# Patient Record
Sex: MALE | Race: WHITE | NOT HISPANIC OR LATINO
[De-identification: names, ages, dates, MRNs, and addresses within clinical notes are randomized per-mention and may not be internally consistent; named-entity substitution may affect disease eponyms.]

---

## 2017-01-30 ENCOUNTER — RX RENEWAL (OUTPATIENT)
Age: 82
End: 2017-01-30

## 2017-02-24 ENCOUNTER — APPOINTMENT (OUTPATIENT)
Dept: INTERNAL MEDICINE | Facility: CLINIC | Age: 82
End: 2017-02-24

## 2017-02-24 VITALS
OXYGEN SATURATION: 98 % | TEMPERATURE: 98.7 F | BODY MASS INDEX: 28.49 KG/M2 | HEIGHT: 68 IN | HEART RATE: 80 BPM | RESPIRATION RATE: 12 BRPM | WEIGHT: 188 LBS | DIASTOLIC BLOOD PRESSURE: 69 MMHG | SYSTOLIC BLOOD PRESSURE: 149 MMHG

## 2017-02-24 DIAGNOSIS — K40.90 UNILATERAL INGUINAL HERNIA, W/OUT OBSTRUCTION OR GANGRENE, NOT SPECIFIED AS RECURRENT: ICD-10-CM

## 2017-02-24 DIAGNOSIS — B35.4 TINEA CORPORIS: ICD-10-CM

## 2017-03-11 PROBLEM — K40.90 LEFT INGUINAL HERNIA: Status: RESOLVED | Noted: 2017-03-11 | Resolved: 2017-03-11

## 2017-03-11 LAB
25(OH)D3 SERPL-MCNC: 21.6 NG/ML
ALBUMIN SERPL ELPH-MCNC: 4.4 G/DL
ALP BLD-CCNC: 60 U/L
ALT SERPL-CCNC: 27 U/L
ANION GAP SERPL CALC-SCNC: 16 MMOL/L
AST SERPL-CCNC: 24 U/L
BACTERIA UR CULT: NORMAL
BILIRUB SERPL-MCNC: 0.2 MG/DL
BUN SERPL-MCNC: 17 MG/DL
CALCIUM SERPL-MCNC: 9.4 MG/DL
CHLORIDE SERPL-SCNC: 103 MMOL/L
CHOLEST SERPL-MCNC: 170 MG/DL
CHOLEST/HDLC SERPL: 4.5 RATIO
CO2 SERPL-SCNC: 20 MMOL/L
CREAT SERPL-MCNC: 1.03 MG/DL
CREAT SPEC-SCNC: 97 MG/DL
FOLATE SERPL-MCNC: >20 NG/ML
FRUCTOSAMINE SERPL-MCNC: 246 UMOL/L
GLUCOSE SERPL-MCNC: 97 MG/DL
HBA1C MFR BLD HPLC: 6 %
HDLC SERPL-MCNC: 38 MG/DL
IRON SERPL-MCNC: 76 UG/DL
LDLC SERPL CALC-MCNC: 80 MG/DL
MICROALBUMIN 24H UR DL<=1MG/L-MCNC: 1 MG/DL
MICROALBUMIN/CREAT 24H UR-RTO: 10 UG/MG
POTASSIUM SERPL-SCNC: 4.4 MMOL/L
PROT SERPL-MCNC: 7.7 G/DL
SODIUM SERPL-SCNC: 139 MMOL/L
T PALLIDUM AB SER QL IA: NEGATIVE
TRIGL SERPL-MCNC: 261 MG/DL
URATE SERPL-MCNC: 7.4 MG/DL
VIT B12 SERPL-MCNC: 433 PG/ML

## 2017-03-13 LAB
BASOPHILS # BLD AUTO: 0.01 K/UL
BASOPHILS NFR BLD AUTO: 0.2 %
EOSINOPHIL # BLD AUTO: 0.1 K/UL
EOSINOPHIL NFR BLD AUTO: 1.9 %
HCT VFR BLD CALC: 40 %
HGB BLD-MCNC: 13.1 G/DL
IMM GRANULOCYTES NFR BLD AUTO: 0.4 %
LYMPHOCYTES # BLD AUTO: 1.51 K/UL
LYMPHOCYTES NFR BLD AUTO: 28.1 %
MAN DIFF?: NORMAL
MCHC RBC-ENTMCNC: 32 PG
MCHC RBC-ENTMCNC: 32.8 GM/DL
MCV RBC AUTO: 97.8 FL
MONOCYTES # BLD AUTO: 0.74 K/UL
MONOCYTES NFR BLD AUTO: 13.8 %
NEUTROPHILS # BLD AUTO: 3 K/UL
NEUTROPHILS NFR BLD AUTO: 55.6 %
PLATELET # BLD AUTO: 244 K/UL
RBC # BLD: 4.09 M/UL
RBC # FLD: 13.7 %
WBC # FLD AUTO: 5.38 K/UL

## 2017-03-17 ENCOUNTER — APPOINTMENT (OUTPATIENT)
Dept: INTERNAL MEDICINE | Facility: CLINIC | Age: 82
End: 2017-03-17

## 2017-03-17 VITALS
DIASTOLIC BLOOD PRESSURE: 72 MMHG | TEMPERATURE: 98.3 F | WEIGHT: 187 LBS | RESPIRATION RATE: 12 BRPM | SYSTOLIC BLOOD PRESSURE: 143 MMHG | HEIGHT: 68 IN | OXYGEN SATURATION: 96 % | HEART RATE: 71 BPM | BODY MASS INDEX: 28.34 KG/M2

## 2017-03-17 DIAGNOSIS — L30.4 ERYTHEMA INTERTRIGO: ICD-10-CM

## 2017-04-13 PROBLEM — L30.4 INTERTRIGO: Status: ACTIVE | Noted: 2017-02-24

## 2017-07-31 ENCOUNTER — APPOINTMENT (OUTPATIENT)
Dept: INTERNAL MEDICINE | Facility: CLINIC | Age: 82
End: 2017-07-31
Payer: MEDICARE

## 2017-07-31 ENCOUNTER — LABORATORY RESULT (OUTPATIENT)
Age: 82
End: 2017-07-31

## 2017-07-31 VITALS
SYSTOLIC BLOOD PRESSURE: 135 MMHG | DIASTOLIC BLOOD PRESSURE: 72 MMHG | OXYGEN SATURATION: 95 % | WEIGHT: 183 LBS | BODY MASS INDEX: 27.74 KG/M2 | HEIGHT: 68 IN | TEMPERATURE: 98.5 F | HEART RATE: 69 BPM | RESPIRATION RATE: 12 BRPM

## 2017-07-31 PROCEDURE — 99214 OFFICE O/P EST MOD 30 MIN: CPT

## 2017-08-11 LAB
25(OH)D3 SERPL-MCNC: 40.3 NG/ML
ALBUMIN SERPL ELPH-MCNC: 4.3 G/DL
ALP BLD-CCNC: 50 U/L
ALT SERPL-CCNC: 22 U/L
ANION GAP SERPL CALC-SCNC: 17 MMOL/L
APPEARANCE: CLEAR
AST SERPL-CCNC: 21 U/L
BACTERIA UR CULT: NORMAL
BASOPHILS # BLD AUTO: 0.01 K/UL
BASOPHILS NFR BLD AUTO: 0.2 %
BILIRUB SERPL-MCNC: 0.4 MG/DL
BILIRUBIN URINE: NEGATIVE
BLOOD URINE: NEGATIVE
BUN SERPL-MCNC: 23 MG/DL
CALCIUM SERPL-MCNC: 10.4 MG/DL
CHLORIDE SERPL-SCNC: 103 MMOL/L
CHOLEST SERPL-MCNC: 137 MG/DL
CHOLEST/HDLC SERPL: 3.6 RATIO
CO2 SERPL-SCNC: 19 MMOL/L
COLOR: YELLOW
CREAT SERPL-MCNC: 1.3 MG/DL
CREAT SPEC-SCNC: 147 MG/DL
EOSINOPHIL # BLD AUTO: 0.05 K/UL
EOSINOPHIL NFR BLD AUTO: 1.1 %
FOLATE SERPL-MCNC: 19.1 NG/ML
FRUCTOSAMINE SERPL-MCNC: 247 UMOL/L
GLUCOSE QUALITATIVE U: NORMAL MG/DL
GLUCOSE SERPL-MCNC: 83 MG/DL
HBA1C MFR BLD HPLC: 5.9 %
HCT VFR BLD CALC: 39.5 %
HDLC SERPL-MCNC: 38 MG/DL
HGB BLD-MCNC: 13.1 G/DL
IMM GRANULOCYTES NFR BLD AUTO: 0.9 %
IRON SATN MFR SERPL: 38 %
IRON SERPL-MCNC: 94 UG/DL
KETONES URINE: NEGATIVE
LDLC SERPL CALC-MCNC: 59 MG/DL
LEUKOCYTE ESTERASE URINE: NEGATIVE
LYMPHOCYTES # BLD AUTO: 1.55 K/UL
LYMPHOCYTES NFR BLD AUTO: 35.1 %
MAN DIFF?: NORMAL
MCHC RBC-ENTMCNC: 32.1 PG
MCHC RBC-ENTMCNC: 33.2 GM/DL
MCV RBC AUTO: 96.8 FL
MICROALBUMIN 24H UR DL<=1MG/L-MCNC: 2.1 MG/DL
MICROALBUMIN/CREAT 24H UR-RTO: 14 MG/G
MONOCYTES # BLD AUTO: 0.83 K/UL
MONOCYTES NFR BLD AUTO: 18.8 %
NEUTROPHILS # BLD AUTO: 1.94 K/UL
NEUTROPHILS NFR BLD AUTO: 43.9 %
NITRITE URINE: NEGATIVE
PH URINE: 5.5
PLATELET # BLD AUTO: 229 K/UL
POTASSIUM SERPL-SCNC: 4.6 MMOL/L
PROT SERPL-MCNC: 8.1 G/DL
PROTEIN URINE: ABNORMAL MG/DL
RBC # BLD: 4.08 M/UL
RBC # FLD: 13.8 %
SODIUM SERPL-SCNC: 139 MMOL/L
SPECIFIC GRAVITY URINE: 1.02
TIBC SERPL-MCNC: 245 UG/DL
TRIGL SERPL-MCNC: 199 MG/DL
TSH SERPL-ACNC: 2.21 UIU/ML
UIBC SERPL-MCNC: 151 UG/DL
UROBILINOGEN URINE: NORMAL MG/DL
VIT B12 SERPL-MCNC: 434 PG/ML
WBC # FLD AUTO: 4.42 K/UL

## 2017-09-29 ENCOUNTER — APPOINTMENT (OUTPATIENT)
Dept: INTERNAL MEDICINE | Facility: CLINIC | Age: 82
End: 2017-09-29

## 2017-10-24 ENCOUNTER — APPOINTMENT (OUTPATIENT)
Dept: INTERNAL MEDICINE | Facility: CLINIC | Age: 82
End: 2017-10-24
Payer: MEDICARE

## 2017-10-24 VITALS — SYSTOLIC BLOOD PRESSURE: 140 MMHG | DIASTOLIC BLOOD PRESSURE: 60 MMHG

## 2017-10-24 VITALS
SYSTOLIC BLOOD PRESSURE: 151 MMHG | DIASTOLIC BLOOD PRESSURE: 74 MMHG | HEART RATE: 80 BPM | HEIGHT: 68 IN | TEMPERATURE: 98.4 F | RESPIRATION RATE: 12 BRPM | OXYGEN SATURATION: 95 % | WEIGHT: 184 LBS | BODY MASS INDEX: 27.89 KG/M2

## 2017-10-24 DIAGNOSIS — Z23 ENCOUNTER FOR IMMUNIZATION: ICD-10-CM

## 2017-10-24 DIAGNOSIS — L03.116 CELLULITIS OF LEFT LOWER LIMB: ICD-10-CM

## 2017-10-24 PROCEDURE — G0008: CPT

## 2017-10-24 PROCEDURE — 99214 OFFICE O/P EST MOD 30 MIN: CPT | Mod: 25

## 2017-10-24 PROCEDURE — 90686 IIV4 VACC NO PRSV 0.5 ML IM: CPT

## 2017-11-08 LAB
25(OH)D3 SERPL-MCNC: 41.1 NG/ML
ALBUMIN SERPL ELPH-MCNC: 4.3 G/DL
ALP BLD-CCNC: 47 U/L
ALT SERPL-CCNC: 20 U/L
ANION GAP SERPL CALC-SCNC: 14 MMOL/L
APPEARANCE: CLEAR
AST SERPL-CCNC: 24 U/L
BASOPHILS # BLD AUTO: 0.01 K/UL
BASOPHILS NFR BLD AUTO: 0.2 %
BILIRUB SERPL-MCNC: 0.3 MG/DL
BILIRUBIN URINE: NEGATIVE
BLOOD URINE: NEGATIVE
BUN SERPL-MCNC: 20 MG/DL
CALCIUM SERPL-MCNC: 9.3 MG/DL
CHLORIDE SERPL-SCNC: 104 MMOL/L
CHOLEST SERPL-MCNC: 143 MG/DL
CHOLEST/HDLC SERPL: 3.7 RATIO
CO2 SERPL-SCNC: 21 MMOL/L
COLOR: YELLOW
CREAT SERPL-MCNC: 1.06 MG/DL
CREAT SPEC-SCNC: 86 MG/DL
EOSINOPHIL # BLD AUTO: 0.08 K/UL
EOSINOPHIL NFR BLD AUTO: 1.4 %
GLUCOSE QUALITATIVE U: NEGATIVE MG/DL
GLUCOSE SERPL-MCNC: 81 MG/DL
HBA1C MFR BLD HPLC: 6 %
HCT VFR BLD CALC: 39.6 %
HDLC SERPL-MCNC: 39 MG/DL
HGB BLD-MCNC: 12.4 G/DL
IMM GRANULOCYTES NFR BLD AUTO: 0.3 %
KETONES URINE: NEGATIVE
LDLC SERPL CALC-MCNC: 70 MG/DL
LEUKOCYTE ESTERASE URINE: NEGATIVE
LYMPHOCYTES # BLD AUTO: 1.57 K/UL
LYMPHOCYTES NFR BLD AUTO: 27.4 %
MAN DIFF?: NORMAL
MCHC RBC-ENTMCNC: 31.3 GM/DL
MCHC RBC-ENTMCNC: 31.9 PG
MCV RBC AUTO: 101.8 FL
MICROALBUMIN 24H UR DL<=1MG/L-MCNC: 1.1 MG/DL
MICROALBUMIN/CREAT 24H UR-RTO: 13 MG/G
MONOCYTES # BLD AUTO: 0.87 K/UL
MONOCYTES NFR BLD AUTO: 15.2 %
NEUTROPHILS # BLD AUTO: 3.19 K/UL
NEUTROPHILS NFR BLD AUTO: 55.5 %
NITRITE URINE: NEGATIVE
PH URINE: 5
PLATELET # BLD AUTO: 229 K/UL
POTASSIUM SERPL-SCNC: 4.4 MMOL/L
PROT SERPL-MCNC: 8.3 G/DL
PROTEIN URINE: NEGATIVE MG/DL
RBC # BLD: 3.89 M/UL
RBC # FLD: 14.3 %
SODIUM SERPL-SCNC: 139 MMOL/L
SPECIFIC GRAVITY URINE: 1.02
TRIGL SERPL-MCNC: 168 MG/DL
UROBILINOGEN URINE: NEGATIVE MG/DL
WBC # FLD AUTO: 5.74 K/UL

## 2018-01-24 ENCOUNTER — RX RENEWAL (OUTPATIENT)
Age: 83
End: 2018-01-24

## 2018-02-13 ENCOUNTER — APPOINTMENT (OUTPATIENT)
Dept: INTERNAL MEDICINE | Facility: CLINIC | Age: 83
End: 2018-02-13
Payer: MEDICARE

## 2018-02-13 VITALS — SYSTOLIC BLOOD PRESSURE: 142 MMHG | DIASTOLIC BLOOD PRESSURE: 76 MMHG

## 2018-02-13 VITALS
TEMPERATURE: 98.4 F | SYSTOLIC BLOOD PRESSURE: 165 MMHG | BODY MASS INDEX: 28.79 KG/M2 | HEIGHT: 68 IN | WEIGHT: 190 LBS | HEART RATE: 74 BPM | OXYGEN SATURATION: 95 % | RESPIRATION RATE: 12 BRPM | DIASTOLIC BLOOD PRESSURE: 89 MMHG

## 2018-02-13 DIAGNOSIS — H53.9 UNSPECIFIED VISUAL DISTURBANCE: ICD-10-CM

## 2018-02-13 PROCEDURE — 99213 OFFICE O/P EST LOW 20 MIN: CPT | Mod: 25

## 2018-02-13 PROCEDURE — G0439: CPT

## 2018-02-25 LAB
25(OH)D3 SERPL-MCNC: 38.9 NG/ML
ALBUMIN SERPL ELPH-MCNC: 4.1 G/DL
ALP BLD-CCNC: 53 U/L
ALT SERPL-CCNC: 17 U/L
ANION GAP SERPL CALC-SCNC: 18 MMOL/L
AST SERPL-CCNC: 24 U/L
BILIRUB SERPL-MCNC: 0.4 MG/DL
BUN SERPL-MCNC: 18 MG/DL
CALCIUM SERPL-MCNC: 9.7 MG/DL
CHLORIDE SERPL-SCNC: 103 MMOL/L
CHOLEST SERPL-MCNC: 138 MG/DL
CHOLEST/HDLC SERPL: 3.5 RATIO
CO2 SERPL-SCNC: 20 MMOL/L
CREAT SERPL-MCNC: 1.19 MG/DL
FOLATE SERPL-MCNC: >20 NG/ML
GLUCOSE SERPL-MCNC: 83 MG/DL
HBA1C MFR BLD HPLC: 5.9 %
HDLC SERPL-MCNC: 40 MG/DL
IRON SATN MFR SERPL: 33 %
IRON SERPL-MCNC: 79 UG/DL
LDLC SERPL CALC-MCNC: 61 MG/DL
POTASSIUM SERPL-SCNC: 4.3 MMOL/L
PROT SERPL-MCNC: 8.3 G/DL
PSA SERPL-MCNC: 1.88 NG/ML
SODIUM SERPL-SCNC: 141 MMOL/L
TIBC SERPL-MCNC: 237 UG/DL
TRIGL SERPL-MCNC: 187 MG/DL
TSH SERPL-ACNC: 2.25 UIU/ML
UIBC SERPL-MCNC: 158 UG/DL
VIT B12 SERPL-MCNC: 532 PG/ML
VZV AB TITR SER: POSITIVE
VZV IGG SER IF-ACNC: 2336 INDEX

## 2018-03-20 LAB
BASOPHILS # BLD AUTO: 0.01 K/UL
BASOPHILS NFR BLD AUTO: 0.2 %
EOSINOPHIL # BLD AUTO: 0.05 K/UL
EOSINOPHIL NFR BLD AUTO: 0.9 %
HCT VFR BLD CALC: 39.7 %
HGB BLD-MCNC: 13.2 G/DL
IMM GRANULOCYTES NFR BLD AUTO: 0.8 %
LYMPHOCYTES # BLD AUTO: 1.94 K/UL
LYMPHOCYTES NFR BLD AUTO: 36.8 %
MAN DIFF?: NORMAL
MCHC RBC-ENTMCNC: 32.3 PG
MCHC RBC-ENTMCNC: 33.2 GM/DL
MCV RBC AUTO: 97.1 FL
MONOCYTES # BLD AUTO: 0.88 K/UL
MONOCYTES NFR BLD AUTO: 16.7 %
NEUTROPHILS # BLD AUTO: 2.35 K/UL
NEUTROPHILS NFR BLD AUTO: 44.6 %
PLATELET # BLD AUTO: 224 K/UL
RBC # BLD: 4.09 M/UL
RBC # FLD: 13.8 %
WBC # FLD AUTO: 5.27 K/UL

## 2018-04-19 ENCOUNTER — RX RENEWAL (OUTPATIENT)
Age: 83
End: 2018-04-19

## 2018-04-19 RX ORDER — CEPHALEXIN 500 MG/1
500 TABLET ORAL 3 TIMES DAILY
Qty: 30 | Refills: 0 | Status: COMPLETED | COMMUNITY
Start: 2017-10-24 | End: 2018-04-19

## 2018-04-20 ENCOUNTER — MEDICATION RENEWAL (OUTPATIENT)
Age: 83
End: 2018-04-20

## 2018-04-25 ENCOUNTER — RX RENEWAL (OUTPATIENT)
Age: 83
End: 2018-04-25

## 2018-05-03 ENCOUNTER — APPOINTMENT (OUTPATIENT)
Dept: INTERNAL MEDICINE | Facility: CLINIC | Age: 83
End: 2018-05-03
Payer: MEDICARE

## 2018-05-03 VITALS
HEIGHT: 68 IN | HEART RATE: 78 BPM | OXYGEN SATURATION: 96 % | TEMPERATURE: 98 F | SYSTOLIC BLOOD PRESSURE: 133 MMHG | RESPIRATION RATE: 12 BRPM | BODY MASS INDEX: 27.74 KG/M2 | DIASTOLIC BLOOD PRESSURE: 71 MMHG | WEIGHT: 183 LBS

## 2018-05-03 DIAGNOSIS — R21 RASH AND OTHER NONSPECIFIC SKIN ERUPTION: ICD-10-CM

## 2018-05-03 DIAGNOSIS — R04.0 EPISTAXIS: ICD-10-CM

## 2018-05-03 PROCEDURE — 99214 OFFICE O/P EST MOD 30 MIN: CPT

## 2018-07-01 DIAGNOSIS — M25.579 PAIN IN UNSPECIFIED ANKLE AND JOINTS OF UNSPECIFIED FOOT: ICD-10-CM

## 2018-08-14 PROBLEM — M25.579 ANKLE PAIN: Status: ACTIVE | Noted: 2018-08-14

## 2018-10-11 ENCOUNTER — APPOINTMENT (OUTPATIENT)
Dept: INTERNAL MEDICINE | Facility: CLINIC | Age: 83
End: 2018-10-11
Payer: MEDICARE

## 2018-10-11 VITALS
SYSTOLIC BLOOD PRESSURE: 145 MMHG | HEIGHT: 68 IN | DIASTOLIC BLOOD PRESSURE: 75 MMHG | RESPIRATION RATE: 12 BRPM | TEMPERATURE: 99.2 F | BODY MASS INDEX: 27.58 KG/M2 | WEIGHT: 182 LBS | OXYGEN SATURATION: 94 % | HEART RATE: 75 BPM

## 2018-10-11 VITALS — DIASTOLIC BLOOD PRESSURE: 66 MMHG | SYSTOLIC BLOOD PRESSURE: 110 MMHG

## 2018-10-11 DIAGNOSIS — E55.9 VITAMIN D DEFICIENCY, UNSPECIFIED: ICD-10-CM

## 2018-10-11 DIAGNOSIS — M10.9 GOUT, UNSPECIFIED: ICD-10-CM

## 2018-10-11 PROCEDURE — 90686 IIV4 VACC NO PRSV 0.5 ML IM: CPT

## 2018-10-11 PROCEDURE — G0008: CPT

## 2018-10-11 PROCEDURE — 99214 OFFICE O/P EST MOD 30 MIN: CPT | Mod: 25

## 2018-10-24 ENCOUNTER — APPOINTMENT (OUTPATIENT)
Dept: INTERNAL MEDICINE | Facility: CLINIC | Age: 83
End: 2018-10-24
Payer: MEDICARE

## 2018-10-24 VITALS
WEIGHT: 183 LBS | HEIGHT: 68 IN | DIASTOLIC BLOOD PRESSURE: 72 MMHG | SYSTOLIC BLOOD PRESSURE: 164 MMHG | RESPIRATION RATE: 12 BRPM | BODY MASS INDEX: 27.74 KG/M2 | OXYGEN SATURATION: 97 % | TEMPERATURE: 98.2 F | HEART RATE: 62 BPM

## 2018-10-24 VITALS — DIASTOLIC BLOOD PRESSURE: 76 MMHG | SYSTOLIC BLOOD PRESSURE: 140 MMHG

## 2018-10-24 DIAGNOSIS — L23.7 ALLERGIC CONTACT DERMATITIS DUE TO PLANTS, EXCEPT FOOD: ICD-10-CM

## 2018-10-24 PROCEDURE — 99214 OFFICE O/P EST MOD 30 MIN: CPT

## 2018-10-25 LAB
25(OH)D3 SERPL-MCNC: 27.3 NG/ML
ALBUMIN SERPL ELPH-MCNC: 4.4 G/DL
ALP BLD-CCNC: 47 U/L
ALT SERPL-CCNC: 20 U/L
ANION GAP SERPL CALC-SCNC: 13 MMOL/L
APPEARANCE: CLEAR
AST SERPL-CCNC: 21 U/L
BILIRUB SERPL-MCNC: 0.4 MG/DL
BILIRUBIN URINE: NEGATIVE
BLOOD URINE: NEGATIVE
BUN SERPL-MCNC: 18 MG/DL
CALCIUM SERPL-MCNC: 9.6 MG/DL
CHLORIDE SERPL-SCNC: 103 MMOL/L
CHOLEST SERPL-MCNC: 137 MG/DL
CHOLEST/HDLC SERPL: 3.6 RATIO
CO2 SERPL-SCNC: 23 MMOL/L
COLOR: YELLOW
CREAT SERPL-MCNC: 1.18 MG/DL
CRP SERPL-MCNC: 0.16 MG/DL
ERYTHROCYTE [SEDIMENTATION RATE] IN BLOOD BY WESTERGREN METHOD: 25 MM/HR
ESTIMATED AVERAGE GLUCOSE: 123 MG/DL
FOLATE SERPL-MCNC: >20 NG/ML
GLUCOSE QUALITATIVE U: NEGATIVE MG/DL
GLUCOSE SERPL-MCNC: 94 MG/DL
HBA1C MFR BLD HPLC: 5.9 %
HDLC SERPL-MCNC: 38 MG/DL
IRON SERPL-MCNC: 102 UG/DL
KETONES URINE: NEGATIVE
LDLC SERPL CALC-MCNC: 72 MG/DL
LEUKOCYTE ESTERASE URINE: NEGATIVE
NITRITE URINE: NEGATIVE
PH URINE: 5
POTASSIUM SERPL-SCNC: 4.2 MMOL/L
PROT SERPL-MCNC: 7.9 G/DL
PROTEIN URINE: NEGATIVE MG/DL
PSA SERPL-MCNC: 1.93 NG/ML
SODIUM SERPL-SCNC: 139 MMOL/L
SPECIFIC GRAVITY URINE: 1.01
TRIGL SERPL-MCNC: 135 MG/DL
TSH SERPL-ACNC: 3.18 UIU/ML
URATE SERPL-MCNC: 7.6 MG/DL
UROBILINOGEN URINE: NEGATIVE MG/DL
VIT B12 SERPL-MCNC: 491 PG/ML

## 2018-10-25 NOTE — PHYSICAL EXAM
[No Acute Distress] : no acute distress [Well Nourished] : well nourished [Well Developed] : well developed [Well-Appearing] : well-appearing [Normal Voice/Communication] : normal voice/communication [Normal Sclera/Conjunctiva] : normal sclera/conjunctiva [EOMI] : extraocular movements intact [Normal Outer Ear/Nose] : the outer ears and nose were normal in appearance [Normal Oropharynx] : the oropharynx was normal [Normal TMs] : both tympanic membranes were normal [No JVD] : no jugular venous distention [Supple] : supple [No Lymphadenopathy] : no lymphadenopathy [Thyroid Normal, No Nodules] : the thyroid was normal and there were no nodules present [No Respiratory Distress] : no respiratory distress  [Clear to Auscultation] : lungs were clear to auscultation bilaterally [No Accessory Muscle Use] : no accessory muscle use [Normal Rate] : normal rate  [Regular Rhythm] : with a regular rhythm [Normal S1, S2] : normal S1 and S2 [No Murmur] : no murmur heard [No Carotid Bruits] : no carotid bruits [No Edema] : there was no peripheral edema [Soft] : abdomen soft [Non Tender] : non-tender [Non-distended] : non-distended [No Masses] : no abdominal mass palpated [No HSM] : no HSM [Normal Bowel Sounds] : normal bowel sounds [Normal Supraclavicular Nodes] : no supraclavicular lymphadenopathy [Normal Posterior Cervical Nodes] : no posterior cervical lymphadenopathy [Normal Anterior Cervical Nodes] : no anterior cervical lymphadenopathy [No Joint Swelling] : no joint swelling [Grossly Normal Strength/Tone] : grossly normal strength/tone [No Rash] : no rash [No Skin Lesions] : no skin lesions [Normal Gait] : normal gait [Coordination Grossly Intact] : coordination grossly intact [No Focal Deficits] : no focal deficits [Speech Grossly Normal] : speech grossly normal [Memory Grossly Normal] : memory grossly normal [Normal Affect] : the affect was normal [Alert and Oriented x3] : oriented to person, place, and time [Normal Mood] : the mood was normal [Normal Insight/Judgement] : insight and judgment were intact [de-identified] : elderly male

## 2018-10-25 NOTE — HISTORY OF PRESENT ILLNESS
[FreeTextEntry1] : F/U [de-identified] : 83 yr old male w/atherosclerosis, IGT, HLD, GERD, and COPD presents today for CPE/annual wellness exam, presenting today with the following: \par \par COPD, GERD, IGT, HLD, Vit D Def = w/o sx. Has enough meds. Currently pt denies any N/V/D, weakness, malaise, F/C/NS, HA, LH, LOC, COV, palpitations, CP, SOB, RAMIREZ, hemoptysis, abdominal pain, edema, claudication, limb weakness or paresthesias, polydipsia, polyphagia, polyuria, weight loss, hyperhidrosis, tremors, agitation, urinary F/E/D, melena, or BRBPR.\par \par Was recently dx'd w/gout, with sharp foot pain requiring steroid injection and sent with oral steroids.  Since then no more sx.

## 2018-10-25 NOTE — REVIEW OF SYSTEMS
[Negative] : Neurological [Easy Bleeding] : no easy bleeding [Easy Bruising] : no easy bruising [Swollen Glands] : no swollen glands [FreeTextEntry9] : right foot pain s/p gouty attack.

## 2018-10-30 LAB
BASOPHILS # BLD AUTO: 0.01 K/UL
BASOPHILS NFR BLD AUTO: 0.2 %
EOSINOPHIL # BLD AUTO: 0.06 K/UL
EOSINOPHIL NFR BLD AUTO: 1.3 %
HCT VFR BLD CALC: 39.1 %
HGB BLD-MCNC: 12.6 G/DL
IMM GRANULOCYTES NFR BLD AUTO: 0.6 %
LYMPHOCYTES # BLD AUTO: 2.29 K/UL
LYMPHOCYTES NFR BLD AUTO: 48.7 %
MAN DIFF?: NORMAL
MCHC RBC-ENTMCNC: 31.5 PG
MCHC RBC-ENTMCNC: 32.2 GM/DL
MCV RBC AUTO: 97.8 FL
MONOCYTES # BLD AUTO: 0.66 K/UL
MONOCYTES NFR BLD AUTO: 14 %
NEUTROPHILS # BLD AUTO: 1.65 K/UL
NEUTROPHILS NFR BLD AUTO: 35.2 %
PLATELET # BLD AUTO: 234 K/UL
RBC # BLD: 4 M/UL
RBC # FLD: 14.3 %
WBC # FLD AUTO: 4.7 K/UL

## 2018-11-04 NOTE — PHYSICAL EXAM
[No Acute Distress] : no acute distress [Normal Sclera/Conjunctiva] : normal sclera/conjunctiva [Normal Outer Ear/Nose] : the outer ears and nose were normal in appearance [No JVD] : no jugular venous distention [No Respiratory Distress] : no respiratory distress  [Clear to Auscultation] : lungs were clear to auscultation bilaterally [No Accessory Muscle Use] : no accessory muscle use [Normal Rate] : normal rate  [Regular Rhythm] : with a regular rhythm [Normal S1, S2] : normal S1 and S2 [No Edema] : there was no peripheral edema [Soft] : abdomen soft [Non Tender] : non-tender [Normal Anterior Cervical Nodes] : no anterior cervical lymphadenopathy [No CVA Tenderness] : no CVA  tenderness [No Joint Swelling] : no joint swelling [Normal Gait] : normal gait [Normal Insight/Judgement] : insight and judgment were intact [de-identified] : + II/VI murmur [de-identified] : + erythematous itchy rash b/l forearms

## 2018-11-04 NOTE — HISTORY OF PRESENT ILLNESS
[FreeTextEntry1] : F/U [de-identified] : 83 yr old male w/atherosclerosis, IGT, HLD, GERD, and COPD presents for follow up visit to discuss lab results\par He c/o itchy rash  b/l forearm that he has had for about a week. Says he was working in his garden.\par Has been using neosporin. Says it's a little better the rash but still itchy

## 2018-11-04 NOTE — PLAN
[FreeTextEntry1] : Poison ivy\par ultravate cream\par \par Hyperlipidemia\par cont atorvastatin 10mg po daily, low fat diet\par \par Lab results reviwed with pt

## 2019-01-04 ENCOUNTER — APPOINTMENT (OUTPATIENT)
Dept: INTERNAL MEDICINE | Facility: CLINIC | Age: 84
End: 2019-01-04
Payer: MEDICARE

## 2019-01-04 VITALS
HEART RATE: 84 BPM | OXYGEN SATURATION: 96 % | WEIGHT: 186 LBS | BODY MASS INDEX: 28.19 KG/M2 | DIASTOLIC BLOOD PRESSURE: 72 MMHG | HEIGHT: 68 IN | SYSTOLIC BLOOD PRESSURE: 151 MMHG | RESPIRATION RATE: 12 BRPM | TEMPERATURE: 97.8 F

## 2019-01-04 PROCEDURE — 99214 OFFICE O/P EST MOD 30 MIN: CPT

## 2019-01-27 NOTE — PHYSICAL EXAM
[No Acute Distress] : no acute distress [Well Nourished] : well nourished [Well Developed] : well developed [Well-Appearing] : well-appearing [Normal Sclera/Conjunctiva] : normal sclera/conjunctiva [EOMI] : extraocular movements intact [Normal Outer Ear/Nose] : the outer ears and nose were normal in appearance [Normal Oropharynx] : the oropharynx was normal [Normal TMs] : both tympanic membranes were normal [No JVD] : no jugular venous distention [Supple] : supple [No Lymphadenopathy] : no lymphadenopathy [Thyroid Normal, No Nodules] : the thyroid was normal and there were no nodules present [No Respiratory Distress] : no respiratory distress  [No Accessory Muscle Use] : no accessory muscle use [Normal Rate] : normal rate  [Regular Rhythm] : with a regular rhythm [Normal S1, S2] : normal S1 and S2 [No Murmur] : no murmur heard [No Carotid Bruits] : no carotid bruits [Pedal Pulses Present] : the pedal pulses are present [No Edema] : there was no peripheral edema [Soft] : abdomen soft [Non Tender] : non-tender [Non-distended] : non-distended [No Masses] : no abdominal mass palpated [No HSM] : no HSM [Normal Bowel Sounds] : normal bowel sounds [Normal Posterior Cervical Nodes] : no posterior cervical lymphadenopathy [Normal Anterior Cervical Nodes] : no anterior cervical lymphadenopathy [No CVA Tenderness] : no CVA  tenderness [No Spinal Tenderness] : no spinal tenderness [No Joint Swelling] : no joint swelling [Grossly Normal Strength/Tone] : grossly normal strength/tone [No Rash] : no rash [Normal Gait] : normal gait [Coordination Grossly Intact] : coordination grossly intact [No Focal Deficits] : no focal deficits [Normal Affect] : the affect was normal [Normal Insight/Judgement] : insight and judgment were intact [de-identified] : + coarse B.S

## 2019-01-27 NOTE — HISTORY OF PRESENT ILLNESS
[de-identified] : 84 year old male presents with URI x 3 days: +  cough productive of yellow sputum  / runny nose.He denies associated fever, chills, symptoms getting  worse at night \par He takes Nasonex prn without much improvement.

## 2019-01-27 NOTE — REVIEW OF SYSTEMS
[Nasal Discharge] : nasal discharge [Sore Throat] : sore throat [Wheezing] : no wheezing [Cough] : cough [Negative] : Heme/Lymph

## 2019-02-05 ENCOUNTER — APPOINTMENT (OUTPATIENT)
Dept: INTERNAL MEDICINE | Facility: CLINIC | Age: 84
End: 2019-02-05
Payer: MEDICARE

## 2019-02-05 VITALS
SYSTOLIC BLOOD PRESSURE: 132 MMHG | HEIGHT: 68 IN | DIASTOLIC BLOOD PRESSURE: 70 MMHG | WEIGHT: 188 LBS | HEART RATE: 72 BPM | OXYGEN SATURATION: 100 % | TEMPERATURE: 98.4 F | BODY MASS INDEX: 28.49 KG/M2 | RESPIRATION RATE: 12 BRPM

## 2019-02-05 DIAGNOSIS — K21.9 GASTRO-ESOPHAGEAL REFLUX DISEASE W/OUT ESOPHAGITIS: ICD-10-CM

## 2019-02-05 PROCEDURE — 99214 OFFICE O/P EST MOD 30 MIN: CPT

## 2019-02-18 NOTE — HISTORY OF PRESENT ILLNESS
[de-identified] : 83 year old male with Hx of atherosclerosis, HLD, GERD, COPD presents for follow up visit\par He is doing well. Reports compliance with taking his meds.\par Offers no complaints

## 2019-02-18 NOTE — PHYSICAL EXAM
[No Acute Distress] : no acute distress [Normal Sclera/Conjunctiva] : normal sclera/conjunctiva [Normal Outer Ear/Nose] : the outer ears and nose were normal in appearance [No JVD] : no jugular venous distention [No Respiratory Distress] : no respiratory distress  [Clear to Auscultation] : lungs were clear to auscultation bilaterally [No Accessory Muscle Use] : no accessory muscle use [Normal Rate] : normal rate  [Regular Rhythm] : with a regular rhythm [Normal S1, S2] : normal S1 and S2 [No Edema] : there was no peripheral edema [Soft] : abdomen soft [Non Tender] : non-tender [Normal Anterior Cervical Nodes] : no anterior cervical lymphadenopathy [No CVA Tenderness] : no CVA  tenderness [No Joint Swelling] : no joint swelling [No Rash] : no rash [Normal Gait] : normal gait [Normal Insight/Judgement] : insight and judgment were intact [de-identified] : + II/VI murmur

## 2019-02-18 NOTE — PLAN
[FreeTextEntry1] : HLD, GERD, and COPD\par cont current meds\par does not want blood work today\par \par f/u in 3 months

## 2019-07-30 ENCOUNTER — RX RENEWAL (OUTPATIENT)
Age: 84
End: 2019-07-30

## 2019-09-11 ENCOUNTER — APPOINTMENT (OUTPATIENT)
Dept: INTERNAL MEDICINE | Facility: CLINIC | Age: 84
End: 2019-09-11
Payer: MEDICARE

## 2019-09-11 VITALS
HEART RATE: 69 BPM | SYSTOLIC BLOOD PRESSURE: 132 MMHG | WEIGHT: 183 LBS | DIASTOLIC BLOOD PRESSURE: 66 MMHG | HEIGHT: 68 IN | RESPIRATION RATE: 12 BRPM | BODY MASS INDEX: 27.74 KG/M2 | TEMPERATURE: 98.2 F | OXYGEN SATURATION: 97 %

## 2019-09-11 DIAGNOSIS — R73.02 IMPAIRED GLUCOSE TOLERANCE (ORAL): ICD-10-CM

## 2019-09-11 PROCEDURE — 99214 OFFICE O/P EST MOD 30 MIN: CPT

## 2019-09-15 LAB
ALBUMIN SERPL ELPH-MCNC: 4.4 G/DL
ALP BLD-CCNC: 49 U/L
ALT SERPL-CCNC: 18 U/L
ANION GAP SERPL CALC-SCNC: 13 MMOL/L
AST SERPL-CCNC: 19 U/L
BILIRUB SERPL-MCNC: 0.4 MG/DL
BUN SERPL-MCNC: 16 MG/DL
CALCIUM SERPL-MCNC: 9 MG/DL
CHLORIDE SERPL-SCNC: 105 MMOL/L
CO2 SERPL-SCNC: 21 MMOL/L
CREAT SERPL-MCNC: 1.05 MG/DL
GLUCOSE SERPL-MCNC: 89 MG/DL
POTASSIUM SERPL-SCNC: 4.3 MMOL/L
PROT SERPL-MCNC: 7.7 G/DL
SODIUM SERPL-SCNC: 139 MMOL/L

## 2019-09-15 NOTE — HISTORY OF PRESENT ILLNESS
[de-identified] : 84 year old male, accompanied by his wife, with Hx of atherosclerosis, HLD, GERD, COPD presents for follow up visit, blood work\par He is doing well. Says he does not take the Lipitor everyday. he takes it few times a week ( like 2-3) \par Offers no complaints

## 2019-09-15 NOTE — PHYSICAL EXAM
[No Acute Distress] : no acute distress [Normal Sclera/Conjunctiva] : normal sclera/conjunctiva [Normal Outer Ear/Nose] : the outer ears and nose were normal in appearance [No JVD] : no jugular venous distention [Clear to Auscultation] : lungs were clear to auscultation bilaterally [No Respiratory Distress] : no respiratory distress  [No Accessory Muscle Use] : no accessory muscle use [Regular Rhythm] : with a regular rhythm [Normal S1, S2] : normal S1 and S2 [Normal Rate] : normal rate  [No Edema] : there was no peripheral edema [Non Tender] : non-tender [Soft] : abdomen soft [Normal Anterior Cervical Nodes] : no anterior cervical lymphadenopathy [No CVA Tenderness] : no CVA  tenderness [No Rash] : no rash [No Joint Swelling] : no joint swelling [Normal Gait] : normal gait [Normal Insight/Judgement] : insight and judgment were intact [de-identified] : + II/VI murmur

## 2019-09-15 NOTE — PLAN
[FreeTextEntry1] : HLD, GERD, and COPD\par cont current meds\par blood work ordered\par \par follow up in one week for lab results\par

## 2019-09-18 LAB
CHOLEST SERPL-MCNC: 139 MG/DL
CHOLEST/HDLC SERPL: 3.9 RATIO
ESTIMATED AVERAGE GLUCOSE: 123 MG/DL
HBA1C MFR BLD HPLC: 5.9 %
HDLC SERPL-MCNC: 36 MG/DL
LDLC SERPL CALC-MCNC: 73 MG/DL
TRIGL SERPL-MCNC: 148 MG/DL

## 2019-10-16 ENCOUNTER — APPOINTMENT (OUTPATIENT)
Dept: INTERNAL MEDICINE | Facility: CLINIC | Age: 84
End: 2019-10-16
Payer: MEDICARE

## 2019-10-16 VITALS
TEMPERATURE: 98.2 F | OXYGEN SATURATION: 96 % | RESPIRATION RATE: 1 BRPM | HEIGHT: 68 IN | DIASTOLIC BLOOD PRESSURE: 74 MMHG | BODY MASS INDEX: 28.04 KG/M2 | SYSTOLIC BLOOD PRESSURE: 143 MMHG | HEART RATE: 65 BPM | WEIGHT: 185 LBS

## 2019-10-16 PROCEDURE — 90653 IIV ADJUVANT VACCINE IM: CPT

## 2019-10-16 PROCEDURE — 99214 OFFICE O/P EST MOD 30 MIN: CPT | Mod: 25

## 2019-10-16 PROCEDURE — G0008: CPT

## 2019-10-23 NOTE — HISTORY OF PRESENT ILLNESS
[de-identified] : 84 year old male, accompanied by his wife, with Hx of atherosclerosis, HLD, GERD, COPD presents for follow up visit to discuss lab results\par

## 2019-10-23 NOTE — ASSESSMENT
[FreeTextEntry1] : Lab results reviewed with pt\par HgA1c 5.9-unchanged from previous\par Lipid panel-normal-cont meds\par \par Hx of HLD, GERD, and COPD\par cont current meds\par \par flu vaccine administered

## 2019-10-23 NOTE — PHYSICAL EXAM
[No Acute Distress] : no acute distress [Normal Outer Ear/Nose] : the outer ears and nose were normal in appearance [Normal Sclera/Conjunctiva] : normal sclera/conjunctiva [No JVD] : no jugular venous distention [No Respiratory Distress] : no respiratory distress  [No Accessory Muscle Use] : no accessory muscle use [Clear to Auscultation] : lungs were clear to auscultation bilaterally [Normal Rate] : normal rate  [Normal S1, S2] : normal S1 and S2 [No Edema] : there was no peripheral edema [Regular Rhythm] : with a regular rhythm [Non Tender] : non-tender [Soft] : abdomen soft [No CVA Tenderness] : no CVA  tenderness [Normal Anterior Cervical Nodes] : no anterior cervical lymphadenopathy [No Joint Swelling] : no joint swelling [No Rash] : no rash [Normal Gait] : normal gait [Normal Insight/Judgement] : insight and judgment were intact [de-identified] : + II/VI murmur

## 2019-11-07 ENCOUNTER — RX RENEWAL (OUTPATIENT)
Age: 84
End: 2019-11-07

## 2019-11-14 ENCOUNTER — MEDICATION RENEWAL (OUTPATIENT)
Age: 84
End: 2019-11-14

## 2020-01-15 ENCOUNTER — APPOINTMENT (OUTPATIENT)
Dept: INTERNAL MEDICINE | Facility: CLINIC | Age: 85
End: 2020-01-15
Payer: MEDICARE

## 2020-01-15 VITALS
SYSTOLIC BLOOD PRESSURE: 145 MMHG | WEIGHT: 188 LBS | BODY MASS INDEX: 28.49 KG/M2 | HEART RATE: 79 BPM | OXYGEN SATURATION: 95 % | TEMPERATURE: 98.7 F | DIASTOLIC BLOOD PRESSURE: 72 MMHG | RESPIRATION RATE: 12 BRPM | HEIGHT: 68 IN

## 2020-01-15 DIAGNOSIS — J34.89 NASAL CONGESTION: ICD-10-CM

## 2020-01-15 DIAGNOSIS — R09.81 NASAL CONGESTION: ICD-10-CM

## 2020-01-15 DIAGNOSIS — J44.9 CHRONIC OBSTRUCTIVE PULMONARY DISEASE, UNSPECIFIED: ICD-10-CM

## 2020-01-15 PROCEDURE — 99214 OFFICE O/P EST MOD 30 MIN: CPT

## 2020-02-18 NOTE — HISTORY OF PRESENT ILLNESS
[de-identified] : 85 year old male, accompanied by his wife, with Hx of atherosclerosis, HLD, GERD, COPD presents for follow up visit \par He complaints of having runny nose for few weeks now\par \par

## 2020-02-18 NOTE — ASSESSMENT
[FreeTextEntry1] :  rhinorrhea\par triamcinolone nasal spray\par \par Hx of atherosclerosis, HLD, GERD, COPD\par cont current meds\par blood work ordered\par \par follow up in one week for lab results\par

## 2020-02-18 NOTE — PHYSICAL EXAM
[Normal Sclera/Conjunctiva] : normal sclera/conjunctiva [Normal Outer Ear/Nose] : the outer ears and nose were normal in appearance [No JVD] : no jugular venous distention [Normal] : no respiratory distress, lungs were clear to auscultation bilaterally and no accessory muscle use [Normal Rate] : normal rate  [Regular Rhythm] : with a regular rhythm [Normal S1, S2] : normal S1 and S2 [No Edema] : there was no peripheral edema [Soft] : abdomen soft [Non Tender] : non-tender [Non-distended] : non-distended [Normal Anterior Cervical Nodes] : no anterior cervical lymphadenopathy [No CVA Tenderness] : no CVA  tenderness [No Joint Swelling] : no joint swelling [No Rash] : no rash [Normal Gait] : normal gait [Alert and Oriented x3] : oriented to person, place, and time [Normal Insight/Judgement] : insight and judgment were intact [de-identified] : II/VI murmur

## 2020-03-17 ENCOUNTER — APPOINTMENT (OUTPATIENT)
Dept: INTERNAL MEDICINE | Facility: CLINIC | Age: 85
End: 2020-03-17
Payer: MEDICARE

## 2020-03-17 VITALS
TEMPERATURE: 98.1 F | WEIGHT: 188 LBS | RESPIRATION RATE: 12 BRPM | HEIGHT: 68 IN | SYSTOLIC BLOOD PRESSURE: 140 MMHG | DIASTOLIC BLOOD PRESSURE: 56 MMHG | BODY MASS INDEX: 28.49 KG/M2 | OXYGEN SATURATION: 99 % | HEART RATE: 75 BPM

## 2020-03-17 DIAGNOSIS — I70.90 UNSPECIFIED ATHEROSCLEROSIS: ICD-10-CM

## 2020-03-17 PROCEDURE — 99214 OFFICE O/P EST MOD 30 MIN: CPT

## 2020-03-17 NOTE — PHYSICAL EXAM
[Normal Sclera/Conjunctiva] : normal sclera/conjunctiva [Normal Outer Ear/Nose] : the outer ears and nose were normal in appearance [No JVD] : no jugular venous distention [Normal] : no respiratory distress, lungs were clear to auscultation bilaterally and no accessory muscle use [Normal Rate] : normal rate  [Regular Rhythm] : with a regular rhythm [Normal S1, S2] : normal S1 and S2 [No Edema] : there was no peripheral edema [Soft] : abdomen soft [Non Tender] : non-tender [Non-distended] : non-distended [Normal Anterior Cervical Nodes] : no anterior cervical lymphadenopathy [No CVA Tenderness] : no CVA  tenderness [No Joint Swelling] : no joint swelling [No Rash] : no rash [Normal Gait] : normal gait [Alert and Oriented x3] : oriented to person, place, and time [Normal Insight/Judgement] : insight and judgment were intact [de-identified] : II/VI murmur

## 2020-03-17 NOTE — HISTORY OF PRESENT ILLNESS
[de-identified] : 85 year old male, accompanied by his wife, with Hx of atherosclerosis, HLD, GERD, COPD presents for follow up visit and blood work\par He is doing well, offers no complaints\par Blood work was ordered on his last visit but pt did not get it done\par \par

## 2020-03-17 NOTE — ASSESSMENT
[FreeTextEntry1] : Hx of atherosclerosis, HLD, GERD, COPD\par cont current meds\par blood work ordered\par \par chronic rhinorrhea\par cont triamcinolone-renewed today\par \par follow up in one week for lab results\par

## 2020-04-02 PROBLEM — R09.81 NASAL CONGESTION WITH RHINORRHEA: Status: ACTIVE | Noted: 2017-03-17

## 2020-05-13 ENCOUNTER — APPOINTMENT (OUTPATIENT)
Dept: INTERNAL MEDICINE | Facility: CLINIC | Age: 85
End: 2020-05-13
Payer: MEDICARE

## 2020-05-13 VITALS
DIASTOLIC BLOOD PRESSURE: 73 MMHG | RESPIRATION RATE: 12 BRPM | OXYGEN SATURATION: 95 % | BODY MASS INDEX: 27.43 KG/M2 | HEIGHT: 68 IN | HEART RATE: 84 BPM | SYSTOLIC BLOOD PRESSURE: 155 MMHG | WEIGHT: 181 LBS | TEMPERATURE: 98.7 F

## 2020-05-13 PROCEDURE — 99214 OFFICE O/P EST MOD 30 MIN: CPT

## 2020-05-13 NOTE — PHYSICAL EXAM
[Normal Sclera/Conjunctiva] : normal sclera/conjunctiva [Normal Outer Ear/Nose] : the outer ears and nose were normal in appearance [No JVD] : no jugular venous distention [Normal] : no respiratory distress, lungs were clear to auscultation bilaterally and no accessory muscle use [Normal Rate] : normal rate  [Regular Rhythm] : with a regular rhythm [Normal S1, S2] : normal S1 and S2 [No Edema] : there was no peripheral edema [Soft] : abdomen soft [Non-distended] : non-distended [Non Tender] : non-tender [No CVA Tenderness] : no CVA  tenderness [Normal Anterior Cervical Nodes] : no anterior cervical lymphadenopathy [No Rash] : no rash [No Joint Swelling] : no joint swelling [Normal Gait] : normal gait [Alert and Oriented x3] : oriented to person, place, and time [Normal Insight/Judgement] : insight and judgment were intact [de-identified] : + PND [de-identified] : II/VI murmur

## 2020-05-13 NOTE — HISTORY OF PRESENT ILLNESS
[de-identified] : 85 year old male,with Hx of atherosclerosis, HLD, GERD, COPD presents for follow up visit\par Pt states his allergies have been acting up. he has nasal congestion and runny nose\par He has been using saline mist every day\par \par \par

## 2020-05-13 NOTE — ASSESSMENT
[FreeTextEntry1] : allergic rhinitis\par nasonex\par cont saline mist\par \par  Hx of atherosclerosis, HLD, GERD, COPD\par cont current meds\par pt does not want blood work today

## 2020-09-28 ENCOUNTER — APPOINTMENT (OUTPATIENT)
Dept: INTERNAL MEDICINE | Facility: CLINIC | Age: 85
End: 2020-09-28
Payer: MEDICARE

## 2020-09-28 PROCEDURE — G0008: CPT

## 2020-09-28 PROCEDURE — 90686 IIV4 VACC NO PRSV 0.5 ML IM: CPT

## 2021-01-06 ENCOUNTER — APPOINTMENT (OUTPATIENT)
Dept: INTERNAL MEDICINE | Facility: CLINIC | Age: 86
End: 2021-01-06
Payer: MEDICARE

## 2021-01-06 PROCEDURE — 99214 OFFICE O/P EST MOD 30 MIN: CPT | Mod: 95

## 2021-01-09 NOTE — ASSESSMENT
[FreeTextEntry1] : allergic rhinitis\par flonase nasal spray-Rx sent to pharmacy \par increase po fluids\par

## 2021-01-09 NOTE — HISTORY OF PRESENT ILLNESS
[Home] : at home, [unfilled] , at the time of the visit. [Medical Office: ([facility])___] : at the medical office located in  [Verbal consent obtained from patient] : the patient, [unfilled] [de-identified] : Mr. ILIA MONROE is a 86 year old male, seen in telemedicine  for follow up visit\par He complaints of nasal congestion, sneezing for about a month. Pt says he wakes up with these symptoms in the morning and last until about 10 o clock. Pt says he has this on and off throughout the year. He was prescribed nasacort in the spring which he says had helped\par Denies fever, chills, cough, SOB, chest pain, abdominal pain, N/V/D, leg swelling, headache, dizziness \par \par \par \par

## 2021-06-15 ENCOUNTER — APPOINTMENT (OUTPATIENT)
Dept: INTERNAL MEDICINE | Facility: CLINIC | Age: 86
End: 2021-06-15
Payer: MEDICARE

## 2021-06-15 VITALS
OXYGEN SATURATION: 96 % | TEMPERATURE: 97.7 F | BODY MASS INDEX: 27.07 KG/M2 | RESPIRATION RATE: 12 BRPM | HEART RATE: 72 BPM | WEIGHT: 178 LBS | DIASTOLIC BLOOD PRESSURE: 62 MMHG | SYSTOLIC BLOOD PRESSURE: 125 MMHG

## 2021-06-15 DIAGNOSIS — Z00.00 ENCOUNTER FOR GENERAL ADULT MEDICAL EXAMINATION W/OUT ABNORMAL FINDINGS: ICD-10-CM

## 2021-06-15 PROCEDURE — 99397 PER PM REEVAL EST PAT 65+ YR: CPT

## 2021-06-15 NOTE — PHYSICAL EXAM
[No Acute Distress] : no acute distress [Well Nourished] : well nourished [Well Developed] : well developed [Well-Appearing] : well-appearing [Normal Sclera/Conjunctiva] : normal sclera/conjunctiva [PERRL] : pupils equal round and reactive to light [EOMI] : extraocular movements intact [Normal Outer Ear/Nose] : the outer ears and nose were normal in appearance [Normal Oropharynx] : the oropharynx was normal [No JVD] : no jugular venous distention [No Lymphadenopathy] : no lymphadenopathy [Supple] : supple [Thyroid Normal, No Nodules] : the thyroid was normal and there were no nodules present [No Respiratory Distress] : no respiratory distress  [No Accessory Muscle Use] : no accessory muscle use [Clear to Auscultation] : lungs were clear to auscultation bilaterally [Normal Rate] : normal rate  [Regular Rhythm] : with a regular rhythm [Normal S1, S2] : normal S1 and S2 [No Murmur] : no murmur heard [No Carotid Bruits] : no carotid bruits [No Varicosities] : no varicosities [No Abdominal Bruit] : a ~M bruit was not heard ~T in the abdomen [Pedal Pulses Present] : the pedal pulses are present [No Edema] : there was no peripheral edema [No Palpable Aorta] : no palpable aorta [No Extremity Clubbing/Cyanosis] : no extremity clubbing/cyanosis [Soft] : abdomen soft [Non Tender] : non-tender [Non-distended] : non-distended [No Masses] : no abdominal mass palpated [No HSM] : no HSM [Normal Bowel Sounds] : normal bowel sounds [Normal Posterior Cervical Nodes] : no posterior cervical lymphadenopathy [Normal Anterior Cervical Nodes] : no anterior cervical lymphadenopathy [No CVA Tenderness] : no CVA  tenderness [No Spinal Tenderness] : no spinal tenderness [No Joint Swelling] : no joint swelling [Grossly Normal Strength/Tone] : grossly normal strength/tone [No Rash] : no rash [Coordination Grossly Intact] : coordination grossly intact [No Focal Deficits] : no focal deficits [Normal Gait] : normal gait [Speech Grossly Normal] : speech grossly normal [Normal Affect] : the affect was normal [Alert and Oriented x3] : oriented to person, place, and time [Normal Insight/Judgement] : insight and judgment were intact

## 2021-06-22 NOTE — HEALTH RISK ASSESSMENT
[No] : No [No falls in past year] : Patient reported no falls in the past year [Patient reported colonoscopy was normal] : Patient reported colonoscopy was normal [] :  [Feels Safe at Home] : Feels safe at home [Fully functional (bathing, dressing, toileting, transferring, walking, feeding)] : Fully functional (bathing, dressing, toileting, transferring, walking, feeding) [Fully functional (using the telephone, shopping, preparing meals, housekeeping, doing laundry, using] : Fully functional and needs no help or supervision to perform IADLs (using the telephone, shopping, preparing meals, housekeeping, doing laundry, using transportation, managing medications and managing finances) [0] : 2) Feeling down, depressed, or hopeless: Not at all (0) [] : No [Change in mental status noted] : No change in mental status noted [Reports changes in hearing] : Reports no changes in hearing [Reports changes in vision] : Reports no changes in vision [Reports changes in dental health] : Reports no changes in dental health [ColonoscopyDate] : many years ago [de-identified] : with wife

## 2021-06-22 NOTE — ASSESSMENT
[FreeTextEntry1] :  Physical\par colonoscopy not indicated at this age group\par blood work ordered\par \par Hx of atherosclerosis, \par cont ASA 81mg daily\par \par HLD\par cont Atorvastatin 10mg daily\par discussed importance of following a low cholesterol/low fat diet\par we'll check Lipid panel and LFT's today\par \par GERD\par cont protonix 40mg daily prn\par \par follow up in one week for lab results\par

## 2021-06-22 NOTE — HISTORY OF PRESENT ILLNESS
[de-identified] : \par Mr. ILIA MONROE is a 86 year old male, accompanied by his wife,with Hx of atherosclerosis, HLD, GERD, COPD ,  presents for annual physical\par He is doing well. \par Denies SOB, chest pain, abdominal pain, N/V/D, leg swelling, headache, dizziness \par Offers no complaints\par

## 2021-07-09 LAB
25(OH)D3 SERPL-MCNC: 29.3 NG/ML
ALBUMIN SERPL ELPH-MCNC: 4.7 G/DL
ALP BLD-CCNC: 46 U/L
ALT SERPL-CCNC: 21 U/L
ANION GAP SERPL CALC-SCNC: 15 MMOL/L
APPEARANCE: CLEAR
AST SERPL-CCNC: 20 U/L
BASOPHILS # BLD AUTO: 0.03 K/UL
BASOPHILS NFR BLD AUTO: 0.6 %
BILIRUB SERPL-MCNC: 0.4 MG/DL
BILIRUBIN URINE: NEGATIVE
BLOOD URINE: NEGATIVE
BUN SERPL-MCNC: 23 MG/DL
CALCIUM SERPL-MCNC: 9.9 MG/DL
CHLORIDE SERPL-SCNC: 100 MMOL/L
CHOLEST SERPL-MCNC: 188 MG/DL
CO2 SERPL-SCNC: 22 MMOL/L
COLOR: NORMAL
COVID-19 NUCLEOCAPSID  GAM ANTIBODY INTERPRETATION: NEGATIVE
COVID-19 SPIKE DOMAIN ANTIBODY INTERPRETATION: POSITIVE
CREAT SERPL-MCNC: 1.17 MG/DL
EOSINOPHIL # BLD AUTO: 0.02 K/UL
EOSINOPHIL NFR BLD AUTO: 0.4 %
ESTIMATED AVERAGE GLUCOSE: 126 MG/DL
GLUCOSE QUALITATIVE U: NEGATIVE
GLUCOSE SERPL-MCNC: 81 MG/DL
HBA1C MFR BLD HPLC: 6 %
HCT VFR BLD CALC: 39.1 %
HDLC SERPL-MCNC: 39 MG/DL
HGB BLD-MCNC: 12.5 G/DL
IMM GRANULOCYTES NFR BLD AUTO: 1.7 %
KETONES URINE: NEGATIVE
LDLC SERPL CALC-MCNC: 116 MG/DL
LEUKOCYTE ESTERASE URINE: NEGATIVE
LYMPHOCYTES # BLD AUTO: 2 K/UL
LYMPHOCYTES NFR BLD AUTO: 37.5 %
MAN DIFF?: NORMAL
MCHC RBC-ENTMCNC: 31.1 PG
MCHC RBC-ENTMCNC: 32 GM/DL
MCV RBC AUTO: 97.3 FL
MONOCYTES # BLD AUTO: 0.86 K/UL
MONOCYTES NFR BLD AUTO: 16.1 %
NEUTROPHILS # BLD AUTO: 2.34 K/UL
NEUTROPHILS NFR BLD AUTO: 43.7 %
NITRITE URINE: NEGATIVE
NONHDLC SERPL-MCNC: 149 MG/DL
PH URINE: 5
PLATELET # BLD AUTO: 207 K/UL
POTASSIUM SERPL-SCNC: 4.6 MMOL/L
PROT SERPL-MCNC: 8.6 G/DL
PROTEIN URINE: NEGATIVE
PSA SERPL-MCNC: 2.35 NG/ML
RBC # BLD: 4.02 M/UL
RBC # FLD: 13.7 %
SARS-COV-2 AB SERPL IA-ACNC: >250 U/ML
SARS-COV-2 AB SERPL QL IA: 0.08 INDEX
SODIUM SERPL-SCNC: 137 MMOL/L
SPECIFIC GRAVITY URINE: 1.01
TRIGL SERPL-MCNC: 168 MG/DL
TSH SERPL-ACNC: 2.75 UIU/ML
URATE SERPL-MCNC: 7.8 MG/DL
UROBILINOGEN URINE: NORMAL
VIT B12 SERPL-MCNC: 397 PG/ML
WBC # FLD AUTO: 5.34 K/UL

## 2021-07-16 ENCOUNTER — LABORATORY RESULT (OUTPATIENT)
Age: 86
End: 2021-07-16

## 2021-07-16 ENCOUNTER — APPOINTMENT (OUTPATIENT)
Dept: INTERNAL MEDICINE | Facility: CLINIC | Age: 86
End: 2021-07-16
Payer: MEDICARE

## 2021-07-16 VITALS
DIASTOLIC BLOOD PRESSURE: 66 MMHG | OXYGEN SATURATION: 96 % | BODY MASS INDEX: 26.76 KG/M2 | HEART RATE: 80 BPM | WEIGHT: 176 LBS | SYSTOLIC BLOOD PRESSURE: 121 MMHG | TEMPERATURE: 97.8 F | RESPIRATION RATE: 12 BRPM

## 2021-07-16 PROCEDURE — 99214 OFFICE O/P EST MOD 30 MIN: CPT

## 2021-07-16 NOTE — PLAN
[FreeTextEntry1] : Follow- Up\par \par 1. Fever\par urine dipstick > + blood/ leuk esterase \par increase po fluids\par cont Tylenol PRN\par Blood culture, urine culture/UA today\par start Keflex 100 mg BID \par \par 2. Constipation\par Colace 100 mg QD\par diet d/w pt\par if no improvement GI referral \par Pt instructed to go to ED if fever persist or worsening of symptoms \par \par

## 2021-07-16 NOTE — REVIEW OF SYSTEMS
[Fever] : fever [Night Sweats] : night sweats [Wheezing] : no wheezing [Dysuria] : no dysuria [Nocturia] : nocturia [Hematuria] : no hematuria [Frequency] : frequency [Negative] : Genitourinary

## 2021-07-16 NOTE — HISTORY OF PRESENT ILLNESS
[de-identified] : 86 year old male presents  c/o fever  and night sweets for 2 days. Max Temp 99.2 reports taking Tylenol with good relief.  \par Pt reports frequent urination at night, denies dysuria \par He is also constipated for few days, had small BM yesterday \par \par Pt reports has Moderna COVID vaccine\par Denies dizziness, blurred/double vision, N/V/D, cough, SOB, voiding problems, URI symptoms

## 2021-07-16 NOTE — PHYSICAL EXAM
[No Acute Distress] : no acute distress [Well Nourished] : well nourished [Well Developed] : well developed [Well-Appearing] : well-appearing [Normal Sclera/Conjunctiva] : normal sclera/conjunctiva [EOMI] : extraocular movements intact [Normal Outer Ear/Nose] : the outer ears and nose were normal in appearance [Normal Oropharynx] : the oropharynx was normal [Normal TMs] : both tympanic membranes were normal [No Lymphadenopathy] : no lymphadenopathy [Supple] : supple [No Respiratory Distress] : no respiratory distress  [No Accessory Muscle Use] : no accessory muscle use [Clear to Auscultation] : lungs were clear to auscultation bilaterally [Normal Rate] : normal rate  [Regular Rhythm] : with a regular rhythm [Normal S1, S2] : normal S1 and S2 [No Murmur] : no murmur heard [Pedal Pulses Present] : the pedal pulses are present [No Edema] : there was no peripheral edema [No Extremity Clubbing/Cyanosis] : no extremity clubbing/cyanosis [Soft] : abdomen soft [Non Tender] : non-tender [Non-distended] : non-distended [No Masses] : no abdominal mass palpated [Normal Bowel Sounds] : normal bowel sounds [Normal Posterior Cervical Nodes] : no posterior cervical lymphadenopathy [Normal Anterior Cervical Nodes] : no anterior cervical lymphadenopathy [No CVA Tenderness] : no CVA  tenderness [No Spinal Tenderness] : no spinal tenderness [No Joint Swelling] : no joint swelling [Grossly Normal Strength/Tone] : grossly normal strength/tone [No Rash] : no rash [Coordination Grossly Intact] : coordination grossly intact [No Focal Deficits] : no focal deficits [Normal Gait] : normal gait [Normal Affect] : the affect was normal [Normal Insight/Judgement] : insight and judgment were intact

## 2021-07-20 ENCOUNTER — APPOINTMENT (OUTPATIENT)
Dept: INTERNAL MEDICINE | Facility: CLINIC | Age: 86
End: 2021-07-20
Payer: MEDICARE

## 2021-07-20 VITALS
WEIGHT: 179 LBS | HEART RATE: 81 BPM | HEIGHT: 68 IN | TEMPERATURE: 98.2 F | OXYGEN SATURATION: 96 % | BODY MASS INDEX: 27.13 KG/M2 | RESPIRATION RATE: 12 BRPM | SYSTOLIC BLOOD PRESSURE: 136 MMHG | DIASTOLIC BLOOD PRESSURE: 71 MMHG

## 2021-07-20 DIAGNOSIS — R50.9 FEVER, UNSPECIFIED: ICD-10-CM

## 2021-07-20 PROCEDURE — 99214 OFFICE O/P EST MOD 30 MIN: CPT

## 2021-07-20 NOTE — PHYSICAL EXAM
[No Acute Distress] : no acute distress [Well Nourished] : well nourished [Well Developed] : well developed [Well-Appearing] : well-appearing [Normal Sclera/Conjunctiva] : normal sclera/conjunctiva [PERRL] : pupils equal round and reactive to light [EOMI] : extraocular movements intact [Normal Outer Ear/Nose] : the outer ears and nose were normal in appearance [Normal Oropharynx] : the oropharynx was normal [No JVD] : no jugular venous distention [No Lymphadenopathy] : no lymphadenopathy [Supple] : supple [No Respiratory Distress] : no respiratory distress  [No Accessory Muscle Use] : no accessory muscle use [Clear to Auscultation] : lungs were clear to auscultation bilaterally [Normal Rate] : normal rate  [Regular Rhythm] : with a regular rhythm [Normal S1, S2] : normal S1 and S2 [No Murmur] : no murmur heard [No Carotid Bruits] : no carotid bruits [No Extremity Clubbing/Cyanosis] : no extremity clubbing/cyanosis [Soft] : abdomen soft [Non Tender] : non-tender [Non-distended] : non-distended [Normal Bowel Sounds] : normal bowel sounds [Normal Posterior Cervical Nodes] : no posterior cervical lymphadenopathy [Normal Anterior Cervical Nodes] : no anterior cervical lymphadenopathy [No CVA Tenderness] : no CVA  tenderness [No Joint Swelling] : no joint swelling [No Rash] : no rash [Coordination Grossly Intact] : coordination grossly intact [No Focal Deficits] : no focal deficits [Normal Gait] : normal gait [Normal Affect] : the affect was normal [Speech Grossly Normal] : speech grossly normal [Alert and Oriented x3] : oriented to person, place, and time [Normal Insight/Judgement] : insight and judgment were intact

## 2021-07-21 LAB — SARS-COV-2 N GENE NPH QL NAA+PROBE: NOT DETECTED

## 2021-07-26 ENCOUNTER — APPOINTMENT (OUTPATIENT)
Dept: INTERNAL MEDICINE | Facility: CLINIC | Age: 86
End: 2021-07-26
Payer: MEDICARE

## 2021-07-26 VITALS
SYSTOLIC BLOOD PRESSURE: 130 MMHG | HEART RATE: 73 BPM | DIASTOLIC BLOOD PRESSURE: 63 MMHG | BODY MASS INDEX: 26.61 KG/M2 | TEMPERATURE: 97.3 F | RESPIRATION RATE: 12 BRPM | WEIGHT: 175 LBS | OXYGEN SATURATION: 97 %

## 2021-07-26 DIAGNOSIS — R79.89 OTHER SPECIFIED ABNORMAL FINDINGS OF BLOOD CHEMISTRY: ICD-10-CM

## 2021-07-26 DIAGNOSIS — N39.0 URINARY TRACT INFECTION, SITE NOT SPECIFIED: ICD-10-CM

## 2021-07-26 PROCEDURE — 99214 OFFICE O/P EST MOD 30 MIN: CPT

## 2021-07-26 NOTE — HISTORY OF PRESENT ILLNESS
[de-identified] : 85 year old male,with Hx of atherosclerosis, HLD, GERD, COPD presents for follow up visit, lab results\par He was seen last week for fever and dysuria. \par Pt states he is feeling better. Finished Cipro  Has no fever, no dysuria\par Also says he has been taking the Colace 100mg at night but it's not helping\par Also his allergies are bothering him. His nose is congested. has been using saline mist but says not helping much\par

## 2021-07-26 NOTE — PHYSICAL EXAM
[No Acute Distress] : no acute distress [Well Nourished] : well nourished [Well Developed] : well developed [Well-Appearing] : well-appearing [Normal Sclera/Conjunctiva] : normal sclera/conjunctiva [PERRL] : pupils equal round and reactive to light [EOMI] : extraocular movements intact [Normal Outer Ear/Nose] : the outer ears and nose were normal in appearance [Normal Oropharynx] : the oropharynx was normal [No JVD] : no jugular venous distention [No Lymphadenopathy] : no lymphadenopathy [Supple] : supple [No Respiratory Distress] : no respiratory distress  [No Accessory Muscle Use] : no accessory muscle use [Clear to Auscultation] : lungs were clear to auscultation bilaterally [Normal Rate] : normal rate  [Regular Rhythm] : with a regular rhythm [Normal S1, S2] : normal S1 and S2 [No Murmur] : no murmur heard [No Carotid Bruits] : no carotid bruits [No Extremity Clubbing/Cyanosis] : no extremity clubbing/cyanosis [Soft] : abdomen soft [Non Tender] : non-tender [Non-distended] : non-distended [Normal Bowel Sounds] : normal bowel sounds [Normal Posterior Cervical Nodes] : no posterior cervical lymphadenopathy [Normal Anterior Cervical Nodes] : no anterior cervical lymphadenopathy [No CVA Tenderness] : no CVA  tenderness [No Joint Swelling] : no joint swelling [No Rash] : no rash [Coordination Grossly Intact] : coordination grossly intact [No Focal Deficits] : no focal deficits [Normal Gait] : normal gait [Speech Grossly Normal] : speech grossly normal [Normal Affect] : the affect was normal [Alert and Oriented x3] : oriented to person, place, and time [Normal Insight/Judgement] : insight and judgment were intact

## 2021-07-26 NOTE — ASSESSMENT
[FreeTextEntry1] : \par s/p UTI\par finished Cipro 250mg\par we'll repeat UA and urine culture today\par \par Abnormal CBC\par pt does not want blood work today\par \par Allergies, PND\par nasacort AQ \par \par Constipation\par increase Colace 100mg to TID\par increase po fluids\par

## 2021-07-27 LAB
APPEARANCE: CLEAR
BILIRUBIN URINE: NEGATIVE
BLOOD URINE: NEGATIVE
COLOR: NORMAL
GLUCOSE QUALITATIVE U: NEGATIVE
KETONES URINE: NEGATIVE
LEUKOCYTE ESTERASE URINE: NEGATIVE
NITRITE URINE: NEGATIVE
PH URINE: 5
PROTEIN URINE: NEGATIVE
SPECIFIC GRAVITY URINE: 1.01
UROBILINOGEN URINE: NORMAL

## 2021-07-28 LAB
ALBUMIN SERPL ELPH-MCNC: 4.8 G/DL
ALP BLD-CCNC: 52 U/L
ALT SERPL-CCNC: 21 U/L
ANION GAP SERPL CALC-SCNC: 17 MMOL/L
APPEARANCE: CLEAR
AST SERPL-CCNC: 29 U/L
BACTERIA UR CULT: ABNORMAL
BACTERIA: NEGATIVE
BASOPHILS # BLD AUTO: 0.02 K/UL
BASOPHILS NFR BLD AUTO: 0.6 %
BILIRUB SERPL-MCNC: 0.2 MG/DL
BILIRUBIN URINE: NEGATIVE
BLOOD URINE: ABNORMAL
BUN SERPL-MCNC: 18 MG/DL
CALCIUM SERPL-MCNC: 10.2 MG/DL
CHLORIDE SERPL-SCNC: 97 MMOL/L
CO2 SERPL-SCNC: 21 MMOL/L
COLOR: YELLOW
CREAT SERPL-MCNC: 1.2 MG/DL
EOSINOPHIL # BLD AUTO: 0.01 K/UL
EOSINOPHIL NFR BLD AUTO: 0.3 %
GLUCOSE QUALITATIVE U: NEGATIVE
GLUCOSE SERPL-MCNC: 77 MG/DL
HCT VFR BLD CALC: 41.6 %
HGB BLD-MCNC: 13.2 G/DL
HYALINE CASTS: 2 /LPF
IMM GRANULOCYTES NFR BLD AUTO: 1.9 %
KETONES URINE: NEGATIVE
LEUKOCYTE ESTERASE URINE: ABNORMAL
LYMPHOCYTES # BLD AUTO: 0.94 K/UL
LYMPHOCYTES NFR BLD AUTO: 29.2 %
MAN DIFF?: NORMAL
MCHC RBC-ENTMCNC: 31.2 PG
MCHC RBC-ENTMCNC: 31.7 GM/DL
MCV RBC AUTO: 98.3 FL
MICROSCOPIC-UA: NORMAL
MONOCYTES # BLD AUTO: 0.84 K/UL
MONOCYTES NFR BLD AUTO: 26.1 %
NEUTROPHILS # BLD AUTO: 1.35 K/UL
NEUTROPHILS NFR BLD AUTO: 41.9 %
NITRITE URINE: NEGATIVE
PH URINE: 6
PLATELET # BLD AUTO: 163 K/UL
POTASSIUM SERPL-SCNC: 4.5 MMOL/L
PROT SERPL-MCNC: 8.8 G/DL
PROTEIN URINE: NEGATIVE
RBC # BLD: 4.23 M/UL
RBC # FLD: 14.4 %
RED BLOOD CELLS URINE: 24 /HPF
SODIUM SERPL-SCNC: 135 MMOL/L
SPECIFIC GRAVITY URINE: 1.01
SQUAMOUS EPITHELIAL CELLS: 3 /HPF
UROBILINOGEN URINE: NORMAL
WBC # FLD AUTO: 3.22 K/UL
WHITE BLOOD CELLS URINE: 42 /HPF

## 2021-07-29 LAB — BACTERIA UR CULT: NORMAL

## 2021-08-11 NOTE — ASSESSMENT
[FreeTextEntry1] : \par Lab results reviewed and discussed with patient\par \par UTI\par d/c Keflex\par start Cipro 250mg BID\par \par Fever, low WBC- ? viral syndrome\par referred for COVID PCR\par cont tylenol prn\par increase po fluids\par \par pt to f/u in one week or soone if symptoms worsen

## 2021-08-11 NOTE — HISTORY OF PRESENT ILLNESS
[de-identified] : 86 year old male presents  for follow up visit\par He has been having fever ( max Temp 100 F ) for over a week  Reports taking Tylenol with good relief.  \par He was seen Friday because of fever and frequency in urination. he was started on keflex had blood work, UA, urine culture done\par Pt reports compliance with taking the Keflex\par Says the urinary frequency is better  Still has been running temperature, takes tylenol and "sweats afterwards" \par Pt reports had Moderna COVID vaccine\par Denies dizziness, blurred/double vision, N/V/D, sore throat, cough, SOB

## 2021-08-16 ENCOUNTER — APPOINTMENT (OUTPATIENT)
Dept: INTERNAL MEDICINE | Facility: CLINIC | Age: 86
End: 2021-08-16
Payer: MEDICARE

## 2021-08-16 VITALS
RESPIRATION RATE: 12 BRPM | SYSTOLIC BLOOD PRESSURE: 130 MMHG | HEIGHT: 68 IN | TEMPERATURE: 97.7 F | WEIGHT: 177 LBS | DIASTOLIC BLOOD PRESSURE: 69 MMHG | HEART RATE: 69 BPM | BODY MASS INDEX: 26.83 KG/M2 | OXYGEN SATURATION: 96 %

## 2021-08-16 DIAGNOSIS — K59.00 CONSTIPATION, UNSPECIFIED: ICD-10-CM

## 2021-08-16 PROCEDURE — 99214 OFFICE O/P EST MOD 30 MIN: CPT

## 2021-08-16 NOTE — HISTORY OF PRESENT ILLNESS
[de-identified] : 86 year old male, accompannied by his wife, with Hx of atherosclerosis, HLD, GERD, COPD presents for follow up visit\par \par He feels well, offers no complaints reports urinary symptoms have subsided\par He reports he is moving his bowels more regularly since taking the Colace\par \par Denies CP, SOB, N/V or abdominal pain

## 2021-08-16 NOTE — PHYSICAL EXAM
[No Acute Distress] : no acute distress [Well Developed] : well developed [Well Nourished] : well nourished [Well-Appearing] : well-appearing [Normal Sclera/Conjunctiva] : normal sclera/conjunctiva [EOMI] : extraocular movements intact [Normal Outer Ear/Nose] : the outer ears and nose were normal in appearance [Normal Oropharynx] : the oropharynx was normal [No JVD] : no jugular venous distention [No Lymphadenopathy] : no lymphadenopathy [Supple] : supple [No Respiratory Distress] : no respiratory distress  [No Accessory Muscle Use] : no accessory muscle use [Clear to Auscultation] : lungs were clear to auscultation bilaterally [Normal Rate] : normal rate  [Regular Rhythm] : with a regular rhythm [Normal S1, S2] : normal S1 and S2 [No Murmur] : no murmur heard [No Carotid Bruits] : no carotid bruits [No Extremity Clubbing/Cyanosis] : no extremity clubbing/cyanosis [Soft] : abdomen soft [Non Tender] : non-tender [Non-distended] : non-distended [Normal Bowel Sounds] : normal bowel sounds [Normal Posterior Cervical Nodes] : no posterior cervical lymphadenopathy [Normal Anterior Cervical Nodes] : no anterior cervical lymphadenopathy [No CVA Tenderness] : no CVA  tenderness [No Joint Swelling] : no joint swelling [No Rash] : no rash [Coordination Grossly Intact] : coordination grossly intact [No Focal Deficits] : no focal deficits [Normal Gait] : normal gait [Speech Grossly Normal] : speech grossly normal [Normal Affect] : the affect was normal [Alert and Oriented x3] : oriented to person, place, and time [Normal Insight/Judgement] : insight and judgment were intact

## 2021-08-16 NOTE — ASSESSMENT
[FreeTextEntry1] : \par s/p UTI-treated with cipro-improved\par repeat UA and urine culture-neg\par \par Constipation\par cont Colace 100mg to TID, Miralax prn-renewed  today\par increase po fluids\par \par HLD\par reinforced low fat diet\par Renewed Atorvastatin 10 mg\par \par Hx of GERD\par cont protonix prn-renewed today\par \par

## 2021-09-10 NOTE — ASSESSMENT
[FreeTextEntry1] : 83-year-old male with atherosclerosis, GERD, HLD, IGT, COPD, rash, and allergic rhinitis who presents today for F/U visit, s/p gouty attack.  Exam normal except as stated.  Labs collected today.  CAD, GERD, COPD are all clinically stable.  RFing atorvastatin and pantoprazole, pt has enough of rest of meds.  Flu vaccine today.  Counseled patient and wife for greater than 50% of this visit, including diagnoses information, medication usage and side effects, therapeutic goals and options, and followup. All questions were answered.  Patient and wife expressed understanding of, and agreement with, assessment and plan.\par \par Basil Munroe MD  Jad, PGY2: Discussed w/ Dr. Jauregui, patients outpatient PMD. Requesting patient TBA for diuresis. Patient's daughter agrees. TBA to hospitalist

## 2021-10-20 ENCOUNTER — APPOINTMENT (OUTPATIENT)
Dept: INTERNAL MEDICINE | Facility: CLINIC | Age: 86
End: 2021-10-20
Payer: MEDICARE

## 2021-10-20 VITALS
DIASTOLIC BLOOD PRESSURE: 66 MMHG | TEMPERATURE: 96.9 F | RESPIRATION RATE: 12 BRPM | HEART RATE: 76 BPM | SYSTOLIC BLOOD PRESSURE: 124 MMHG | WEIGHT: 180 LBS | BODY MASS INDEX: 27.37 KG/M2 | OXYGEN SATURATION: 98 %

## 2021-10-20 DIAGNOSIS — L98.9 DISORDER OF THE SKIN AND SUBCUTANEOUS TISSUE, UNSPECIFIED: ICD-10-CM

## 2021-10-20 PROCEDURE — 99214 OFFICE O/P EST MOD 30 MIN: CPT | Mod: 25

## 2021-10-20 PROCEDURE — G0008: CPT

## 2021-10-20 PROCEDURE — 96372 THER/PROPH/DIAG INJ SC/IM: CPT

## 2021-10-20 PROCEDURE — 90662 IIV NO PRSV INCREASED AG IM: CPT

## 2021-10-20 NOTE — HISTORY OF PRESENT ILLNESS
[de-identified] : 86 year old male, accompannied by his wife, with Hx of atherosclerosis, HLD, GERD, COPD presents for follow up visit\par \par He feels well, offers no complaints \par \par Denies CP, SOB, N/V or abdominal pain

## 2021-10-20 NOTE — ASSESSMENT
[FreeTextEntry1] : \par skin lesions in face\par referred to dermatology\par \par High risk for diabetes\par discussed importance of following a low sugar/low carb diet\par we'll check glucose and HgA1c today\par \par Constipation\par cont Colace 100mg to TID, Miralax prn-renewed  today\par increase po fluids\par \par HLD\par cont  Atorvastatin 10 mg\par reinforced low cholesterol/low fat diet\par we'll check Lipid panel and LFT's today\par \par Hx of GERD\par cont protonix prn\par \par flu vaccine administered\par \par blood work ordered\par \par follow up in one week for lab results\par

## 2021-10-20 NOTE — PHYSICAL EXAM
[No Acute Distress] : no acute distress [Well Nourished] : well nourished [Well Developed] : well developed [Well-Appearing] : well-appearing [Normal Sclera/Conjunctiva] : normal sclera/conjunctiva [EOMI] : extraocular movements intact [Normal Outer Ear/Nose] : the outer ears and nose were normal in appearance [Normal Oropharynx] : the oropharynx was normal [No JVD] : no jugular venous distention [No Lymphadenopathy] : no lymphadenopathy [Supple] : supple [No Respiratory Distress] : no respiratory distress  [No Accessory Muscle Use] : no accessory muscle use [Clear to Auscultation] : lungs were clear to auscultation bilaterally [Normal Rate] : normal rate  [Regular Rhythm] : with a regular rhythm [Normal S1, S2] : normal S1 and S2 [No Murmur] : no murmur heard [No Carotid Bruits] : no carotid bruits [No Extremity Clubbing/Cyanosis] : no extremity clubbing/cyanosis [Soft] : abdomen soft [Non Tender] : non-tender [Non-distended] : non-distended [Normal Bowel Sounds] : normal bowel sounds [Normal Posterior Cervical Nodes] : no posterior cervical lymphadenopathy [Normal Anterior Cervical Nodes] : no anterior cervical lymphadenopathy [No CVA Tenderness] : no CVA  tenderness [No Joint Swelling] : no joint swelling [No Rash] : no rash [Coordination Grossly Intact] : coordination grossly intact [No Focal Deficits] : no focal deficits [Normal Gait] : normal gait [Speech Grossly Normal] : speech grossly normal [Normal Affect] : the affect was normal [Alert and Oriented x3] : oriented to person, place, and time [Normal Insight/Judgement] : insight and judgment were intact [de-identified] : multiple skin lesions in face

## 2022-05-10 ENCOUNTER — APPOINTMENT (OUTPATIENT)
Dept: INTERNAL MEDICINE | Facility: CLINIC | Age: 87
End: 2022-05-10
Payer: MEDICARE

## 2022-05-10 VITALS
RESPIRATION RATE: 12 BRPM | HEIGHT: 68 IN | TEMPERATURE: 97.3 F | OXYGEN SATURATION: 97 % | DIASTOLIC BLOOD PRESSURE: 68 MMHG | BODY MASS INDEX: 27.58 KG/M2 | HEART RATE: 72 BPM | WEIGHT: 182 LBS | SYSTOLIC BLOOD PRESSURE: 146 MMHG

## 2022-05-10 PROCEDURE — 99214 OFFICE O/P EST MOD 30 MIN: CPT

## 2022-05-15 NOTE — HISTORY OF PRESENT ILLNESS
[de-identified] : \par Mr. ILIA MONROE is a 87 year old male accompanied by his wife, with hx of atherosclerosis, HLD, GERD, COPD presents for follow up visit \par He c/o pain in the right shoulder for about a month and a half. Says he feels the pain when he lays down at night. He does not feel much pain during the day when he is moving around has tried Voltaren gel and takes Tylenol prn but says not helping much\par Denies SOB, chest pain, abdominal pain, N/V/D, leg swelling, headache, dizziness \par Offers no complaints\par \par \par

## 2022-05-15 NOTE — ASSESSMENT
[FreeTextEntry1] : \par rt shoulder pain\par does not want to go for PT or see Ortho at this time\par \par High risk for diabetes\par cont low sugar/low carb diet\par we'll check glucose and HgA1c today\par \par HLD\par on Atorvastatin 10 mg, but pt says he takes it 1-2 times a week\par reinforced low cholesterol/low fat diet\par we'll check Lipid panel and LFT's today\par \par Hx of GERD\par cont protonix prn\par \par blood work ordered\par \par follow up in one week for lab results\par

## 2022-07-11 ENCOUNTER — APPOINTMENT (OUTPATIENT)
Dept: INTERNAL MEDICINE | Facility: CLINIC | Age: 87
End: 2022-07-11

## 2022-07-11 VITALS
TEMPERATURE: 97.3 F | RESPIRATION RATE: 12 BRPM | WEIGHT: 177 LBS | DIASTOLIC BLOOD PRESSURE: 64 MMHG | HEIGHT: 68 IN | HEART RATE: 71 BPM | BODY MASS INDEX: 26.83 KG/M2 | SYSTOLIC BLOOD PRESSURE: 131 MMHG | OXYGEN SATURATION: 95 %

## 2022-07-11 DIAGNOSIS — S60.569A INSECT BITE (NONVENOMOUS) OF UNSPECIFIED HAND, INITIAL ENCOUNTER: ICD-10-CM
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
## 2022-07-11 PROCEDURE — 99214 OFFICE O/P EST MOD 30 MIN: CPT

## 2022-07-11 NOTE — ASSESSMENT
[FreeTextEntry1] : \par bug bite, left hand swelling\par diprolene cream\par Prednisone 5mg daily X 4 days\par \par High risk for diabetes\par cont low sugar/low carb diet\par \par HLD\par on Atorvastatin 10 mg, but pt says he takes it 1-2 times a week\par reinforced low cholesterol/low fat diet\par \par Hx of GERD\par cont protonix prn\par \par does not want blood work today\par \par Pt to f/u in one week or sooner if symptoms worsen\par \par

## 2022-07-11 NOTE — PHYSICAL EXAM
[No Acute Distress] : no acute distress [Well Nourished] : well nourished [Well Developed] : well developed [Well-Appearing] : well-appearing [Normal Sclera/Conjunctiva] : normal sclera/conjunctiva [EOMI] : extraocular movements intact [Normal Outer Ear/Nose] : the outer ears and nose were normal in appearance [Normal Oropharynx] : the oropharynx was normal [No JVD] : no jugular venous distention [No Lymphadenopathy] : no lymphadenopathy [Supple] : supple [No Respiratory Distress] : no respiratory distress  [No Accessory Muscle Use] : no accessory muscle use [Clear to Auscultation] : lungs were clear to auscultation bilaterally [Normal Rate] : normal rate  [Regular Rhythm] : with a regular rhythm [Normal S1, S2] : normal S1 and S2 [No Murmur] : no murmur heard [No Carotid Bruits] : no carotid bruits [No Extremity Clubbing/Cyanosis] : no extremity clubbing/cyanosis [Soft] : abdomen soft [Non Tender] : non-tender [Non-distended] : non-distended [Normal Bowel Sounds] : normal bowel sounds [Normal Posterior Cervical Nodes] : no posterior cervical lymphadenopathy [Normal Anterior Cervical Nodes] : no anterior cervical lymphadenopathy [No CVA Tenderness] : no CVA  tenderness [No Joint Swelling] : no joint swelling [No Rash] : no rash [Coordination Grossly Intact] : coordination grossly intact [No Focal Deficits] : no focal deficits [Normal Gait] : normal gait [Speech Grossly Normal] : speech grossly normal [Normal Affect] : the affect was normal [Alert and Oriented x3] : oriented to person, place, and time [Normal Insight/Judgement] : insight and judgment were intact [de-identified] : + swelling dorsum of left hand extending to left wrist and fingers

## 2022-07-11 NOTE — HISTORY OF PRESENT ILLNESS
[de-identified] : \par Mr. ILIA MONROE is a 87 year old male accompanied by his wife, with hx of atherosclerosis, HLD, GERD, COPD presents for follow up visit \par He c/o pain and swelling in the left hand. Says he was out in the garden 2 days ago and was bit by some bug\par Denies SOB, chest pain, abdominal pain, N/V/D, leg swelling, headache, dizziness \par \par \par \par

## 2022-10-04 ENCOUNTER — APPOINTMENT (OUTPATIENT)
Dept: INTERNAL MEDICINE | Facility: CLINIC | Age: 87
End: 2022-10-04

## 2022-10-04 ENCOUNTER — MED ADMIN CHARGE (OUTPATIENT)
Age: 87
End: 2022-10-04

## 2022-10-04 VITALS
WEIGHT: 180 LBS | BODY MASS INDEX: 27.28 KG/M2 | RESPIRATION RATE: 12 BRPM | HEIGHT: 68 IN | SYSTOLIC BLOOD PRESSURE: 150 MMHG | HEART RATE: 84 BPM | OXYGEN SATURATION: 95 % | DIASTOLIC BLOOD PRESSURE: 72 MMHG

## 2022-10-04 DIAGNOSIS — E78.5 HYPERLIPIDEMIA, UNSPECIFIED: ICD-10-CM

## 2022-10-04 DIAGNOSIS — Z91.89 OTHER SPECIFIED PERSONAL RISK FACTORS, NOT ELSEWHERE CLASSIFIED: ICD-10-CM

## 2022-10-04 DIAGNOSIS — Z00.00 ENCOUNTER FOR GENERAL ADULT MEDICAL EXAMINATION W/OUT ABNORMAL FINDINGS: ICD-10-CM

## 2022-10-04 PROCEDURE — 90471 IMMUNIZATION ADMIN: CPT

## 2022-10-04 PROCEDURE — 99397 PER PM REEVAL EST PAT 65+ YR: CPT

## 2022-10-04 PROCEDURE — 90662 IIV NO PRSV INCREASED AG IM: CPT

## 2022-10-04 RX ORDER — FLUTICASONE PROPIONATE 50 UG/1
50 SPRAY, METERED NASAL DAILY
Qty: 1 | Refills: 3 | Status: DISCONTINUED | COMMUNITY
Start: 2017-03-17 | End: 2022-10-04

## 2022-10-04 RX ORDER — CIPROFLOXACIN HYDROCHLORIDE 250 MG/1
250 TABLET, FILM COATED ORAL
Qty: 10 | Refills: 0 | Status: DISCONTINUED | COMMUNITY
Start: 2021-07-20 | End: 2022-10-04

## 2022-10-04 RX ORDER — TRIAMCINOLONE ACETONIDE 55 UG/1
55 SOLUTION/ DROPS OPHTHALMIC TWICE DAILY
Qty: 1 | Refills: 3 | Status: DISCONTINUED | COMMUNITY
Start: 2020-01-15 | End: 2022-10-04

## 2022-10-04 RX ORDER — AMOXICILLIN AND CLAVULANATE POTASSIUM 875; 125 MG/1; MG/1
875-125 TABLET, COATED ORAL
Qty: 14 | Refills: 0 | Status: DISCONTINUED | COMMUNITY
Start: 2019-01-04 | End: 2022-10-04

## 2022-10-04 RX ORDER — CEPHALEXIN 500 MG/1
500 CAPSULE ORAL TWICE DAILY
Qty: 10 | Refills: 0 | Status: DISCONTINUED | COMMUNITY
Start: 2021-07-16 | End: 2022-10-04

## 2022-10-04 RX ORDER — ANORECTAL OINTMENT 15.7; .44; 24; 20.6 G/100G; G/100G; G/100G; G/100G
0.44-20.6 OINTMENT TOPICAL
Qty: 45 | Refills: 1 | Status: DISCONTINUED | COMMUNITY
Start: 2017-02-24 | End: 2022-10-04

## 2022-10-04 NOTE — COUNSELING
[Fall prevention counseling provided] : Fall prevention counseling provided [Adequate lighting] : Adequate lighting [No throw rugs] : No throw rugs [Use proper foot wear] : Use proper foot wear [None] : None

## 2022-10-23 NOTE — HEALTH RISK ASSESSMENT
[Good] : ~his/her~  mood as  good [Never] : Never [No] : No [No falls in past year] : Patient reported no falls in the past year [0] : 2) Feeling down, depressed, or hopeless: Not at all (0) [None] : None [Retired] : retired [] :  [Feels Safe at Home] : Feels safe at home [Fully functional (bathing, dressing, toileting, transferring, walking, feeding)] : Fully functional (bathing, dressing, toileting, transferring, walking, feeding) [Fully functional (using the telephone, shopping, preparing meals, housekeeping, doing laundry, using] : Fully functional and needs no help or supervision to perform IADLs (using the telephone, shopping, preparing meals, housekeeping, doing laundry, using transportation, managing medications and managing finances) [Reports normal functional visual acuity (ie: able to read med bottle)] : Reports normal functional visual acuity [Smoke Detector] : smoke detector [Carbon Monoxide Detector] : carbon monoxide detector [Safety elements used in home] : safety elements used in home [Seat Belt] :  uses seat belt [Sunscreen] : uses sunscreen [Reports changes in hearing] : Reports no changes in hearing [Reports changes in vision] : Reports no changes in vision [Reports changes in dental health] : Reports no changes in dental health [Guns at Home] : no guns at home [Travel to Developing Areas] : does not  travel to developing areas [TB Exposure] : is not being exposed to tuberculosis [Caregiver Concerns] : does not have caregiver concerns [de-identified] : with wife

## 2022-10-23 NOTE — HISTORY OF PRESENT ILLNESS
[de-identified] : Mr. ILIA MONROE is a 87 year old male, accompanied by his wife,with Hx of atherosclerosis, HLD, GERD, COPD , presents for annual physical\par \par \par He is doing well. \par Denies SOB, chest pain, abdominal pain, N/V/D, leg swelling, headache, dizziness \par Offers no complaints\par  \par

## 2022-10-23 NOTE — ASSESSMENT
[FreeTextEntry1] : Physical \par \par Flu vaccine today \par \par HLD/HTN\par Low sodium, low cholesterol diet/ increased physical activity as tolerated\par cont Atorvastatin 10 mg daily \par \par High risk diabetes\par we'll check A1C today \par Low carb, low sugar diet/ increased physical activity\par \par Labs ordered \par pt to follow-up in 1 week for results

## 2022-10-23 NOTE — PHYSICAL EXAM
[No Acute Distress] : no acute distress [Well Nourished] : well nourished [Well Developed] : well developed [Well-Appearing] : well-appearing [Normal Sclera/Conjunctiva] : normal sclera/conjunctiva [EOMI] : extraocular movements intact [Normal Outer Ear/Nose] : the outer ears and nose were normal in appearance [Normal Oropharynx] : the oropharynx was normal [Normal TMs] : both tympanic membranes were normal [No JVD] : no jugular venous distention [No Lymphadenopathy] : no lymphadenopathy [Supple] : supple [No Respiratory Distress] : no respiratory distress  [No Accessory Muscle Use] : no accessory muscle use [Clear to Auscultation] : lungs were clear to auscultation bilaterally [Normal Rate] : normal rate  [Regular Rhythm] : with a regular rhythm [Normal S1, S2] : normal S1 and S2 [No Edema] : there was no peripheral edema [No Extremity Clubbing/Cyanosis] : no extremity clubbing/cyanosis [Soft] : abdomen soft [Non Tender] : non-tender [Non-distended] : non-distended [Normal Bowel Sounds] : normal bowel sounds [Normal Posterior Cervical Nodes] : no posterior cervical lymphadenopathy [Normal Anterior Cervical Nodes] : no anterior cervical lymphadenopathy [No CVA Tenderness] : no CVA  tenderness [No Joint Swelling] : no joint swelling [No Rash] : no rash [Coordination Grossly Intact] : coordination grossly intact [No Focal Deficits] : no focal deficits [Normal Gait] : normal gait [Speech Grossly Normal] : speech grossly normal [Normal Affect] : the affect was normal [Alert and Oriented x3] : oriented to person, place, and time [Normal Insight/Judgement] : insight and judgment were intact [de-identified] : + murmur

## 2022-11-02 LAB
25(OH)D3 SERPL-MCNC: 26.7 NG/ML
ALBUMIN SERPL ELPH-MCNC: 4.8 G/DL
ALP BLD-CCNC: 45 U/L
ALT SERPL-CCNC: 20 U/L
ANION GAP SERPL CALC-SCNC: 16 MMOL/L
APPEARANCE: CLEAR
AST SERPL-CCNC: 22 U/L
BACTERIA: NEGATIVE
BASOPHILS # BLD AUTO: 0.02 K/UL
BASOPHILS NFR BLD AUTO: 0.4 %
BILIRUB SERPL-MCNC: 0.3 MG/DL
BILIRUBIN URINE: NEGATIVE
BLOOD URINE: NEGATIVE
BUN SERPL-MCNC: 26 MG/DL
CALCIUM SERPL-MCNC: 9.9 MG/DL
CHLORIDE SERPL-SCNC: 101 MMOL/L
CHOLEST SERPL-MCNC: 175 MG/DL
CO2 SERPL-SCNC: 22 MMOL/L
COLOR: NORMAL
COVID-19 NUCLEOCAPSID  GAM ANTIBODY INTERPRETATION: NEGATIVE
COVID-19 SPIKE DOMAIN ANTIBODY INTERPRETATION: POSITIVE
CREAT SERPL-MCNC: 1.1 MG/DL
EGFR: 65 ML/MIN/1.73M2
EOSINOPHIL # BLD AUTO: 0.01 K/UL
EOSINOPHIL NFR BLD AUTO: 0.2 %
ESTIMATED AVERAGE GLUCOSE: 123 MG/DL
GLUCOSE QUALITATIVE U: NEGATIVE
GLUCOSE SERPL-MCNC: 85 MG/DL
HBA1C MFR BLD HPLC: 5.9 %
HCT VFR BLD CALC: 39.3 %
HDLC SERPL-MCNC: 40 MG/DL
HGB BLD-MCNC: 12.6 G/DL
HYALINE CASTS: 0 /LPF
IMM GRANULOCYTES NFR BLD AUTO: 1.6 %
KETONES URINE: NEGATIVE
LDLC SERPL CALC-MCNC: 89 MG/DL
LEUKOCYTE ESTERASE URINE: NEGATIVE
LYMPHOCYTES # BLD AUTO: 1.81 K/UL
LYMPHOCYTES NFR BLD AUTO: 36.6 %
MAN DIFF?: NORMAL
MCHC RBC-ENTMCNC: 30.9 PG
MCHC RBC-ENTMCNC: 32.1 GM/DL
MCV RBC AUTO: 96.3 FL
MICROSCOPIC-UA: NORMAL
MONOCYTES # BLD AUTO: 0.84 K/UL
MONOCYTES NFR BLD AUTO: 17 %
NEUTROPHILS # BLD AUTO: 2.18 K/UL
NEUTROPHILS NFR BLD AUTO: 44.2 %
NITRITE URINE: NEGATIVE
NONHDLC SERPL-MCNC: 135 MG/DL
PH URINE: 6.5
PLATELET # BLD AUTO: 229 K/UL
POTASSIUM SERPL-SCNC: 4.7 MMOL/L
PROT SERPL-MCNC: 9 G/DL
PROTEIN URINE: NEGATIVE
PSA SERPL-MCNC: 2.49 NG/ML
RBC # BLD: 4.08 M/UL
RBC # FLD: 13.7 %
RED BLOOD CELLS URINE: 2 /HPF
SARS-COV-2 AB SERPL IA-ACNC: 74.3 U/ML
SARS-COV-2 AB SERPL QL IA: 0.08 INDEX
SODIUM SERPL-SCNC: 138 MMOL/L
SPECIFIC GRAVITY URINE: 1.01
SQUAMOUS EPITHELIAL CELLS: 0 /HPF
TRIGL SERPL-MCNC: 230 MG/DL
TSH SERPL-ACNC: 3.72 UIU/ML
UROBILINOGEN URINE: NORMAL
VIT B12 SERPL-MCNC: 440 PG/ML
WBC # FLD AUTO: 4.94 K/UL
WHITE BLOOD CELLS URINE: 2 /HPF

## 2023-01-10 ENCOUNTER — APPOINTMENT (OUTPATIENT)
Dept: INTERNAL MEDICINE | Facility: CLINIC | Age: 88
End: 2023-01-10
Payer: MEDICARE

## 2023-01-10 VITALS
DIASTOLIC BLOOD PRESSURE: 67 MMHG | BODY MASS INDEX: 27.13 KG/M2 | HEART RATE: 68 BPM | RESPIRATION RATE: 12 BRPM | HEIGHT: 68 IN | WEIGHT: 179 LBS | OXYGEN SATURATION: 95 % | SYSTOLIC BLOOD PRESSURE: 147 MMHG | TEMPERATURE: 97.5 F

## 2023-01-10 DIAGNOSIS — R05.9 COUGH, UNSPECIFIED: ICD-10-CM

## 2023-01-10 PROCEDURE — 99214 OFFICE O/P EST MOD 30 MIN: CPT

## 2023-01-29 NOTE — PHYSICAL EXAM
[No Acute Distress] : no acute distress [Well Nourished] : well nourished [Normal Sclera/Conjunctiva] : normal sclera/conjunctiva [EOMI] : extraocular movements intact [Normal Outer Ear/Nose] : the outer ears and nose were normal in appearance [Normal Oropharynx] : the oropharynx was normal [No JVD] : no jugular venous distention [No Lymphadenopathy] : no lymphadenopathy [Supple] : supple [Thyroid Normal, No Nodules] : the thyroid was normal and there were no nodules present [No Respiratory Distress] : no respiratory distress  [No Accessory Muscle Use] : no accessory muscle use [Clear to Auscultation] : lungs were clear to auscultation bilaterally [Normal Rate] : normal rate  [Regular Rhythm] : with a regular rhythm [Normal S1, S2] : normal S1 and S2 [No Murmur] : no murmur heard [No Carotid Bruits] : no carotid bruits [No Varicosities] : no varicosities [Pedal Pulses Present] : the pedal pulses are present [No Edema] : there was no peripheral edema [No Extremity Clubbing/Cyanosis] : no extremity clubbing/cyanosis [Soft] : abdomen soft [Non Tender] : non-tender [Non-distended] : non-distended [No Masses] : no abdominal mass palpated [No HSM] : no HSM [Normal Bowel Sounds] : normal bowel sounds [Normal Posterior Cervical Nodes] : no posterior cervical lymphadenopathy [Normal Anterior Cervical Nodes] : no anterior cervical lymphadenopathy [No CVA Tenderness] : no CVA  tenderness [No Spinal Tenderness] : no spinal tenderness [No Joint Swelling] : no joint swelling [Grossly Normal Strength/Tone] : grossly normal strength/tone [No Rash] : no rash [Coordination Grossly Intact] : coordination grossly intact [No Focal Deficits] : no focal deficits [Normal Gait] : normal gait [Normal Affect] : the affect was normal [Normal Insight/Judgement] : insight and judgment were intact [Speech Grossly Normal] : speech grossly normal [Alert and Oriented x3] : oriented to person, place, and time [de-identified] : + nasal congestion, + mild erythematous pharynx [de-identified] : some coarse breath sounds b/l

## 2023-01-29 NOTE — REVIEW OF SYSTEMS
[Postnasal Drip] : postnasal drip [Nasal Discharge] : nasal discharge [Cough] : cough [Negative] : Heme/Lymph [FreeTextEntry4] : nasal congestion

## 2023-01-29 NOTE — ASSESSMENT
[FreeTextEntry1] : \par \par \par Cough/ PND \par increase Po fluid intake \par Nasonex Nasal spray- pt has at home\par Tessalon Perles 200 mg PRN \par augmentin 500mg BID with food \par \par HLD\par cont Atorvastatin 10 mg daily \par cont low cholesterol diet\par \par Pt to f/u in one week or sooner if symptoms worsen

## 2023-01-29 NOTE — HISTORY OF PRESENT ILLNESS
[de-identified] : Mr. ILIA MONROE is a 87 year old male, accompanied by his wife,with Hx of atherosclerosis, HLD, GERD, COPD , presents for acute visit \par \par \par \par Patient reports having cough with sputum production, post nasal drip, nasal congestion for the last week. Cough is worse at night. Denies fevers, chills, SOB, chest pain, abdominal pain, N/V/D, leg swelling, headache, dizziness \par \par  \par

## 2023-09-12 ENCOUNTER — APPOINTMENT (OUTPATIENT)
Dept: INTERNAL MEDICINE | Facility: CLINIC | Age: 88
End: 2023-09-12
Payer: MEDICARE

## 2023-09-12 VITALS
HEART RATE: 77 BPM | SYSTOLIC BLOOD PRESSURE: 156 MMHG | BODY MASS INDEX: 25.46 KG/M2 | WEIGHT: 168 LBS | HEIGHT: 68 IN | OXYGEN SATURATION: 94 % | RESPIRATION RATE: 12 BRPM | DIASTOLIC BLOOD PRESSURE: 70 MMHG | TEMPERATURE: 97.7 F

## 2023-09-12 DIAGNOSIS — D50.8 OTHER IRON DEFICIENCY ANEMIAS: ICD-10-CM

## 2023-09-12 PROCEDURE — 99214 OFFICE O/P EST MOD 30 MIN: CPT

## 2023-09-14 VITALS — SYSTOLIC BLOOD PRESSURE: 136 MMHG | DIASTOLIC BLOOD PRESSURE: 70 MMHG

## 2023-10-02 NOTE — PHYSICAL EXAM
Writer attempted to call patient at 157 pm. Left message for patient relaying this. Will try again today.  Lupe Owens LPN  Nephrology  [phone]     [No Acute Distress] : no acute distress [Well Nourished] : well nourished [Well Developed] : well developed [Well-Appearing] : well-appearing [Normal Sclera/Conjunctiva] : normal sclera/conjunctiva [EOMI] : extraocular movements intact [Normal Outer Ear/Nose] : the outer ears and nose were normal in appearance [Normal Oropharynx] : the oropharynx was normal [No JVD] : no jugular venous distention [No Lymphadenopathy] : no lymphadenopathy [Supple] : supple [No Respiratory Distress] : no respiratory distress  [No Accessory Muscle Use] : no accessory muscle use [Clear to Auscultation] : lungs were clear to auscultation bilaterally [Normal Rate] : normal rate  [Regular Rhythm] : with a regular rhythm [Normal S1, S2] : normal S1 and S2 [No Murmur] : no murmur heard [No Carotid Bruits] : no carotid bruits [No Extremity Clubbing/Cyanosis] : no extremity clubbing/cyanosis [Soft] : abdomen soft [Non Tender] : non-tender [Non-distended] : non-distended [Normal Bowel Sounds] : normal bowel sounds [Normal Posterior Cervical Nodes] : no posterior cervical lymphadenopathy [Normal Anterior Cervical Nodes] : no anterior cervical lymphadenopathy [No CVA Tenderness] : no CVA  tenderness [No Rash] : no rash [Coordination Grossly Intact] : coordination grossly intact [No Focal Deficits] : no focal deficits [Normal Gait] : normal gait [Speech Grossly Normal] : speech grossly normal [Normal Affect] : the affect was normal [Alert and Oriented x3] : oriented to person, place, and time [Normal Insight/Judgement] : insight and judgment were intact [de-identified] : decreased ROM in end ranges of external rotation and abduction secondary to pain [de-identified] : multiple skin lesions in face

## 2023-10-22 LAB
BASOPHILS # BLD AUTO: 0.02 K/UL
BASOPHILS NFR BLD AUTO: 0.4 %
EOSINOPHIL # BLD AUTO: 0.02 K/UL
EOSINOPHIL NFR BLD AUTO: 0.4 %
FERRITIN SERPL-MCNC: 171 NG/ML
HCT VFR BLD CALC: 38.7 %
HGB BLD-MCNC: 12.1 G/DL
IMM GRANULOCYTES NFR BLD AUTO: 1.9 %
IRON SATN MFR SERPL: 31 %
IRON SERPL-MCNC: 76 UG/DL
LYMPHOCYTES # BLD AUTO: 1.82 K/UL
LYMPHOCYTES NFR BLD AUTO: 38.7 %
MAN DIFF?: NORMAL
MCHC RBC-ENTMCNC: 31.3 GM/DL
MCHC RBC-ENTMCNC: 31.8 PG
MCV RBC AUTO: 101.8 FL
MONOCYTES # BLD AUTO: 0.78 K/UL
MONOCYTES NFR BLD AUTO: 16.6 %
NEUTROPHILS # BLD AUTO: 1.97 K/UL
NEUTROPHILS NFR BLD AUTO: 42 %
PLATELET # BLD AUTO: 218 K/UL
RBC # BLD: 3.8 M/UL
RBC # FLD: 14.3 %
TIBC SERPL-MCNC: 247 UG/DL
UIBC SERPL-MCNC: 171 UG/DL
VIT B12 SERPL-MCNC: 528 PG/ML
WBC # FLD AUTO: 4.7 K/UL

## 2023-11-08 ENCOUNTER — APPOINTMENT (OUTPATIENT)
Dept: INTERNAL MEDICINE | Facility: CLINIC | Age: 88
End: 2023-11-08
Payer: MEDICARE

## 2023-11-08 VITALS
SYSTOLIC BLOOD PRESSURE: 140 MMHG | HEART RATE: 68 BPM | OXYGEN SATURATION: 97 % | DIASTOLIC BLOOD PRESSURE: 82 MMHG | HEIGHT: 68 IN | RESPIRATION RATE: 12 BRPM

## 2023-11-08 DIAGNOSIS — J30.9 ALLERGIC RHINITIS, UNSPECIFIED: ICD-10-CM

## 2023-11-08 PROCEDURE — 99214 OFFICE O/P EST MOD 30 MIN: CPT

## 2023-12-21 ENCOUNTER — APPOINTMENT (OUTPATIENT)
Dept: UROLOGY | Facility: CLINIC | Age: 88
End: 2023-12-21
Payer: MEDICARE

## 2023-12-21 VITALS
HEIGHT: 68 IN | RESPIRATION RATE: 14 BRPM | SYSTOLIC BLOOD PRESSURE: 122 MMHG | HEART RATE: 67 BPM | DIASTOLIC BLOOD PRESSURE: 65 MMHG | TEMPERATURE: 97.6 F | WEIGHT: 168 LBS | BODY MASS INDEX: 25.46 KG/M2 | OXYGEN SATURATION: 98 %

## 2023-12-21 DIAGNOSIS — R31.0 GROSS HEMATURIA: ICD-10-CM

## 2023-12-21 PROCEDURE — 99204 OFFICE O/P NEW MOD 45 MIN: CPT

## 2023-12-21 NOTE — HISTORY OF PRESENT ILLNESS
[FreeTextEntry1] : 88 yo male with his nephew who provided clinically relevant details. He recently noticed gross hematuria 2 weeks ago. Denies any dysuria.  The hematuria has cleared up at this time.  He has a history of smoking previously but quit over 20 years ago.

## 2023-12-21 NOTE — ASSESSMENT
[FreeTextEntry1] : 88 yo male with gross hematuria. Will undergo full workup.  Plan  Urinalysis Urine culture Urine cytology CT urogram FU in 2 weeks for cystoscopy

## 2023-12-22 LAB
APPEARANCE: CLEAR
BACTERIA: ABNORMAL /HPF
BILIRUBIN URINE: NEGATIVE
BLOOD URINE: ABNORMAL
CAST: 0 /LPF
COLOR: YELLOW
EPITHELIAL CELLS: 2 /HPF
GLUCOSE QUALITATIVE U: NEGATIVE MG/DL
KETONES URINE: NEGATIVE MG/DL
LEUKOCYTE ESTERASE URINE: ABNORMAL
MICROSCOPIC-UA: NORMAL
NITRITE URINE: NEGATIVE
PH URINE: 5
PROTEIN URINE: NEGATIVE MG/DL
RED BLOOD CELLS URINE: 0 /HPF
REVIEW: NORMAL
SPECIFIC GRAVITY URINE: 1.01
UROBILINOGEN URINE: 0.2 MG/DL
WHITE BLOOD CELLS URINE: 10 /HPF

## 2023-12-29 ENCOUNTER — APPOINTMENT (OUTPATIENT)
Dept: CT IMAGING | Facility: CLINIC | Age: 88
End: 2023-12-29
Payer: MEDICARE

## 2023-12-29 PROCEDURE — 74178 CT ABD&PLV WO CNTR FLWD CNTR: CPT

## 2024-01-01 ENCOUNTER — INPATIENT (INPATIENT)
Facility: HOSPITAL | Age: 89
LOS: 19 days | DRG: 180 | End: 2024-07-10
Attending: INTERNAL MEDICINE | Admitting: HOSPITALIST
Payer: SELF-PAY

## 2024-01-01 ENCOUNTER — OUTPATIENT (OUTPATIENT)
Dept: OUTPATIENT SERVICES | Facility: HOSPITAL | Age: 88
LOS: 1 days | Discharge: HOME | End: 2024-01-01

## 2024-01-01 ENCOUNTER — OUTPATIENT (OUTPATIENT)
Dept: OUTPATIENT SERVICES | Facility: HOSPITAL | Age: 89
LOS: 1 days | Discharge: HOME | End: 2024-01-01
Payer: MEDICARE

## 2024-01-01 ENCOUNTER — OUTPATIENT (OUTPATIENT)
Dept: OUTPATIENT SERVICES | Facility: HOSPITAL | Age: 88
LOS: 1 days | End: 2024-01-01

## 2024-01-01 VITALS
TEMPERATURE: 98 F | WEIGHT: 149.91 LBS | HEART RATE: 97 BPM | HEIGHT: 67 IN | OXYGEN SATURATION: 92 % | RESPIRATION RATE: 24 BRPM | SYSTOLIC BLOOD PRESSURE: 175 MMHG | DIASTOLIC BLOOD PRESSURE: 82 MMHG

## 2024-01-01 VITALS
TEMPERATURE: 98 F | DIASTOLIC BLOOD PRESSURE: 71 MMHG | HEART RATE: 119 BPM | SYSTOLIC BLOOD PRESSURE: 123 MMHG | RESPIRATION RATE: 22 BRPM | OXYGEN SATURATION: 92 %

## 2024-01-01 DIAGNOSIS — I10 ESSENTIAL (PRIMARY) HYPERTENSION: ICD-10-CM

## 2024-01-01 DIAGNOSIS — Z98.890 OTHER SPECIFIED POSTPROCEDURAL STATES: Chronic | ICD-10-CM

## 2024-01-01 DIAGNOSIS — J90 PLEURAL EFFUSION, NOT ELSEWHERE CLASSIFIED: ICD-10-CM

## 2024-01-01 DIAGNOSIS — I48.0 PAROXYSMAL ATRIAL FIBRILLATION: ICD-10-CM

## 2024-01-01 DIAGNOSIS — C67.9 MALIGNANT NEOPLASM OF BLADDER, UNSPECIFIED: ICD-10-CM

## 2024-01-01 DIAGNOSIS — R64 CACHEXIA: ICD-10-CM

## 2024-01-01 DIAGNOSIS — R53.2 FUNCTIONAL QUADRIPLEGIA: ICD-10-CM

## 2024-01-01 DIAGNOSIS — R79.89 OTHER SPECIFIED ABNORMAL FINDINGS OF BLOOD CHEMISTRY: ICD-10-CM

## 2024-01-01 DIAGNOSIS — E87.1 HYPO-OSMOLALITY AND HYPONATREMIA: ICD-10-CM

## 2024-01-01 DIAGNOSIS — R06.02 SHORTNESS OF BREATH: ICD-10-CM

## 2024-01-01 DIAGNOSIS — Z51.5 ENCOUNTER FOR PALLIATIVE CARE: ICD-10-CM

## 2024-01-01 DIAGNOSIS — C67.8 MALIGNANT NEOPLASM OF OVERLAPPING SITES OF BLADDER: ICD-10-CM

## 2024-01-01 DIAGNOSIS — K52.9 NONINFECTIVE GASTROENTERITIS AND COLITIS, UNSPECIFIED: ICD-10-CM

## 2024-01-01 DIAGNOSIS — J96.01 ACUTE RESPIRATORY FAILURE WITH HYPOXIA: ICD-10-CM

## 2024-01-01 DIAGNOSIS — Z29.9 ENCOUNTER FOR PROPHYLACTIC MEASURES, UNSPECIFIED: ICD-10-CM

## 2024-01-01 DIAGNOSIS — R09.02 HYPOXEMIA: ICD-10-CM

## 2024-01-01 DIAGNOSIS — E83.52 HYPERCALCEMIA: ICD-10-CM

## 2024-01-01 DIAGNOSIS — E87.8 OTHER DISORDERS OF ELECTROLYTE AND FLUID BALANCE, NOT ELSEWHERE CLASSIFIED: ICD-10-CM

## 2024-01-01 DIAGNOSIS — Z71.89 OTHER SPECIFIED COUNSELING: ICD-10-CM

## 2024-01-01 DIAGNOSIS — N17.9 ACUTE KIDNEY FAILURE, UNSPECIFIED: ICD-10-CM

## 2024-01-01 DIAGNOSIS — D72.829 ELEVATED WHITE BLOOD CELL COUNT, UNSPECIFIED: ICD-10-CM

## 2024-01-01 LAB
ADD ON TEST-SPECIMEN IN LAB: SIGNIFICANT CHANGE UP
ADD ON TEST-SPECIMEN IN LAB: SIGNIFICANT CHANGE UP
ALBUMIN FLD-MCNC: 1.1 G/DL — SIGNIFICANT CHANGE UP
ALBUMIN FLD-MCNC: 1.5 G/DL — SIGNIFICANT CHANGE UP
ALBUMIN FLD-MCNC: 1.8 G/DL — SIGNIFICANT CHANGE UP
ALBUMIN FLD-MCNC: 2.2 G/DL — SIGNIFICANT CHANGE UP
ALBUMIN FLD-MCNC: 2.5 G/DL — SIGNIFICANT CHANGE UP
ALBUMIN SERPL ELPH-MCNC: 2.5 G/DL — LOW (ref 3.3–5)
ALBUMIN SERPL ELPH-MCNC: 2.6 G/DL — LOW (ref 3.3–5)
ALBUMIN SERPL ELPH-MCNC: 2.7 G/DL — LOW (ref 3.3–5)
ALBUMIN SERPL ELPH-MCNC: 3 G/DL — LOW (ref 3.3–5)
ALBUMIN SERPL ELPH-MCNC: 3.1 G/DL — LOW (ref 3.3–5)
ALP SERPL-CCNC: 82 U/L — SIGNIFICANT CHANGE UP (ref 40–120)
ALP SERPL-CCNC: 88 U/L — SIGNIFICANT CHANGE UP (ref 40–120)
ALP SERPL-CCNC: 89 U/L — SIGNIFICANT CHANGE UP (ref 40–120)
ALP SERPL-CCNC: 89 U/L — SIGNIFICANT CHANGE UP (ref 40–120)
ALP SERPL-CCNC: 94 U/L — SIGNIFICANT CHANGE UP (ref 40–120)
ALP SERPL-CCNC: 96 U/L — SIGNIFICANT CHANGE UP (ref 40–120)
ALP SERPL-CCNC: 97 U/L — SIGNIFICANT CHANGE UP (ref 40–120)
ALP SERPL-CCNC: 99 U/L — SIGNIFICANT CHANGE UP (ref 40–120)
ALT FLD-CCNC: 13 U/L — SIGNIFICANT CHANGE UP (ref 10–45)
ALT FLD-CCNC: 15 U/L — SIGNIFICANT CHANGE UP (ref 10–45)
ALT FLD-CCNC: 16 U/L — SIGNIFICANT CHANGE UP (ref 10–45)
ALT FLD-CCNC: 21 U/L — SIGNIFICANT CHANGE UP (ref 10–45)
ALT FLD-CCNC: 26 U/L — SIGNIFICANT CHANGE UP (ref 10–45)
ALT FLD-CCNC: 31 U/L — SIGNIFICANT CHANGE UP (ref 10–45)
ALT FLD-CCNC: 48 U/L — HIGH (ref 10–45)
ALT FLD-CCNC: 71 U/L — HIGH (ref 10–45)
ANION GAP SERPL CALC-SCNC: 11 MMOL/L — SIGNIFICANT CHANGE UP (ref 5–17)
ANION GAP SERPL CALC-SCNC: 11 MMOL/L — SIGNIFICANT CHANGE UP (ref 5–17)
ANION GAP SERPL CALC-SCNC: 12 MMOL/L — SIGNIFICANT CHANGE UP (ref 5–17)
ANION GAP SERPL CALC-SCNC: 13 MMOL/L — SIGNIFICANT CHANGE UP (ref 5–17)
ANION GAP SERPL CALC-SCNC: 14 MMOL/L — SIGNIFICANT CHANGE UP (ref 5–17)
ANION GAP SERPL CALC-SCNC: 15 MMOL/L — SIGNIFICANT CHANGE UP (ref 5–17)
ANION GAP SERPL CALC-SCNC: 16 MMOL/L — SIGNIFICANT CHANGE UP (ref 5–17)
ANION GAP SERPL CALC-SCNC: 17 MMOL/L — SIGNIFICANT CHANGE UP (ref 5–17)
ANION GAP SERPL CALC-SCNC: 18 MMOL/L — HIGH (ref 5–17)
ANION GAP SERPL CALC-SCNC: 19 MMOL/L — HIGH (ref 5–17)
ANISOCYTOSIS BLD QL: SIGNIFICANT CHANGE UP
ANISOCYTOSIS BLD QL: SLIGHT — SIGNIFICANT CHANGE UP
ANISOCYTOSIS BLD QL: SLIGHT — SIGNIFICANT CHANGE UP
APPEARANCE UR: ABNORMAL
APPEARANCE UR: CLEAR — SIGNIFICANT CHANGE UP
APTT BLD: 122.9 SEC — CRITICAL HIGH (ref 24.5–35.6)
APTT BLD: 27.3 SEC — SIGNIFICANT CHANGE UP (ref 24.5–35.6)
APTT BLD: 27.9 SEC — SIGNIFICANT CHANGE UP (ref 24.5–35.6)
APTT BLD: 29.1 SEC — SIGNIFICANT CHANGE UP (ref 24.5–35.6)
APTT BLD: 29.3 SEC — SIGNIFICANT CHANGE UP (ref 24.5–35.6)
APTT BLD: 30.9 SEC — SIGNIFICANT CHANGE UP (ref 24.5–35.6)
APTT BLD: 33.1 SEC — SIGNIFICANT CHANGE UP (ref 24.5–35.6)
APTT BLD: 35.3 SEC — SIGNIFICANT CHANGE UP (ref 24.5–35.6)
APTT BLD: 35.7 SEC — HIGH (ref 24.5–35.6)
APTT BLD: 36.5 SEC — HIGH (ref 24.5–35.6)
APTT BLD: 46.3 SEC — HIGH (ref 24.5–35.6)
APTT BLD: 53.8 SEC — HIGH (ref 24.5–35.6)
APTT BLD: 55.4 SEC — HIGH (ref 24.5–35.6)
APTT BLD: 56 SEC — HIGH (ref 24.5–35.6)
APTT BLD: 58.2 SEC — HIGH (ref 24.5–35.6)
APTT BLD: 64.4 SEC — HIGH (ref 24.5–35.6)
APTT BLD: 66 SEC — HIGH (ref 24.5–35.6)
APTT BLD: 66.7 SEC — HIGH (ref 24.5–35.6)
APTT BLD: 70.5 SEC — HIGH (ref 24.5–35.6)
APTT BLD: 70.9 SEC — HIGH (ref 24.5–35.6)
APTT BLD: 71 SEC — HIGH (ref 24.5–35.6)
APTT BLD: 71.3 SEC — HIGH (ref 24.5–35.6)
APTT BLD: 73 SEC — HIGH (ref 24.5–35.6)
APTT BLD: 74.3 SEC — HIGH (ref 24.5–35.6)
APTT BLD: 81.7 SEC — HIGH (ref 24.5–35.6)
APTT BLD: 81.8 SEC — HIGH (ref 24.5–35.6)
APTT BLD: 83.6 SEC — HIGH (ref 24.5–35.6)
APTT BLD: 91.1 SEC — HIGH (ref 24.5–35.6)
AST SERPL-CCNC: 11 U/L — SIGNIFICANT CHANGE UP (ref 10–40)
AST SERPL-CCNC: 13 U/L — SIGNIFICANT CHANGE UP (ref 10–40)
AST SERPL-CCNC: 14 U/L — SIGNIFICANT CHANGE UP (ref 10–40)
AST SERPL-CCNC: 16 U/L — SIGNIFICANT CHANGE UP (ref 10–40)
AST SERPL-CCNC: 22 U/L — SIGNIFICANT CHANGE UP (ref 10–40)
AST SERPL-CCNC: 28 U/L — SIGNIFICANT CHANGE UP (ref 10–40)
AST SERPL-CCNC: 34 U/L — SIGNIFICANT CHANGE UP (ref 10–40)
AST SERPL-CCNC: 45 U/L — HIGH (ref 10–40)
B PERT IGG+IGM PNL SER: ABNORMAL
BACTERIA # UR AUTO: NEGATIVE /HPF — SIGNIFICANT CHANGE UP
BACTERIA # UR AUTO: NEGATIVE /HPF — SIGNIFICANT CHANGE UP
BASE EXCESS BLDV CALC-SCNC: -2.4 MMOL/L — LOW (ref -2–3)
BASE EXCESS BLDV CALC-SCNC: -2.7 MMOL/L — LOW (ref -2–3)
BASE EXCESS BLDV CALC-SCNC: -4.6 MMOL/L — LOW (ref -2–3)
BASE EXCESS BLDV CALC-SCNC: 4 MMOL/L — HIGH (ref -2–3)
BASOPHILS # BLD AUTO: 0 K/UL — SIGNIFICANT CHANGE UP (ref 0–0.2)
BASOPHILS # BLD AUTO: 0.02 K/UL — SIGNIFICANT CHANGE UP (ref 0–0.2)
BASOPHILS # BLD AUTO: 0.03 K/UL — SIGNIFICANT CHANGE UP (ref 0–0.2)
BASOPHILS # BLD AUTO: 0.08 K/UL — SIGNIFICANT CHANGE UP (ref 0–0.2)
BASOPHILS # BLD AUTO: 0.25 K/UL — HIGH (ref 0–0.2)
BASOPHILS NFR BLD AUTO: 0 % — SIGNIFICANT CHANGE UP (ref 0–2)
BASOPHILS NFR BLD AUTO: 0.1 % — SIGNIFICANT CHANGE UP (ref 0–2)
BASOPHILS NFR BLD AUTO: 0.2 % — SIGNIFICANT CHANGE UP (ref 0–2)
BASOPHILS NFR BLD AUTO: 1 % — SIGNIFICANT CHANGE UP (ref 0–2)
BASOPHILS NFR BLD AUTO: 1.8 % — SIGNIFICANT CHANGE UP (ref 0–2)
BILIRUB SERPL-MCNC: 0.3 MG/DL — SIGNIFICANT CHANGE UP (ref 0.2–1.2)
BILIRUB SERPL-MCNC: 0.4 MG/DL — SIGNIFICANT CHANGE UP (ref 0.2–1.2)
BILIRUB UR-MCNC: NEGATIVE — SIGNIFICANT CHANGE UP
BILIRUB UR-MCNC: NEGATIVE — SIGNIFICANT CHANGE UP
BLD GP AB SCN SERPL QL: NEGATIVE — SIGNIFICANT CHANGE UP
BUN SERPL-MCNC: 22 MG/DL — SIGNIFICANT CHANGE UP (ref 7–23)
BUN SERPL-MCNC: 22 MG/DL — SIGNIFICANT CHANGE UP (ref 7–23)
BUN SERPL-MCNC: 24 MG/DL — HIGH (ref 7–23)
BUN SERPL-MCNC: 25 MG/DL — HIGH (ref 7–23)
BUN SERPL-MCNC: 26 MG/DL — HIGH (ref 7–23)
BUN SERPL-MCNC: 30 MG/DL — HIGH (ref 7–23)
BUN SERPL-MCNC: 31 MG/DL — HIGH (ref 7–23)
BUN SERPL-MCNC: 31 MG/DL — HIGH (ref 7–23)
BUN SERPL-MCNC: 32 MG/DL — HIGH (ref 7–23)
BUN SERPL-MCNC: 33 MG/DL — HIGH (ref 7–23)
BUN SERPL-MCNC: 34 MG/DL — HIGH (ref 7–23)
BUN SERPL-MCNC: 35 MG/DL — HIGH (ref 7–23)
BUN SERPL-MCNC: 35 MG/DL — HIGH (ref 7–23)
BUN SERPL-MCNC: 36 MG/DL — HIGH (ref 7–23)
BUN SERPL-MCNC: 36 MG/DL — HIGH (ref 7–23)
BUN SERPL-MCNC: 43 MG/DL — HIGH (ref 7–23)
BUN SERPL-MCNC: 43 MG/DL — HIGH (ref 7–23)
BUN SERPL-MCNC: 51 MG/DL — HIGH (ref 7–23)
BUN SERPL-MCNC: 54 MG/DL — HIGH (ref 7–23)
BUN SERPL-MCNC: 56 MG/DL — HIGH (ref 7–23)
BURR CELLS BLD QL SMEAR: PRESENT — SIGNIFICANT CHANGE UP
CA-I BLD-SCNC: 1.76 MMOL/L — CRITICAL HIGH (ref 1.15–1.33)
CA-I BLD-SCNC: 1.77 MMOL/L — CRITICAL HIGH (ref 1.15–1.33)
CA-I SERPL-SCNC: 1.22 MMOL/L — SIGNIFICANT CHANGE UP (ref 1.15–1.33)
CA-I SERPL-SCNC: 1.26 MMOL/L — SIGNIFICANT CHANGE UP (ref 1.15–1.33)
CA-I SERPL-SCNC: 1.46 MMOL/L — HIGH (ref 1.15–1.33)
CALCIUM SERPL-MCNC: 10 MG/DL — SIGNIFICANT CHANGE UP (ref 8.4–10.5)
CALCIUM SERPL-MCNC: 10.3 MG/DL — SIGNIFICANT CHANGE UP (ref 8.4–10.5)
CALCIUM SERPL-MCNC: 10.8 MG/DL — HIGH (ref 8.4–10.5)
CALCIUM SERPL-MCNC: 11.2 MG/DL — HIGH (ref 8.4–10.5)
CALCIUM SERPL-MCNC: 11.4 MG/DL — HIGH (ref 8.4–10.5)
CALCIUM SERPL-MCNC: 11.4 MG/DL — HIGH (ref 8.4–10.5)
CALCIUM SERPL-MCNC: 11.5 MG/DL — HIGH (ref 8.4–10.5)
CALCIUM SERPL-MCNC: 11.6 MG/DL — HIGH (ref 8.4–10.5)
CALCIUM SERPL-MCNC: 11.9 MG/DL — HIGH (ref 8.4–10.5)
CALCIUM SERPL-MCNC: 12 MG/DL — HIGH (ref 8.4–10.5)
CALCIUM SERPL-MCNC: 12.5 MG/DL — HIGH (ref 8.4–10.5)
CALCIUM SERPL-MCNC: 12.5 MG/DL — HIGH (ref 8.4–10.5)
CALCIUM SERPL-MCNC: 12.8 MG/DL — HIGH (ref 8.4–10.5)
CALCIUM SERPL-MCNC: 13.1 MG/DL — CRITICAL HIGH (ref 8.4–10.5)
CALCIUM SERPL-MCNC: 13.5 MG/DL — CRITICAL HIGH (ref 8.4–10.5)
CALCIUM SERPL-MCNC: 9 MG/DL — SIGNIFICANT CHANGE UP (ref 8.4–10.5)
CALCIUM SERPL-MCNC: 9.2 MG/DL — SIGNIFICANT CHANGE UP (ref 8.4–10.5)
CALCIUM SERPL-MCNC: 9.3 MG/DL — SIGNIFICANT CHANGE UP (ref 8.4–10.5)
CALCIUM SERPL-MCNC: 9.4 MG/DL — SIGNIFICANT CHANGE UP (ref 8.4–10.5)
CALCIUM SERPL-MCNC: 9.7 MG/DL — SIGNIFICANT CHANGE UP (ref 8.4–10.5)
CALCIUM SERPL-MCNC: 9.9 MG/DL — SIGNIFICANT CHANGE UP (ref 8.4–10.5)
CAST: 0 /LPF — SIGNIFICANT CHANGE UP (ref 0–4)
CAST: 5 /LPF — HIGH (ref 0–4)
CHLORIDE BLDV-SCNC: 90 MMOL/L — LOW (ref 96–108)
CHLORIDE BLDV-SCNC: 90 MMOL/L — LOW (ref 96–108)
CHLORIDE BLDV-SCNC: 93 MMOL/L — LOW (ref 96–108)
CHLORIDE SERPL-SCNC: 100 MMOL/L — SIGNIFICANT CHANGE UP (ref 96–108)
CHLORIDE SERPL-SCNC: 100 MMOL/L — SIGNIFICANT CHANGE UP (ref 96–108)
CHLORIDE SERPL-SCNC: 101 MMOL/L — SIGNIFICANT CHANGE UP (ref 96–108)
CHLORIDE SERPL-SCNC: 104 MMOL/L — SIGNIFICANT CHANGE UP (ref 96–108)
CHLORIDE SERPL-SCNC: 88 MMOL/L — LOW (ref 96–108)
CHLORIDE SERPL-SCNC: 89 MMOL/L — LOW (ref 96–108)
CHLORIDE SERPL-SCNC: 90 MMOL/L — LOW (ref 96–108)
CHLORIDE SERPL-SCNC: 91 MMOL/L — LOW (ref 96–108)
CHLORIDE SERPL-SCNC: 91 MMOL/L — LOW (ref 96–108)
CHLORIDE SERPL-SCNC: 92 MMOL/L — LOW (ref 96–108)
CHLORIDE SERPL-SCNC: 93 MMOL/L — LOW (ref 96–108)
CHLORIDE SERPL-SCNC: 94 MMOL/L — LOW (ref 96–108)
CHLORIDE SERPL-SCNC: 96 MMOL/L — SIGNIFICANT CHANGE UP (ref 96–108)
CHLORIDE SERPL-SCNC: 97 MMOL/L — SIGNIFICANT CHANGE UP (ref 96–108)
CHLORIDE SERPL-SCNC: 97 MMOL/L — SIGNIFICANT CHANGE UP (ref 96–108)
CHLORIDE SERPL-SCNC: 99 MMOL/L — SIGNIFICANT CHANGE UP (ref 96–108)
CHLORIDE SERPL-SCNC: 99 MMOL/L — SIGNIFICANT CHANGE UP (ref 96–108)
CO2 BLDV-SCNC: 21 MMOL/L — LOW (ref 22–26)
CO2 BLDV-SCNC: 22 MMOL/L — SIGNIFICANT CHANGE UP (ref 22–26)
CO2 BLDV-SCNC: 24 MMOL/L — SIGNIFICANT CHANGE UP (ref 22–26)
CO2 BLDV-SCNC: 31 MMOL/L — HIGH (ref 22–26)
CO2 SERPL-SCNC: 18 MMOL/L — LOW (ref 22–31)
CO2 SERPL-SCNC: 19 MMOL/L — LOW (ref 22–31)
CO2 SERPL-SCNC: 20 MMOL/L — LOW (ref 22–31)
CO2 SERPL-SCNC: 21 MMOL/L — LOW (ref 22–31)
CO2 SERPL-SCNC: 22 MMOL/L — SIGNIFICANT CHANGE UP (ref 22–31)
CO2 SERPL-SCNC: 22 MMOL/L — SIGNIFICANT CHANGE UP (ref 22–31)
CO2 SERPL-SCNC: 23 MMOL/L — SIGNIFICANT CHANGE UP (ref 22–31)
CO2 SERPL-SCNC: 23 MMOL/L — SIGNIFICANT CHANGE UP (ref 22–31)
CO2 SERPL-SCNC: 24 MMOL/L — SIGNIFICANT CHANGE UP (ref 22–31)
CO2 SERPL-SCNC: 25 MMOL/L — SIGNIFICANT CHANGE UP (ref 22–31)
CO2 SERPL-SCNC: 26 MMOL/L — SIGNIFICANT CHANGE UP (ref 22–31)
CO2 SERPL-SCNC: 26 MMOL/L — SIGNIFICANT CHANGE UP (ref 22–31)
CO2 SERPL-SCNC: 27 MMOL/L — SIGNIFICANT CHANGE UP (ref 22–31)
CO2 SERPL-SCNC: 27 MMOL/L — SIGNIFICANT CHANGE UP (ref 22–31)
COLOR FLD: ABNORMAL
COLOR SPEC: YELLOW — SIGNIFICANT CHANGE UP
COLOR SPEC: YELLOW — SIGNIFICANT CHANGE UP
CREAT ?TM UR-MCNC: 65 MG/DL — SIGNIFICANT CHANGE UP
CREAT SERPL-MCNC: 0.71 MG/DL — SIGNIFICANT CHANGE UP (ref 0.5–1.3)
CREAT SERPL-MCNC: 0.77 MG/DL — SIGNIFICANT CHANGE UP (ref 0.5–1.3)
CREAT SERPL-MCNC: 0.8 MG/DL — SIGNIFICANT CHANGE UP (ref 0.5–1.3)
CREAT SERPL-MCNC: 0.87 MG/DL — SIGNIFICANT CHANGE UP (ref 0.5–1.3)
CREAT SERPL-MCNC: 0.88 MG/DL — SIGNIFICANT CHANGE UP (ref 0.5–1.3)
CREAT SERPL-MCNC: 0.89 MG/DL — SIGNIFICANT CHANGE UP (ref 0.5–1.3)
CREAT SERPL-MCNC: 0.91 MG/DL — SIGNIFICANT CHANGE UP (ref 0.5–1.3)
CREAT SERPL-MCNC: 0.91 MG/DL — SIGNIFICANT CHANGE UP (ref 0.5–1.3)
CREAT SERPL-MCNC: 0.92 MG/DL — SIGNIFICANT CHANGE UP (ref 0.5–1.3)
CREAT SERPL-MCNC: 0.92 MG/DL — SIGNIFICANT CHANGE UP (ref 0.5–1.3)
CREAT SERPL-MCNC: 0.95 MG/DL — SIGNIFICANT CHANGE UP (ref 0.5–1.3)
CREAT SERPL-MCNC: 0.96 MG/DL — SIGNIFICANT CHANGE UP (ref 0.5–1.3)
CREAT SERPL-MCNC: 1.03 MG/DL — SIGNIFICANT CHANGE UP (ref 0.5–1.3)
CREAT SERPL-MCNC: 1.03 MG/DL — SIGNIFICANT CHANGE UP (ref 0.5–1.3)
CREAT SERPL-MCNC: 1.04 MG/DL — SIGNIFICANT CHANGE UP (ref 0.5–1.3)
CREAT SERPL-MCNC: 1.07 MG/DL — SIGNIFICANT CHANGE UP (ref 0.5–1.3)
CREAT SERPL-MCNC: 1.1 MG/DL — SIGNIFICANT CHANGE UP (ref 0.5–1.3)
CREAT SERPL-MCNC: 1.12 MG/DL — SIGNIFICANT CHANGE UP (ref 0.5–1.3)
CREAT SERPL-MCNC: 1.15 MG/DL — SIGNIFICANT CHANGE UP (ref 0.5–1.3)
CREAT SERPL-MCNC: 1.16 MG/DL — SIGNIFICANT CHANGE UP (ref 0.5–1.3)
CREAT SERPL-MCNC: 1.36 MG/DL — HIGH (ref 0.5–1.3)
CREAT SERPL-MCNC: 1.48 MG/DL — HIGH (ref 0.5–1.3)
CULTURE RESULTS: ABNORMAL
CULTURE RESULTS: NO GROWTH — SIGNIFICANT CHANGE UP
CULTURE RESULTS: SIGNIFICANT CHANGE UP
DIFF PNL FLD: ABNORMAL
DIFF PNL FLD: ABNORMAL
EGFR: 45 ML/MIN/1.73M2 — LOW
EGFR: 50 ML/MIN/1.73M2 — LOW
EGFR: 60 ML/MIN/1.73M2 — SIGNIFICANT CHANGE UP
EGFR: 61 ML/MIN/1.73M2 — SIGNIFICANT CHANGE UP
EGFR: 63 ML/MIN/1.73M2 — SIGNIFICANT CHANGE UP
EGFR: 64 ML/MIN/1.73M2 — SIGNIFICANT CHANGE UP
EGFR: 66 ML/MIN/1.73M2 — SIGNIFICANT CHANGE UP
EGFR: 69 ML/MIN/1.73M2 — SIGNIFICANT CHANGE UP
EGFR: 76 ML/MIN/1.73M2 — SIGNIFICANT CHANGE UP
EGFR: 77 ML/MIN/1.73M2 — SIGNIFICANT CHANGE UP
EGFR: 80 ML/MIN/1.73M2 — SIGNIFICANT CHANGE UP
EGFR: 80 ML/MIN/1.73M2 — SIGNIFICANT CHANGE UP
EGFR: 81 ML/MIN/1.73M2 — SIGNIFICANT CHANGE UP
EGFR: 81 ML/MIN/1.73M2 — SIGNIFICANT CHANGE UP
EGFR: 82 ML/MIN/1.73M2 — SIGNIFICANT CHANGE UP
EGFR: 85 ML/MIN/1.73M2 — SIGNIFICANT CHANGE UP
EGFR: 86 ML/MIN/1.73M2 — SIGNIFICANT CHANGE UP
EGFR: 88 ML/MIN/1.73M2 — SIGNIFICANT CHANGE UP
EOSINOPHIL # BLD AUTO: 0 K/UL — SIGNIFICANT CHANGE UP (ref 0–0.5)
EOSINOPHIL # BLD AUTO: 0.02 K/UL — SIGNIFICANT CHANGE UP (ref 0–0.5)
EOSINOPHIL # BLD AUTO: 0.02 K/UL — SIGNIFICANT CHANGE UP (ref 0–0.5)
EOSINOPHIL # BLD AUTO: 0.08 K/UL — SIGNIFICANT CHANGE UP (ref 0–0.5)
EOSINOPHIL # FLD: 1 % — SIGNIFICANT CHANGE UP
EOSINOPHIL # FLD: 6 % — SIGNIFICANT CHANGE UP
EOSINOPHIL NFR BLD AUTO: 0 % — SIGNIFICANT CHANGE UP (ref 0–6)
EOSINOPHIL NFR BLD AUTO: 0.1 % — SIGNIFICANT CHANGE UP (ref 0–6)
EOSINOPHIL NFR BLD AUTO: 0.1 % — SIGNIFICANT CHANGE UP (ref 0–6)
EOSINOPHIL NFR BLD AUTO: 1 % — SIGNIFICANT CHANGE UP (ref 0–6)
FLUID INTAKE SUBSTANCE CLASS: SIGNIFICANT CHANGE UP
GAS PNL BLDA: SIGNIFICANT CHANGE UP
GAS PNL BLDA: SIGNIFICANT CHANGE UP
GAS PNL BLDV: 119 MMOL/L — CRITICAL LOW (ref 136–145)
GAS PNL BLDV: 120 MMOL/L — CRITICAL LOW (ref 136–145)
GAS PNL BLDV: 126 MMOL/L — LOW (ref 136–145)
GAS PNL BLDV: SIGNIFICANT CHANGE UP
GLUCOSE BLDC GLUCOMTR-MCNC: 110 MG/DL — HIGH (ref 70–99)
GLUCOSE BLDC GLUCOMTR-MCNC: 150 MG/DL — HIGH (ref 70–99)
GLUCOSE BLDV-MCNC: 121 MG/DL — HIGH (ref 70–99)
GLUCOSE BLDV-MCNC: 136 MG/DL — HIGH (ref 70–99)
GLUCOSE BLDV-MCNC: 162 MG/DL — HIGH (ref 70–99)
GLUCOSE FLD-MCNC: 13 MG/DL — SIGNIFICANT CHANGE UP
GLUCOSE FLD-MCNC: 28 MG/DL — SIGNIFICANT CHANGE UP
GLUCOSE FLD-MCNC: 31 MG/DL — SIGNIFICANT CHANGE UP
GLUCOSE FLD-MCNC: 66 MG/DL — SIGNIFICANT CHANGE UP
GLUCOSE FLD-MCNC: 82 MG/DL — SIGNIFICANT CHANGE UP
GLUCOSE SERPL-MCNC: 101 MG/DL — HIGH (ref 70–99)
GLUCOSE SERPL-MCNC: 102 MG/DL — HIGH (ref 70–99)
GLUCOSE SERPL-MCNC: 103 MG/DL — HIGH (ref 70–99)
GLUCOSE SERPL-MCNC: 104 MG/DL — HIGH (ref 70–99)
GLUCOSE SERPL-MCNC: 107 MG/DL — HIGH (ref 70–99)
GLUCOSE SERPL-MCNC: 109 MG/DL — HIGH (ref 70–99)
GLUCOSE SERPL-MCNC: 111 MG/DL — HIGH (ref 70–99)
GLUCOSE SERPL-MCNC: 112 MG/DL — HIGH (ref 70–99)
GLUCOSE SERPL-MCNC: 113 MG/DL — HIGH (ref 70–99)
GLUCOSE SERPL-MCNC: 114 MG/DL — HIGH (ref 70–99)
GLUCOSE SERPL-MCNC: 115 MG/DL — HIGH (ref 70–99)
GLUCOSE SERPL-MCNC: 124 MG/DL — HIGH (ref 70–99)
GLUCOSE SERPL-MCNC: 134 MG/DL — HIGH (ref 70–99)
GLUCOSE SERPL-MCNC: 139 MG/DL — HIGH (ref 70–99)
GLUCOSE SERPL-MCNC: 157 MG/DL — HIGH (ref 70–99)
GLUCOSE SERPL-MCNC: 79 MG/DL — SIGNIFICANT CHANGE UP (ref 70–99)
GLUCOSE SERPL-MCNC: 88 MG/DL — SIGNIFICANT CHANGE UP (ref 70–99)
GLUCOSE SERPL-MCNC: 88 MG/DL — SIGNIFICANT CHANGE UP (ref 70–99)
GLUCOSE SERPL-MCNC: 90 MG/DL — SIGNIFICANT CHANGE UP (ref 70–99)
GLUCOSE SERPL-MCNC: 96 MG/DL — SIGNIFICANT CHANGE UP (ref 70–99)
GLUCOSE SERPL-MCNC: 97 MG/DL — SIGNIFICANT CHANGE UP (ref 70–99)
GLUCOSE SERPL-MCNC: 98 MG/DL — SIGNIFICANT CHANGE UP (ref 70–99)
GLUCOSE UR QL: NEGATIVE MG/DL — SIGNIFICANT CHANGE UP
GLUCOSE UR QL: NEGATIVE MG/DL — SIGNIFICANT CHANGE UP
GRAM STN FLD: ABNORMAL
GRAM STN FLD: SIGNIFICANT CHANGE UP
HCO3 BLDV-SCNC: 20 MMOL/L — LOW (ref 22–29)
HCO3 BLDV-SCNC: 21 MMOL/L — LOW (ref 22–29)
HCO3 BLDV-SCNC: 23 MMOL/L — SIGNIFICANT CHANGE UP (ref 22–29)
HCO3 BLDV-SCNC: 30 MMOL/L — HIGH (ref 22–29)
HCT VFR BLD CALC: 23.9 % — LOW (ref 39–50)
HCT VFR BLD CALC: 25.9 % — LOW (ref 39–50)
HCT VFR BLD CALC: 26 % — LOW (ref 39–50)
HCT VFR BLD CALC: 27 % — LOW (ref 39–50)
HCT VFR BLD CALC: 27.4 % — LOW (ref 39–50)
HCT VFR BLD CALC: 27.8 % — LOW (ref 39–50)
HCT VFR BLD CALC: 28.7 % — LOW (ref 39–50)
HCT VFR BLD CALC: 29.1 % — LOW (ref 39–50)
HCT VFR BLD CALC: 29.4 % — LOW (ref 39–50)
HCT VFR BLD CALC: 29.7 % — LOW (ref 39–50)
HCT VFR BLD CALC: 29.9 % — LOW (ref 39–50)
HCT VFR BLD CALC: 30.3 % — LOW (ref 39–50)
HCT VFR BLD CALC: 30.7 % — LOW (ref 39–50)
HCT VFR BLD CALC: 30.8 % — LOW (ref 39–50)
HCT VFR BLD CALC: 30.8 % — LOW (ref 39–50)
HCT VFR BLD CALC: 31 % — LOW (ref 39–50)
HCT VFR BLD CALC: 31.3 % — LOW (ref 39–50)
HCT VFR BLD CALC: 31.4 % — LOW (ref 39–50)
HCT VFR BLD CALC: 31.5 % — LOW (ref 39–50)
HCT VFR BLD CALC: 31.8 % — LOW (ref 39–50)
HCT VFR BLD CALC: 32.2 % — LOW (ref 39–50)
HCT VFR BLD CALC: 32.7 % — LOW (ref 39–50)
HCT VFR BLD CALC: 32.7 % — LOW (ref 39–50)
HCT VFR BLD CALC: 32.8 % — LOW (ref 39–50)
HCT VFR BLD CALC: 33.2 % — LOW (ref 39–50)
HCT VFR BLDA CALC: 31 % — LOW (ref 39–51)
HCT VFR BLDA CALC: 32 % — LOW (ref 39–51)
HCT VFR BLDA CALC: 35 % — LOW (ref 39–51)
HGB BLD CALC-MCNC: 10.3 G/DL — LOW (ref 12.6–17.4)
HGB BLD CALC-MCNC: 10.8 G/DL — LOW (ref 12.6–17.4)
HGB BLD CALC-MCNC: 11.5 G/DL — LOW (ref 12.6–17.4)
HGB BLD-MCNC: 10.2 G/DL — LOW (ref 13–17)
HGB BLD-MCNC: 10.2 G/DL — LOW (ref 13–17)
HGB BLD-MCNC: 10.3 G/DL — LOW (ref 13–17)
HGB BLD-MCNC: 10.4 G/DL — LOW (ref 13–17)
HGB BLD-MCNC: 10.4 G/DL — LOW (ref 13–17)
HGB BLD-MCNC: 10.5 G/DL — LOW (ref 13–17)
HGB BLD-MCNC: 10.6 G/DL — LOW (ref 13–17)
HGB BLD-MCNC: 10.7 G/DL — LOW (ref 13–17)
HGB BLD-MCNC: 10.8 G/DL — LOW (ref 13–17)
HGB BLD-MCNC: 10.9 G/DL — LOW (ref 13–17)
HGB BLD-MCNC: 11 G/DL — LOW (ref 13–17)
HGB BLD-MCNC: 11.3 G/DL — LOW (ref 13–17)
HGB BLD-MCNC: 7.7 G/DL — LOW (ref 13–17)
HGB BLD-MCNC: 8.2 G/DL — LOW (ref 13–17)
HGB BLD-MCNC: 8.2 G/DL — LOW (ref 13–17)
HGB BLD-MCNC: 8.7 G/DL — LOW (ref 13–17)
HGB BLD-MCNC: 8.7 G/DL — LOW (ref 13–17)
HGB BLD-MCNC: 8.8 G/DL — LOW (ref 13–17)
HGB BLD-MCNC: 9 G/DL — LOW (ref 13–17)
HGB BLD-MCNC: 9.2 G/DL — LOW (ref 13–17)
HGB BLD-MCNC: 9.2 G/DL — LOW (ref 13–17)
HGB BLD-MCNC: 9.3 G/DL — LOW (ref 13–17)
HGB BLD-MCNC: 9.7 G/DL — LOW (ref 13–17)
HGB BLD-MCNC: 9.9 G/DL — LOW (ref 13–17)
HGB BLD-MCNC: 9.9 G/DL — LOW (ref 13–17)
IMM GRANULOCYTES NFR BLD AUTO: 5.6 % — HIGH (ref 0–0.9)
IMM GRANULOCYTES NFR BLD AUTO: 5.7 % — HIGH (ref 0–0.9)
INR BLD: 1.2 RATIO — HIGH (ref 0.85–1.18)
INR BLD: 1.26 RATIO — HIGH (ref 0.85–1.18)
INR BLD: 1.31 RATIO — HIGH (ref 0.85–1.18)
INR BLD: 1.34 RATIO — HIGH (ref 0.85–1.18)
INTERPRETATION 24H UR IFE-IMP: SIGNIFICANT CHANGE UP
INTERPRETATION SERPL IFE-IMP: SIGNIFICANT CHANGE UP
KAPPA LC SER QL IFE: 6.69 MG/DL — HIGH (ref 0.33–1.94)
KAPPA/LAMBDA FREE LIGHT CHAIN RATIO, SERUM: 0.88 RATIO — SIGNIFICANT CHANGE UP (ref 0.26–1.65)
KETONES UR-MCNC: NEGATIVE MG/DL — SIGNIFICANT CHANGE UP
KETONES UR-MCNC: NEGATIVE MG/DL — SIGNIFICANT CHANGE UP
LACTATE BLDV-MCNC: 2.4 MMOL/L — HIGH (ref 0.5–2)
LACTATE BLDV-MCNC: 2.6 MMOL/L — HIGH (ref 0.5–2)
LACTATE BLDV-MCNC: 5.2 MMOL/L — CRITICAL HIGH (ref 0.5–2)
LACTATE SERPL-SCNC: 4.4 MMOL/L — CRITICAL HIGH (ref 0.5–2)
LACTATE SERPL-SCNC: 4.5 MMOL/L — CRITICAL HIGH (ref 0.5–2)
LACTATE SERPL-SCNC: 4.8 MMOL/L — CRITICAL HIGH (ref 0.5–2)
LACTATE SERPL-SCNC: 4.8 MMOL/L — CRITICAL HIGH (ref 0.5–2)
LACTATE SERPL-SCNC: 5.4 MMOL/L — CRITICAL HIGH (ref 0.5–2)
LACTATE SERPL-SCNC: 5.5 MMOL/L — CRITICAL HIGH (ref 0.5–2)
LACTATE SERPL-SCNC: 5.9 MMOL/L — CRITICAL HIGH (ref 0.5–2)
LACTATE SERPL-SCNC: 6.5 MMOL/L — CRITICAL HIGH (ref 0.5–2)
LACTATE SERPL-SCNC: 6.7 MMOL/L — CRITICAL HIGH (ref 0.5–2)
LACTATE SERPL-SCNC: 7.4 MMOL/L — CRITICAL HIGH (ref 0.5–2)
LAMBDA LC SER QL IFE: 7.57 MG/DL — HIGH (ref 0.57–2.63)
LDH SERPL L TO P-CCNC: 1119 U/L — SIGNIFICANT CHANGE UP
LDH SERPL L TO P-CCNC: 195 U/L — SIGNIFICANT CHANGE UP (ref 50–242)
LDH SERPL L TO P-CCNC: 1960 U/L — SIGNIFICANT CHANGE UP
LDH SERPL L TO P-CCNC: 421 U/L — SIGNIFICANT CHANGE UP
LDH SERPL L TO P-CCNC: 480 U/L — SIGNIFICANT CHANGE UP
LDH SERPL L TO P-CCNC: 641 U/L — SIGNIFICANT CHANGE UP
LEUKOCYTE ESTERASE UR-ACNC: ABNORMAL
LEUKOCYTE ESTERASE UR-ACNC: NEGATIVE — SIGNIFICANT CHANGE UP
LYMPHOCYTES # BLD AUTO: 0.88 K/UL — LOW (ref 1–3.3)
LYMPHOCYTES # BLD AUTO: 0.98 K/UL — LOW (ref 1–3.3)
LYMPHOCYTES # BLD AUTO: 1.04 K/UL — SIGNIFICANT CHANGE UP (ref 1–3.3)
LYMPHOCYTES # BLD AUTO: 1.13 K/UL — SIGNIFICANT CHANGE UP (ref 1–3.3)
LYMPHOCYTES # BLD AUTO: 1.15 K/UL — SIGNIFICANT CHANGE UP (ref 1–3.3)
LYMPHOCYTES # BLD AUTO: 1.49 K/UL — SIGNIFICANT CHANGE UP (ref 1–3.3)
LYMPHOCYTES # BLD AUTO: 1.5 K/UL — SIGNIFICANT CHANGE UP (ref 1–3.3)
LYMPHOCYTES # BLD AUTO: 10.7 % — LOW (ref 13–44)
LYMPHOCYTES # BLD AUTO: 20 % — SIGNIFICANT CHANGE UP (ref 13–44)
LYMPHOCYTES # BLD AUTO: 4.6 % — LOW (ref 13–44)
LYMPHOCYTES # BLD AUTO: 5.1 % — LOW (ref 13–44)
LYMPHOCYTES # BLD AUTO: 5.2 % — LOW (ref 13–44)
LYMPHOCYTES # BLD AUTO: 5.7 % — LOW (ref 13–44)
LYMPHOCYTES # BLD AUTO: 7 % — LOW (ref 13–44)
LYMPHOCYTES # FLD: 15 % — SIGNIFICANT CHANGE UP
LYMPHOCYTES # FLD: 26 % — SIGNIFICANT CHANGE UP
LYMPHOCYTES # FLD: 29 % — SIGNIFICANT CHANGE UP
LYMPHOCYTES # FLD: 35 % — SIGNIFICANT CHANGE UP
LYMPHOCYTES # FLD: 4 % — SIGNIFICANT CHANGE UP
MACROCYTES BLD QL: SLIGHT — SIGNIFICANT CHANGE UP
MAGNESIUM SERPL-MCNC: 1.8 MG/DL — SIGNIFICANT CHANGE UP (ref 1.6–2.6)
MAGNESIUM SERPL-MCNC: 2.2 MG/DL — SIGNIFICANT CHANGE UP (ref 1.6–2.6)
MAGNESIUM SERPL-MCNC: 2.2 MG/DL — SIGNIFICANT CHANGE UP (ref 1.6–2.6)
MAGNESIUM SERPL-MCNC: 2.3 MG/DL — SIGNIFICANT CHANGE UP (ref 1.6–2.6)
MAGNESIUM SERPL-MCNC: 2.4 MG/DL — SIGNIFICANT CHANGE UP (ref 1.6–2.6)
MAGNESIUM SERPL-MCNC: 2.5 MG/DL — SIGNIFICANT CHANGE UP (ref 1.6–2.6)
MANUAL SMEAR VERIFICATION: SIGNIFICANT CHANGE UP
MCHC RBC-ENTMCNC: 28.9 PG — SIGNIFICANT CHANGE UP (ref 27–34)
MCHC RBC-ENTMCNC: 29.5 PG — SIGNIFICANT CHANGE UP (ref 27–34)
MCHC RBC-ENTMCNC: 29.6 PG — SIGNIFICANT CHANGE UP (ref 27–34)
MCHC RBC-ENTMCNC: 29.6 PG — SIGNIFICANT CHANGE UP (ref 27–34)
MCHC RBC-ENTMCNC: 29.7 PG — SIGNIFICANT CHANGE UP (ref 27–34)
MCHC RBC-ENTMCNC: 29.8 PG — SIGNIFICANT CHANGE UP (ref 27–34)
MCHC RBC-ENTMCNC: 29.9 GM/DL — LOW (ref 32–36)
MCHC RBC-ENTMCNC: 29.9 GM/DL — LOW (ref 32–36)
MCHC RBC-ENTMCNC: 29.9 PG — SIGNIFICANT CHANGE UP (ref 27–34)
MCHC RBC-ENTMCNC: 30 PG — SIGNIFICANT CHANGE UP (ref 27–34)
MCHC RBC-ENTMCNC: 30.1 PG — SIGNIFICANT CHANGE UP (ref 27–34)
MCHC RBC-ENTMCNC: 30.2 PG — SIGNIFICANT CHANGE UP (ref 27–34)
MCHC RBC-ENTMCNC: 30.3 GM/DL — LOW (ref 32–36)
MCHC RBC-ENTMCNC: 30.3 PG — SIGNIFICANT CHANGE UP (ref 27–34)
MCHC RBC-ENTMCNC: 30.4 GM/DL — LOW (ref 32–36)
MCHC RBC-ENTMCNC: 30.4 PG — SIGNIFICANT CHANGE UP (ref 27–34)
MCHC RBC-ENTMCNC: 30.8 GM/DL — LOW (ref 32–36)
MCHC RBC-ENTMCNC: 30.9 PG — SIGNIFICANT CHANGE UP (ref 27–34)
MCHC RBC-ENTMCNC: 31.3 PG — SIGNIFICANT CHANGE UP (ref 27–34)
MCHC RBC-ENTMCNC: 31.6 GM/DL — LOW (ref 32–36)
MCHC RBC-ENTMCNC: 31.7 GM/DL — LOW (ref 32–36)
MCHC RBC-ENTMCNC: 32.2 GM/DL — SIGNIFICANT CHANGE UP (ref 32–36)
MCHC RBC-ENTMCNC: 32.4 GM/DL — SIGNIFICANT CHANGE UP (ref 32–36)
MCHC RBC-ENTMCNC: 32.6 GM/DL — SIGNIFICANT CHANGE UP (ref 32–36)
MCHC RBC-ENTMCNC: 32.7 GM/DL — SIGNIFICANT CHANGE UP (ref 32–36)
MCHC RBC-ENTMCNC: 32.7 GM/DL — SIGNIFICANT CHANGE UP (ref 32–36)
MCHC RBC-ENTMCNC: 33.2 GM/DL — SIGNIFICANT CHANGE UP (ref 32–36)
MCHC RBC-ENTMCNC: 33.3 G/DL — SIGNIFICANT CHANGE UP (ref 32–36)
MCHC RBC-ENTMCNC: 33.3 GM/DL — SIGNIFICANT CHANGE UP (ref 32–36)
MCHC RBC-ENTMCNC: 33.4 GM/DL — SIGNIFICANT CHANGE UP (ref 32–36)
MCHC RBC-ENTMCNC: 33.5 GM/DL — SIGNIFICANT CHANGE UP (ref 32–36)
MCHC RBC-ENTMCNC: 33.6 GM/DL — SIGNIFICANT CHANGE UP (ref 32–36)
MCHC RBC-ENTMCNC: 33.8 GM/DL — SIGNIFICANT CHANGE UP (ref 32–36)
MCHC RBC-ENTMCNC: 33.9 GM/DL — SIGNIFICANT CHANGE UP (ref 32–36)
MCHC RBC-ENTMCNC: 33.9 GM/DL — SIGNIFICANT CHANGE UP (ref 32–36)
MCHC RBC-ENTMCNC: 34 GM/DL — SIGNIFICANT CHANGE UP (ref 32–36)
MCHC RBC-ENTMCNC: 34.1 G/DL — SIGNIFICANT CHANGE UP (ref 32–36)
MCHC RBC-ENTMCNC: 34.2 GM/DL — SIGNIFICANT CHANGE UP (ref 32–36)
MCHC RBC-ENTMCNC: 34.5 GM/DL — SIGNIFICANT CHANGE UP (ref 32–36)
MCV RBC AUTO: 87.1 FL — SIGNIFICANT CHANGE UP (ref 80–100)
MCV RBC AUTO: 87.4 FL — SIGNIFICANT CHANGE UP (ref 80–100)
MCV RBC AUTO: 87.5 FL — SIGNIFICANT CHANGE UP (ref 80–100)
MCV RBC AUTO: 87.5 FL — SIGNIFICANT CHANGE UP (ref 80–100)
MCV RBC AUTO: 87.9 FL — SIGNIFICANT CHANGE UP (ref 80–100)
MCV RBC AUTO: 88 FL — SIGNIFICANT CHANGE UP (ref 80–100)
MCV RBC AUTO: 88 FL — SIGNIFICANT CHANGE UP (ref 80–100)
MCV RBC AUTO: 88.2 FL — SIGNIFICANT CHANGE UP (ref 80–100)
MCV RBC AUTO: 88.6 FL — SIGNIFICANT CHANGE UP (ref 80–100)
MCV RBC AUTO: 89.3 FL — SIGNIFICANT CHANGE UP (ref 80–100)
MCV RBC AUTO: 89.4 FL — SIGNIFICANT CHANGE UP (ref 80–100)
MCV RBC AUTO: 89.5 FL — SIGNIFICANT CHANGE UP (ref 80–100)
MCV RBC AUTO: 89.7 FL — SIGNIFICANT CHANGE UP (ref 80–100)
MCV RBC AUTO: 89.8 FL — SIGNIFICANT CHANGE UP (ref 80–100)
MCV RBC AUTO: 90.2 FL — SIGNIFICANT CHANGE UP (ref 80–100)
MCV RBC AUTO: 90.4 FL — SIGNIFICANT CHANGE UP (ref 80–100)
MCV RBC AUTO: 91.4 FL — SIGNIFICANT CHANGE UP (ref 80–100)
MCV RBC AUTO: 93.4 FL — SIGNIFICANT CHANGE UP (ref 80–100)
MCV RBC AUTO: 94 FL — SIGNIFICANT CHANGE UP (ref 80–100)
MCV RBC AUTO: 94.4 FL — SIGNIFICANT CHANGE UP (ref 80–100)
MCV RBC AUTO: 94.5 FL — SIGNIFICANT CHANGE UP (ref 80–100)
MCV RBC AUTO: 95.7 FL — SIGNIFICANT CHANGE UP (ref 80–100)
MCV RBC AUTO: 96.7 FL — SIGNIFICANT CHANGE UP (ref 80–100)
MCV RBC AUTO: 97.5 FL — SIGNIFICANT CHANGE UP (ref 80–100)
MCV RBC AUTO: 97.8 FL — SIGNIFICANT CHANGE UP (ref 80–100)
MCV RBC AUTO: 98.3 FL — SIGNIFICANT CHANGE UP (ref 80–100)
MCV RBC AUTO: 98.7 FL — SIGNIFICANT CHANGE UP (ref 80–100)
METAMYELOCYTES # FLD: 0.9 % — HIGH (ref 0–0)
METAMYELOCYTES # FLD: 1.8 % — HIGH (ref 0–0)
METAMYELOCYTES # FLD: 2 % — HIGH (ref 0–0)
METAMYELOCYTES NFR BLD: 2 % — HIGH (ref 0–0)
MONOCYTES # BLD AUTO: 1.56 K/UL — HIGH (ref 0–0.9)
MONOCYTES # BLD AUTO: 1.58 K/UL — HIGH (ref 0–0.9)
MONOCYTES # BLD AUTO: 1.75 K/UL — HIGH (ref 0–0.9)
MONOCYTES # BLD AUTO: 1.87 K/UL — HIGH (ref 0–0.9)
MONOCYTES # BLD AUTO: 2.46 K/UL — HIGH (ref 0–0.9)
MONOCYTES # BLD AUTO: 2.97 K/UL — HIGH (ref 0–0.9)
MONOCYTES # BLD AUTO: 3.39 K/UL — HIGH (ref 0–0.9)
MONOCYTES NFR BLD AUTO: 10 % — SIGNIFICANT CHANGE UP (ref 2–14)
MONOCYTES NFR BLD AUTO: 13.4 % — SIGNIFICANT CHANGE UP (ref 2–14)
MONOCYTES NFR BLD AUTO: 14 % — SIGNIFICANT CHANGE UP (ref 2–14)
MONOCYTES NFR BLD AUTO: 15.5 % — HIGH (ref 2–14)
MONOCYTES NFR BLD AUTO: 16.9 % — HIGH (ref 2–14)
MONOCYTES NFR BLD AUTO: 21 % — HIGH (ref 2–14)
MONOCYTES NFR BLD AUTO: 7.8 % — SIGNIFICANT CHANGE UP (ref 2–14)
MONOS+MACROS # FLD: 20 % — SIGNIFICANT CHANGE UP
MONOS+MACROS # FLD: 27 % — SIGNIFICANT CHANGE UP
MONOS+MACROS # FLD: 4 % — SIGNIFICANT CHANGE UP
MONOS+MACROS # FLD: 4 % — SIGNIFICANT CHANGE UP
MONOS+MACROS # FLD: 5 % — SIGNIFICANT CHANGE UP
MRSA PCR RESULT.: SIGNIFICANT CHANGE UP
MYELOCYTES NFR BLD: 0.9 % — HIGH (ref 0–0)
MYELOCYTES NFR BLD: 1.8 % — HIGH (ref 0–0)
MYELOCYTES NFR BLD: 2 % — HIGH (ref 0–0)
MYELOCYTES NFR BLD: 5 % — HIGH (ref 0–0)
NEUTROPHILS # BLD AUTO: 14.11 K/UL — HIGH (ref 1.8–7.4)
NEUTROPHILS # BLD AUTO: 14.33 K/UL — HIGH (ref 1.8–7.4)
NEUTROPHILS # BLD AUTO: 17.25 K/UL — HIGH (ref 1.8–7.4)
NEUTROPHILS # BLD AUTO: 20.58 K/UL — HIGH (ref 1.8–7.4)
NEUTROPHILS # BLD AUTO: 3.9 K/UL — SIGNIFICANT CHANGE UP (ref 1.8–7.4)
NEUTROPHILS # BLD AUTO: 9.4 K/UL — HIGH (ref 1.8–7.4)
NEUTROPHILS # BLD AUTO: 9.97 K/UL — HIGH (ref 1.8–7.4)
NEUTROPHILS NFR BLD AUTO: 52 % — SIGNIFICANT CHANGE UP (ref 43–77)
NEUTROPHILS NFR BLD AUTO: 70.5 % — SIGNIFICANT CHANGE UP (ref 43–77)
NEUTROPHILS NFR BLD AUTO: 71.5 % — SIGNIFICANT CHANGE UP (ref 43–77)
NEUTROPHILS NFR BLD AUTO: 73.5 % — SIGNIFICANT CHANGE UP (ref 43–77)
NEUTROPHILS NFR BLD AUTO: 75 % — SIGNIFICANT CHANGE UP (ref 43–77)
NEUTROPHILS NFR BLD AUTO: 83.6 % — HIGH (ref 43–77)
NEUTROPHILS NFR BLD AUTO: 86.1 % — HIGH (ref 43–77)
NEUTROPHILS-BODY FLUID: 33 % — SIGNIFICANT CHANGE UP
NEUTROPHILS-BODY FLUID: 45 % — SIGNIFICANT CHANGE UP
NEUTROPHILS-BODY FLUID: 69 % — SIGNIFICANT CHANGE UP
NEUTROPHILS-BODY FLUID: 81 % — SIGNIFICANT CHANGE UP
NEUTROPHILS-BODY FLUID: 92 % — SIGNIFICANT CHANGE UP
NEUTS BAND # BLD: 0.9 % — SIGNIFICANT CHANGE UP (ref 0–8)
NIGHT BLUE STAIN TISS: SIGNIFICANT CHANGE UP
NIGHT BLUE STAIN TISS: SIGNIFICANT CHANGE UP
NITRITE UR-MCNC: NEGATIVE — SIGNIFICANT CHANGE UP
NITRITE UR-MCNC: NEGATIVE — SIGNIFICANT CHANGE UP
NON-GYNECOLOGICAL CYTOLOGY STUDY: SIGNIFICANT CHANGE UP
NON-GYNECOLOGICAL CYTOLOGY STUDY: SIGNIFICANT CHANGE UP
NRBC # BLD: 0 /100 WBCS — SIGNIFICANT CHANGE UP (ref 0–0)
NRBC # BLD: 1 /100 WBCS — HIGH (ref 0–0)
NRBC # BLD: SIGNIFICANT CHANGE UP /100 WBCS (ref 0–0)
NRBC # BLD: SIGNIFICANT CHANGE UP /100 WBCS (ref 0–0)
NRBC BLD-RTO: 0 /100 WBCS — SIGNIFICANT CHANGE UP (ref 0–0)
NRBC BLD-RTO: 0 /100 WBCS — SIGNIFICANT CHANGE UP (ref 0–0)
NRBC BLD-RTO: SIGNIFICANT CHANGE UP /100 WBCS (ref 0–0)
NRBC BLD-RTO: SIGNIFICANT CHANGE UP /100 WBCS (ref 0–0)
NT-PROBNP SERPL-SCNC: 778 PG/ML — HIGH (ref 0–300)
OSMOLALITY UR: 414 MOS/KG — SIGNIFICANT CHANGE UP (ref 300–900)
OTHER CELLS FLD MANUAL: 4 % — SIGNIFICANT CHANGE UP
OVALOCYTES BLD QL SMEAR: SLIGHT — SIGNIFICANT CHANGE UP
PCO2 BLDV: 33 MMHG — LOW (ref 42–55)
PCO2 BLDV: 36 MMHG — LOW (ref 42–55)
PCO2 BLDV: 40 MMHG — LOW (ref 42–55)
PCO2 BLDV: 48 MMHG — SIGNIFICANT CHANGE UP (ref 42–55)
PH BLDV: 7.36 — SIGNIFICANT CHANGE UP (ref 7.32–7.43)
PH BLDV: 7.36 — SIGNIFICANT CHANGE UP (ref 7.32–7.43)
PH BLDV: 7.4 — SIGNIFICANT CHANGE UP (ref 7.32–7.43)
PH BLDV: 7.42 — SIGNIFICANT CHANGE UP (ref 7.32–7.43)
PH FLD: 7.31 — SIGNIFICANT CHANGE UP
PH FLD: 7.53 — SIGNIFICANT CHANGE UP
PH FLD: 7.53 — SIGNIFICANT CHANGE UP
PH FLD: 7.92 — SIGNIFICANT CHANGE UP
PH FLD: >7.7 — SIGNIFICANT CHANGE UP
PH UR: 5.5 — SIGNIFICANT CHANGE UP (ref 5–8)
PH UR: 5.5 — SIGNIFICANT CHANGE UP (ref 5–8)
PHOSPHATE SERPL-MCNC: 1.8 MG/DL — LOW (ref 2.5–4.5)
PHOSPHATE SERPL-MCNC: 2.5 MG/DL — SIGNIFICANT CHANGE UP (ref 2.5–4.5)
PHOSPHATE SERPL-MCNC: 2.5 MG/DL — SIGNIFICANT CHANGE UP (ref 2.5–4.5)
PHOSPHATE SERPL-MCNC: 3.2 MG/DL — SIGNIFICANT CHANGE UP (ref 2.5–4.5)
PHOSPHATE SERPL-MCNC: 3.3 MG/DL — SIGNIFICANT CHANGE UP (ref 2.5–4.5)
PHOSPHATE SERPL-MCNC: 3.5 MG/DL — SIGNIFICANT CHANGE UP (ref 2.5–4.5)
PHOSPHATE SERPL-MCNC: 5.7 MG/DL — HIGH (ref 2.5–4.5)
PLAT MORPH BLD: NORMAL — SIGNIFICANT CHANGE UP
PLATELET # BLD AUTO: 192 K/UL — SIGNIFICANT CHANGE UP (ref 150–400)
PLATELET # BLD AUTO: 192 K/UL — SIGNIFICANT CHANGE UP (ref 150–400)
PLATELET # BLD AUTO: 200 K/UL — SIGNIFICANT CHANGE UP (ref 150–400)
PLATELET # BLD AUTO: 205 K/UL — SIGNIFICANT CHANGE UP (ref 150–400)
PLATELET # BLD AUTO: 215 K/UL — SIGNIFICANT CHANGE UP (ref 150–400)
PLATELET # BLD AUTO: 233 K/UL — SIGNIFICANT CHANGE UP (ref 150–400)
PLATELET # BLD AUTO: 245 K/UL — SIGNIFICANT CHANGE UP (ref 150–400)
PLATELET # BLD AUTO: 276 K/UL — SIGNIFICANT CHANGE UP (ref 150–400)
PLATELET # BLD AUTO: 278 K/UL — SIGNIFICANT CHANGE UP (ref 150–400)
PLATELET # BLD AUTO: 283 K/UL — SIGNIFICANT CHANGE UP (ref 150–400)
PLATELET # BLD AUTO: 283 K/UL — SIGNIFICANT CHANGE UP (ref 150–400)
PLATELET # BLD AUTO: 297 K/UL — SIGNIFICANT CHANGE UP (ref 150–400)
PLATELET # BLD AUTO: 307 K/UL — SIGNIFICANT CHANGE UP (ref 150–400)
PLATELET # BLD AUTO: 313 K/UL — SIGNIFICANT CHANGE UP (ref 150–400)
PLATELET # BLD AUTO: 317 K/UL — SIGNIFICANT CHANGE UP (ref 150–400)
PLATELET # BLD AUTO: 317 K/UL — SIGNIFICANT CHANGE UP (ref 150–400)
PLATELET # BLD AUTO: 326 K/UL — SIGNIFICANT CHANGE UP (ref 150–400)
PLATELET # BLD AUTO: 328 K/UL — SIGNIFICANT CHANGE UP (ref 150–400)
PLATELET # BLD AUTO: 336 K/UL — SIGNIFICANT CHANGE UP (ref 150–400)
PLATELET # BLD AUTO: 338 K/UL — SIGNIFICANT CHANGE UP (ref 150–400)
PLATELET # BLD AUTO: 343 K/UL — SIGNIFICANT CHANGE UP (ref 150–400)
PLATELET # BLD AUTO: 352 K/UL — SIGNIFICANT CHANGE UP (ref 150–400)
PLATELET # BLD AUTO: 352 K/UL — SIGNIFICANT CHANGE UP (ref 150–400)
PLATELET # BLD AUTO: 357 K/UL — SIGNIFICANT CHANGE UP (ref 150–400)
PLATELET # BLD AUTO: 358 K/UL — SIGNIFICANT CHANGE UP (ref 150–400)
PLATELET # BLD AUTO: 368 K/UL — SIGNIFICANT CHANGE UP (ref 150–400)
PLATELET # BLD AUTO: 369 K/UL — SIGNIFICANT CHANGE UP (ref 150–400)
PO2 BLDV: 36 MMHG — SIGNIFICANT CHANGE UP (ref 25–45)
PO2 BLDV: 49 MMHG — HIGH (ref 25–45)
PO2 BLDV: 65 MMHG — HIGH (ref 25–45)
PO2 BLDV: 67 MMHG — HIGH (ref 25–45)
POIKILOCYTOSIS BLD QL AUTO: SLIGHT — SIGNIFICANT CHANGE UP
POLYCHROMASIA BLD QL SMEAR: SLIGHT — SIGNIFICANT CHANGE UP
POTASSIUM BLDV-SCNC: 4.4 MMOL/L — SIGNIFICANT CHANGE UP (ref 3.5–5.1)
POTASSIUM BLDV-SCNC: 4.5 MMOL/L — SIGNIFICANT CHANGE UP (ref 3.5–5.1)
POTASSIUM BLDV-SCNC: 4.9 MMOL/L — SIGNIFICANT CHANGE UP (ref 3.5–5.1)
POTASSIUM SERPL-MCNC: 3.4 MMOL/L — LOW (ref 3.5–5.3)
POTASSIUM SERPL-MCNC: 3.6 MMOL/L — SIGNIFICANT CHANGE UP (ref 3.5–5.3)
POTASSIUM SERPL-MCNC: 3.7 MMOL/L — SIGNIFICANT CHANGE UP (ref 3.5–5.3)
POTASSIUM SERPL-MCNC: 3.7 MMOL/L — SIGNIFICANT CHANGE UP (ref 3.5–5.3)
POTASSIUM SERPL-MCNC: 3.9 MMOL/L — SIGNIFICANT CHANGE UP (ref 3.5–5.3)
POTASSIUM SERPL-MCNC: 4 MMOL/L — SIGNIFICANT CHANGE UP (ref 3.5–5.3)
POTASSIUM SERPL-MCNC: 4 MMOL/L — SIGNIFICANT CHANGE UP (ref 3.5–5.3)
POTASSIUM SERPL-MCNC: 4.1 MMOL/L — SIGNIFICANT CHANGE UP (ref 3.5–5.3)
POTASSIUM SERPL-MCNC: 4.1 MMOL/L — SIGNIFICANT CHANGE UP (ref 3.5–5.3)
POTASSIUM SERPL-MCNC: 4.2 MMOL/L — SIGNIFICANT CHANGE UP (ref 3.5–5.3)
POTASSIUM SERPL-MCNC: 4.2 MMOL/L — SIGNIFICANT CHANGE UP (ref 3.5–5.3)
POTASSIUM SERPL-MCNC: 4.3 MMOL/L — SIGNIFICANT CHANGE UP (ref 3.5–5.3)
POTASSIUM SERPL-MCNC: 4.3 MMOL/L — SIGNIFICANT CHANGE UP (ref 3.5–5.3)
POTASSIUM SERPL-MCNC: 4.5 MMOL/L — SIGNIFICANT CHANGE UP (ref 3.5–5.3)
POTASSIUM SERPL-MCNC: 4.6 MMOL/L — SIGNIFICANT CHANGE UP (ref 3.5–5.3)
POTASSIUM SERPL-MCNC: 4.6 MMOL/L — SIGNIFICANT CHANGE UP (ref 3.5–5.3)
POTASSIUM SERPL-MCNC: 4.7 MMOL/L — SIGNIFICANT CHANGE UP (ref 3.5–5.3)
POTASSIUM SERPL-MCNC: 4.9 MMOL/L — SIGNIFICANT CHANGE UP (ref 3.5–5.3)
POTASSIUM SERPL-SCNC: 3.4 MMOL/L — LOW (ref 3.5–5.3)
POTASSIUM SERPL-SCNC: 3.6 MMOL/L — SIGNIFICANT CHANGE UP (ref 3.5–5.3)
POTASSIUM SERPL-SCNC: 3.7 MMOL/L — SIGNIFICANT CHANGE UP (ref 3.5–5.3)
POTASSIUM SERPL-SCNC: 3.7 MMOL/L — SIGNIFICANT CHANGE UP (ref 3.5–5.3)
POTASSIUM SERPL-SCNC: 3.9 MMOL/L — SIGNIFICANT CHANGE UP (ref 3.5–5.3)
POTASSIUM SERPL-SCNC: 4 MMOL/L — SIGNIFICANT CHANGE UP (ref 3.5–5.3)
POTASSIUM SERPL-SCNC: 4 MMOL/L — SIGNIFICANT CHANGE UP (ref 3.5–5.3)
POTASSIUM SERPL-SCNC: 4.1 MMOL/L — SIGNIFICANT CHANGE UP (ref 3.5–5.3)
POTASSIUM SERPL-SCNC: 4.1 MMOL/L — SIGNIFICANT CHANGE UP (ref 3.5–5.3)
POTASSIUM SERPL-SCNC: 4.2 MMOL/L — SIGNIFICANT CHANGE UP (ref 3.5–5.3)
POTASSIUM SERPL-SCNC: 4.2 MMOL/L — SIGNIFICANT CHANGE UP (ref 3.5–5.3)
POTASSIUM SERPL-SCNC: 4.3 MMOL/L — SIGNIFICANT CHANGE UP (ref 3.5–5.3)
POTASSIUM SERPL-SCNC: 4.3 MMOL/L — SIGNIFICANT CHANGE UP (ref 3.5–5.3)
POTASSIUM SERPL-SCNC: 4.5 MMOL/L — SIGNIFICANT CHANGE UP (ref 3.5–5.3)
POTASSIUM SERPL-SCNC: 4.6 MMOL/L — SIGNIFICANT CHANGE UP (ref 3.5–5.3)
POTASSIUM SERPL-SCNC: 4.6 MMOL/L — SIGNIFICANT CHANGE UP (ref 3.5–5.3)
POTASSIUM SERPL-SCNC: 4.7 MMOL/L — SIGNIFICANT CHANGE UP (ref 3.5–5.3)
POTASSIUM SERPL-SCNC: 4.9 MMOL/L — SIGNIFICANT CHANGE UP (ref 3.5–5.3)
PROCALCITONIN SERPL-MCNC: 0.1 NG/ML — SIGNIFICANT CHANGE UP (ref 0.02–0.1)
PROT ?TM UR-MCNC: 27 MG/DL — HIGH (ref 0–12)
PROT ?TM UR-MCNC: 33 MG/DL — HIGH (ref 0–12)
PROT FLD-MCNC: 2.4 G/DL — SIGNIFICANT CHANGE UP
PROT FLD-MCNC: 3.4 G/DL — SIGNIFICANT CHANGE UP
PROT FLD-MCNC: 4 G/DL — SIGNIFICANT CHANGE UP
PROT FLD-MCNC: 4.9 G/DL — SIGNIFICANT CHANGE UP
PROT FLD-MCNC: 5 G/DL — SIGNIFICANT CHANGE UP
PROT SERPL-MCNC: 6.2 G/DL — SIGNIFICANT CHANGE UP (ref 6–8.3)
PROT SERPL-MCNC: 6.3 G/DL — SIGNIFICANT CHANGE UP (ref 6–8.3)
PROT SERPL-MCNC: 6.4 G/DL — SIGNIFICANT CHANGE UP (ref 6–8.3)
PROT SERPL-MCNC: 6.6 G/DL — SIGNIFICANT CHANGE UP (ref 6–8.3)
PROT SERPL-MCNC: 6.7 G/DL — SIGNIFICANT CHANGE UP (ref 6–8.3)
PROT SERPL-MCNC: 7.3 G/DL — SIGNIFICANT CHANGE UP (ref 6–8.3)
PROT SERPL-MCNC: 7.4 G/DL — SIGNIFICANT CHANGE UP (ref 6–8.3)
PROT SERPL-MCNC: 8.2 G/DL — SIGNIFICANT CHANGE UP (ref 6–8.3)
PROT UR-MCNC: 30 MG/DL
PROT UR-MCNC: NEGATIVE MG/DL — SIGNIFICANT CHANGE UP
PROT/CREAT UR-RTO: 0.5 RATIO — HIGH (ref 0–0.2)
PROTHROM AB SERPL-ACNC: 12.5 SEC — SIGNIFICANT CHANGE UP (ref 9.5–13)
PROTHROM AB SERPL-ACNC: 13.6 SEC — HIGH (ref 9.5–13)
PROTHROM AB SERPL-ACNC: 13.8 SEC — HIGH (ref 9.5–13)
PROTHROM AB SERPL-ACNC: 14 SEC — HIGH (ref 9.5–13)
PTH-INTACT FLD-MCNC: 3 PG/ML — LOW (ref 15–65)
RBC # BLD: 2.56 M/UL — LOW (ref 4.2–5.8)
RBC # BLD: 2.74 M/UL — LOW (ref 4.2–5.8)
RBC # BLD: 2.76 M/UL — LOW (ref 4.2–5.8)
RBC # BLD: 2.9 M/UL — LOW (ref 4.2–5.8)
RBC # BLD: 2.92 M/UL — LOW (ref 4.2–5.8)
RBC # BLD: 3.04 M/UL — LOW (ref 4.2–5.8)
RBC # BLD: 3.12 M/UL — LOW (ref 4.2–5.8)
RBC # BLD: 3.13 M/UL — LOW (ref 4.2–5.8)
RBC # BLD: 3.16 M/UL — LOW (ref 4.2–5.8)
RBC # BLD: 3.23 M/UL — LOW (ref 4.2–5.8)
RBC # BLD: 3.35 M/UL — LOW (ref 4.2–5.8)
RBC # BLD: 3.36 M/UL — LOW (ref 4.2–5.8)
RBC # BLD: 3.37 M/UL — LOW (ref 4.2–5.8)
RBC # BLD: 3.43 M/UL — LOW (ref 4.2–5.8)
RBC # BLD: 3.43 M/UL — LOW (ref 4.2–5.8)
RBC # BLD: 3.49 M/UL — LOW (ref 4.2–5.8)
RBC # BLD: 3.5 M/UL — LOW (ref 4.2–5.8)
RBC # BLD: 3.51 M/UL — LOW (ref 4.2–5.8)
RBC # BLD: 3.56 M/UL — LOW (ref 4.2–5.8)
RBC # BLD: 3.56 M/UL — LOW (ref 4.2–5.8)
RBC # BLD: 3.63 M/UL — LOW (ref 4.2–5.8)
RBC # BLD: 3.66 M/UL — LOW (ref 4.2–5.8)
RBC # BLD: 3.69 M/UL — LOW (ref 4.2–5.8)
RBC # BLD: 3.72 M/UL — LOW (ref 4.2–5.8)
RBC # BLD: 3.8 M/UL — LOW (ref 4.2–5.8)
RBC # FLD: 14.6 % — HIGH (ref 10.3–14.5)
RBC # FLD: 14.6 % — HIGH (ref 10.3–14.5)
RBC # FLD: 14.7 % — HIGH (ref 10.3–14.5)
RBC # FLD: 14.8 % — HIGH (ref 10.3–14.5)
RBC # FLD: 14.8 % — HIGH (ref 10.3–14.5)
RBC # FLD: 14.9 % — HIGH (ref 10.3–14.5)
RBC # FLD: 15 % — HIGH (ref 10.3–14.5)
RBC # FLD: 15.1 % — HIGH (ref 10.3–14.5)
RBC # FLD: 15.6 % — HIGH (ref 10.3–14.5)
RBC # FLD: 15.7 % — HIGH (ref 10.3–14.5)
RBC # FLD: 15.8 % — HIGH (ref 10.3–14.5)
RBC # FLD: 15.9 % — HIGH (ref 10.3–14.5)
RBC # FLD: 16 % — HIGH (ref 10.3–14.5)
RBC # FLD: 16.1 % — HIGH (ref 10.3–14.5)
RBC # FLD: 16.2 % — HIGH (ref 10.3–14.5)
RBC # FLD: 16.2 % — HIGH (ref 10.3–14.5)
RBC # FLD: 16.6 % — HIGH (ref 10.3–14.5)
RBC # FLD: 17.1 % — HIGH (ref 10.3–14.5)
RBC # FLD: 17.5 % — HIGH (ref 10.3–14.5)
RBC # FLD: 18.1 % — HIGH (ref 10.3–14.5)
RBC # FLD: 18.1 % — HIGH (ref 10.3–14.5)
RBC # FLD: 18.7 % — HIGH (ref 10.3–14.5)
RBC # FLD: 19.3 % — HIGH (ref 10.3–14.5)
RBC BLD AUTO: ABNORMAL
RBC BLD AUTO: SIGNIFICANT CHANGE UP
RBC CASTS # UR COMP ASSIST: 2 /HPF — SIGNIFICANT CHANGE UP (ref 0–4)
RBC CASTS # UR COMP ASSIST: 30 /HPF — HIGH (ref 0–4)
RCV VOL RI: HIGH CELLS/UL (ref 0–5)
REVIEW: SIGNIFICANT CHANGE UP
REVIEW: SIGNIFICANT CHANGE UP
RH IG SCN BLD-IMP: POSITIVE — SIGNIFICANT CHANGE UP
S AUREUS DNA NOSE QL NAA+PROBE: SIGNIFICANT CHANGE UP
SAO2 % BLDV: 59 % — LOW (ref 67–88)
SAO2 % BLDV: 72.3 % — SIGNIFICANT CHANGE UP (ref 67–88)
SAO2 % BLDV: 90 % — HIGH (ref 67–88)
SAO2 % BLDV: 95 % — HIGH (ref 67–88)
SMUDGE CELLS # BLD: PRESENT — SIGNIFICANT CHANGE UP
SODIUM SERPL-SCNC: 120 MMOL/L — CRITICAL LOW (ref 135–145)
SODIUM SERPL-SCNC: 121 MMOL/L — LOW (ref 135–145)
SODIUM SERPL-SCNC: 125 MMOL/L — LOW (ref 135–145)
SODIUM SERPL-SCNC: 127 MMOL/L — LOW (ref 135–145)
SODIUM SERPL-SCNC: 129 MMOL/L — LOW (ref 135–145)
SODIUM SERPL-SCNC: 129 MMOL/L — LOW (ref 135–145)
SODIUM SERPL-SCNC: 131 MMOL/L — LOW (ref 135–145)
SODIUM SERPL-SCNC: 131 MMOL/L — LOW (ref 135–145)
SODIUM SERPL-SCNC: 132 MMOL/L — LOW (ref 135–145)
SODIUM SERPL-SCNC: 136 MMOL/L — SIGNIFICANT CHANGE UP (ref 135–145)
SODIUM SERPL-SCNC: 136 MMOL/L — SIGNIFICANT CHANGE UP (ref 135–145)
SODIUM SERPL-SCNC: 138 MMOL/L — SIGNIFICANT CHANGE UP (ref 135–145)
SODIUM SERPL-SCNC: 138 MMOL/L — SIGNIFICANT CHANGE UP (ref 135–145)
SODIUM SERPL-SCNC: 139 MMOL/L — SIGNIFICANT CHANGE UP (ref 135–145)
SODIUM SERPL-SCNC: 141 MMOL/L — SIGNIFICANT CHANGE UP (ref 135–145)
SODIUM SERPL-SCNC: 146 MMOL/L — HIGH (ref 135–145)
SODIUM UR-SCNC: 17 MMOL/L — SIGNIFICANT CHANGE UP
SP GR SPEC: 1.01 — SIGNIFICANT CHANGE UP (ref 1–1.03)
SP GR SPEC: 1.01 — SIGNIFICANT CHANGE UP (ref 1–1.03)
SPECIMEN SOURCE: SIGNIFICANT CHANGE UP
SQUAMOUS # UR AUTO: 0 /HPF — SIGNIFICANT CHANGE UP (ref 0–5)
SQUAMOUS # UR AUTO: 0 /HPF — SIGNIFICANT CHANGE UP (ref 0–5)
TM INTERPRETATION: SIGNIFICANT CHANGE UP
TOTAL NUCLEATED CELL COUNT, BODY FLUID: 1150 CELLS/UL — HIGH (ref 0–5)
TOTAL NUCLEATED CELL COUNT, BODY FLUID: 1397 CELLS/UL — HIGH (ref 0–5)
TOTAL NUCLEATED CELL COUNT, BODY FLUID: 253 CELLS/UL — HIGH (ref 0–5)
TOTAL NUCLEATED CELL COUNT, BODY FLUID: 329 CELLS/UL — HIGH (ref 0–5)
TOTAL NUCLEATED CELL COUNT, BODY FLUID: 396 CELLS/UL — HIGH (ref 0–5)
TROPONIN T, HIGH SENSITIVITY RESULT: 37 NG/L — SIGNIFICANT CHANGE UP (ref 0–51)
TSH SERPL-MCNC: 1.11 UIU/ML — SIGNIFICANT CHANGE UP (ref 0.27–4.2)
TUBE TYPE: SIGNIFICANT CHANGE UP
URATE CRY FLD QL MICRO: PRESENT
UROBILINOGEN FLD QL: 0.2 MG/DL — SIGNIFICANT CHANGE UP (ref 0.2–1)
UROBILINOGEN FLD QL: 0.2 MG/DL — SIGNIFICANT CHANGE UP (ref 0.2–1)
VIT D25+D1,25 OH+D1,25 PNL SERPL-MCNC: 165 PG/ML — HIGH (ref 19.9–79.3)
WBC # BLD: 12.53 K/UL — HIGH (ref 3.8–10.5)
WBC # BLD: 13.96 K/UL — HIGH (ref 3.8–10.5)
WBC # BLD: 14.38 K/UL — HIGH (ref 3.8–10.5)
WBC # BLD: 14.73 K/UL — HIGH (ref 3.8–10.5)
WBC # BLD: 15.26 K/UL — HIGH (ref 3.8–10.5)
WBC # BLD: 15.89 K/UL — HIGH (ref 3.8–10.5)
WBC # BLD: 16.27 K/UL — HIGH (ref 3.8–10.5)
WBC # BLD: 16.39 K/UL — HIGH (ref 3.8–10.5)
WBC # BLD: 17.29 K/UL — HIGH (ref 3.8–10.5)
WBC # BLD: 17.72 K/UL — HIGH (ref 3.8–10.5)
WBC # BLD: 18.4 K/UL — HIGH (ref 3.8–10.5)
WBC # BLD: 18.77 K/UL — HIGH (ref 3.8–10.5)
WBC # BLD: 18.83 K/UL — HIGH (ref 3.8–10.5)
WBC # BLD: 18.96 K/UL — HIGH (ref 3.8–10.5)
WBC # BLD: 18.99 K/UL — HIGH (ref 3.8–10.5)
WBC # BLD: 19.19 K/UL — HIGH (ref 3.8–10.5)
WBC # BLD: 19.28 K/UL — HIGH (ref 3.8–10.5)
WBC # BLD: 20.04 K/UL — HIGH (ref 3.8–10.5)
WBC # BLD: 20.05 K/UL — HIGH (ref 3.8–10.5)
WBC # BLD: 20.26 K/UL — HIGH (ref 3.8–10.5)
WBC # BLD: 20.81 K/UL — HIGH (ref 3.8–10.5)
WBC # BLD: 21.21 K/UL — HIGH (ref 3.8–10.5)
WBC # BLD: 22.05 K/UL — HIGH (ref 3.8–10.5)
WBC # BLD: 22.06 K/UL — HIGH (ref 3.8–10.5)
WBC # BLD: 24.62 K/UL — HIGH (ref 3.8–10.5)
WBC # BLD: 29.09 K/UL — HIGH (ref 3.8–10.5)
WBC # BLD: 7.5 K/UL — SIGNIFICANT CHANGE UP (ref 3.8–10.5)
WBC # FLD AUTO: 12.53 K/UL — HIGH (ref 3.8–10.5)
WBC # FLD AUTO: 13.96 K/UL — HIGH (ref 3.8–10.5)
WBC # FLD AUTO: 14.38 K/UL — HIGH (ref 3.8–10.5)
WBC # FLD AUTO: 14.73 K/UL — HIGH (ref 3.8–10.5)
WBC # FLD AUTO: 15.26 K/UL — HIGH (ref 3.8–10.5)
WBC # FLD AUTO: 15.89 K/UL — HIGH (ref 3.8–10.5)
WBC # FLD AUTO: 16.27 K/UL — HIGH (ref 3.8–10.5)
WBC # FLD AUTO: 16.39 K/UL — HIGH (ref 3.8–10.5)
WBC # FLD AUTO: 17.29 K/UL — HIGH (ref 3.8–10.5)
WBC # FLD AUTO: 17.72 K/UL — HIGH (ref 3.8–10.5)
WBC # FLD AUTO: 18.4 K/UL — HIGH (ref 3.8–10.5)
WBC # FLD AUTO: 18.77 K/UL — HIGH (ref 3.8–10.5)
WBC # FLD AUTO: 18.83 K/UL — HIGH (ref 3.8–10.5)
WBC # FLD AUTO: 18.96 K/UL — HIGH (ref 3.8–10.5)
WBC # FLD AUTO: 18.99 K/UL — HIGH (ref 3.8–10.5)
WBC # FLD AUTO: 19.19 K/UL — HIGH (ref 3.8–10.5)
WBC # FLD AUTO: 19.28 K/UL — HIGH (ref 3.8–10.5)
WBC # FLD AUTO: 20.04 K/UL — HIGH (ref 3.8–10.5)
WBC # FLD AUTO: 20.05 K/UL — HIGH (ref 3.8–10.5)
WBC # FLD AUTO: 20.26 K/UL — HIGH (ref 3.8–10.5)
WBC # FLD AUTO: 20.81 K/UL — HIGH (ref 3.8–10.5)
WBC # FLD AUTO: 21.21 K/UL — HIGH (ref 3.8–10.5)
WBC # FLD AUTO: 22.05 K/UL — HIGH (ref 3.8–10.5)
WBC # FLD AUTO: 22.06 K/UL — HIGH (ref 3.8–10.5)
WBC # FLD AUTO: 24.62 K/UL — HIGH (ref 3.8–10.5)
WBC # FLD AUTO: 29.09 K/UL — HIGH (ref 3.8–10.5)
WBC # FLD AUTO: 7.5 K/UL — SIGNIFICANT CHANGE UP (ref 3.8–10.5)
WBC UR QL: 0 /HPF — SIGNIFICANT CHANGE UP (ref 0–5)
WBC UR QL: 22 /HPF — HIGH (ref 0–5)

## 2024-01-01 PROCEDURE — 99233 SBSQ HOSP IP/OBS HIGH 50: CPT | Mod: GC

## 2024-01-01 PROCEDURE — 86901 BLOOD TYPING SEROLOGIC RH(D): CPT

## 2024-01-01 PROCEDURE — 83970 ASSAY OF PARATHORMONE: CPT

## 2024-01-01 PROCEDURE — 99497 ADVNCD CARE PLAN 30 MIN: CPT | Mod: 25

## 2024-01-01 PROCEDURE — 99233 SBSQ HOSP IP/OBS HIGH 50: CPT

## 2024-01-01 PROCEDURE — 77014: CPT | Mod: 26

## 2024-01-01 PROCEDURE — 71045 X-RAY EXAM CHEST 1 VIEW: CPT | Mod: 26

## 2024-01-01 PROCEDURE — 74177 CT ABD & PELVIS W/CONTRAST: CPT | Mod: 26

## 2024-01-01 PROCEDURE — 77470 SPECIAL RADIATION TREATMENT: CPT | Mod: 26

## 2024-01-01 PROCEDURE — 81001 URINALYSIS AUTO W/SCOPE: CPT

## 2024-01-01 PROCEDURE — 99232 SBSQ HOSP IP/OBS MODERATE 35: CPT

## 2024-01-01 PROCEDURE — 99232 SBSQ HOSP IP/OBS MODERATE 35: CPT | Mod: GC

## 2024-01-01 PROCEDURE — 88342 IMHCHEM/IMCYTCHM 1ST ANTB: CPT

## 2024-01-01 PROCEDURE — 93306 TTE W/DOPPLER COMPLETE: CPT | Mod: 26

## 2024-01-01 PROCEDURE — 86850 RBC ANTIBODY SCREEN: CPT

## 2024-01-01 PROCEDURE — 87015 SPECIMEN INFECT AGNT CONCNTJ: CPT

## 2024-01-01 PROCEDURE — 99231 SBSQ HOSP IP/OBS SF/LOW 25: CPT

## 2024-01-01 PROCEDURE — 71045 X-RAY EXAM CHEST 1 VIEW: CPT | Mod: 26,77

## 2024-01-01 PROCEDURE — 85025 COMPLETE CBC W/AUTO DIFF WBC: CPT

## 2024-01-01 PROCEDURE — 88184 FLOWCYTOMETRY/ TC 1 MARKER: CPT

## 2024-01-01 PROCEDURE — 88108 CYTOPATH CONCENTRATE TECH: CPT | Mod: 26,59

## 2024-01-01 PROCEDURE — 87206 SMEAR FLUORESCENT/ACID STAI: CPT

## 2024-01-01 PROCEDURE — ZZZZZ: CPT

## 2024-01-01 PROCEDURE — 88112 CYTOPATH CELL ENHANCE TECH: CPT | Mod: 26

## 2024-01-01 PROCEDURE — 87640 STAPH A DNA AMP PROBE: CPT

## 2024-01-01 PROCEDURE — 93010 ELECTROCARDIOGRAM REPORT: CPT

## 2024-01-01 PROCEDURE — 88185 FLOWCYTOMETRY/TC ADD-ON: CPT

## 2024-01-01 PROCEDURE — 76604 US EXAM CHEST: CPT | Mod: 26,GC

## 2024-01-01 PROCEDURE — 32556 INSERT CATH PLEURA W/O IMAGE: CPT | Mod: RT

## 2024-01-01 PROCEDURE — 88341 IMHCHEM/IMCYTCHM EA ADD ANTB: CPT | Mod: 26

## 2024-01-01 PROCEDURE — 82330 ASSAY OF CALCIUM: CPT

## 2024-01-01 PROCEDURE — 87102 FUNGUS ISOLATION CULTURE: CPT

## 2024-01-01 PROCEDURE — 99223 1ST HOSP IP/OBS HIGH 75: CPT | Mod: GC

## 2024-01-01 PROCEDURE — 36415 COLL VENOUS BLD VENIPUNCTURE: CPT

## 2024-01-01 PROCEDURE — 76770 US EXAM ABDO BACK WALL COMP: CPT | Mod: 26

## 2024-01-01 PROCEDURE — 83605 ASSAY OF LACTIC ACID: CPT

## 2024-01-01 PROCEDURE — 99222 1ST HOSP IP/OBS MODERATE 55: CPT | Mod: 25

## 2024-01-01 PROCEDURE — 94660 CPAP INITIATION&MGMT: CPT

## 2024-01-01 PROCEDURE — 82962 GLUCOSE BLOOD TEST: CPT

## 2024-01-01 PROCEDURE — 94640 AIRWAY INHALATION TREATMENT: CPT

## 2024-01-01 PROCEDURE — 71260 CT THORAX DX C+: CPT | Mod: 26

## 2024-01-01 PROCEDURE — 83935 ASSAY OF URINE OSMOLALITY: CPT

## 2024-01-01 PROCEDURE — 36600 WITHDRAWAL OF ARTERIAL BLOOD: CPT

## 2024-01-01 PROCEDURE — 85730 THROMBOPLASTIN TIME PARTIAL: CPT

## 2024-01-01 PROCEDURE — 87116 MYCOBACTERIA CULTURE: CPT

## 2024-01-01 PROCEDURE — 99285 EMERGENCY DEPT VISIT HI MDM: CPT

## 2024-01-01 PROCEDURE — 77332 RADIATION TREATMENT AID(S): CPT | Mod: 26

## 2024-01-01 PROCEDURE — 82945 GLUCOSE OTHER FLUID: CPT

## 2024-01-01 PROCEDURE — 82570 ASSAY OF URINE CREATININE: CPT

## 2024-01-01 PROCEDURE — 89051 BODY FLUID CELL COUNT: CPT

## 2024-01-01 PROCEDURE — 71250 CT THORAX DX C-: CPT | Mod: MC

## 2024-01-01 PROCEDURE — 84295 ASSAY OF SERUM SODIUM: CPT

## 2024-01-01 PROCEDURE — 84484 ASSAY OF TROPONIN QUANT: CPT

## 2024-01-01 PROCEDURE — 99223 1ST HOSP IP/OBS HIGH 75: CPT

## 2024-01-01 PROCEDURE — 77427 RADIATION TX MANAGEMENT X5: CPT

## 2024-01-01 PROCEDURE — 88112 CYTOPATH CELL ENHANCE TECH: CPT

## 2024-01-01 PROCEDURE — 32555 ASPIRATE PLEURA W/ IMAGING: CPT | Mod: LT

## 2024-01-01 PROCEDURE — P9047: CPT

## 2024-01-01 PROCEDURE — 88112 CYTOPATH CELL ENHANCE TECH: CPT | Mod: 26,59

## 2024-01-01 PROCEDURE — 71260 CT THORAX DX C+: CPT | Mod: MC

## 2024-01-01 PROCEDURE — 76604 US EXAM CHEST: CPT | Mod: 26

## 2024-01-01 PROCEDURE — 88341 IMHCHEM/IMCYTCHM EA ADD ANTB: CPT

## 2024-01-01 PROCEDURE — 32557 INSERT CATH PLEURA W/ IMAGE: CPT | Mod: RT

## 2024-01-01 PROCEDURE — 84132 ASSAY OF SERUM POTASSIUM: CPT

## 2024-01-01 PROCEDURE — 99221 1ST HOSP IP/OBS SF/LOW 40: CPT

## 2024-01-01 PROCEDURE — 84157 ASSAY OF PROTEIN OTHER: CPT

## 2024-01-01 PROCEDURE — 86334 IMMUNOFIX E-PHORESIS SERUM: CPT

## 2024-01-01 PROCEDURE — 77338 DESIGN MLC DEVICE FOR IMRT: CPT | Mod: 26

## 2024-01-01 PROCEDURE — 84145 PROCALCITONIN (PCT): CPT

## 2024-01-01 PROCEDURE — 88189 FLOWCYTOMETRY/READ 16 & >: CPT

## 2024-01-01 PROCEDURE — 82803 BLOOD GASES ANY COMBINATION: CPT

## 2024-01-01 PROCEDURE — 80048 BASIC METABOLIC PNL TOTAL CA: CPT

## 2024-01-01 PROCEDURE — 86900 BLOOD TYPING SEROLOGIC ABO: CPT

## 2024-01-01 PROCEDURE — 84300 ASSAY OF URINE SODIUM: CPT

## 2024-01-01 PROCEDURE — 83986 ASSAY PH BODY FLUID NOS: CPT

## 2024-01-01 PROCEDURE — 84100 ASSAY OF PHOSPHORUS: CPT

## 2024-01-01 PROCEDURE — 88305 TISSUE EXAM BY PATHOLOGIST: CPT

## 2024-01-01 PROCEDURE — 88305 TISSUE EXAM BY PATHOLOGIST: CPT | Mod: 26

## 2024-01-01 PROCEDURE — 32557 INSERT CATH PLEURA W/ IMAGE: CPT

## 2024-01-01 PROCEDURE — 71275 CT ANGIOGRAPHY CHEST: CPT | Mod: 26

## 2024-01-01 PROCEDURE — 85027 COMPLETE CBC AUTOMATED: CPT

## 2024-01-01 PROCEDURE — 93005 ELECTROCARDIOGRAM TRACING: CPT

## 2024-01-01 PROCEDURE — 87075 CULTR BACTERIA EXCEPT BLOOD: CPT

## 2024-01-01 PROCEDURE — 99222 1ST HOSP IP/OBS MODERATE 55: CPT

## 2024-01-01 PROCEDURE — 82042 OTHER SOURCE ALBUMIN QUAN EA: CPT

## 2024-01-01 PROCEDURE — 32555 ASPIRATE PLEURA W/ IMAGING: CPT | Mod: RT,GC

## 2024-01-01 PROCEDURE — 87205 SMEAR GRAM STAIN: CPT

## 2024-01-01 PROCEDURE — 82435 ASSAY OF BLOOD CHLORIDE: CPT

## 2024-01-01 PROCEDURE — 84443 ASSAY THYROID STIM HORMONE: CPT

## 2024-01-01 PROCEDURE — 74177 CT ABD & PELVIS W/CONTRAST: CPT | Mod: MC

## 2024-01-01 PROCEDURE — 88342 IMHCHEM/IMCYTCHM 1ST ANTB: CPT | Mod: 26,59

## 2024-01-01 PROCEDURE — 71275 CT ANGIOGRAPHY CHEST: CPT | Mod: MC

## 2024-01-01 PROCEDURE — 71045 X-RAY EXAM CHEST 1 VIEW: CPT

## 2024-01-01 PROCEDURE — 83880 ASSAY OF NATRIURETIC PEPTIDE: CPT

## 2024-01-01 PROCEDURE — C1729: CPT

## 2024-01-01 PROCEDURE — C1769: CPT

## 2024-01-01 PROCEDURE — 76770 US EXAM ABDO BACK WALL COMP: CPT

## 2024-01-01 PROCEDURE — 71250 CT THORAX DX C-: CPT | Mod: 26

## 2024-01-01 PROCEDURE — 80053 COMPREHEN METABOLIC PANEL: CPT

## 2024-01-01 PROCEDURE — 84156 ASSAY OF PROTEIN URINE: CPT

## 2024-01-01 PROCEDURE — 86480 TB TEST CELL IMMUN MEASURE: CPT

## 2024-01-01 PROCEDURE — 86335 IMMUNFIX E-PHORSIS/URINE/CSF: CPT

## 2024-01-01 PROCEDURE — 77300 RADIATION THERAPY DOSE PLAN: CPT | Mod: 26

## 2024-01-01 PROCEDURE — 85014 HEMATOCRIT: CPT

## 2024-01-01 PROCEDURE — 82310 ASSAY OF CALCIUM: CPT

## 2024-01-01 PROCEDURE — 82652 VIT D 1 25-DIHYDROXY: CPT

## 2024-01-01 PROCEDURE — 85018 HEMOGLOBIN: CPT

## 2024-01-01 PROCEDURE — C8929: CPT

## 2024-01-01 PROCEDURE — 85610 PROTHROMBIN TIME: CPT

## 2024-01-01 PROCEDURE — 77263 THER RADIOLOGY TX PLNG CPLX: CPT

## 2024-01-01 PROCEDURE — 83521 IG LIGHT CHAINS FREE EACH: CPT

## 2024-01-01 PROCEDURE — 99233 SBSQ HOSP IP/OBS HIGH 50: CPT | Mod: GC,25

## 2024-01-01 PROCEDURE — 83615 LACTATE (LD) (LDH) ENZYME: CPT

## 2024-01-01 PROCEDURE — 87040 BLOOD CULTURE FOR BACTERIA: CPT

## 2024-01-01 PROCEDURE — 87070 CULTURE OTHR SPECIMN AEROBIC: CPT

## 2024-01-01 PROCEDURE — 77301 RADIOTHERAPY DOSE PLAN IMRT: CPT | Mod: 26

## 2024-01-01 PROCEDURE — 87641 MR-STAPH DNA AMP PROBE: CPT

## 2024-01-01 PROCEDURE — 83735 ASSAY OF MAGNESIUM: CPT

## 2024-01-01 PROCEDURE — 82947 ASSAY GLUCOSE BLOOD QUANT: CPT

## 2024-01-01 PROCEDURE — 71045 X-RAY EXAM CHEST 1 VIEW: CPT | Mod: 26,76

## 2024-01-01 RX ORDER — SODIUM CHLORIDE 0.9 % (FLUSH) 0.9 %
500 SYRINGE (ML) INJECTION
Refills: 0 | Status: DISCONTINUED | OUTPATIENT
Start: 2024-01-01 | End: 2024-01-01

## 2024-01-01 RX ORDER — GUAIFENESIN 100 %
200 POWDER (GRAM) MISCELLANEOUS EVERY 6 HOURS
Refills: 0 | Status: DISCONTINUED | OUTPATIENT
Start: 2024-01-01 | End: 2024-01-01

## 2024-01-01 RX ORDER — APIXABAN 5 MG/1
1 TABLET, FILM COATED ORAL
Qty: 60 | Refills: 0
Start: 2024-01-01 | End: 2024-07-23

## 2024-01-01 RX ORDER — HYDROMORPHONE HCL 0.2 MG/ML
0.5 INJECTION, SOLUTION INTRAVENOUS
Refills: 0 | Status: DISCONTINUED | OUTPATIENT
Start: 2024-01-01 | End: 2024-01-01

## 2024-01-01 RX ORDER — SODIUM CHLORIDE 0.9 % (FLUSH) 0.9 %
1000 SYRINGE (ML) INJECTION
Refills: 0 | Status: DISCONTINUED | OUTPATIENT
Start: 2024-01-01 | End: 2024-01-01

## 2024-01-01 RX ORDER — PAMIDRONATE DISODIUM 30MG/10ML
60 VIAL (ML) INTRAVENOUS ONCE
Refills: 0 | Status: COMPLETED | OUTPATIENT
Start: 2024-01-01 | End: 2024-01-01

## 2024-01-01 RX ORDER — MIDODRINE HYDROCHLORIDE 10 MG/1
15 TABLET ORAL THREE TIMES A DAY
Refills: 0 | Status: DISCONTINUED | OUTPATIENT
Start: 2024-01-01 | End: 2024-01-01

## 2024-01-01 RX ORDER — LIDOCAINE HYDROCHLORIDE,EPINEPHRINE BITARTRATE 15; .005 MG/ML; MG/ML
50 INJECTION, SOLUTION EPIDURAL; INFILTRATION; INTRACAUDAL; PERINEURAL ONCE
Refills: 0 | Status: COMPLETED | OUTPATIENT
Start: 2024-01-01 | End: 2024-01-01

## 2024-01-01 RX ORDER — VANCOMYCIN HYDROCHLORIDE 50 MG/ML
1000 KIT ORAL ONCE
Refills: 0 | Status: COMPLETED | OUTPATIENT
Start: 2024-01-01 | End: 2024-01-01

## 2024-01-01 RX ORDER — AMPICILLIN AND SULBACTAM 2; 1 G/20ML; G/20ML
3 INJECTION, POWDER, FOR SOLUTION INTRAMUSCULAR; INTRAVENOUS ONCE
Refills: 0 | Status: COMPLETED | OUTPATIENT
Start: 2024-01-01 | End: 2024-01-01

## 2024-01-01 RX ORDER — DEXTROSE MONOHYDRATE AND SODIUM CHLORIDE 5; .3 G/100ML; G/100ML
250 INJECTION, SOLUTION INTRAVENOUS ONCE
Refills: 0 | Status: COMPLETED | OUTPATIENT
Start: 2024-01-01 | End: 2024-01-01

## 2024-01-01 RX ORDER — DEXTROSE MONOHYDRATE AND SODIUM CHLORIDE 5; .3 G/100ML; G/100ML
500 INJECTION, SOLUTION INTRAVENOUS ONCE
Refills: 0 | Status: COMPLETED | OUTPATIENT
Start: 2024-01-01 | End: 2024-01-01

## 2024-01-01 RX ORDER — METOPROLOL TARTRATE 50 MG
2.5 TABLET ORAL ONCE
Refills: 0 | Status: COMPLETED | OUTPATIENT
Start: 2024-01-01 | End: 2024-01-01

## 2024-01-01 RX ORDER — METOPROLOL TARTRATE 50 MG
12.5 TABLET ORAL
Refills: 0 | Status: DISCONTINUED | OUTPATIENT
Start: 2024-01-01 | End: 2024-01-01

## 2024-01-01 RX ORDER — ENOXAPARIN SODIUM 100 MG/ML
40 INJECTION SUBCUTANEOUS EVERY 24 HOURS
Refills: 0 | Status: DISCONTINUED | OUTPATIENT
Start: 2024-01-01 | End: 2024-01-01

## 2024-01-01 RX ORDER — HEPARIN SODIUM 50 [USP'U]/ML
1500 INJECTION, SOLUTION INTRAVENOUS
Qty: 25000 | Refills: 0 | Status: DISCONTINUED | OUTPATIENT
Start: 2024-01-01 | End: 2024-01-01

## 2024-01-01 RX ORDER — SODIUM CHLORIDE 0.9 % (FLUSH) 0.9 %
250 SYRINGE (ML) INJECTION ONCE
Refills: 0 | Status: DISCONTINUED | OUTPATIENT
Start: 2024-01-01 | End: 2024-01-01

## 2024-01-01 RX ORDER — BENZOCAINE AND MENTHOL 15; 3.6 MG/1; MG/1
1 LOZENGE ORAL THREE TIMES A DAY
Refills: 0 | Status: DISCONTINUED | OUTPATIENT
Start: 2024-01-01 | End: 2024-01-01

## 2024-01-01 RX ORDER — HEPARIN SODIUM 50 [USP'U]/ML
2500 INJECTION, SOLUTION INTRAVENOUS EVERY 6 HOURS
Refills: 0 | Status: DISCONTINUED | OUTPATIENT
Start: 2024-01-01 | End: 2024-01-01

## 2024-01-01 RX ORDER — BIOTIN/FOLIC AC/VIT BCOMP,C/ZN 3MG-0.8MG
1 TABLET ORAL DAILY
Refills: 0 | Status: DISCONTINUED | OUTPATIENT
Start: 2024-01-01 | End: 2024-01-01

## 2024-01-01 RX ORDER — PANTOPRAZOLE SODIUM 40 MG/10ML
1 INJECTION, POWDER, FOR SOLUTION INTRAVENOUS
Refills: 0 | DISCHARGE

## 2024-01-01 RX ORDER — HEPARIN SODIUM 50 [USP'U]/ML
5500 INJECTION, SOLUTION INTRAVENOUS EVERY 6 HOURS
Refills: 0 | Status: DISCONTINUED | OUTPATIENT
Start: 2024-01-01 | End: 2024-01-01

## 2024-01-01 RX ORDER — GLYCOPYRROLATE 0.2 MG/ML
0.4 INJECTION, SOLUTION INTRAMUSCULAR; INTRAVENOUS EVERY 6 HOURS
Refills: 0 | Status: DISCONTINUED | OUTPATIENT
Start: 2024-01-01 | End: 2024-01-01

## 2024-01-01 RX ORDER — POTASSIUM CHLORIDE 600 MG/1
40 TABLET, FILM COATED, EXTENDED RELEASE ORAL ONCE
Refills: 0 | Status: COMPLETED | OUTPATIENT
Start: 2024-01-01 | End: 2024-01-01

## 2024-01-01 RX ORDER — APIXABAN 5 MG/1
2.5 TABLET, FILM COATED ORAL EVERY 12 HOURS
Refills: 0 | Status: DISCONTINUED | OUTPATIENT
Start: 2024-01-01 | End: 2024-01-01

## 2024-01-01 RX ORDER — HYDROMORPHONE HCL 0.2 MG/ML
1 INJECTION, SOLUTION INTRAVENOUS
Refills: 0 | Status: DISCONTINUED | OUTPATIENT
Start: 2024-01-01 | End: 2024-01-01

## 2024-01-01 RX ORDER — DEXTROSE MONOHYDRATE AND SODIUM CHLORIDE 5; .3 G/100ML; G/100ML
1000 INJECTION, SOLUTION INTRAVENOUS
Refills: 0 | Status: DISCONTINUED | OUTPATIENT
Start: 2024-01-01 | End: 2024-01-01

## 2024-01-01 RX ORDER — HYDROMORPHONE HCL 0.2 MG/ML
0.3 INJECTION, SOLUTION INTRAVENOUS
Refills: 0 | Status: DISCONTINUED | OUTPATIENT
Start: 2024-01-01 | End: 2024-01-01

## 2024-01-01 RX ORDER — BISACODYL 5 MG
10 TABLET, DELAYED RELEASE (ENTERIC COATED) ORAL DAILY
Refills: 0 | Status: DISCONTINUED | OUTPATIENT
Start: 2024-01-01 | End: 2024-01-01

## 2024-01-01 RX ORDER — TAMSULOSIN HYDROCHLORIDE 0.4 MG/1
0.4 CAPSULE ORAL AT BEDTIME
Refills: 0 | Status: DISCONTINUED | OUTPATIENT
Start: 2024-01-01 | End: 2024-01-01

## 2024-01-01 RX ORDER — MAGNESIUM, ALUMINUM HYDROXIDE 400-400
30 TABLET,CHEWABLE ORAL EVERY 4 HOURS
Refills: 0 | Status: DISCONTINUED | OUTPATIENT
Start: 2024-01-01 | End: 2024-01-01

## 2024-01-01 RX ORDER — CALCITONIN SALMON 200 [IU]/ML
270 INJECTION, SOLUTION INTRAMUSCULAR; SUBCUTANEOUS EVERY 12 HOURS
Refills: 0 | Status: DISCONTINUED | OUTPATIENT
Start: 2024-01-01 | End: 2024-01-01

## 2024-01-01 RX ORDER — PIPERACILLIN SODIUM AND TAZOBACTAM SODIUM 3; .375 G/15ML; G/15ML
3.38 INJECTION, POWDER, LYOPHILIZED, FOR SOLUTION INTRAVENOUS ONCE
Refills: 0 | Status: COMPLETED | OUTPATIENT
Start: 2024-01-01 | End: 2024-01-01

## 2024-01-01 RX ORDER — SIMETHICONE 40MG/0.6ML
80 SUSPENSION, DROPS(FINAL DOSAGE FORM)(ML) ORAL
Refills: 0 | Status: DISCONTINUED | OUTPATIENT
Start: 2024-01-01 | End: 2024-01-01

## 2024-01-01 RX ORDER — SODIUM CHLORIDE 0.9 % (FLUSH) 0.9 %
250 SYRINGE (ML) INJECTION ONCE
Refills: 0 | Status: COMPLETED | OUTPATIENT
Start: 2024-01-01 | End: 2024-01-01

## 2024-01-01 RX ORDER — BENZOCAINE AND MENTHOL 15; 3.6 MG/1; MG/1
1 LOZENGE ORAL ONCE
Refills: 0 | Status: COMPLETED | OUTPATIENT
Start: 2024-01-01 | End: 2024-01-01

## 2024-01-01 RX ORDER — MIDODRINE HYDROCHLORIDE 10 MG/1
15 TABLET ORAL ONCE
Refills: 0 | Status: COMPLETED | OUTPATIENT
Start: 2024-01-01 | End: 2024-01-01

## 2024-01-01 RX ORDER — SODIUM CHLORIDE 0.65 %
1 AEROSOL, SPRAY (ML) NASAL
Refills: 0 | Status: DISCONTINUED | OUTPATIENT
Start: 2024-01-01 | End: 2024-01-01

## 2024-01-01 RX ORDER — HEPARIN SODIUM 50 [USP'U]/ML
INJECTION, SOLUTION INTRAVENOUS
Qty: 25000 | Refills: 0 | Status: DISCONTINUED | OUTPATIENT
Start: 2024-01-01 | End: 2024-01-01

## 2024-01-01 RX ORDER — ACETAMINOPHEN 325 MG
650 TABLET ORAL EVERY 6 HOURS
Refills: 0 | Status: DISCONTINUED | OUTPATIENT
Start: 2024-01-01 | End: 2024-01-01

## 2024-01-01 RX ORDER — PIPERACILLIN SODIUM AND TAZOBACTAM SODIUM 3; .375 G/15ML; G/15ML
3.38 INJECTION, POWDER, LYOPHILIZED, FOR SOLUTION INTRAVENOUS EVERY 8 HOURS
Refills: 0 | Status: DISCONTINUED | OUTPATIENT
Start: 2024-01-01 | End: 2024-01-01

## 2024-01-01 RX ORDER — HYDROMORPHONE HCL 0.2 MG/ML
0.2 INJECTION, SOLUTION INTRAVENOUS
Refills: 0 | Status: DISCONTINUED | OUTPATIENT
Start: 2024-01-01 | End: 2024-01-01

## 2024-01-01 RX ORDER — HEPARIN SODIUM 50 [USP'U]/ML
1800 INJECTION, SOLUTION INTRAVENOUS
Qty: 25000 | Refills: 0 | Status: DISCONTINUED | OUTPATIENT
Start: 2024-01-01 | End: 2024-01-01

## 2024-01-01 RX ORDER — DIGOXIN 125 MCG
500 TABLET ORAL ONCE
Refills: 0 | Status: COMPLETED | OUTPATIENT
Start: 2024-01-01 | End: 2024-01-01

## 2024-01-01 RX ORDER — METOPROLOL TARTRATE 50 MG
2.5 TABLET ORAL ONCE
Refills: 0 | Status: DISCONTINUED | OUTPATIENT
Start: 2024-01-01 | End: 2024-01-01

## 2024-01-01 RX ORDER — AMPICILLIN AND SULBACTAM 2; 1 G/20ML; G/20ML
INJECTION, POWDER, FOR SOLUTION INTRAMUSCULAR; INTRAVENOUS
Refills: 0 | Status: DISCONTINUED | OUTPATIENT
Start: 2024-01-01 | End: 2024-01-01

## 2024-01-01 RX ORDER — IPRATROPIUM BROMIDE AND ALBUTEROL SULFATE .5; 3 MG/3ML; MG/3ML
3 SOLUTION RESPIRATORY (INHALATION) EVERY 6 HOURS
Refills: 0 | Status: DISCONTINUED | OUTPATIENT
Start: 2024-01-01 | End: 2024-01-01

## 2024-01-01 RX ORDER — METOPROLOL TARTRATE 50 MG
5 TABLET ORAL ONCE
Refills: 0 | Status: COMPLETED | OUTPATIENT
Start: 2024-01-01 | End: 2024-01-01

## 2024-01-01 RX ORDER — PIPERACILLIN SODIUM AND TAZOBACTAM SODIUM 3; .375 G/15ML; G/15ML
3.38 INJECTION, POWDER, LYOPHILIZED, FOR SOLUTION INTRAVENOUS EVERY 8 HOURS
Refills: 0 | Status: COMPLETED | OUTPATIENT
Start: 2024-01-01 | End: 2024-01-01

## 2024-01-01 RX ORDER — HEPARIN SODIUM 50 [USP'U]/ML
1200 INJECTION, SOLUTION INTRAVENOUS
Qty: 25000 | Refills: 0 | Status: DISCONTINUED | OUTPATIENT
Start: 2024-01-01 | End: 2024-01-01

## 2024-01-01 RX ORDER — AMPICILLIN AND SULBACTAM 2; 1 G/20ML; G/20ML
3 INJECTION, POWDER, FOR SOLUTION INTRAMUSCULAR; INTRAVENOUS EVERY 6 HOURS
Refills: 0 | Status: DISCONTINUED | OUTPATIENT
Start: 2024-01-01 | End: 2024-01-01

## 2024-01-01 RX ORDER — SODIUM CHLORIDE 0.9 % (FLUSH) 0.9 %
500 SYRINGE (ML) INJECTION ONCE
Refills: 0 | Status: COMPLETED | OUTPATIENT
Start: 2024-01-01 | End: 2024-01-01

## 2024-01-01 RX ORDER — FUROSEMIDE 10 MG/ML
20 INJECTION, SOLUTION INTRAMUSCULAR; INTRAVENOUS ONCE
Refills: 0 | Status: COMPLETED | OUTPATIENT
Start: 2024-01-01 | End: 2024-01-01

## 2024-01-01 RX ORDER — MIDODRINE HYDROCHLORIDE 10 MG/1
15 TABLET ORAL EVERY 8 HOURS
Refills: 0 | Status: DISCONTINUED | OUTPATIENT
Start: 2024-01-01 | End: 2024-01-01

## 2024-01-01 RX ORDER — PANTOPRAZOLE SODIUM 40 MG/10ML
40 INJECTION, POWDER, FOR SOLUTION INTRAVENOUS
Refills: 0 | Status: DISCONTINUED | OUTPATIENT
Start: 2024-01-01 | End: 2024-01-01

## 2024-01-01 RX ORDER — LORAZEPAM 0.5 MG
0.5 TABLET ORAL
Refills: 0 | Status: DISCONTINUED | OUTPATIENT
Start: 2024-01-01 | End: 2024-01-01

## 2024-01-01 RX ORDER — ALBUMIN HUMAN 5 %
100 INTRAVENOUS SOLUTION INTRAVENOUS ONCE
Refills: 0 | Status: COMPLETED | OUTPATIENT
Start: 2024-01-01 | End: 2024-01-01

## 2024-01-01 RX ADMIN — PIPERACILLIN SODIUM AND TAZOBACTAM SODIUM 25 GRAM(S): 3; .375 INJECTION, POWDER, LYOPHILIZED, FOR SOLUTION INTRAVENOUS at 21:42

## 2024-01-01 RX ADMIN — PIPERACILLIN SODIUM AND TAZOBACTAM SODIUM 25 GRAM(S): 3; .375 INJECTION, POWDER, LYOPHILIZED, FOR SOLUTION INTRAVENOUS at 05:49

## 2024-01-01 RX ADMIN — POTASSIUM CHLORIDE 40 MILLIEQUIVALENT(S): 600 TABLET, FILM COATED, EXTENDED RELEASE ORAL at 11:23

## 2024-01-01 RX ADMIN — AMPICILLIN AND SULBACTAM 200 GRAM(S): 2; 1 INJECTION, POWDER, FOR SOLUTION INTRAMUSCULAR; INTRAVENOUS at 17:11

## 2024-01-01 RX ADMIN — HEPARIN SODIUM 1900 UNIT(S)/HR: 50 INJECTION, SOLUTION INTRAVENOUS at 08:06

## 2024-01-01 RX ADMIN — Medication 1 TABLET(S): at 11:48

## 2024-01-01 RX ADMIN — PANTOPRAZOLE SODIUM 40 MILLIGRAM(S): 40 INJECTION, POWDER, FOR SOLUTION INTRAVENOUS at 05:05

## 2024-01-01 RX ADMIN — Medication 80 MILLIGRAM(S): at 11:09

## 2024-01-01 RX ADMIN — Medication 80 MILLIGRAM(S): at 05:43

## 2024-01-01 RX ADMIN — PANTOPRAZOLE SODIUM 40 MILLIGRAM(S): 40 INJECTION, POWDER, FOR SOLUTION INTRAVENOUS at 05:38

## 2024-01-01 RX ADMIN — BENZOCAINE AND MENTHOL 1 LOZENGE: 15; 3.6 LOZENGE ORAL at 21:45

## 2024-01-01 RX ADMIN — MIDODRINE HYDROCHLORIDE 15 MILLIGRAM(S): 10 TABLET ORAL at 22:28

## 2024-01-01 RX ADMIN — Medication 1 TABLET(S): at 11:23

## 2024-01-01 RX ADMIN — MIDODRINE HYDROCHLORIDE 15 MILLIGRAM(S): 10 TABLET ORAL at 05:16

## 2024-01-01 RX ADMIN — Medication 80 MILLIGRAM(S): at 17:45

## 2024-01-01 RX ADMIN — PIPERACILLIN SODIUM AND TAZOBACTAM SODIUM 25 GRAM(S): 3; .375 INJECTION, POWDER, LYOPHILIZED, FOR SOLUTION INTRAVENOUS at 05:15

## 2024-01-01 RX ADMIN — PIPERACILLIN SODIUM AND TAZOBACTAM SODIUM 25 GRAM(S): 3; .375 INJECTION, POWDER, LYOPHILIZED, FOR SOLUTION INTRAVENOUS at 07:30

## 2024-01-01 RX ADMIN — TAMSULOSIN HYDROCHLORIDE 0.4 MILLIGRAM(S): 0.4 CAPSULE ORAL at 22:02

## 2024-01-01 RX ADMIN — DEXTROSE MONOHYDRATE AND SODIUM CHLORIDE 60 MILLILITER(S): 5; .3 INJECTION, SOLUTION INTRAVENOUS at 22:50

## 2024-01-01 RX ADMIN — PIPERACILLIN SODIUM AND TAZOBACTAM SODIUM 25 GRAM(S): 3; .375 INJECTION, POWDER, LYOPHILIZED, FOR SOLUTION INTRAVENOUS at 13:35

## 2024-01-01 RX ADMIN — Medication 80 MILLIGRAM(S): at 13:17

## 2024-01-01 RX ADMIN — Medication 80 MILLIGRAM(S): at 00:11

## 2024-01-01 RX ADMIN — Medication 1 TABLET(S): at 12:22

## 2024-01-01 RX ADMIN — BENZOCAINE AND MENTHOL 1 LOZENGE: 15; 3.6 LOZENGE ORAL at 13:03

## 2024-01-01 RX ADMIN — IPRATROPIUM BROMIDE AND ALBUTEROL SULFATE 3 MILLILITER(S): .5; 3 SOLUTION RESPIRATORY (INHALATION) at 00:52

## 2024-01-01 RX ADMIN — BENZOCAINE AND MENTHOL 1 LOZENGE: 15; 3.6 LOZENGE ORAL at 13:37

## 2024-01-01 RX ADMIN — Medication 650 MILLIGRAM(S): at 12:16

## 2024-01-01 RX ADMIN — PIPERACILLIN SODIUM AND TAZOBACTAM SODIUM 200 GRAM(S): 3; .375 INJECTION, POWDER, LYOPHILIZED, FOR SOLUTION INTRAVENOUS at 04:00

## 2024-01-01 RX ADMIN — Medication 1 APPLICATION(S): at 11:40

## 2024-01-01 RX ADMIN — APIXABAN 2.5 MILLIGRAM(S): 5 TABLET, FILM COATED ORAL at 05:57

## 2024-01-01 RX ADMIN — Medication 1 TABLET(S): at 12:19

## 2024-01-01 RX ADMIN — BENZOCAINE AND MENTHOL 1 LOZENGE: 15; 3.6 LOZENGE ORAL at 13:17

## 2024-01-01 RX ADMIN — Medication 80 MILLIGRAM(S): at 11:23

## 2024-01-01 RX ADMIN — MIDODRINE HYDROCHLORIDE 15 MILLIGRAM(S): 10 TABLET ORAL at 17:45

## 2024-01-01 RX ADMIN — APIXABAN 2.5 MILLIGRAM(S): 5 TABLET, FILM COATED ORAL at 17:11

## 2024-01-01 RX ADMIN — Medication 1 APPLICATION(S): at 11:47

## 2024-01-01 RX ADMIN — MIDODRINE HYDROCHLORIDE 15 MILLIGRAM(S): 10 TABLET ORAL at 13:40

## 2024-01-01 RX ADMIN — AMPICILLIN AND SULBACTAM 200 GRAM(S): 2; 1 INJECTION, POWDER, FOR SOLUTION INTRAMUSCULAR; INTRAVENOUS at 05:29

## 2024-01-01 RX ADMIN — HEPARIN SODIUM 1500 UNIT(S)/HR: 50 INJECTION, SOLUTION INTRAVENOUS at 09:49

## 2024-01-01 RX ADMIN — Medication 1 APPLICATION(S): at 11:07

## 2024-01-01 RX ADMIN — MIDODRINE HYDROCHLORIDE 15 MILLIGRAM(S): 10 TABLET ORAL at 13:29

## 2024-01-01 RX ADMIN — TAMSULOSIN HYDROCHLORIDE 0.4 MILLIGRAM(S): 0.4 CAPSULE ORAL at 21:27

## 2024-01-01 RX ADMIN — AMPICILLIN AND SULBACTAM 200 GRAM(S): 2; 1 INJECTION, POWDER, FOR SOLUTION INTRAMUSCULAR; INTRAVENOUS at 12:22

## 2024-01-01 RX ADMIN — Medication 125 MILLILITER(S): at 21:49

## 2024-01-01 RX ADMIN — PIPERACILLIN SODIUM AND TAZOBACTAM SODIUM 25 GRAM(S): 3; .375 INJECTION, POWDER, LYOPHILIZED, FOR SOLUTION INTRAVENOUS at 05:40

## 2024-01-01 RX ADMIN — TAMSULOSIN HYDROCHLORIDE 0.4 MILLIGRAM(S): 0.4 CAPSULE ORAL at 22:31

## 2024-01-01 RX ADMIN — Medication 12.5 MILLIGRAM(S): at 15:40

## 2024-01-01 RX ADMIN — DEXTROSE MONOHYDRATE AND SODIUM CHLORIDE 60 MILLILITER(S): 5; .3 INJECTION, SOLUTION INTRAVENOUS at 09:19

## 2024-01-01 RX ADMIN — Medication 1 TABLET(S): at 11:25

## 2024-01-01 RX ADMIN — PIPERACILLIN SODIUM AND TAZOBACTAM SODIUM 25 GRAM(S): 3; .375 INJECTION, POWDER, LYOPHILIZED, FOR SOLUTION INTRAVENOUS at 21:25

## 2024-01-01 RX ADMIN — HEPARIN SODIUM 1500 UNIT(S)/HR: 50 INJECTION, SOLUTION INTRAVENOUS at 16:34

## 2024-01-01 RX ADMIN — TAMSULOSIN HYDROCHLORIDE 0.4 MILLIGRAM(S): 0.4 CAPSULE ORAL at 21:58

## 2024-01-01 RX ADMIN — DEXTROSE MONOHYDRATE AND SODIUM CHLORIDE 250 MILLILITER(S): 5; .3 INJECTION, SOLUTION INTRAVENOUS at 03:54

## 2024-01-01 RX ADMIN — Medication 500 MILLIGRAM(S): at 12:21

## 2024-01-01 RX ADMIN — Medication 80 MILLIGRAM(S): at 17:17

## 2024-01-01 RX ADMIN — Medication 1 TABLET(S): at 13:28

## 2024-01-01 RX ADMIN — Medication 250 MILLILITER(S): at 15:12

## 2024-01-01 RX ADMIN — Medication 80 MILLIGRAM(S): at 05:40

## 2024-01-01 RX ADMIN — APIXABAN 2.5 MILLIGRAM(S): 5 TABLET, FILM COATED ORAL at 10:40

## 2024-01-01 RX ADMIN — Medication 650 MILLIGRAM(S): at 13:56

## 2024-01-01 RX ADMIN — Medication 500 MILLIGRAM(S): at 11:47

## 2024-01-01 RX ADMIN — TAMSULOSIN HYDROCHLORIDE 0.4 MILLIGRAM(S): 0.4 CAPSULE ORAL at 21:50

## 2024-01-01 RX ADMIN — HEPARIN SODIUM 1900 UNIT(S)/HR: 50 INJECTION, SOLUTION INTRAVENOUS at 07:22

## 2024-01-01 RX ADMIN — Medication 2.5 MILLIGRAM(S): at 02:08

## 2024-01-01 RX ADMIN — BENZOCAINE AND MENTHOL 1 LOZENGE: 15; 3.6 LOZENGE ORAL at 05:16

## 2024-01-01 RX ADMIN — PIPERACILLIN SODIUM AND TAZOBACTAM SODIUM 200 GRAM(S): 3; .375 INJECTION, POWDER, LYOPHILIZED, FOR SOLUTION INTRAVENOUS at 12:29

## 2024-01-01 RX ADMIN — Medication 500 MILLIGRAM(S): at 11:25

## 2024-01-01 RX ADMIN — AMPICILLIN AND SULBACTAM 200 GRAM(S): 2; 1 INJECTION, POWDER, FOR SOLUTION INTRAMUSCULAR; INTRAVENOUS at 23:56

## 2024-01-01 RX ADMIN — Medication 80 MILLIGRAM(S): at 05:56

## 2024-01-01 RX ADMIN — HEPARIN SODIUM 1900 UNIT(S)/HR: 50 INJECTION, SOLUTION INTRAVENOUS at 14:28

## 2024-01-01 RX ADMIN — MIDODRINE HYDROCHLORIDE 15 MILLIGRAM(S): 10 TABLET ORAL at 05:57

## 2024-01-01 RX ADMIN — HYDROMORPHONE HCL 0.2 MILLIGRAM(S): 0.2 INJECTION, SOLUTION INTRAVENOUS at 18:10

## 2024-01-01 RX ADMIN — AMPICILLIN AND SULBACTAM 200 GRAM(S): 2; 1 INJECTION, POWDER, FOR SOLUTION INTRAMUSCULAR; INTRAVENOUS at 00:09

## 2024-01-01 RX ADMIN — MIDODRINE HYDROCHLORIDE 15 MILLIGRAM(S): 10 TABLET ORAL at 13:41

## 2024-01-01 RX ADMIN — AMPICILLIN AND SULBACTAM 200 GRAM(S): 2; 1 INJECTION, POWDER, FOR SOLUTION INTRAMUSCULAR; INTRAVENOUS at 11:07

## 2024-01-01 RX ADMIN — Medication 500 MILLIGRAM(S): at 11:51

## 2024-01-01 RX ADMIN — PIPERACILLIN SODIUM AND TAZOBACTAM SODIUM 25 GRAM(S): 3; .375 INJECTION, POWDER, LYOPHILIZED, FOR SOLUTION INTRAVENOUS at 13:06

## 2024-01-01 RX ADMIN — Medication 80 MILLIGRAM(S): at 23:33

## 2024-01-01 RX ADMIN — Medication 200 MILLIGRAM(S): at 11:08

## 2024-01-01 RX ADMIN — TAMSULOSIN HYDROCHLORIDE 0.4 MILLIGRAM(S): 0.4 CAPSULE ORAL at 21:32

## 2024-01-01 RX ADMIN — Medication 80 MILLIGRAM(S): at 01:04

## 2024-01-01 RX ADMIN — PANTOPRAZOLE SODIUM 40 MILLIGRAM(S): 40 INJECTION, POWDER, FOR SOLUTION INTRAVENOUS at 05:58

## 2024-01-01 RX ADMIN — TAMSULOSIN HYDROCHLORIDE 0.4 MILLIGRAM(S): 0.4 CAPSULE ORAL at 21:42

## 2024-01-01 RX ADMIN — PIPERACILLIN SODIUM AND TAZOBACTAM SODIUM 25 GRAM(S): 3; .375 INJECTION, POWDER, LYOPHILIZED, FOR SOLUTION INTRAVENOUS at 06:28

## 2024-01-01 RX ADMIN — AMPICILLIN AND SULBACTAM 200 GRAM(S): 2; 1 INJECTION, POWDER, FOR SOLUTION INTRAMUSCULAR; INTRAVENOUS at 01:00

## 2024-01-01 RX ADMIN — BENZOCAINE AND MENTHOL 1 LOZENGE: 15; 3.6 LOZENGE ORAL at 06:21

## 2024-01-01 RX ADMIN — LIDOCAINE HYDROCHLORIDE,EPINEPHRINE BITARTRATE 50 MILLILITER(S): 15; .005 INJECTION, SOLUTION EPIDURAL; INFILTRATION; INTRACAUDAL; PERINEURAL at 17:13

## 2024-01-01 RX ADMIN — HEPARIN SODIUM 1200 UNIT(S)/HR: 50 INJECTION, SOLUTION INTRAVENOUS at 13:05

## 2024-01-01 RX ADMIN — HEPARIN SODIUM 1800 UNIT(S)/HR: 50 INJECTION, SOLUTION INTRAVENOUS at 08:02

## 2024-01-01 RX ADMIN — HEPARIN SODIUM 1900 UNIT(S)/HR: 50 INJECTION, SOLUTION INTRAVENOUS at 07:08

## 2024-01-01 RX ADMIN — AMPICILLIN AND SULBACTAM 200 GRAM(S): 2; 1 INJECTION, POWDER, FOR SOLUTION INTRAMUSCULAR; INTRAVENOUS at 00:26

## 2024-01-01 RX ADMIN — PANTOPRAZOLE SODIUM 40 MILLIGRAM(S): 40 INJECTION, POWDER, FOR SOLUTION INTRAVENOUS at 05:01

## 2024-01-01 RX ADMIN — AMPICILLIN AND SULBACTAM 200 GRAM(S): 2; 1 INJECTION, POWDER, FOR SOLUTION INTRAMUSCULAR; INTRAVENOUS at 11:27

## 2024-01-01 RX ADMIN — TAMSULOSIN HYDROCHLORIDE 0.4 MILLIGRAM(S): 0.4 CAPSULE ORAL at 21:15

## 2024-01-01 RX ADMIN — Medication 80 MILLIGRAM(S): at 17:11

## 2024-01-01 RX ADMIN — HEPARIN SODIUM 1900 UNIT(S)/HR: 50 INJECTION, SOLUTION INTRAVENOUS at 07:13

## 2024-01-01 RX ADMIN — HEPARIN SODIUM 1900 UNIT(S)/HR: 50 INJECTION, SOLUTION INTRAVENOUS at 22:53

## 2024-01-01 RX ADMIN — Medication 80 MILLIGRAM(S): at 12:19

## 2024-01-01 RX ADMIN — HEPARIN SODIUM 1900 UNIT(S)/HR: 50 INJECTION, SOLUTION INTRAVENOUS at 21:12

## 2024-01-01 RX ADMIN — HEPARIN SODIUM 1800 UNIT(S)/HR: 50 INJECTION, SOLUTION INTRAVENOUS at 07:41

## 2024-01-01 RX ADMIN — MIDODRINE HYDROCHLORIDE 15 MILLIGRAM(S): 10 TABLET ORAL at 23:14

## 2024-01-01 RX ADMIN — DEXTROSE MONOHYDRATE AND SODIUM CHLORIDE 500 MILLILITER(S): 5; .3 INJECTION, SOLUTION INTRAVENOUS at 18:29

## 2024-01-01 RX ADMIN — AMPICILLIN AND SULBACTAM 200 GRAM(S): 2; 1 INJECTION, POWDER, FOR SOLUTION INTRAMUSCULAR; INTRAVENOUS at 17:15

## 2024-01-01 RX ADMIN — Medication 12.5 MILLIGRAM(S): at 05:38

## 2024-01-01 RX ADMIN — TAMSULOSIN HYDROCHLORIDE 0.4 MILLIGRAM(S): 0.4 CAPSULE ORAL at 22:29

## 2024-01-01 RX ADMIN — BENZOCAINE AND MENTHOL 1 LOZENGE: 15; 3.6 LOZENGE ORAL at 22:29

## 2024-01-01 RX ADMIN — Medication 650 MILLIGRAM(S): at 10:02

## 2024-01-01 RX ADMIN — Medication 80 MILLIGRAM(S): at 06:27

## 2024-01-01 RX ADMIN — Medication 80 MILLIGRAM(S): at 06:21

## 2024-01-01 RX ADMIN — IPRATROPIUM BROMIDE AND ALBUTEROL SULFATE 3 MILLILITER(S): .5; 3 SOLUTION RESPIRATORY (INHALATION) at 02:48

## 2024-01-01 RX ADMIN — PIPERACILLIN SODIUM AND TAZOBACTAM SODIUM 25 GRAM(S): 3; .375 INJECTION, POWDER, LYOPHILIZED, FOR SOLUTION INTRAVENOUS at 22:14

## 2024-01-01 RX ADMIN — HEPARIN SODIUM 1900 UNIT(S)/HR: 50 INJECTION, SOLUTION INTRAVENOUS at 15:45

## 2024-01-01 RX ADMIN — Medication 1 APPLICATION(S): at 12:16

## 2024-01-01 RX ADMIN — HEPARIN SODIUM 1900 UNIT(S)/HR: 50 INJECTION, SOLUTION INTRAVENOUS at 19:04

## 2024-01-01 RX ADMIN — Medication 1 TABLET(S): at 12:02

## 2024-01-01 RX ADMIN — Medication 500 MILLIGRAM(S): at 13:18

## 2024-01-01 RX ADMIN — TAMSULOSIN HYDROCHLORIDE 0.4 MILLIGRAM(S): 0.4 CAPSULE ORAL at 00:09

## 2024-01-01 RX ADMIN — PANTOPRAZOLE SODIUM 40 MILLIGRAM(S): 40 INJECTION, POWDER, FOR SOLUTION INTRAVENOUS at 05:43

## 2024-01-01 RX ADMIN — PIPERACILLIN SODIUM AND TAZOBACTAM SODIUM 25 GRAM(S): 3; .375 INJECTION, POWDER, LYOPHILIZED, FOR SOLUTION INTRAVENOUS at 13:05

## 2024-01-01 RX ADMIN — Medication 1 TABLET(S): at 11:50

## 2024-01-01 RX ADMIN — AMPICILLIN AND SULBACTAM 200 GRAM(S): 2; 1 INJECTION, POWDER, FOR SOLUTION INTRAMUSCULAR; INTRAVENOUS at 05:04

## 2024-01-01 RX ADMIN — HEPARIN SODIUM 1900 UNIT(S)/HR: 50 INJECTION, SOLUTION INTRAVENOUS at 18:20

## 2024-01-01 RX ADMIN — Medication 2.5 MILLIGRAM(S): at 13:59

## 2024-01-01 RX ADMIN — PANTOPRAZOLE SODIUM 40 MILLIGRAM(S): 40 INJECTION, POWDER, FOR SOLUTION INTRAVENOUS at 05:20

## 2024-01-01 RX ADMIN — PANTOPRAZOLE SODIUM 40 MILLIGRAM(S): 40 INJECTION, POWDER, FOR SOLUTION INTRAVENOUS at 06:27

## 2024-01-01 RX ADMIN — Medication 75 MILLILITER(S): at 15:44

## 2024-01-01 RX ADMIN — Medication 650 MILLIGRAM(S): at 05:36

## 2024-01-01 RX ADMIN — Medication 650 MILLIGRAM(S): at 22:11

## 2024-01-01 RX ADMIN — HEPARIN SODIUM 1900 UNIT(S)/HR: 50 INJECTION, SOLUTION INTRAVENOUS at 21:45

## 2024-01-01 RX ADMIN — PIPERACILLIN SODIUM AND TAZOBACTAM SODIUM 25 GRAM(S): 3; .375 INJECTION, POWDER, LYOPHILIZED, FOR SOLUTION INTRAVENOUS at 22:30

## 2024-01-01 RX ADMIN — HYDROMORPHONE HCL 0.3 MILLIGRAM(S): 0.2 INJECTION, SOLUTION INTRAVENOUS at 20:54

## 2024-01-01 RX ADMIN — TAMSULOSIN HYDROCHLORIDE 0.4 MILLIGRAM(S): 0.4 CAPSULE ORAL at 21:45

## 2024-01-01 RX ADMIN — TAMSULOSIN HYDROCHLORIDE 0.4 MILLIGRAM(S): 0.4 CAPSULE ORAL at 21:16

## 2024-01-01 RX ADMIN — BENZOCAINE AND MENTHOL 1 LOZENGE: 15; 3.6 LOZENGE ORAL at 05:43

## 2024-01-01 RX ADMIN — Medication 80 MILLIGRAM(S): at 17:06

## 2024-01-01 RX ADMIN — AMPICILLIN AND SULBACTAM 200 GRAM(S): 2; 1 INJECTION, POWDER, FOR SOLUTION INTRAMUSCULAR; INTRAVENOUS at 09:26

## 2024-01-01 RX ADMIN — TAMSULOSIN HYDROCHLORIDE 0.4 MILLIGRAM(S): 0.4 CAPSULE ORAL at 23:05

## 2024-01-01 RX ADMIN — MIDODRINE HYDROCHLORIDE 15 MILLIGRAM(S): 10 TABLET ORAL at 22:02

## 2024-01-01 RX ADMIN — PIPERACILLIN SODIUM AND TAZOBACTAM SODIUM 25 GRAM(S): 3; .375 INJECTION, POWDER, LYOPHILIZED, FOR SOLUTION INTRAVENOUS at 22:31

## 2024-01-01 RX ADMIN — BENZOCAINE AND MENTHOL 1 LOZENGE: 15; 3.6 LOZENGE ORAL at 13:41

## 2024-01-01 RX ADMIN — Medication 80 MILLIGRAM(S): at 17:32

## 2024-01-01 RX ADMIN — PIPERACILLIN SODIUM AND TAZOBACTAM SODIUM 25 GRAM(S): 3; .375 INJECTION, POWDER, LYOPHILIZED, FOR SOLUTION INTRAVENOUS at 06:20

## 2024-01-01 RX ADMIN — Medication 80 MILLIGRAM(S): at 05:38

## 2024-01-01 RX ADMIN — Medication 650 MILLIGRAM(S): at 09:02

## 2024-01-01 RX ADMIN — Medication 1 APPLICATION(S): at 11:23

## 2024-01-01 RX ADMIN — HEPARIN SODIUM 1900 UNIT(S)/HR: 50 INJECTION, SOLUTION INTRAVENOUS at 08:57

## 2024-01-01 RX ADMIN — Medication 1 TABLET(S): at 13:18

## 2024-01-01 RX ADMIN — APIXABAN 2.5 MILLIGRAM(S): 5 TABLET, FILM COATED ORAL at 17:17

## 2024-01-01 RX ADMIN — Medication 0.5 MILLIGRAM(S): at 14:49

## 2024-01-01 RX ADMIN — HEPARIN SODIUM 1900 UNIT(S)/HR: 50 INJECTION, SOLUTION INTRAVENOUS at 02:43

## 2024-01-01 RX ADMIN — PANTOPRAZOLE SODIUM 40 MILLIGRAM(S): 40 INJECTION, POWDER, FOR SOLUTION INTRAVENOUS at 05:40

## 2024-01-01 RX ADMIN — Medication 80 MILLIGRAM(S): at 01:13

## 2024-01-01 RX ADMIN — TAMSULOSIN HYDROCHLORIDE 0.4 MILLIGRAM(S): 0.4 CAPSULE ORAL at 21:34

## 2024-01-01 RX ADMIN — Medication 1 TABLET(S): at 11:47

## 2024-01-01 RX ADMIN — BENZOCAINE AND MENTHOL 1 LOZENGE: 15; 3.6 LOZENGE ORAL at 13:29

## 2024-01-01 RX ADMIN — Medication 1 TABLET(S): at 11:28

## 2024-01-01 RX ADMIN — Medication 80 MILLIGRAM(S): at 11:24

## 2024-01-01 RX ADMIN — BENZOCAINE AND MENTHOL 1 LOZENGE: 15; 3.6 LOZENGE ORAL at 05:10

## 2024-01-01 RX ADMIN — Medication 75 MILLILITER(S): at 08:30

## 2024-01-01 RX ADMIN — PIPERACILLIN SODIUM AND TAZOBACTAM SODIUM 25 GRAM(S): 3; .375 INJECTION, POWDER, LYOPHILIZED, FOR SOLUTION INTRAVENOUS at 15:40

## 2024-01-01 RX ADMIN — Medication 50 MILLILITER(S): at 19:03

## 2024-01-01 RX ADMIN — PIPERACILLIN SODIUM AND TAZOBACTAM SODIUM 25 GRAM(S): 3; .375 INJECTION, POWDER, LYOPHILIZED, FOR SOLUTION INTRAVENOUS at 13:19

## 2024-01-01 RX ADMIN — MIDODRINE HYDROCHLORIDE 15 MILLIGRAM(S): 10 TABLET ORAL at 05:37

## 2024-01-01 RX ADMIN — BENZOCAINE AND MENTHOL 1 LOZENGE: 15; 3.6 LOZENGE ORAL at 13:40

## 2024-01-01 RX ADMIN — PANTOPRAZOLE SODIUM 40 MILLIGRAM(S): 40 INJECTION, POWDER, FOR SOLUTION INTRAVENOUS at 05:07

## 2024-01-01 RX ADMIN — AMPICILLIN AND SULBACTAM 200 GRAM(S): 2; 1 INJECTION, POWDER, FOR SOLUTION INTRAMUSCULAR; INTRAVENOUS at 18:20

## 2024-01-01 RX ADMIN — Medication 500 MILLIGRAM(S): at 11:28

## 2024-01-01 RX ADMIN — Medication 1 APPLICATION(S): at 12:21

## 2024-01-01 RX ADMIN — PANTOPRAZOLE SODIUM 40 MILLIGRAM(S): 40 INJECTION, POWDER, FOR SOLUTION INTRAVENOUS at 05:16

## 2024-01-01 RX ADMIN — HEPARIN SODIUM 1200 UNIT(S)/HR: 50 INJECTION, SOLUTION INTRAVENOUS at 02:47

## 2024-01-01 RX ADMIN — HYDROMORPHONE HCL 0.3 MILLIGRAM(S): 0.2 INJECTION, SOLUTION INTRAVENOUS at 05:42

## 2024-01-01 RX ADMIN — Medication 650 MILLIGRAM(S): at 23:11

## 2024-01-01 RX ADMIN — PANTOPRAZOLE SODIUM 40 MILLIGRAM(S): 40 INJECTION, POWDER, FOR SOLUTION INTRAVENOUS at 05:29

## 2024-01-01 RX ADMIN — Medication 500 MILLIGRAM(S): at 11:29

## 2024-01-01 RX ADMIN — PIPERACILLIN SODIUM AND TAZOBACTAM SODIUM 25 GRAM(S): 3; .375 INJECTION, POWDER, LYOPHILIZED, FOR SOLUTION INTRAVENOUS at 13:28

## 2024-01-01 RX ADMIN — APIXABAN 2.5 MILLIGRAM(S): 5 TABLET, FILM COATED ORAL at 05:38

## 2024-01-01 RX ADMIN — HEPARIN SODIUM 1200 UNIT(S)/HR: 50 INJECTION, SOLUTION INTRAVENOUS at 07:14

## 2024-01-01 RX ADMIN — PIPERACILLIN SODIUM AND TAZOBACTAM SODIUM 25 GRAM(S): 3; .375 INJECTION, POWDER, LYOPHILIZED, FOR SOLUTION INTRAVENOUS at 13:14

## 2024-01-01 RX ADMIN — Medication 1 APPLICATION(S): at 12:23

## 2024-01-01 RX ADMIN — Medication 1 TABLET(S): at 11:22

## 2024-01-01 RX ADMIN — BENZOCAINE AND MENTHOL 1 LOZENGE: 15; 3.6 LOZENGE ORAL at 21:31

## 2024-01-01 RX ADMIN — Medication 1 APPLICATION(S): at 11:06

## 2024-01-01 RX ADMIN — HEPARIN SODIUM 1800 UNIT(S)/HR: 50 INJECTION, SOLUTION INTRAVENOUS at 07:32

## 2024-01-01 RX ADMIN — BENZOCAINE AND MENTHOL 1 LOZENGE: 15; 3.6 LOZENGE ORAL at 13:05

## 2024-01-01 RX ADMIN — Medication 1 APPLICATION(S): at 12:45

## 2024-01-01 RX ADMIN — Medication 80 MILLIGRAM(S): at 23:22

## 2024-01-01 RX ADMIN — AMPICILLIN AND SULBACTAM 200 GRAM(S): 2; 1 INJECTION, POWDER, FOR SOLUTION INTRAMUSCULAR; INTRAVENOUS at 05:20

## 2024-01-01 RX ADMIN — PIPERACILLIN SODIUM AND TAZOBACTAM SODIUM 25 GRAM(S): 3; .375 INJECTION, POWDER, LYOPHILIZED, FOR SOLUTION INTRAVENOUS at 05:57

## 2024-01-01 RX ADMIN — BENZOCAINE AND MENTHOL 1 LOZENGE: 15; 3.6 LOZENGE ORAL at 21:26

## 2024-01-01 RX ADMIN — APIXABAN 2.5 MILLIGRAM(S): 5 TABLET, FILM COATED ORAL at 05:18

## 2024-01-01 RX ADMIN — TAMSULOSIN HYDROCHLORIDE 0.4 MILLIGRAM(S): 0.4 CAPSULE ORAL at 22:42

## 2024-01-01 RX ADMIN — MIDODRINE HYDROCHLORIDE 15 MILLIGRAM(S): 10 TABLET ORAL at 05:56

## 2024-01-01 RX ADMIN — HEPARIN SODIUM 1800 UNIT(S)/HR: 50 INJECTION, SOLUTION INTRAVENOUS at 04:22

## 2024-01-01 RX ADMIN — Medication 500 MILLIGRAM(S): at 11:34

## 2024-01-01 RX ADMIN — Medication 1 APPLICATION(S): at 11:08

## 2024-01-01 RX ADMIN — CALCITONIN SALMON 270 INTERNATIONAL UNIT(S): 200 INJECTION, SOLUTION INTRAMUSCULAR; SUBCUTANEOUS at 05:38

## 2024-01-01 RX ADMIN — Medication 1 APPLICATION(S): at 13:18

## 2024-01-01 RX ADMIN — Medication 80 MILLIGRAM(S): at 17:37

## 2024-01-01 RX ADMIN — HEPARIN SODIUM 1900 UNIT(S)/HR: 50 INJECTION, SOLUTION INTRAVENOUS at 19:23

## 2024-01-01 RX ADMIN — Medication 80 MILLIGRAM(S): at 11:51

## 2024-01-01 RX ADMIN — Medication 65 MILLIGRAM(S): at 17:32

## 2024-01-01 RX ADMIN — PIPERACILLIN SODIUM AND TAZOBACTAM SODIUM 25 GRAM(S): 3; .375 INJECTION, POWDER, LYOPHILIZED, FOR SOLUTION INTRAVENOUS at 22:02

## 2024-01-01 RX ADMIN — Medication 80 MILLIGRAM(S): at 13:26

## 2024-01-01 RX ADMIN — PANTOPRAZOLE SODIUM 40 MILLIGRAM(S): 40 INJECTION, POWDER, FOR SOLUTION INTRAVENOUS at 06:17

## 2024-01-01 RX ADMIN — HEPARIN SODIUM 1800 UNIT(S)/HR: 50 INJECTION, SOLUTION INTRAVENOUS at 08:37

## 2024-01-01 RX ADMIN — HYDROMORPHONE HCL 0.3 MILLIGRAM(S): 0.2 INJECTION, SOLUTION INTRAVENOUS at 14:30

## 2024-01-01 RX ADMIN — Medication 80 MILLIGRAM(S): at 11:47

## 2024-01-01 RX ADMIN — HEPARIN SODIUM 1900 UNIT(S)/HR: 50 INJECTION, SOLUTION INTRAVENOUS at 07:14

## 2024-01-01 RX ADMIN — HEPARIN SODIUM 1900 UNIT(S)/HR: 50 INJECTION, SOLUTION INTRAVENOUS at 21:36

## 2024-01-01 RX ADMIN — BENZOCAINE AND MENTHOL 1 LOZENGE: 15; 3.6 LOZENGE ORAL at 05:37

## 2024-01-01 RX ADMIN — Medication 80 MILLIGRAM(S): at 05:10

## 2024-01-01 RX ADMIN — HEPARIN SODIUM 1800 UNIT(S)/HR: 50 INJECTION, SOLUTION INTRAVENOUS at 23:49

## 2024-01-01 RX ADMIN — HYDROMORPHONE HCL 0.3 MILLIGRAM(S): 0.2 INJECTION, SOLUTION INTRAVENOUS at 06:07

## 2024-01-01 RX ADMIN — PIPERACILLIN SODIUM AND TAZOBACTAM SODIUM 25 GRAM(S): 3; .375 INJECTION, POWDER, LYOPHILIZED, FOR SOLUTION INTRAVENOUS at 05:11

## 2024-01-01 RX ADMIN — Medication 75 MILLILITER(S): at 09:00

## 2024-01-01 RX ADMIN — PIPERACILLIN SODIUM AND TAZOBACTAM SODIUM 25 GRAM(S): 3; .375 INJECTION, POWDER, LYOPHILIZED, FOR SOLUTION INTRAVENOUS at 13:42

## 2024-01-01 RX ADMIN — HEPARIN SODIUM 1900 UNIT(S)/HR: 50 INJECTION, SOLUTION INTRAVENOUS at 06:22

## 2024-01-01 RX ADMIN — Medication 5 MILLIGRAM(S): at 03:56

## 2024-01-01 RX ADMIN — Medication 650 MILLIGRAM(S): at 00:52

## 2024-01-01 RX ADMIN — HEPARIN SODIUM 5500 UNIT(S): 50 INJECTION, SOLUTION INTRAVENOUS at 06:59

## 2024-01-01 RX ADMIN — Medication 650 MILLIGRAM(S): at 01:52

## 2024-01-01 RX ADMIN — Medication 1 APPLICATION(S): at 12:18

## 2024-01-01 RX ADMIN — HEPARIN SODIUM 1900 UNIT(S)/HR: 50 INJECTION, SOLUTION INTRAVENOUS at 11:27

## 2024-01-01 RX ADMIN — Medication 1 APPLICATION(S): at 11:42

## 2024-01-01 RX ADMIN — Medication 650 MILLIGRAM(S): at 12:56

## 2024-01-01 RX ADMIN — Medication 80 MILLIGRAM(S): at 23:14

## 2024-01-01 RX ADMIN — Medication 1 TABLET(S): at 14:32

## 2024-01-01 RX ADMIN — AMPICILLIN AND SULBACTAM 200 GRAM(S): 2; 1 INJECTION, POWDER, FOR SOLUTION INTRAMUSCULAR; INTRAVENOUS at 17:44

## 2024-01-01 RX ADMIN — Medication 1 APPLICATION(S): at 11:32

## 2024-01-01 RX ADMIN — Medication 1 APPLICATION(S): at 11:35

## 2024-01-01 RX ADMIN — Medication 500 MILLIGRAM(S): at 11:07

## 2024-01-01 RX ADMIN — Medication 500 MILLIGRAM(S): at 12:23

## 2024-01-01 RX ADMIN — PANTOPRAZOLE SODIUM 40 MILLIGRAM(S): 40 INJECTION, POWDER, FOR SOLUTION INTRAVENOUS at 05:37

## 2024-01-01 RX ADMIN — Medication 500 MICROGRAM(S): at 13:13

## 2024-01-01 RX ADMIN — PIPERACILLIN SODIUM AND TAZOBACTAM SODIUM 25 GRAM(S): 3; .375 INJECTION, POWDER, LYOPHILIZED, FOR SOLUTION INTRAVENOUS at 13:25

## 2024-01-01 RX ADMIN — Medication 80 MILLIGRAM(S): at 17:16

## 2024-01-01 RX ADMIN — HEPARIN SODIUM 1500 UNIT(S)/HR: 50 INJECTION, SOLUTION INTRAVENOUS at 22:32

## 2024-01-01 RX ADMIN — MIDODRINE HYDROCHLORIDE 15 MILLIGRAM(S): 10 TABLET ORAL at 21:45

## 2024-01-01 RX ADMIN — PANTOPRAZOLE SODIUM 40 MILLIGRAM(S): 40 INJECTION, POWDER, FOR SOLUTION INTRAVENOUS at 06:24

## 2024-01-01 RX ADMIN — Medication 500 MILLIGRAM(S): at 12:02

## 2024-01-01 RX ADMIN — HEPARIN SODIUM 1900 UNIT(S)/HR: 50 INJECTION, SOLUTION INTRAVENOUS at 12:14

## 2024-01-01 RX ADMIN — Medication 500 MILLIGRAM(S): at 11:42

## 2024-01-01 RX ADMIN — MIDODRINE HYDROCHLORIDE 15 MILLIGRAM(S): 10 TABLET ORAL at 05:10

## 2024-01-01 RX ADMIN — HEPARIN SODIUM 1500 UNIT(S)/HR: 50 INJECTION, SOLUTION INTRAVENOUS at 19:18

## 2024-01-01 RX ADMIN — MIDODRINE HYDROCHLORIDE 15 MILLIGRAM(S): 10 TABLET ORAL at 13:37

## 2024-01-01 RX ADMIN — Medication 80 MILLIGRAM(S): at 05:57

## 2024-01-01 RX ADMIN — HEPARIN SODIUM 1800 UNIT(S)/HR: 50 INJECTION, SOLUTION INTRAVENOUS at 05:22

## 2024-01-01 RX ADMIN — HEPARIN SODIUM 1800 UNIT(S)/HR: 50 INJECTION, SOLUTION INTRAVENOUS at 07:11

## 2024-01-01 RX ADMIN — PANTOPRAZOLE SODIUM 40 MILLIGRAM(S): 40 INJECTION, POWDER, FOR SOLUTION INTRAVENOUS at 05:10

## 2024-01-01 RX ADMIN — PANTOPRAZOLE SODIUM 40 MILLIGRAM(S): 40 INJECTION, POWDER, FOR SOLUTION INTRAVENOUS at 06:21

## 2024-01-01 RX ADMIN — Medication 80 MILLIGRAM(S): at 11:28

## 2024-01-01 RX ADMIN — DEXTROSE MONOHYDRATE AND SODIUM CHLORIDE 60 MILLILITER(S): 5; .3 INJECTION, SOLUTION INTRAVENOUS at 21:49

## 2024-01-01 RX ADMIN — HEPARIN SODIUM 1500 UNIT(S)/HR: 50 INJECTION, SOLUTION INTRAVENOUS at 23:18

## 2024-01-01 RX ADMIN — MIDODRINE HYDROCHLORIDE 15 MILLIGRAM(S): 10 TABLET ORAL at 11:14

## 2024-01-01 RX ADMIN — Medication 1 TABLET(S): at 11:35

## 2024-01-01 RX ADMIN — Medication 1 TABLET(S): at 11:09

## 2024-01-01 RX ADMIN — MIDODRINE HYDROCHLORIDE 15 MILLIGRAM(S): 10 TABLET ORAL at 22:42

## 2024-01-01 RX ADMIN — MIDODRINE HYDROCHLORIDE 15 MILLIGRAM(S): 10 TABLET ORAL at 06:20

## 2024-01-01 RX ADMIN — IPRATROPIUM BROMIDE AND ALBUTEROL SULFATE 3 MILLILITER(S): .5; 3 SOLUTION RESPIRATORY (INHALATION) at 05:06

## 2024-01-01 RX ADMIN — PIPERACILLIN SODIUM AND TAZOBACTAM SODIUM 25 GRAM(S): 3; .375 INJECTION, POWDER, LYOPHILIZED, FOR SOLUTION INTRAVENOUS at 21:45

## 2024-01-01 RX ADMIN — HEPARIN SODIUM 1500 UNIT(S)/HR: 50 INJECTION, SOLUTION INTRAVENOUS at 21:58

## 2024-01-01 RX ADMIN — HEPARIN SODIUM 1900 UNIT(S)/HR: 50 INJECTION, SOLUTION INTRAVENOUS at 07:35

## 2024-01-01 RX ADMIN — Medication 80 MILLIGRAM(S): at 05:16

## 2024-01-01 RX ADMIN — APIXABAN 2.5 MILLIGRAM(S): 5 TABLET, FILM COATED ORAL at 17:32

## 2024-01-01 RX ADMIN — VANCOMYCIN HYDROCHLORIDE 250 MILLIGRAM(S): KIT at 06:17

## 2024-01-01 RX ADMIN — BENZOCAINE AND MENTHOL 1 LOZENGE: 15; 3.6 LOZENGE ORAL at 16:37

## 2024-01-01 RX ADMIN — Medication 650 MILLIGRAM(S): at 06:36

## 2024-01-01 RX ADMIN — TAMSULOSIN HYDROCHLORIDE 0.4 MILLIGRAM(S): 0.4 CAPSULE ORAL at 21:12

## 2024-01-01 RX ADMIN — IPRATROPIUM BROMIDE AND ALBUTEROL SULFATE 3 MILLILITER(S): .5; 3 SOLUTION RESPIRATORY (INHALATION) at 05:19

## 2024-01-01 RX ADMIN — Medication 1 APPLICATION(S): at 11:37

## 2024-01-01 RX ADMIN — AMPICILLIN AND SULBACTAM 200 GRAM(S): 2; 1 INJECTION, POWDER, FOR SOLUTION INTRAMUSCULAR; INTRAVENOUS at 12:14

## 2024-01-01 RX ADMIN — TAMSULOSIN HYDROCHLORIDE 0.4 MILLIGRAM(S): 0.4 CAPSULE ORAL at 21:25

## 2024-01-01 RX ADMIN — HEPARIN SODIUM 5500 UNIT(S): 50 INJECTION, SOLUTION INTRAVENOUS at 09:51

## 2024-01-01 RX ADMIN — HYDROMORPHONE HCL 0.3 MILLIGRAM(S): 0.2 INJECTION, SOLUTION INTRAVENOUS at 21:15

## 2024-01-01 RX ADMIN — Medication 1 APPLICATION(S): at 11:28

## 2024-01-01 RX ADMIN — IPRATROPIUM BROMIDE AND ALBUTEROL SULFATE 3 MILLILITER(S): .5; 3 SOLUTION RESPIRATORY (INHALATION) at 05:57

## 2024-01-01 RX ADMIN — HEPARIN SODIUM 1800 UNIT(S)/HR: 50 INJECTION, SOLUTION INTRAVENOUS at 06:56

## 2024-01-01 RX ADMIN — HEPARIN SODIUM 1800 UNIT(S)/HR: 50 INJECTION, SOLUTION INTRAVENOUS at 21:42

## 2024-01-01 RX ADMIN — APIXABAN 2.5 MILLIGRAM(S): 5 TABLET, FILM COATED ORAL at 17:37

## 2024-01-01 RX ADMIN — BENZOCAINE AND MENTHOL 1 LOZENGE: 15; 3.6 LOZENGE ORAL at 06:27

## 2024-01-01 RX ADMIN — Medication 1 APPLICATION(S): at 11:51

## 2024-01-01 RX ADMIN — AMPICILLIN AND SULBACTAM 200 GRAM(S): 2; 1 INJECTION, POWDER, FOR SOLUTION INTRAMUSCULAR; INTRAVENOUS at 00:04

## 2024-01-01 RX ADMIN — Medication 2.5 MILLIGRAM(S): at 08:20

## 2024-01-01 RX ADMIN — MIDODRINE HYDROCHLORIDE 15 MILLIGRAM(S): 10 TABLET ORAL at 13:03

## 2024-01-01 RX ADMIN — HEPARIN SODIUM 1900 UNIT(S)/HR: 50 INJECTION, SOLUTION INTRAVENOUS at 19:21

## 2024-01-01 RX ADMIN — Medication 650 MILLIGRAM(S): at 11:46

## 2024-01-01 RX ADMIN — HYDROMORPHONE HCL 0.5 MILLIGRAM(S): 0.2 INJECTION, SOLUTION INTRAVENOUS at 14:49

## 2024-01-01 RX ADMIN — HEPARIN SODIUM 1900 UNIT(S)/HR: 50 INJECTION, SOLUTION INTRAVENOUS at 12:31

## 2024-01-01 RX ADMIN — BENZOCAINE AND MENTHOL 1 LOZENGE: 15; 3.6 LOZENGE ORAL at 13:19

## 2024-01-01 RX ADMIN — HEPARIN SODIUM 1900 UNIT(S)/HR: 50 INJECTION, SOLUTION INTRAVENOUS at 07:03

## 2024-01-01 RX ADMIN — Medication 166.67 MILLILITER(S): at 09:16

## 2024-01-01 RX ADMIN — Medication 100 MILLILITER(S): at 06:23

## 2024-01-01 RX ADMIN — Medication 80 MILLIGRAM(S): at 17:36

## 2024-01-01 RX ADMIN — HEPARIN SODIUM 1900 UNIT(S)/HR: 50 INJECTION, SOLUTION INTRAVENOUS at 04:16

## 2024-01-01 RX ADMIN — PANTOPRAZOLE SODIUM 40 MILLIGRAM(S): 40 INJECTION, POWDER, FOR SOLUTION INTRAVENOUS at 05:57

## 2024-01-01 RX ADMIN — Medication 500 MILLIGRAM(S): at 11:23

## 2024-01-01 RX ADMIN — Medication 500 MILLIGRAM(S): at 11:09

## 2024-01-01 RX ADMIN — Medication 80 MILLIGRAM(S): at 22:31

## 2024-01-01 RX ADMIN — Medication 1 TABLET(S): at 12:45

## 2024-01-01 RX ADMIN — Medication 1 APPLICATION(S): at 11:24

## 2024-01-01 RX ADMIN — BENZOCAINE AND MENTHOL 1 LOZENGE: 15; 3.6 LOZENGE ORAL at 22:02

## 2024-01-01 RX ADMIN — AMPICILLIN AND SULBACTAM 200 GRAM(S): 2; 1 INJECTION, POWDER, FOR SOLUTION INTRAMUSCULAR; INTRAVENOUS at 05:54

## 2024-01-01 RX ADMIN — Medication 80 MILLIGRAM(S): at 12:45

## 2024-01-01 RX ADMIN — Medication 500 MILLIGRAM(S): at 12:44

## 2024-01-01 RX ADMIN — Medication 1 APPLICATION(S): at 13:31

## 2024-01-01 RX ADMIN — Medication 500 MILLIGRAM(S): at 13:27

## 2024-01-01 RX ADMIN — HEPARIN SODIUM 1800 UNIT(S)/HR: 50 INJECTION, SOLUTION INTRAVENOUS at 14:32

## 2024-01-01 RX ADMIN — AMPICILLIN AND SULBACTAM 200 GRAM(S): 2; 1 INJECTION, POWDER, FOR SOLUTION INTRAMUSCULAR; INTRAVENOUS at 17:12

## 2024-01-01 RX ADMIN — BENZOCAINE AND MENTHOL 1 LOZENGE: 15; 3.6 LOZENGE ORAL at 22:42

## 2024-01-01 RX ADMIN — MIDODRINE HYDROCHLORIDE 15 MILLIGRAM(S): 10 TABLET ORAL at 21:42

## 2024-01-01 RX ADMIN — HEPARIN SODIUM 1900 UNIT(S)/HR: 50 INJECTION, SOLUTION INTRAVENOUS at 19:20

## 2024-01-01 RX ADMIN — Medication 125 MILLILITER(S): at 17:43

## 2024-01-01 RX ADMIN — Medication 1 SPRAY(S): at 18:18

## 2024-01-01 RX ADMIN — HEPARIN SODIUM 2500 UNIT(S): 50 INJECTION, SOLUTION INTRAVENOUS at 08:59

## 2024-01-01 RX ADMIN — MIDODRINE HYDROCHLORIDE 15 MILLIGRAM(S): 10 TABLET ORAL at 17:16

## 2024-01-01 RX ADMIN — Medication 1 TABLET(S): at 12:44

## 2024-01-01 RX ADMIN — PANTOPRAZOLE SODIUM 40 MILLIGRAM(S): 40 INJECTION, POWDER, FOR SOLUTION INTRAVENOUS at 05:55

## 2024-01-01 RX ADMIN — BENZOCAINE AND MENTHOL 1 LOZENGE: 15; 3.6 LOZENGE ORAL at 05:56

## 2024-01-01 RX ADMIN — MIDODRINE HYDROCHLORIDE 15 MILLIGRAM(S): 10 TABLET ORAL at 14:12

## 2024-01-01 RX ADMIN — Medication 1 TABLET(S): at 11:07

## 2024-01-01 RX ADMIN — FUROSEMIDE 20 MILLIGRAM(S): 10 INJECTION, SOLUTION INTRAMUSCULAR; INTRAVENOUS at 23:05

## 2024-01-01 RX ADMIN — HYDROMORPHONE HCL 0.2 MILLIGRAM(S): 0.2 INJECTION, SOLUTION INTRAVENOUS at 17:42

## 2024-01-01 RX ADMIN — Medication 1 TABLET(S): at 11:42

## 2024-01-01 RX ADMIN — MIDODRINE HYDROCHLORIDE 15 MILLIGRAM(S): 10 TABLET ORAL at 05:43

## 2024-01-03 LAB
BACTERIA UR CULT: ABNORMAL
URINE CYTOLOGY: NORMAL

## 2024-01-05 ENCOUNTER — APPOINTMENT (OUTPATIENT)
Dept: UROLOGY | Facility: CLINIC | Age: 89
End: 2024-01-05
Payer: MEDICARE

## 2024-01-05 ENCOUNTER — APPOINTMENT (OUTPATIENT)
Dept: UROLOGY | Facility: CLINIC | Age: 89
End: 2024-01-05

## 2024-01-05 VITALS
BODY MASS INDEX: 33.38 KG/M2 | DIASTOLIC BLOOD PRESSURE: 62 MMHG | HEART RATE: 78 BPM | TEMPERATURE: 95.4 F | SYSTOLIC BLOOD PRESSURE: 141 MMHG | OXYGEN SATURATION: 98 % | WEIGHT: 170 LBS | HEIGHT: 60 IN

## 2024-01-05 PROCEDURE — 52000 CYSTOURETHROSCOPY: CPT

## 2024-01-05 PROCEDURE — 99214 OFFICE O/P EST MOD 30 MIN: CPT | Mod: 25

## 2024-01-05 NOTE — ASSESSMENT
[FreeTextEntry1] : 89 yo male with gross hematuria.  We reviewed his previous labs including positive urine culture which he has been taking antibiotics for.  His urine cytology which was positive.  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  His cystoscopy done today confirmed the findings of a 4 cm posterior bladder mass as well as a 2 to 3 cm right lateral wall mass.  I discussed the need for a TURBT for tissue diagnosis.  We discussed risks of the procedure and the patient will require medical clearance.  He also has been complaining of worsening urinary frequency while passing clots however this has been persistent and likely a result of his BPH.  He will trial tamsulosin 0.4 mg daily.  Plan  I reviewed the images the patient's CT urogram discussed results with the patient demonstrating a posterior and right lateral wall mass Cystoscopy done today demonstrating bladder tumors. Tamsulosin 0.4 mg daily: Prescription sent Will schedule for TURBT The patient will require medical clearance

## 2024-01-05 NOTE — HISTORY OF PRESENT ILLNESS
[FreeTextEntry1] : 89 yo male with gross hematuria.  He is here today for a cystoscopy.  He has been having intermittent gross hematuria and passing clots.  He also states that he has been voiding frequently which is worsening with passage of clots and states that he has been having episodes of urinary frequency prior to noting any gross hematuria.  His CT urogram did not demonstrate any renal masses stones or hydronephrosis.  It did demonstrate a posterior bladder mass as well as a tumor along the right lateral wall.  These findings were confirmed today on cystoscopy.

## 2024-01-18 ENCOUNTER — APPOINTMENT (OUTPATIENT)
Dept: UROLOGY | Facility: CLINIC | Age: 89
End: 2024-01-18

## 2024-01-18 ENCOUNTER — APPOINTMENT (OUTPATIENT)
Dept: UROLOGY | Facility: CLINIC | Age: 89
End: 2024-01-18
Payer: MEDICARE

## 2024-01-18 VITALS
WEIGHT: 180 LBS | HEART RATE: 77 BPM | RESPIRATION RATE: 17 BRPM | SYSTOLIC BLOOD PRESSURE: 162 MMHG | HEIGHT: 65 IN | DIASTOLIC BLOOD PRESSURE: 76 MMHG | BODY MASS INDEX: 29.99 KG/M2

## 2024-01-18 PROCEDURE — G2211 COMPLEX E/M VISIT ADD ON: CPT

## 2024-01-18 PROCEDURE — 99213 OFFICE O/P EST LOW 20 MIN: CPT

## 2024-01-18 RX ORDER — HALOBETASOL PROPIONATE 0.5 MG/G
0.05 CREAM TOPICAL
Qty: 1 | Refills: 3 | Status: DISCONTINUED | COMMUNITY
Start: 2018-10-24 | End: 2024-01-18

## 2024-01-18 RX ORDER — CIPROFLOXACIN HYDROCHLORIDE 500 MG/1
500 TABLET, FILM COATED ORAL
Qty: 14 | Refills: 0 | Status: DISCONTINUED | COMMUNITY
Start: 2024-01-03 | End: 2024-01-18

## 2024-01-18 RX ORDER — DOCUSATE SODIUM 100 MG/1
100 CAPSULE ORAL
Qty: 30 | Refills: 1 | Status: DISCONTINUED | COMMUNITY
Start: 2021-07-16 | End: 2024-01-18

## 2024-01-18 RX ORDER — BETAMETHASONE DIPROPIONATE 0.5 MG/G
0.05 CREAM, AUGMENTED TOPICAL TWICE DAILY
Qty: 1 | Refills: 3 | Status: DISCONTINUED | COMMUNITY
Start: 2022-07-11 | End: 2024-01-18

## 2024-01-18 RX ORDER — KETOCONAZOLE 20 MG/G
2 CREAM TOPICAL TWICE DAILY
Qty: 1 | Refills: 5 | Status: DISCONTINUED | COMMUNITY
Start: 2018-05-03 | End: 2024-01-18

## 2024-01-18 RX ORDER — MOMETASONE 50 UG/1
50 SPRAY, METERED NASAL TWICE DAILY
Qty: 1 | Refills: 1 | Status: DISCONTINUED | COMMUNITY
Start: 2023-11-08 | End: 2024-01-18

## 2024-01-18 RX ORDER — GUAIFENESIN AND DEXTROMETHORPHAN HYDROBROMIDE 600; 30 MG/1; MG/1
30-600 TABLET, EXTENDED RELEASE ORAL
Qty: 14 | Refills: 0 | Status: DISCONTINUED | COMMUNITY
Start: 2019-01-04 | End: 2024-01-18

## 2024-01-18 RX ORDER — BENZONATATE 200 MG/1
200 CAPSULE ORAL
Qty: 30 | Refills: 0 | Status: DISCONTINUED | COMMUNITY
Start: 2023-01-10 | End: 2024-01-18

## 2024-01-18 RX ORDER — LORATADINE 10 MG
17 TABLET,DISINTEGRATING ORAL DAILY
Qty: 1 | Refills: 3 | Status: DISCONTINUED | COMMUNITY
Start: 2021-08-16 | End: 2024-01-18

## 2024-01-18 RX ORDER — MOMETASONE 50 UG/1
50 SPRAY, METERED NASAL TWICE DAILY
Qty: 1 | Refills: 3 | Status: DISCONTINUED | COMMUNITY
Start: 2020-05-13 | End: 2024-01-18

## 2024-01-18 RX ORDER — PREDNISONE 5 MG/1
5 TABLET ORAL DAILY
Qty: 4 | Refills: 0 | Status: DISCONTINUED | COMMUNITY
Start: 2022-07-11 | End: 2024-01-18

## 2024-01-18 RX ORDER — PANTOPRAZOLE 40 MG/1
40 TABLET, DELAYED RELEASE ORAL
Qty: 90 | Refills: 1 | Status: DISCONTINUED | COMMUNITY
Start: 2019-11-07 | End: 2024-01-18

## 2024-01-18 RX ORDER — ERGOCALCIFEROL 1.25 MG/1
1.25 MG CAPSULE, LIQUID FILLED ORAL
Qty: 5 | Refills: 0 | Status: DISCONTINUED | COMMUNITY
Start: 2017-03-11 | End: 2024-01-18

## 2024-01-18 NOTE — ASSESSMENT
[FreeTextEntry1] : Discussed that this is representative of bladder cancer (TCC) until proven otherwise. Needs eval with resection. Discussed that this would be diagnostic and theraputic. Discussed risks of biopsy including but not limited to bleeding, infection, injury to bladder/ureters, bladder perforation, need for nelson catheter, need for additional procedures.  --UA, UCx --Medical clearance --Plan for TURBT

## 2024-01-18 NOTE — PHYSICAL EXAM
[General Appearance - Well Nourished] : well nourished [General Appearance - Well Developed] : well developed [General Appearance - In No Acute Distress] : no acute distress [Edema] : no peripheral edema [Exaggerated Use Of Accessory Muscles For Inspiration] : no accessory muscle use [Abdomen Soft] : soft [Abdomen Tenderness] : non-tender [Normal Station and Gait] : the gait and station were normal for the patient's age

## 2024-01-18 NOTE — HISTORY OF PRESENT ILLNESS
[FreeTextEntry1] : 89yo gentleman with cc of bladder tumor. pt developed gross hematuria about a month or so ago (around the time of his wife passing). Prior tobacco, quit 18y ago, 50 pack years. Went to see  (Dr Perez). Underwent CTU that showed filling defects in bladder. Uppertract negative. Also with ? of pancreatitis based on imaging. Cyto suspicious. Had cysto with Dr Perez that demonstrated "4cm posterior bladder tumor and R lateral tumor of about 2-3cm". On imaging. R lateral tumor appears close to UO.   Recently placed on tamsulosin with improvement in urinary sx. Not on any other meds.

## 2024-01-19 LAB
APPEARANCE: CLEAR
BACTERIA: NEGATIVE /HPF
BILIRUBIN URINE: NEGATIVE
BLOOD URINE: ABNORMAL
CAST: 0 /LPF
COLOR: YELLOW
EPITHELIAL CELLS: 3 /HPF
GLUCOSE QUALITATIVE U: NEGATIVE MG/DL
KETONES URINE: NEGATIVE MG/DL
LEUKOCYTE ESTERASE URINE: ABNORMAL
MICROSCOPIC-UA: NORMAL
NITRITE URINE: NEGATIVE
PH URINE: 5.5
PROTEIN URINE: NEGATIVE MG/DL
RED BLOOD CELLS URINE: 2 /HPF
REVIEW: NORMAL
SPECIFIC GRAVITY URINE: 1.01
UROBILINOGEN URINE: 0.2 MG/DL
WHITE BLOOD CELLS URINE: 8 /HPF

## 2024-01-22 LAB — BACTERIA UR CULT: ABNORMAL

## 2024-02-06 ENCOUNTER — NON-APPOINTMENT (OUTPATIENT)
Age: 89
End: 2024-02-06

## 2024-02-06 ENCOUNTER — APPOINTMENT (OUTPATIENT)
Dept: INTERNAL MEDICINE | Facility: CLINIC | Age: 89
End: 2024-02-06
Payer: MEDICARE

## 2024-02-06 ENCOUNTER — OUTPATIENT (OUTPATIENT)
Dept: OUTPATIENT SERVICES | Facility: HOSPITAL | Age: 89
LOS: 1 days | End: 2024-02-06
Payer: COMMERCIAL

## 2024-02-06 VITALS
OXYGEN SATURATION: 97 % | DIASTOLIC BLOOD PRESSURE: 66 MMHG | TEMPERATURE: 97.1 F | BODY MASS INDEX: 28.62 KG/M2 | WEIGHT: 172 LBS | RESPIRATION RATE: 14 BRPM | SYSTOLIC BLOOD PRESSURE: 133 MMHG | HEART RATE: 80 BPM

## 2024-02-06 VITALS
OXYGEN SATURATION: 97 % | RESPIRATION RATE: 18 BRPM | TEMPERATURE: 98 F | DIASTOLIC BLOOD PRESSURE: 57 MMHG | SYSTOLIC BLOOD PRESSURE: 146 MMHG | HEIGHT: 65 IN | WEIGHT: 169.98 LBS | HEART RATE: 70 BPM

## 2024-02-06 DIAGNOSIS — Z98.890 OTHER SPECIFIED POSTPROCEDURAL STATES: Chronic | ICD-10-CM

## 2024-02-06 DIAGNOSIS — Z01.818 ENCOUNTER FOR OTHER PREPROCEDURAL EXAMINATION: ICD-10-CM

## 2024-02-06 DIAGNOSIS — D49.4 NEOPLASM OF UNSPECIFIED BEHAVIOR OF BLADDER: ICD-10-CM

## 2024-02-06 DIAGNOSIS — C67.9 MALIGNANT NEOPLASM OF BLADDER, UNSPECIFIED: ICD-10-CM

## 2024-02-06 LAB
ANION GAP SERPL CALC-SCNC: 15 MMOL/L — SIGNIFICANT CHANGE UP (ref 5–17)
BUN SERPL-MCNC: 27 MG/DL — HIGH (ref 7–23)
CALCIUM SERPL-MCNC: 9.9 MG/DL — SIGNIFICANT CHANGE UP (ref 8.4–10.5)
CHLORIDE SERPL-SCNC: 101 MMOL/L — SIGNIFICANT CHANGE UP (ref 96–108)
CO2 SERPL-SCNC: 21 MMOL/L — LOW (ref 22–31)
CREAT SERPL-MCNC: 1.08 MG/DL — SIGNIFICANT CHANGE UP (ref 0.5–1.3)
EGFR: 66 ML/MIN/1.73M2 — SIGNIFICANT CHANGE UP
GLUCOSE SERPL-MCNC: 88 MG/DL — SIGNIFICANT CHANGE UP (ref 70–99)
HCT VFR BLD CALC: 37.9 % — LOW (ref 39–50)
HGB BLD-MCNC: 12.7 G/DL — LOW (ref 13–17)
MCHC RBC-ENTMCNC: 31.3 PG — SIGNIFICANT CHANGE UP (ref 27–34)
MCHC RBC-ENTMCNC: 33.5 GM/DL — SIGNIFICANT CHANGE UP (ref 32–36)
MCV RBC AUTO: 93.3 FL — SIGNIFICANT CHANGE UP (ref 80–100)
NRBC # BLD: 0 /100 WBCS — SIGNIFICANT CHANGE UP (ref 0–0)
PLATELET # BLD AUTO: 249 K/UL — SIGNIFICANT CHANGE UP (ref 150–400)
POTASSIUM SERPL-MCNC: 4.4 MMOL/L — SIGNIFICANT CHANGE UP (ref 3.5–5.3)
POTASSIUM SERPL-SCNC: 4.4 MMOL/L — SIGNIFICANT CHANGE UP (ref 3.5–5.3)
RBC # BLD: 4.06 M/UL — LOW (ref 4.2–5.8)
RBC # FLD: 13.3 % — SIGNIFICANT CHANGE UP (ref 10.3–14.5)
SODIUM SERPL-SCNC: 137 MMOL/L — SIGNIFICANT CHANGE UP (ref 135–145)
WBC # BLD: 5.26 K/UL — SIGNIFICANT CHANGE UP (ref 3.8–10.5)
WBC # FLD AUTO: 5.26 K/UL — SIGNIFICANT CHANGE UP (ref 3.8–10.5)

## 2024-02-06 PROCEDURE — 85027 COMPLETE CBC AUTOMATED: CPT

## 2024-02-06 PROCEDURE — T1013: CPT

## 2024-02-06 PROCEDURE — 93000 ELECTROCARDIOGRAM COMPLETE: CPT

## 2024-02-06 PROCEDURE — 87186 SC STD MICRODIL/AGAR DIL: CPT

## 2024-02-06 PROCEDURE — G0463: CPT

## 2024-02-06 PROCEDURE — 80048 BASIC METABOLIC PNL TOTAL CA: CPT

## 2024-02-06 PROCEDURE — 99214 OFFICE O/P EST MOD 30 MIN: CPT

## 2024-02-06 PROCEDURE — 87086 URINE CULTURE/COLONY COUNT: CPT

## 2024-02-06 RX ORDER — COLCHICINE 0.6 MG/1
0.6 TABLET ORAL
Qty: 30 | Refills: 3 | Status: DISCONTINUED | COMMUNITY
Start: 2022-10-04 | End: 2024-02-06

## 2024-02-06 RX ORDER — CIPROFLOXACIN HYDROCHLORIDE 250 MG/1
250 TABLET, FILM COATED ORAL
Qty: 14 | Refills: 0 | Status: DISCONTINUED | COMMUNITY
Start: 2024-01-22 | End: 2024-02-06

## 2024-02-06 RX ORDER — NYSTATIN 100000 [USP'U]/G
100000 CREAM TOPICAL TWICE DAILY
Qty: 1 | Refills: 2 | Status: DISCONTINUED | COMMUNITY
Start: 2018-10-26 | End: 2024-02-06

## 2024-02-06 RX ORDER — CLOTRIMAZOLE AND BETAMETHASONE DIPROPIONATE 10; .5 MG/G; MG/G
1-0.05 CREAM TOPICAL TWICE DAILY
Qty: 45 | Refills: 1 | Status: DISCONTINUED | COMMUNITY
Start: 2017-03-17 | End: 2024-02-06

## 2024-02-06 RX ORDER — CLOTRIMAZOLE 10 MG/G
1 CREAM TOPICAL
Qty: 30 | Refills: 1 | Status: DISCONTINUED | COMMUNITY
Start: 2017-02-24 | End: 2024-02-06

## 2024-02-06 NOTE — PHYSICAL EXAM
[No Acute Distress] : no acute distress [Well-Appearing] : well-appearing [Normal Sclera/Conjunctiva] : normal sclera/conjunctiva [EOMI] : extraocular movements intact [Normal Outer Ear/Nose] : the outer ears and nose were normal in appearance [Normal Oropharynx] : the oropharynx was normal [No JVD] : no jugular venous distention [No Lymphadenopathy] : no lymphadenopathy [Supple] : supple [No Respiratory Distress] : no respiratory distress  [No Accessory Muscle Use] : no accessory muscle use [Clear to Auscultation] : lungs were clear to auscultation bilaterally [Normal Rate] : normal rate  [Regular Rhythm] : with a regular rhythm [Normal S1, S2] : normal S1 and S2 [No Murmur] : no murmur heard [No Edema] : there was no peripheral edema [No Extremity Clubbing/Cyanosis] : no extremity clubbing/cyanosis [Soft] : abdomen soft [Non Tender] : non-tender [Non-distended] : non-distended [No Masses] : no abdominal mass palpated [No HSM] : no HSM [Normal Bowel Sounds] : normal bowel sounds [No CVA Tenderness] : no CVA  tenderness [No Joint Swelling] : no joint swelling [No Rash] : no rash [No Focal Deficits] : no focal deficits [Normal Gait] : normal gait [Speech Grossly Normal] : speech grossly normal [Normal Affect] : the affect was normal [Alert and Oriented x3] : oriented to person, place, and time [Normal Insight/Judgement] : insight and judgment were intact

## 2024-02-06 NOTE — H&P PST ADULT - NSANTHOSAYNRD_GEN_A_CORE
No. HILL screening performed.  STOP BANG Legend: 0-2 = LOW Risk; 3-4 = INTERMEDIATE Risk; 5-8 = HIGH Risk

## 2024-02-06 NOTE — H&P PST ADULT - ASSESSMENT
DASI Score:  DASI Activity: able to go up one flight of stairs or walk 1-2 blocks with out difficulty  Loose or removable teeth: upper and lower dentures present    DASI Score: 6.27  DASI Activity: pt self care, live alone in 2 level home, able to go up one flight of stairs or walk 1-2 blocks with out difficulty  Loose or removable teeth: upper and lower dentures present

## 2024-02-06 NOTE — H&P PST ADULT - PROBLEM SELECTOR PLAN 1
Pt scheduled for Cystoscopy, Transurethral Resection of Bladder with Dr. case on 2/27/24.  Pre-op instructions given, all questions answered.  Labs: CBC, BMP, UC

## 2024-02-06 NOTE — H&P PST ADULT - HISTORY OF PRESENT ILLNESS
89yo gentleman with cc of bladder tumor. pt developed gross hematuria about a month or so ago (around the time of his wife passing). Prior tobacco, quit 18y ago, 50 pack years. Went to see  (Dr Perez). Underwent CTU that showed filling defects in bladder. Uppertract negative. Also with ? of pancreatitis based on imaging. Cyto suspicious. Had cysto with Dr Perez that demonstrated "4cm posterior bladder tumor and R lateral tumor of about 2-3cm". On imaging. R lateral tumor appears close to UO.  ? 89 year old male (former smoker 70 pack yrs, quit 18yrs ago). PMhx HLD. PShx B/l hernia repair. C/o intermittent hematuria. Pt developed gross hematuria about 2 month ago (around the time of his wife passing). Pt had cysto with Dr Perez that demonstrated "4cm posterior bladder tumor and R lateral tumor of about 2-3cm". Now presents to PST prior to scheduled Cystoscopy, Transurethral Resection of Bladder Tumor with Dr. Sanchez on 2/27/24. Denies any chest pain, palpitations, SOB, N/V, fever or chills.       ?

## 2024-02-10 LAB
-  AMIKACIN: SIGNIFICANT CHANGE UP
-  AZTREONAM: SIGNIFICANT CHANGE UP
-  CEFEPIME: SIGNIFICANT CHANGE UP
-  CEFTAZIDIME: SIGNIFICANT CHANGE UP
-  CIPROFLOXACIN: SIGNIFICANT CHANGE UP
-  IMIPENEM: SIGNIFICANT CHANGE UP
-  LEVOFLOXACIN: SIGNIFICANT CHANGE UP
-  MEROPENEM: SIGNIFICANT CHANGE UP
-  PIPERACILLIN/TAZOBACTAM: SIGNIFICANT CHANGE UP
CULTURE RESULTS: ABNORMAL
METHOD TYPE: SIGNIFICANT CHANGE UP
ORGANISM # SPEC MICROSCOPIC CNT: ABNORMAL
ORGANISM # SPEC MICROSCOPIC CNT: ABNORMAL
SPECIMEN SOURCE: SIGNIFICANT CHANGE UP

## 2024-02-11 NOTE — HISTORY OF PRESENT ILLNESS
[FreeTextEntry1] : cystoscopy, TURBT [FreeTextEntry2] : 2/27/2024 [FreeTextEntry3] : Dr Jayda Sanchez [FreeTextEntry4] : Mr. ILIA MONROE is a 89 year old male accompanied by his nephew, with hx of atherosclerosis, HLD, GERD, COPD presents for preoperative medical clearance

## 2024-02-11 NOTE — HISTORY OF PRESENT ILLNESS
[de-identified] : Mr. ILIA MONROE is a 87 year old male accompanied by his wife, with hx of atherosclerosis, HLD, GERD, COPD presents for follow up visit   He is feeling well Denies SOB, chest pain, abdominal pain, N/V/D, leg swelling, headache, dizziness

## 2024-02-11 NOTE — ASSESSMENT
[FreeTextEntry1] :  HLD on Atorvastatin 10 mg, but pt says he takes it 1-2 times a week reinforced low cholesterol/low fat diet we'll check lipids and LFT's today  Hx of GERD cont protonix prn  Hx of anemia Will check CBC, Vit B12, iron studies and ferritin  today  blood work ordered follow up in one week for lab results

## 2024-02-13 ENCOUNTER — APPOINTMENT (OUTPATIENT)
Dept: UROLOGY | Facility: CLINIC | Age: 89
End: 2024-02-13

## 2024-02-26 ENCOUNTER — TRANSCRIPTION ENCOUNTER (OUTPATIENT)
Age: 89
End: 2024-02-26

## 2024-02-27 ENCOUNTER — OUTPATIENT (OUTPATIENT)
Dept: INPATIENT UNIT | Facility: HOSPITAL | Age: 89
LOS: 1 days | End: 2024-02-27
Payer: COMMERCIAL

## 2024-02-27 ENCOUNTER — APPOINTMENT (OUTPATIENT)
Dept: UROLOGY | Facility: HOSPITAL | Age: 89
End: 2024-02-27

## 2024-02-27 ENCOUNTER — RESULT REVIEW (OUTPATIENT)
Age: 89
End: 2024-02-27

## 2024-02-27 ENCOUNTER — TRANSCRIPTION ENCOUNTER (OUTPATIENT)
Age: 89
End: 2024-02-27

## 2024-02-27 VITALS
HEART RATE: 78 BPM | WEIGHT: 169.98 LBS | TEMPERATURE: 98 F | DIASTOLIC BLOOD PRESSURE: 69 MMHG | HEIGHT: 65 IN | OXYGEN SATURATION: 98 % | RESPIRATION RATE: 18 BRPM | SYSTOLIC BLOOD PRESSURE: 161 MMHG

## 2024-02-27 VITALS
OXYGEN SATURATION: 95 % | DIASTOLIC BLOOD PRESSURE: 67 MMHG | HEART RATE: 70 BPM | RESPIRATION RATE: 16 BRPM | SYSTOLIC BLOOD PRESSURE: 149 MMHG

## 2024-02-27 DIAGNOSIS — D49.4 NEOPLASM OF UNSPECIFIED BEHAVIOR OF BLADDER: ICD-10-CM

## 2024-02-27 DIAGNOSIS — Z98.890 OTHER SPECIFIED POSTPROCEDURAL STATES: Chronic | ICD-10-CM

## 2024-02-27 PROBLEM — E78.5 HYPERLIPIDEMIA, UNSPECIFIED: Chronic | Status: ACTIVE | Noted: 2024-02-06

## 2024-02-27 PROCEDURE — 88342 IMHCHEM/IMCYTCHM 1ST ANTB: CPT | Mod: 26

## 2024-02-27 PROCEDURE — 88342 IMHCHEM/IMCYTCHM 1ST ANTB: CPT

## 2024-02-27 PROCEDURE — 88307 TISSUE EXAM BY PATHOLOGIST: CPT | Mod: 26

## 2024-02-27 PROCEDURE — 88341 IMHCHEM/IMCYTCHM EA ADD ANTB: CPT | Mod: 26

## 2024-02-27 PROCEDURE — 52235 CYSTOSCOPY AND TREATMENT: CPT

## 2024-02-27 PROCEDURE — 88341 IMHCHEM/IMCYTCHM EA ADD ANTB: CPT

## 2024-02-27 PROCEDURE — 88307 TISSUE EXAM BY PATHOLOGIST: CPT

## 2024-02-27 RX ORDER — ONDANSETRON 8 MG/1
4 TABLET, FILM COATED ORAL ONCE
Refills: 0 | Status: DISCONTINUED | OUTPATIENT
Start: 2024-02-27 | End: 2024-02-27

## 2024-02-27 RX ORDER — ACETAMINOPHEN 500 MG
2 TABLET ORAL
Refills: 0 | DISCHARGE

## 2024-02-27 RX ORDER — PANTOPRAZOLE SODIUM 20 MG/1
1 TABLET, DELAYED RELEASE ORAL
Refills: 0 | DISCHARGE

## 2024-02-27 RX ORDER — POLYETHYLENE GLYCOL 3350 17 G/17G
17 POWDER, FOR SOLUTION ORAL
Refills: 0 | DISCHARGE

## 2024-02-27 RX ORDER — CEFAZOLIN SODIUM 1 G
2000 VIAL (EA) INJECTION ONCE
Refills: 0 | Status: COMPLETED | OUTPATIENT
Start: 2024-02-27 | End: 2024-02-27

## 2024-02-27 RX ORDER — LIDOCAINE HCL 20 MG/ML
0.2 VIAL (ML) INJECTION ONCE
Refills: 0 | Status: DISCONTINUED | OUTPATIENT
Start: 2024-02-27 | End: 2024-02-27

## 2024-02-27 RX ORDER — FENTANYL CITRATE 50 UG/ML
25 INJECTION INTRAVENOUS
Refills: 0 | Status: DISCONTINUED | OUTPATIENT
Start: 2024-02-27 | End: 2024-02-27

## 2024-02-27 RX ORDER — SODIUM CHLORIDE 9 MG/ML
3 INJECTION INTRAMUSCULAR; INTRAVENOUS; SUBCUTANEOUS EVERY 8 HOURS
Refills: 0 | Status: DISCONTINUED | OUTPATIENT
Start: 2024-02-27 | End: 2024-02-27

## 2024-02-27 RX ORDER — SODIUM CHLORIDE 9 MG/ML
1000 INJECTION, SOLUTION INTRAVENOUS
Refills: 0 | Status: DISCONTINUED | OUTPATIENT
Start: 2024-02-27 | End: 2024-02-27

## 2024-02-27 RX ORDER — TAMSULOSIN HYDROCHLORIDE 0.4 MG/1
1 CAPSULE ORAL
Refills: 0 | DISCHARGE

## 2024-02-27 NOTE — ASU DISCHARGE PLAN (ADULT/PEDIATRIC) - ASU DC SPECIAL INSTRUCTIONSFT
CATHETER: You will go home with your catheter. You will come into the office on Thursday 2/29/2024 for catheter removal.  GENERAL: It is common to have blood in your urine after your procedure. It may be pink or even red; inform your doctor if you have a significant amount of clot in the urine or if you are unable to void at all or if your catheter stops draining. The urine may clear and then become bloody again especially as you are more physically active. It is not uncommon to have some burning when you urinate, this will gradually improve. With a catheter in place, it is not uncommon to have occasional leakage or urine or blood around the catheter. Please call your urologist if this is excessive and/or the urine is not draining through the catheter into the bag.  BATHING: You may shower or bathe. If going home with nelson, shower only until catheter is removed.  DIET: You may resume your regular diet and regular medication regimen.  PAIN: You may take Tylenol (acetaminophen) 650-975mg and/or Motrin (ibuprofen) 400-600mg, both available over the counter, for pain every 6 hours as needed. Do not exceed 4000mg of Tylenol (acetaminophen) daily. You may alternate these medications such that you take one or the other every 3 hours for around the clock pain coverage.  ANTIBIOTICS: Please complete your course of antibiotics at home as prescribed.  STOOL SOFTENERS: Do not allow yourself to become constipated as straining may cause bleeding. Take stool softeners or a laxative (ex. Miralax, Colace, Senokot, ExLax, etc), available over the counter, if needed.  ACTIVITY: No heavy lifting or strenuous exercise until you are evaluated at your post-operative appointment. Otherwise, you may return to your usual level of physical activity.  ANTICOAGULATION: If you are taking any blood thinning medications, please discuss with your urologist prior to restarting these medications unless otherwise specified.  FOLLOW-UP: If you did not already schedule your post-operative appointment, please call your urologist to schedule a follow-up appointment.  CALL YOUR UROLOGIST IF: You have any bleeding that does not stop, inability to void >8 hours, fever over 100.4 F, chills, persistent nausea/vomiting, changes in your incision concerning for infection, or if your pain is not controlled on your discharge pain medications.

## 2024-02-27 NOTE — ASU DISCHARGE PLAN (ADULT/PEDIATRIC) - NS MD DC FALL RISK RISK
For information on Fall & Injury Prevention, visit: [email or web address] OR  [email or web address] OR  https://www.cdc.gov/steadi/patient.html

## 2024-02-27 NOTE — BRIEF OPERATIVE NOTE - OPERATION/FINDINGS
Cystoscopy performed, bilateral ureteral orifices identified in normal orthotopic position. Posterior wall and right lateral wall bladder tumor identified, resected with loop electrocautery and sent for pathology, base fulgurated, hemostasis achieved. No additional mucosal abnormalities identified. Ureteral orifices not involved with resection.

## 2024-02-27 NOTE — ASU DISCHARGE PLAN (ADULT/PEDIATRIC) - CARE PROVIDER_API CALL
Jayda Sanchez  Urology  [address] [place] [id number]  Phone: [phone]  Fax: [phone]  Scheduled Appointment: 02/29/2024

## 2024-02-29 ENCOUNTER — APPOINTMENT (OUTPATIENT)
Dept: UROLOGY | Facility: CLINIC | Age: 89
End: 2024-02-29
Payer: MEDICARE

## 2024-02-29 DIAGNOSIS — N40.0 BENIGN PROSTATIC HYPERPLASIA WITHOUT LOWER URINARY TRACT SYMPMS: ICD-10-CM

## 2024-02-29 DIAGNOSIS — D49.4 NEOPLASM OF UNSPECIFIED BEHAVIOR OF BLADDER: ICD-10-CM

## 2024-02-29 PROCEDURE — 99212 OFFICE O/P EST SF 10 MIN: CPT

## 2024-02-29 NOTE — HISTORY OF PRESENT ILLNESS
[FreeTextEntry1] : 88yo gentleman with cc of bladder tumor. pt developed gross hematuria about a month or so ago (around the time of his wife passing). Prior tobacco, quit 18y ago, 50 pack years. Went to see  (Dr Perez). Underwent CTU that showed filling defects in bladder. Uppertract negative. Also with ? of pancreatitis based on imaging. Cyto suspicious. Recently placed on tamsulosin with improvement in urinary sx. Not on any other meds.   Earlier this week underwent resection with TURBT. Nelson left in place post procedure. Pt returns today for nelson removal. No issues with catheter. Urine with concentrated appearing urine. Removed in office today.

## 2024-02-29 NOTE — PHYSICAL EXAM
[General Appearance - Well Developed] : well developed [Edema] : no peripheral edema [General Appearance - In No Acute Distress] : no acute distress [General Appearance - Well Nourished] : well nourished [Exaggerated Use Of Accessory Muscles For Inspiration] : no accessory muscle use [Abdomen Soft] : soft [Abdomen Tenderness] : non-tender [Normal Station and Gait] : the gait and station were normal for the patient's age

## 2024-03-07 LAB — SURGICAL PATHOLOGY STUDY: SIGNIFICANT CHANGE UP

## 2024-03-08 ENCOUNTER — APPOINTMENT (OUTPATIENT)
Dept: UROLOGY | Facility: CLINIC | Age: 89
End: 2024-03-08
Payer: MEDICARE

## 2024-03-08 VITALS
OXYGEN SATURATION: 97 % | DIASTOLIC BLOOD PRESSURE: 69 MMHG | HEART RATE: 69 BPM | RESPIRATION RATE: 16 BRPM | TEMPERATURE: 97.5 F | SYSTOLIC BLOOD PRESSURE: 148 MMHG

## 2024-03-08 PROBLEM — C67.9 MALIGNANT NEOPLASM OF BLADDER, UNSPECIFIED: Chronic | Status: ACTIVE | Noted: 2024-02-06

## 2024-03-08 PROCEDURE — 99214 OFFICE O/P EST MOD 30 MIN: CPT

## 2024-03-08 NOTE — PHYSICAL EXAM
[General Appearance - Well Nourished] : well nourished [General Appearance - Well Developed] : well developed [Exaggerated Use Of Accessory Muscles For Inspiration] : no accessory muscle use [Edema] : no peripheral edema [General Appearance - In No Acute Distress] : no acute distress [Costovertebral Angle Tenderness] : no ~M costovertebral angle tenderness [Abdomen Tenderness] : non-tender [Abdomen Soft] : soft

## 2024-03-12 ENCOUNTER — APPOINTMENT (OUTPATIENT)
Dept: RADIATION ONCOLOGY | Facility: CLINIC | Age: 89
End: 2024-03-12
Payer: MEDICARE

## 2024-03-12 VITALS
WEIGHT: 172.51 LBS | HEIGHT: 65 IN | SYSTOLIC BLOOD PRESSURE: 158 MMHG | HEART RATE: 73 BPM | RESPIRATION RATE: 16 BRPM | OXYGEN SATURATION: 98 % | BODY MASS INDEX: 28.74 KG/M2 | DIASTOLIC BLOOD PRESSURE: 71 MMHG | TEMPERATURE: 95.6 F

## 2024-03-12 PROCEDURE — 99205 OFFICE O/P NEW HI 60 MIN: CPT

## 2024-03-12 RX ORDER — CIPROFLOXACIN HYDROCHLORIDE 500 MG/1
500 TABLET, FILM COATED ORAL TWICE DAILY
Qty: 10 | Refills: 0 | Status: DISCONTINUED | COMMUNITY
Start: 2024-02-23 | End: 2024-03-12

## 2024-03-12 NOTE — VITALS
[Maximal Pain Intensity: 0/10] : 0/10 [ECOG Performance Status: 1 - Restricted in physically strenuous activity but ambulatory and able to carry out work of a light or sedentary nature] : Performance Status: 1 - Restricted in physically strenuous activity but ambulatory and able to carry out work of a light or sedentary nature, e.g., light house work, office work [80: Normal activity with effort; some signs or symptoms of disease.] : 80: Normal activity with effort; some signs or symptoms of disease.  [0 - No Distress] : Distress Level: 0 [Date: ____________] : Patient's last distress assessment performed on [unfilled].

## 2024-03-12 NOTE — ASSESSMENT
[FreeTextEntry1] : New dx muscle invasive bladder ca. posterior wall tumor, multifocal. DIscussed management options including observation/TUR, surgery (not ammenable to partial cystectomy, likely not a good cystectomy candidate based on urine alone), or RT and chemo. Recommend RT and chemo.  --Refer to Dr Peña and Dr Williamson

## 2024-03-12 NOTE — HISTORY OF PRESENT ILLNESS
[FreeTextEntry1] : 89yo gentleman with cc of bladder cancer.   pt developed gross hematuria about in Dec (around the time of his wife passing). Prior tobacco, quit 18y ago, 50 pack years. Went to see  (Dr Perez). Underwent CTU that showed filling defects in bladder. Uppertract negative. Also with ? of pancreatitis based on imaging. Cyto suspicious. Underwent TURBT with  me on 2/27. Path returned with muscle invasive bladder cancer.   Recently placed on tamsulosin with improvement in urinary sx. Not on any other meds. Cr 1, GFR >60  Dad passed at 75 at lung ca (smoker). Mom lived to 85, passed from stroke  Pts only medication is atorvastatin and pantoprazole (hiatal hernia).   Pt here with nephew and friend. Used  [id number]

## 2024-03-12 NOTE — REVIEW OF SYSTEMS
[Loss of Hearing] : loss of hearing [Nocturia] : nocturia [Urinary Frequency] : urinary frequency [Negative] : Allergic/Immunologic

## 2024-03-13 ENCOUNTER — OUTPATIENT (OUTPATIENT)
Dept: OUTPATIENT SERVICES | Facility: HOSPITAL | Age: 89
LOS: 1 days | Discharge: HOME | End: 2024-03-13

## 2024-03-13 DIAGNOSIS — C67.9 MALIGNANT NEOPLASM OF BLADDER, UNSPECIFIED: ICD-10-CM

## 2024-03-13 DIAGNOSIS — Z98.890 OTHER SPECIFIED POSTPROCEDURAL STATES: Chronic | ICD-10-CM

## 2024-03-13 NOTE — HISTORY OF PRESENT ILLNESS
[FreeTextEntry1] : 89M w/ T2N0 bladder cancer.  Initially presented with hematuria.   CT urogram 23 had shown a mass or masses (one thought to be sessile tumor, one tumor vs hematoma) in the posterior bladder, no LAD.   He underwent TURBT 24 that showed multiple tumors on the posterior bladder wall. "Bladder tumor of about 4 cm noted in the mid posterior bladder wall seen just beyond the trigone. Both UOs appear clear of tumor and are patent. A second bladder tumor of about 2-3 cm noted along right lateral wall." - Resection specimens showed high grade invasive urothelial carcinoma, involving the detrusor muscle; no LVI.   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  Reports occasional hematuria mostly in the morning upon waking. He has ongoing urinary frequency every 1-2 hours, worse at night. Reports weak stream. Started on Flomax for BPH symptoms, he stopped use when out of supply, but didn't note any benefit. Denies pelvic pain. He takes MiraLAX and stool softeners for constipation. Has good performance status for age and independent in ADLs. He lies alone, wife  2 months ago. Has a tenant who has lived in his house for >30 years and is involved in his care as well as a nephew Bhaskar.  - will see Dr. Peña 3/14/24, defer labs to then

## 2024-03-13 NOTE — PHYSICAL EXAM
[Sclera] : the sclera and conjunctiva were normal [] : no respiratory distress [Heart Rate And Rhythm] : heart rate and rhythm were normal [Abdomen Soft] : soft [Musculoskeletal - Swelling] : no joint swelling [Skin Color & Pigmentation] : normal skin color and pigmentation [Motor Exam] : the motor exam was normal [Oriented To Time, Place, And Person] : oriented to person, place, and time [Extraocular Movements] : extraocular movements were intact [Outer Ear] : the ears and nose were normal in appearance [Respiration, Rhythm And Depth] : normal respiratory rhythm and effort [Exaggerated Use Of Accessory Muscles For Inspiration] : no accessory muscle use [Edema] : no peripheral edema present [Arterial Pulses Normal] : the arterial pulses were normal [Nondistended] : nondistended [Nail Clubbing] : no clubbing  or cyanosis of the fingernails [Range of Motion to Joints] : range of motion to joints [Motor Tone] : muscle strength and tone were normal [Normal] : no focal deficits [Affect] : the affect was normal [Mood] : the mood was normal [Not Anxious] : not anxious [de-identified] : Diminished hearing

## 2024-03-13 NOTE — REASON FOR VISIT
[Consideration of Curative Therapy] : consideration of curative therapy for [Other: ___] : [unfilled] [Other: _____] : [unfilled] [Family Member] : family member [Other: ______] : provided by [employer] [Time Spent: ____ minutes] : Total time spent using  services: [unfilled] minutes. The patient's primary language is not English thus required  services. [Interpreters_IDNumber] : [id number] [TWNoteComboBox1] : Filipino

## 2024-03-14 ENCOUNTER — RESULT REVIEW (OUTPATIENT)
Age: 89
End: 2024-03-14

## 2024-03-14 ENCOUNTER — NON-APPOINTMENT (OUTPATIENT)
Age: 89
End: 2024-03-14

## 2024-03-14 ENCOUNTER — APPOINTMENT (OUTPATIENT)
Dept: HEMATOLOGY ONCOLOGY | Facility: CLINIC | Age: 89
End: 2024-03-14
Payer: MEDICARE

## 2024-03-14 ENCOUNTER — LABORATORY RESULT (OUTPATIENT)
Age: 89
End: 2024-03-14

## 2024-03-14 ENCOUNTER — RX RENEWAL (OUTPATIENT)
Age: 89
End: 2024-03-14

## 2024-03-14 VITALS
BODY MASS INDEX: 28.54 KG/M2 | OXYGEN SATURATION: 95 % | HEART RATE: 62 BPM | RESPIRATION RATE: 20 BRPM | DIASTOLIC BLOOD PRESSURE: 66 MMHG | HEIGHT: 65 IN | SYSTOLIC BLOOD PRESSURE: 132 MMHG | WEIGHT: 171.3 LBS | TEMPERATURE: 98.2 F

## 2024-03-14 DIAGNOSIS — Z82.49 FAMILY HISTORY OF ISCHEMIC HEART DISEASE AND OTHER DISEASES OF THE CIRCULATORY SYSTEM: ICD-10-CM

## 2024-03-14 DIAGNOSIS — R31.9 HEMATURIA, UNSPECIFIED: ICD-10-CM

## 2024-03-14 DIAGNOSIS — Z60.2 PROBLEMS RELATED TO LIVING ALONE: ICD-10-CM

## 2024-03-14 DIAGNOSIS — Z80.52 FAMILY HISTORY OF MALIGNANT NEOPLASM OF BLADDER: ICD-10-CM

## 2024-03-14 LAB
BASOPHILS # BLD AUTO: 0.06 K/UL — SIGNIFICANT CHANGE UP (ref 0–0.2)
BASOPHILS NFR BLD AUTO: 1 % — SIGNIFICANT CHANGE UP (ref 0–2)
EOSINOPHIL # BLD AUTO: 0 K/UL — SIGNIFICANT CHANGE UP (ref 0–0.5)
EOSINOPHIL NFR BLD AUTO: 0 % — SIGNIFICANT CHANGE UP (ref 0–6)
HCT VFR BLD CALC: 37.6 % — LOW (ref 39–50)
HGB BLD-MCNC: 12.3 G/DL — LOW (ref 13–17)
LYMPHOCYTES # BLD AUTO: 2.11 K/UL — SIGNIFICANT CHANGE UP (ref 1–3.3)
LYMPHOCYTES # BLD AUTO: 38 % — SIGNIFICANT CHANGE UP (ref 13–44)
MCHC RBC-ENTMCNC: 31.5 PG — SIGNIFICANT CHANGE UP (ref 27–34)
MCHC RBC-ENTMCNC: 32.7 G/DL — SIGNIFICANT CHANGE UP (ref 32–36)
MCV RBC AUTO: 96.4 FL — SIGNIFICANT CHANGE UP (ref 80–100)
MONOCYTES # BLD AUTO: 0.89 K/UL — SIGNIFICANT CHANGE UP (ref 0–0.9)
MONOCYTES NFR BLD AUTO: 16 % — HIGH (ref 2–14)
MYELOCYTES NFR BLD: 2 % — HIGH (ref 0–0)
NEUTROPHILS # BLD AUTO: 2.39 K/UL — SIGNIFICANT CHANGE UP (ref 1.8–7.4)
NEUTROPHILS NFR BLD AUTO: 43 % — SIGNIFICANT CHANGE UP (ref 43–77)
NRBC # BLD: 0 /100 WBCS — SIGNIFICANT CHANGE UP (ref 0–0)
NRBC # BLD: SIGNIFICANT CHANGE UP /100 WBCS (ref 0–0)
PLAT MORPH BLD: NORMAL — SIGNIFICANT CHANGE UP
PLATELET # BLD AUTO: 306 K/UL — SIGNIFICANT CHANGE UP (ref 150–400)
RBC # BLD: 3.9 M/UL — LOW (ref 4.2–5.8)
RBC # FLD: 13.1 % — SIGNIFICANT CHANGE UP (ref 10.3–14.5)
RBC BLD AUTO: SIGNIFICANT CHANGE UP
WBC # BLD: 5.56 K/UL — SIGNIFICANT CHANGE UP (ref 3.8–10.5)
WBC # FLD AUTO: 5.56 K/UL — SIGNIFICANT CHANGE UP (ref 3.8–10.5)

## 2024-03-14 PROCEDURE — 99205 OFFICE O/P NEW HI 60 MIN: CPT

## 2024-03-14 SDOH — SOCIAL STABILITY - SOCIAL INSECURITY: PROBLEMS RELATED TO LIVING ALONE: Z60.2

## 2024-03-15 ENCOUNTER — TRANSCRIPTION ENCOUNTER (OUTPATIENT)
Age: 89
End: 2024-03-15

## 2024-03-15 LAB
ALBUMIN SERPL ELPH-MCNC: 4.6 G/DL
ALP BLD-CCNC: 86 U/L
ALT SERPL-CCNC: 22 U/L
ANION GAP SERPL CALC-SCNC: 15 MMOL/L
AST SERPL-CCNC: 21 U/L
BILIRUB SERPL-MCNC: 0.3 MG/DL
BUN SERPL-MCNC: 22 MG/DL
CALCIUM SERPL-MCNC: 10.2 MG/DL
CHLORIDE SERPL-SCNC: 100 MMOL/L
CO2 SERPL-SCNC: 22 MMOL/L
CREAT SERPL-MCNC: 1.29 MG/DL
EGFR: 53 ML/MIN/1.73M2
GLUCOSE SERPL-MCNC: 111 MG/DL
POTASSIUM SERPL-SCNC: 4.4 MMOL/L
PROT SERPL-MCNC: 8.6 G/DL
SODIUM SERPL-SCNC: 137 MMOL/L

## 2024-03-15 NOTE — RESULTS/DATA
[FreeTextEntry1] : Kathleen Accession Number : [id number] Patient:     ILIA MONROE Accession:                             [id number]  Collected Date/Time:                   2/27/2024 12:15 EST Received Date/Time:                    2/27/2024 12:15 EST  Surgical Pathology Report - Auth (Verified)  Specimen(s) Submitted 1  Bladder Tumor  Final Diagnosis 1. Bladder tumor, TURB -Histologic Type:   Invasive urothelial carcinoma -Histologic Grade:   High grade -Pattern of growth of the non-invasive component:  Solid  -Local Invasion:  Carcinoma invades muscularis propria (detrusor muscle) -Muscularis Propria:  Muscularis propria (detrusor muscle) is involved -Lymphovascular Invasion:  Not identified  Note:  By immunohistochemical stains tumor cells are positive for GATA3, and negative for P40, NKX3.1, and TTF1. These findings support the diagnosis of urothelial carcinoma. Verified by: Radha Vides M.D. (Electronic Signature) Reported on: 03/07/24  ************************************************* EXAM: [id number] - CT ABD PELV UROGRAM WAW IC  - ORDERED BY: SOFIA HUDSON PROCEDURE DATE:  12/29/2023 INTERPRETATION:  CLINICAL INFORMATION: Gross hematuria ADDITIONAL CLINICAL INFORMATION: Gross Hematuria R31.0 COMPARISON: None.  CONTRAST/COMPLICATIONS: IV Contrast: Omnipaque 350  90 cc administered   10 cc discarded Oral Contrast: Water Complications: None reported at time of study completion  PROCEDURE: CT of the Abdomen and Pelvis was performed. Precontrast, Nephrographic and Excretory phases were acquired (CT UROGRAM). 10 mg of Lasix was administered. Sagittal and coronal reformats were performed.  FINDINGS: LOWER CHEST: 4 mm calcified granuloma in the right lower lobe. Minimal atelectatic changes.  LIVER: Within normal limits. BILE DUCTS: Normal caliber. GALLBLADDER: Within normal limits. SPLEEN: Within normal limits. PANCREAS: Multiple small pancreatic calcifications. Is there history of chronic pancreatitis? ADRENALS: Within normal limits. KIDNEYS/URETERS: Subcentimeter low-attenuation lesion in the lower pole left kidney which is too small to characterize on CT scan. No gross urothelial lesion or calcification.  BLADDER: Enhancing soft tissue in the posterior aspect of the superior bladder which is concerning for neoplasm. Mild enhancing soft tissue in the right side of the lateral bladder may represent an additional sessile lesion. Additional small filling defect more inferiorly in the right side of the bladder posteriorly. There is limited evaluation for enhancement due to the small size and this may represent an additional lesion versus REPRODUCTIVE ORGANS: Prominent and heterogeneous prostate gland.  BOWEL: No bowel obstruction. Appendix within normal limits. Few scattered colonic diverticuli without focal inflammation. PERITONEUM: No ascites. VESSELS: Vascular calcifications. RETROPERITONEUM/LYMPH NODES: No lymphadenopathy. ABDOMINAL WALL: Within normal limits. BONES: Degenerative changes of bone.  IMPRESSION: Enhancing soft tissue in the superior posterior bladder, highly concerning for neoplasm. Mild wall thickening with enhancement in the right lateral bladder may represent a sessile lesion. Filling defect in the posterior bladder more posteriorly may represent hematoma versus a lesion. Further evaluation is recommended.  Multiple punctate calcifications in the pancreas raising concern for chronic pancreatitis. Please correlate clinically. --- End of Report --- LALITHA JARRELL MD; Attending Radiologist This document has been electronically signed. Dec 31 2023 10:46AM *************************************************************************** January 5, 2024.....A flexible cystoscope was used to visualize the urethra and bladder. The anterior urethra was normal. The prostatic urethra had enlargement of the lateral prostatic lobes. The bladder outlet was obstructed.     Additional Procedural Findings/Comments: Bladder tumor of about 4 cm noted in the mid posterior bladder wall seen just beyond the trigone. Both UOs appear clear of tumor and are patent. A second bladder tumor of about 2-3 cm noted along right lateral wall. ***********************************************************

## 2024-03-15 NOTE — REASON FOR VISIT
[Initial Consultation] : an initial consultation [Family Member] : family member [Other: _____] : [unfilled] [[employer] ] : provided by [employer]   [FreeTextEntry2] : urothelial cancer/bladder

## 2024-03-15 NOTE — ASSESSMENT
[Palliative Care Plan] : not applicable at this time [FreeTextEntry1] : Christopher Gonzales was seen in consultation on , accompanied by his sister and his nephew he is referred by Dr. Manjeet Holman and Dr. Jayda Sanchez. Information from her note indicates that he developed gross hematuria labs in December.  His wife was still, and she  about that time.  He was a heavy smoker and quit 18 years ago by her note, but he says 30 years ago.  He was seen by Dr. Italo Perez, urologist, initially.  A CT urogram showed filling defects in the bladder.  The upper tract was negative.  There was evidence of possible prior pancreatitis noted by calcifications.  He underwent a transurethral resection of bladder tumor on  revealing muscle invasive bladder cancer.  The procedure note indicated large necrotic papillary tumors x 2, along the posterior wall.  Both ureteral orifices were visualized with clear reflux of urine.  He reports that he feels "good".  He has some additional gross hematuria a few days ago which has now stopped.  He states that his appetite is good and there is no weight loss.  There were no GI symptoms other than some constipation.  Fatigue is not present.  He said he did not sleep well the night before this visit as he had to get up to void every 15 minutes.  This was the first time that this occurred.  He usually gets up 2-3 times at night.  He does have some minor dysuria currently at the initiation of voiding.  He has occasional incontinence with urgency.  He reports no chest pain or chest pressure.  There is no edema of the extremities.  There were no respiratory complaints.  He denies any headaches.  He is independent in his ADLs.  He lives alone and his sister has come up from [place] to stay with him during this process.  At the request of his nephew, a Irish  was utilized, but I did not record the person's name or number.  About assisted through the visit, the nephew was confident enough in his understanding that he requested that we stop utilizing the .  A comprehensive history was obtained and a physical examination was performed.  There were no significant findings on physical examination.  We went over the results of the pathology report.  I started to talk about staging of bladder cancer, but he indicated he was not interested in talking about the staging and he wanted to know more about the plan of treatment.  The CAT scan was performed on 2023, and the images were personally reviewed by me.  I showed them the areas of concern on the CAT scan.  He has not had a chest CT done.  This has been ordered.  He has been seen by Dr. Holman this past week.  Laboratory values associated with this visit revealed normal electrolytes.  The BUN was 22 with a creatinine of 1.29.  The albumin was 4.6.  The transaminases and alkaline phosphatase levels were normal.  The white blood cell count was 5.56 with a hemoglobin value of 12.3 g.  The indices were normal.  The platelet count was 306,000.  The differential was normal.  Since her plan to give chemotherapy, hepatitis profile was added onto the ordered labs, but it does not appear that it was performed.  I have asked my team to call the core lab and added onto the existing sample that they have on hand.  We talked about the treatment of chemoradiation on bladder cancer.  Radical cystoprostatectomy would not be appropriate in this man who lives alone and is functional at this time.  Risk of major morbidity and mortality is high and he may not be able to function alone if he underwent radical surgery.  We talked about radiation.  The plan is for him to receive 20 fractions over 4 weeks.  I explained the rationale for adding chemotherapy to the radiation.  This is been shown to improve local control, but it does not have any impact on overall survival.  The largest trial was [facility] 2001 trial from the [place], published in [place] Journal of Medicine in 2012.  Patients with chemotherapy and an approximate 55% control without evidence of invasive tumor at 2 years.  This was increased to 67% in those that received chemotherapy.  The chemotherapy used in this trial was infusional 5-fluorouracil along with mitomycin, regimen not commonly utilized in the event it states for bladder cancer.  It also had significant logistical problems of having to have a pump for the 5-fluorouracil infusion over the course of 96 hours.  Cisplatin is the most active agent, but I do not think that he would be able to handle cisplatin on a weekly basis.  Gemcitabine at 100 mg/m weekly is well-tolerated and would be my choice.  We discussed the adverse effects that may occur with gemcitabine in detail.  They were given written information about gemcitabine.  I reminded them that the information provided on that trip that she is for gemcitabine at 1000 mg/m whereas we will be using 1/10 at that dose.  After lengthy conversation in regards to all of these issues as well as the potential adverse effects of treatment, patient agreed to proceed with therapy, and written informed consent was obtained.  All questions were answered to the best of my ability and to their apparent satisfaction.

## 2024-03-15 NOTE — PHYSICAL EXAM
[Ambulatory and capable of all self care but unable to carry out any work activities] : Status 2- Ambulatory and capable of all self care but unable to carry out any work activities. Up and about more than 50% of waking hours [Normal] : grossly intact [de-identified] : some pitting edema noted both distal legs [de-identified] : SK, large, right temporal, state it has been there for years

## 2024-03-15 NOTE — REVIEW OF SYSTEMS
[Loss of Hearing] : loss of hearing [Dysuria] : dysuria [Incontinence] : incontinence [Joint Pain] : joint pain [Easy Bruising] : a tendency for easy bruising [Negative] : Gastrointestinal [Fever] : no fever [Chills] : no chills [Fatigue] : no fatigue [Recent Change In Weight] : ~T no recent weight change [Dysphagia] : no dysphagia [Hoarseness] : no hoarseness [Odynophagia] : no odynophagia [Chest Pain] : no chest pain [Palpitations] : no palpitations [Lower Ext Edema] : no lower extremity edema [Cough] : no cough [SOB on Exertion] : no shortness of breath during exertion [Muscle Pain] : no muscle pain [Skin Rash] : no skin rash [Dizziness] : no dizziness [Anxiety] : no anxiety [Depression] : no depression [Muscle Weakness] : no muscle weakness [de-identified] : no HA

## 2024-03-15 NOTE — HISTORY OF PRESENT ILLNESS
[Disease: _____________________] : Disease: [unfilled] [T: ___] : T[unfilled] [N: ___] : N[unfilled] [M: ___] : M[unfilled] [AJCC Stage: ____] : AJCC Stage: [unfilled] [de-identified] : Christopher Gonzales is seen in consultation on 3/14/24, accompanied by his sister and his nephew.  He is referred by Dr Jayda Sanchez. Dr Manjeet Holman From her note..."pt developed gross hematuria about in Dec (around the time of his wife passing). Prior tobacco, quit 18y ago, 50 pack years. Went to see  (Dr Perez). Underwent CTU that showed filling defects in bladder. Upper tract negative. Also, with? of pancreatitis based on imaging. Cyto suspicious. Underwent TURBT with me on 2/27. 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  "Inspection of the bladder showed large necrotic papillary tumor x2 along the posterior wall.  There was significant trabeculation with cellules throughout.  Bilateral ureteral orifices were visualized with clear efflux.  Once surveillance was complete, resectoscope was used to carefully resect the posterior wall tumors.  This showed evidence of significant bladder wall thickening, concerning for muscle invasion.  Resection was taken down deep with care taken to avoid any significant injury, particularly given the patient's age and obtain appropriate pathology with resection."  Feels "good", had some gross hematuria a few days ago, then stopped. Appetite is good, no weight loss. No N/V/D, some constipation. Fatigue is not present. Did not sleep well last night, had to get up to void every 15 minutes. This was the first time this occurred. usually gets up 2-3 times. He does have some minor dysuria currently, at the initiation of voiding.  Occ incontinence with urgency. No chest pain/pressure. No edema. No cough, no RAMIREZ. No HA. Independent in ADLs, lives alone, sister from [place] will stay with him during this process

## 2024-03-15 NOTE — REVIEW OF SYSTEMS
[Loss of Hearing] : loss of hearing [Dysuria] : dysuria [Joint Pain] : joint pain [Incontinence] : incontinence [Easy Bruising] : a tendency for easy bruising [Negative] : Gastrointestinal [Fever] : no fever [Chills] : no chills [Fatigue] : no fatigue [Recent Change In Weight] : ~T no recent weight change [Dysphagia] : no dysphagia [Hoarseness] : no hoarseness [Odynophagia] : no odynophagia [Chest Pain] : no chest pain [Palpitations] : no palpitations [Lower Ext Edema] : no lower extremity edema [Cough] : no cough [SOB on Exertion] : no shortness of breath during exertion [Muscle Pain] : no muscle pain [Skin Rash] : no skin rash [Dizziness] : no dizziness [Anxiety] : no anxiety [Depression] : no depression [Muscle Weakness] : no muscle weakness [de-identified] : no HA

## 2024-03-15 NOTE — PHYSICAL EXAM
[Ambulatory and capable of all self care but unable to carry out any work activities] : Status 2- Ambulatory and capable of all self care but unable to carry out any work activities. Up and about more than 50% of waking hours [Normal] : affect appropriate [de-identified] : some pitting edema noted both distal legs [de-identified] : SK, large, right temporal, state it has been there for years

## 2024-03-15 NOTE — RESULTS/DATA
[FreeTextEntry1] : Kathleen Accession Number : [id number] Patient:     ILIA MONROE Accession:                             [id number]  Collected Date/Time:                   2/27/2024 12:15 EST Received Date/Time:                    2/27/2024 12:15 EST  Surgical Pathology Report - Auth (Verified)  Specimen(s) Submitted 1  Bladder Tumor  Final Diagnosis 1. Bladder tumor, TURB -Histologic Type:   Invasive urothelial carcinoma -Histologic Grade:   High grade -Pattern of growth of the non-invasive component:  Solid  -Local Invasion:  Carcinoma invades muscularis propria (detrusor muscle) -Muscularis Propria:  Muscularis propria (detrusor muscle) is involved -Lymphovascular Invasion:  Not identified  Note:  By immunohistochemical stains tumor cells are positive for GATA3, and negative for P40, NKX3.1, and TTF1. These findings support the diagnosis of urothelial carcinoma. Verified by: Radha Vides M.D. (Electronic Signature) Reported on: 03/07/24  ************************************************* EXAM: [id number] - CT ABD PELV UROGRAM WAW IC  - ORDERED BY: SOFIA HUDSON PROCEDURE DATE:  12/29/2023 INTERPRETATION:  CLINICAL INFORMATION: Gross hematuria ADDITIONAL CLINICAL INFORMATION: Gross Hematuria R31.0 COMPARISON: None.  CONTRAST/COMPLICATIONS: IV Contrast: Omnipaque 350  90 cc administered   10 cc discarded Oral Contrast: Water Complications: None reported at time of study completion  PROCEDURE: CT of the Abdomen and Pelvis was performed. Precontrast, Nephrographic and Excretory phases were acquired (CT UROGRAM). 10 mg of Lasix was administered. Sagittal and coronal reformats were performed.  FINDINGS: LOWER CHEST: 4 mm calcified granuloma in the right lower lobe. Minimal atelectatic changes.  LIVER: Within normal limits. BILE DUCTS: Normal caliber. GALLBLADDER: Within normal limits. SPLEEN: Within normal limits. PANCREAS: Multiple small pancreatic calcifications. Is there history of chronic pancreatitis? ADRENALS: Within normal limits. KIDNEYS/URETERS: Subcentimeter low-attenuation lesion in the lower pole left kidney which is too small to characterize on CT scan. No gross urothelial lesion or calcification.  BLADDER: Enhancing soft tissue in the posterior aspect of the superior bladder which is concerning for neoplasm. Mild enhancing soft tissue in the right side of the lateral bladder may represent an additional sessile lesion. Additional small filling defect more inferiorly in the right side of the bladder posteriorly. There is limited evaluation for enhancement due to the small size and this may represent an additional lesion versus REPRODUCTIVE ORGANS: Prominent and heterogeneous prostate gland.  BOWEL: No bowel obstruction. Appendix within normal limits. Few scattered colonic diverticuli without focal inflammation. PERITONEUM: No ascites. VESSELS: Vascular calcifications. RETROPERITONEUM/LYMPH NODES: No lymphadenopathy. ABDOMINAL WALL: Within normal limits. BONES: Degenerative changes of bone.  IMPRESSION: Enhancing soft tissue in the superior posterior bladder, highly concerning for neoplasm. Mild wall thickening with enhancement in the right lateral bladder may represent a sessile lesion. Filling defect in the posterior bladder more posteriorly may represent hematoma versus a lesion. Further evaluation is recommended.  Multiple punctate calcifications in the pancreas raising concern for chronic pancreatitis. Please correlate clinically. --- End of Report --- LALITHA JARRELL MD; Attending Radiologist This document has been electronically signed. Dec 31 2023 10:46AM *************************************************************************** January 5, 2024.....A flexible cystoscope was used to visualize the urethra and bladder. The anterior urethra was normal. The prostatic urethra had enlargement of the lateral prostatic lobes. The bladder outlet was obstructed.     Additional Procedural Findings/Comments: Bladder tumor of about 4 cm noted in the mid posterior bladder wall seen just beyond the trigone. Both UOs appear clear of tumor and are patent. A second bladder tumor of about 2-3 cm noted along right lateral wall. ***********************************************************

## 2024-03-15 NOTE — ASSESSMENT
[Palliative Care Plan] : not applicable at this time [FreeTextEntry1] : Christopher Gonzales was seen in consultation on , accompanied by his sister and his nephew he is referred by Dr. Manjeet Holman and Dr. Jayda Sanchez. Information from her note indicates that he developed gross hematuria labs in December.  His wife was still, and she  about that time.  He was a heavy smoker and quit 18 years ago by her note, but he says 30 years ago.  He was seen by Dr. Italo Perez, urologist, initially.  A CT urogram showed filling defects in the bladder.  The upper tract was negative.  There was evidence of possible prior pancreatitis noted by calcifications.  He underwent a transurethral resection of bladder tumor on  revealing muscle invasive bladder cancer.  The procedure note indicated large necrotic papillary tumors x 2, along the posterior wall.  Both ureteral orifices were visualized with clear reflux of urine.  He reports that he feels "good".  He has some additional gross hematuria a few days ago which has now stopped.  He states that his appetite is good and there is no weight loss.  There were no GI symptoms other than some constipation.  Fatigue is not present.  He said he did not sleep well the night before this visit as he had to get up to void every 15 minutes.  This was the first time that this occurred.  He usually gets up 2-3 times at night.  He does have some minor dysuria currently at the initiation of voiding.  He has occasional incontinence with urgency.  He reports no chest pain or chest pressure.  There is no edema of the extremities.  There were no respiratory complaints.  He denies any headaches.  He is independent in his ADLs.  He lives alone and his sister has come up from [place] to stay with him during this process.  At the request of his nephew, a [place]  was utilized, but I did not record the person's name or number.  About snf through the visit, the nephew was confident enough in his understanding that he requested that we stop utilizing the .  A comprehensive history was obtained and a physical examination was performed.  There were no significant findings on physical examination.  We went over the results of the pathology report.  I started to talk about staging of bladder cancer, but he indicated he was not interested in talking about the staging and he wanted to know more about the plan of treatment.  The CAT scan was performed on 2023, and the images were personally reviewed by me.  I showed them the areas of concern on the CAT scan.  He has not had a chest CT done.  This has been ordered.  He has been seen by Dr. Holman this past week.  Laboratory values associated with this visit revealed normal electrolytes.  The BUN was 22 with a creatinine of 1.29.  The albumin was 4.6.  The transaminases and alkaline phosphatase levels were normal.  The white blood cell count was 5.56 with a hemoglobin value of 12.3 g.  The indices were normal.  The platelet count was 306,000.  The differential was normal.  Since her plan to give chemotherapy, hepatitis profile was added onto the ordered labs, but it does not appear that it was performed.  I have asked my team to call the core lab and added onto the existing sample that they have on hand.  We talked about the treatment of chemoradiation on bladder cancer.  Radical cystoprostatectomy would not be appropriate in this man who lives alone and is functional at this time.  Risk of major morbidity and mortality is high and he may not be able to function alone if he underwent radical surgery.  We talked about radiation.  The plan is for him to receive 20 fractions over 4 weeks.  I explained the rationale for adding chemotherapy to the radiation.  This is been shown to improve local control, but it does not have any impact on overall survival.  The largest trial was [facility] 2001 trial from the [place], published in [place] Journal of Medicine in 2012.  Patients with chemotherapy and an approximate 55% control without evidence of invasive tumor at 2 years.  This was increased to 67% in those that received chemotherapy.  The chemotherapy used in this trial was infusional 5-fluorouracil along with mitomycin, regimen not commonly utilized in the event it states for bladder cancer.  It also had significant logistical problems of having to have a pump for the 5-fluorouracil infusion over the course of 96 hours.  Cisplatin is the most active agent, but I do not think that he would be able to handle cisplatin on a weekly basis.  Gemcitabine at 100 mg/m weekly is well-tolerated and would be my choice.  We discussed the adverse effects that may occur with gemcitabine in detail.  They were given written information about gemcitabine.  I reminded them that the information provided on that trip that she is for gemcitabine at 1000 mg/m whereas we will be using 1/10 at that dose.  After lengthy conversation in regards to all of these issues as well as the potential adverse effects of treatment, patient agreed to proceed with therapy, and written informed consent was obtained.  All questions were answered to the best of my ability and to their apparent satisfaction.

## 2024-03-15 NOTE — HISTORY OF PRESENT ILLNESS
[Disease: _____________________] : Disease: [unfilled] [T: ___] : T[unfilled] [M: ___] : M[unfilled] [N: ___] : N[unfilled] [AJCC Stage: ____] : AJCC Stage: [unfilled] [de-identified] : Christopher Gonzales is seen in consultation on 3/14/24, accompanied by his sister and his nephew.  He is referred by Dr Jayda Sanchez. Dr Manjeet Holman From her note..."pt developed gross hematuria about in Dec (around the time of his wife passing). Prior tobacco, quit 18y ago, 50 pack years. Went to see  (Dr Perez). Underwent CTU that showed filling defects in bladder. Upper tract negative. Also, with? of pancreatitis based on imaging. Cyto suspicious. Underwent TURBT with me on 2/27. 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  "Inspection of the bladder showed large necrotic papillary tumor x2 along the posterior wall.  There was significant trabeculation with cellules throughout.  Bilateral ureteral orifices were visualized with clear efflux.  Once surveillance was complete, resectoscope was used to carefully resect the posterior wall tumors.  This showed evidence of significant bladder wall thickening, concerning for muscle invasion.  Resection was taken down deep with care taken to avoid any significant injury, particularly given the patient's age and obtain appropriate pathology with resection."  Feels "good", had some gross hematuria a few days ago, then stopped. Appetite is good, no weight loss. No N/V/D, some constipation. Fatigue is not present. Did not sleep well last night, had to get up to void every 15 minutes. This was the first time this occurred. usually gets up 2-3 times. He does have some minor dysuria currently, at the initiation of voiding.  Occ incontinence with urgency. No chest pain/pressure. No edema. No cough, no RAMIREZ. No HA. Independent in ADLs, lives alone, sister from [place] will stay with him during this process

## 2024-03-16 ENCOUNTER — OUTPATIENT (OUTPATIENT)
Dept: OUTPATIENT SERVICES | Facility: HOSPITAL | Age: 89
LOS: 1 days | End: 2024-03-16
Payer: COMMERCIAL

## 2024-03-16 ENCOUNTER — APPOINTMENT (OUTPATIENT)
Dept: CT IMAGING | Facility: IMAGING CENTER | Age: 89
End: 2024-03-16
Payer: MEDICARE

## 2024-03-16 DIAGNOSIS — Z00.8 ENCOUNTER FOR OTHER GENERAL EXAMINATION: ICD-10-CM

## 2024-03-16 DIAGNOSIS — Z98.890 OTHER SPECIFIED POSTPROCEDURAL STATES: Chronic | ICD-10-CM

## 2024-03-16 PROCEDURE — 71260 CT THORAX DX C+: CPT

## 2024-03-16 PROCEDURE — 71260 CT THORAX DX C+: CPT | Mod: 26

## 2024-03-19 ENCOUNTER — NON-APPOINTMENT (OUTPATIENT)
Age: 89
End: 2024-03-19

## 2024-03-21 NOTE — GOALS
[Patient] : patient [Time Spent: ___ minutes] : I, personally, spent [unfilled] minutes on advance care planning services with the patient.  This time is separate and distinct from any other care management services provided on this date [FreeTextEntry1] : Bhaskar [FreeTextEntry2] : casimiro [FreeTextEntry7] : spoke with pt and nephew regarding need for documentation of HCP. HCP form provided to pt and nephew. pt currently scheduled for chemo weekly- Gemzar with Daily RT for 4 weeks  [FreeTextEntry3] : [phone]

## 2024-03-21 NOTE — GOALS
[Patient] : patient [Time Spent: ___ minutes] : I, personally, spent [unfilled] minutes on advance care planning services with the patient.  This time is separate and distinct from any other care management services provided on this date [FreeTextEntry1] : Bhaskar [FreeTextEntry2] : casimiro [FreeTextEntry7] : spoke with pt and nephew regarding need for documentation of HCP. HCP form provided to pt and nephew. pt currently scheduled for chemo weekly- Gemzar with Daily RT for 4 weeks  [FreeTextEntry3] : [phone]

## 2024-04-04 ENCOUNTER — NON-APPOINTMENT (OUTPATIENT)
Age: 89
End: 2024-04-04

## 2024-04-05 NOTE — CONSULT LETTER
[Dear  ___] : Dear  [unfilled], [Courtesy Letter:] : I had the pleasure of seeing your patient, [unfilled], in my office today. [Please see my note below.] : Please see my note below. [Consult Closing:] : Thank you very much for allowing me to participate in the care of this patient.  If you have any questions, please do not hesitate to contact me. [Sincerely,] : Sincerely, [DrPilo  ___] : Dr. PETTIT [DrPilo ___] : Dr. PETTIT

## 2024-04-09 ENCOUNTER — RESULT REVIEW (OUTPATIENT)
Age: 89
End: 2024-04-09

## 2024-04-09 ENCOUNTER — APPOINTMENT (OUTPATIENT)
Dept: INFUSION THERAPY | Facility: HOSPITAL | Age: 89
End: 2024-04-09

## 2024-04-09 ENCOUNTER — APPOINTMENT (OUTPATIENT)
Dept: HEMATOLOGY ONCOLOGY | Facility: CLINIC | Age: 89
End: 2024-04-09

## 2024-04-09 ENCOUNTER — APPOINTMENT (OUTPATIENT)
Dept: HEMATOLOGY ONCOLOGY | Facility: CLINIC | Age: 89
End: 2024-04-09
Payer: MEDICARE

## 2024-04-09 ENCOUNTER — NON-APPOINTMENT (OUTPATIENT)
Age: 89
End: 2024-04-09

## 2024-04-09 VITALS
BODY MASS INDEX: 28.95 KG/M2 | DIASTOLIC BLOOD PRESSURE: 69 MMHG | TEMPERATURE: 98 F | SYSTOLIC BLOOD PRESSURE: 116 MMHG | RESPIRATION RATE: 18 BRPM | OXYGEN SATURATION: 95 % | WEIGHT: 173.92 LBS | HEART RATE: 85 BPM

## 2024-04-09 LAB
ALBUMIN SERPL ELPH-MCNC: 4.3 G/DL — SIGNIFICANT CHANGE UP (ref 3.3–5)
ALP SERPL-CCNC: 79 U/L — SIGNIFICANT CHANGE UP (ref 40–120)
ALT FLD-CCNC: 13 U/L — SIGNIFICANT CHANGE UP (ref 10–45)
ANION GAP SERPL CALC-SCNC: 14 MMOL/L — SIGNIFICANT CHANGE UP (ref 5–17)
AST SERPL-CCNC: 22 U/L — SIGNIFICANT CHANGE UP (ref 10–40)
BASOPHILS # BLD AUTO: 0.01 K/UL — SIGNIFICANT CHANGE UP (ref 0–0.2)
BASOPHILS NFR BLD AUTO: 0.2 % — SIGNIFICANT CHANGE UP (ref 0–2)
BILIRUB SERPL-MCNC: 0.3 MG/DL — SIGNIFICANT CHANGE UP (ref 0.2–1.2)
BUN SERPL-MCNC: 29 MG/DL — HIGH (ref 7–23)
CALCIUM SERPL-MCNC: 9.5 MG/DL — SIGNIFICANT CHANGE UP (ref 8.4–10.5)
CHLORIDE SERPL-SCNC: 101 MMOL/L — SIGNIFICANT CHANGE UP (ref 96–108)
CO2 SERPL-SCNC: 22 MMOL/L — SIGNIFICANT CHANGE UP (ref 22–31)
CREAT SERPL-MCNC: 1.31 MG/DL — HIGH (ref 0.5–1.3)
EGFR: 52 ML/MIN/1.73M2 — LOW
EOSINOPHIL # BLD AUTO: 0.03 K/UL — SIGNIFICANT CHANGE UP (ref 0–0.5)
EOSINOPHIL NFR BLD AUTO: 0.7 % — SIGNIFICANT CHANGE UP (ref 0–6)
GLUCOSE SERPL-MCNC: 101 MG/DL — HIGH (ref 70–99)
HCT VFR BLD CALC: 33.9 % — LOW (ref 39–50)
HGB BLD-MCNC: 11.6 G/DL — LOW (ref 13–17)
IMM GRANULOCYTES NFR BLD AUTO: 1.3 % — HIGH (ref 0–0.9)
LYMPHOCYTES # BLD AUTO: 1.53 K/UL — SIGNIFICANT CHANGE UP (ref 1–3.3)
LYMPHOCYTES # BLD AUTO: 34.1 % — SIGNIFICANT CHANGE UP (ref 13–44)
MCHC RBC-ENTMCNC: 32 PG — SIGNIFICANT CHANGE UP (ref 27–34)
MCHC RBC-ENTMCNC: 34.2 G/DL — SIGNIFICANT CHANGE UP (ref 32–36)
MCV RBC AUTO: 93.4 FL — SIGNIFICANT CHANGE UP (ref 80–100)
MONOCYTES # BLD AUTO: 1.02 K/UL — HIGH (ref 0–0.9)
MONOCYTES NFR BLD AUTO: 22.7 % — HIGH (ref 2–14)
NEUTROPHILS # BLD AUTO: 1.84 K/UL — SIGNIFICANT CHANGE UP (ref 1.8–7.4)
NEUTROPHILS NFR BLD AUTO: 41 % — LOW (ref 43–77)
NRBC # BLD: 0 /100 WBCS — SIGNIFICANT CHANGE UP (ref 0–0)
PLATELET # BLD AUTO: 231 K/UL — SIGNIFICANT CHANGE UP (ref 150–400)
POTASSIUM SERPL-MCNC: 4.3 MMOL/L — SIGNIFICANT CHANGE UP (ref 3.5–5.3)
POTASSIUM SERPL-SCNC: 4.3 MMOL/L — SIGNIFICANT CHANGE UP (ref 3.5–5.3)
PROT SERPL-MCNC: 7.9 G/DL — SIGNIFICANT CHANGE UP (ref 6–8.3)
RBC # BLD: 3.63 M/UL — LOW (ref 4.2–5.8)
RBC # FLD: 13.2 % — SIGNIFICANT CHANGE UP (ref 10.3–14.5)
SODIUM SERPL-SCNC: 137 MMOL/L — SIGNIFICANT CHANGE UP (ref 135–145)
WBC # BLD: 4.49 K/UL — SIGNIFICANT CHANGE UP (ref 3.8–10.5)
WBC # FLD AUTO: 4.49 K/UL — SIGNIFICANT CHANGE UP (ref 3.8–10.5)

## 2024-04-09 PROCEDURE — 99214 OFFICE O/P EST MOD 30 MIN: CPT

## 2024-04-09 PROCEDURE — G2211 COMPLEX E/M VISIT ADD ON: CPT

## 2024-04-09 RX ORDER — TAMSULOSIN HYDROCHLORIDE 0.4 MG/1
1 CAPSULE ORAL
Qty: 0 | Refills: 0 | DISCHARGE

## 2024-04-09 RX ORDER — PANTOPRAZOLE SODIUM 20 MG/1
1 TABLET, DELAYED RELEASE ORAL
Qty: 0 | Refills: 0 | DISCHARGE

## 2024-04-09 RX ORDER — LORATADINE 10 MG
17 TABLET,DISINTEGRATING ORAL
Refills: 0 | Status: ACTIVE | COMMUNITY
Start: 2024-04-09

## 2024-04-10 ENCOUNTER — NON-APPOINTMENT (OUTPATIENT)
Age: 89
End: 2024-04-10

## 2024-04-10 DIAGNOSIS — Z51.11 ENCOUNTER FOR ANTINEOPLASTIC CHEMOTHERAPY: ICD-10-CM

## 2024-04-10 DIAGNOSIS — R11.2 NAUSEA WITH VOMITING, UNSPECIFIED: ICD-10-CM

## 2024-04-10 NOTE — REVIEW OF SYSTEMS
[Constipation] : constipation [Easy Bruising] : a tendency for easy bruising [Fever] : no fever [Chills] : no chills [Night Sweats] : no night sweats [Fatigue] : no fatigue [Dysphagia] : no dysphagia [Odynophagia] : no odynophagia [Chest Pain] : no chest pain [Palpitations] : no palpitations [Shortness Of Breath] : no shortness of breath [Cough] : no cough [Abdominal Pain] : no abdominal pain [Vomiting] : no vomiting [Dysuria] : no dysuria [Incontinence] : no incontinence [Joint Pain] : no joint pain [Muscle Pain] : no muscle pain [Skin Rash] : no skin rash [Dizziness] : no dizziness [Easy Bleeding] : no tendency for easy bleeding [FreeTextEntry6] : (+) occasional phlegm production [FreeTextEntry7] : (+) constipation per HPI; no blood in stools, no dark stools [FreeTextEntry8] : (+) nocturia ~3-10x/night with incomplete voiding; (+) mild urinary burning last night which resolved; (+) occasional dribbling; no straining; no hematuria

## 2024-04-10 NOTE — RESULTS/DATA
[FreeTextEntry1] : Kathleen Accession Number : [id number] Patient:     ILIA MONROE Accession:                             [id number]  Collected Date/Time:                   2/27/2024 12:15 EST Received Date/Time:                    2/27/2024 12:15 EST  Surgical Pathology Report - Auth (Verified)  Specimen(s) Submitted 1  Bladder Tumor  Final Diagnosis 1. Bladder tumor, TURB -Histologic Type:   Invasive urothelial carcinoma -Histologic Grade:   High grade -Pattern of growth of the non-invasive component:  Solid  -Local Invasion:  Carcinoma invades muscularis propria (detrusor muscle) -Muscularis Propria:  Muscularis propria (detrusor muscle) is involved -Lymphovascular Invasion:  Not identified  Note:  By immunohistochemical stains tumor cells are positive for GATA3, and negative for P40, NKX3.1, and TTF1. These findings support the diagnosis of urothelial carcinoma. Verified by: Rdaha Vides M.D. (Electronic Signature) Reported on: 03/07/24  ************************************************* EXAM: [id number] - CT ABD PELV UROGRAM WAW IC  - ORDERED BY: SOFIA HUDSON PROCEDURE DATE:  12/29/2023 INTERPRETATION:  CLINICAL INFORMATION: Gross hematuria ADDITIONAL CLINICAL INFORMATION: Gross Hematuria R31.0 COMPARISON: None.  CONTRAST/COMPLICATIONS: IV Contrast: Omnipaque 350  90 cc administered   10 cc discarded Oral Contrast: Water Complications: None reported at time of study completion  PROCEDURE: CT of the Abdomen and Pelvis was performed. Precontrast, Nephrographic and Excretory phases were acquired (CT UROGRAM). 10 mg of Lasix was administered. Sagittal and coronal reformats were performed.  FINDINGS: LOWER CHEST: 4 mm calcified granuloma in the right lower lobe. Minimal atelectatic changes.  LIVER: Within normal limits. BILE DUCTS: Normal caliber. GALLBLADDER: Within normal limits. SPLEEN: Within normal limits. PANCREAS: Multiple small pancreatic calcifications. Is there history of chronic pancreatitis? ADRENALS: Within normal limits. KIDNEYS/URETERS: Subcentimeter low-attenuation lesion in the lower pole left kidney which is too small to characterize on CT scan. No gross urothelial lesion or calcification.  BLADDER: Enhancing soft tissue in the posterior aspect of the superior bladder which is concerning for neoplasm. Mild enhancing soft tissue in the right side of the lateral bladder may represent an additional sessile lesion. Additional small filling defect more inferiorly in the right side of the bladder posteriorly. There is limited evaluation for enhancement due to the small size and this may represent an additional lesion versus REPRODUCTIVE ORGANS: Prominent and heterogeneous prostate gland.  BOWEL: No bowel obstruction. Appendix within normal limits. Few scattered colonic diverticuli without focal inflammation. PERITONEUM: No ascites. VESSELS: Vascular calcifications. RETROPERITONEUM/LYMPH NODES: No lymphadenopathy. ABDOMINAL WALL: Within normal limits. BONES: Degenerative changes of bone.  IMPRESSION: Enhancing soft tissue in the superior posterior bladder, highly concerning for neoplasm. Mild wall thickening with enhancement in the right lateral bladder may represent a sessile lesion. Filling defect in the posterior bladder more posteriorly may represent hematoma versus a lesion. Further evaluation is recommended.  Multiple punctate calcifications in the pancreas raising concern for chronic pancreatitis. Please correlate clinically. --- End of Report --- LALITHA JARRELL MD; Attending Radiologist This document has been electronically signed. Dec 31 2023 10:46AM *************************************************************************** January 5, 2024.....A flexible cystoscope was used to visualize the urethra and bladder. The anterior urethra was normal. The prostatic urethra had enlargement of the lateral prostatic lobes. The bladder outlet was obstructed.     Additional Procedural Findings/Comments: Bladder tumor of about 4 cm noted in the mid posterior bladder wall seen just beyond the trigone. Both UOs appear clear of tumor and are patent. A second bladder tumor of about 2-3 cm noted along right lateral wall. ***********************************************************   EXAM: [id number] - CT CHEST IC  - ORDERED BY:  DALE MANNING PROCEDURE DATE:  03/16/2024 INTERPRETATION:  CLINICAL INFORMATION: Bladder cancer, staging COMPARISON: No prior chest CT. CT Abdomen and pelvis 12/29/2023 CONTRAST/COMPLICATIONS: IV Contrast: Omnipaque 350  40 cc administered   10 cc discarded Oral Contrast: NONE Complications: None reported at time of study completion  PROCEDURE: CT of the Chest was performed. Sagittal and coronal reformats were performed.  FINDINGS: AIRWAYS, LUNGS and PLEURA: Patent central airways. Mild emphysema. Clear lungs. No suspicious lung nodule. No pleural effusion. MEDIASTINUM AND GRANT: No lymphadenopathy. Calcified right paratracheal and right hilar lymph nodes. HEART AND VESSELS: The right ventricle and right atrium are qualitatively dilated. No pericardial effusion. Aortic and coronary calcifications. Thoracic aorta and pulmonary artery normal in diameter. Dilated pulmonary arteries suggestive of pulmonary hypertension. VISUALIZED UPPER ABDOMEN: Pancreatic calcifications. Stable thickening of bilateral adrenal glands. Colonic diverticulosis. CHEST WALL AND BONES: No aggressive osseous lesion. LOWER NECK: Within normal limits. IMPRESSION: No evidence of metastatic disease within the chest. Findings suggestive of pulmonary hypertension. --- End of Report ---

## 2024-04-10 NOTE — ASSESSMENT
[Palliative Care Plan] : not applicable at this time [FreeTextEntry1] : Christopher Gonzales was seen for initial consultation on , accompanied by his sister and his nephew he is referred by Dr. Manjeet Holman and Dr. Jayda Sanchez. Information from her note indicates that he developed gross hematuria labs in December.  His wife was still, and she  about that time.  He was a heavy smoker and quit 18 years ago by her note, but he says 30 years ago.  He was seen by Dr. Italo Perez, urologist, initially.  A CT urogram showed filling defects in the bladder.  The upper tract was negative.  There was evidence of possible prior pancreatitis noted by calcifications.  He underwent a transurethral resection of bladder tumor on  revealing muscle invasive bladder cancer.  The procedure note indicated large necrotic papillary tumors x 2, along the posterior wall.  Both ureteral orifices were visualized with clear reflux of urine.  He overall appears well. He is no longer having any hematuria, though continues to experience nocturia, on occasion (such as last night) up to 10x/night which interferes with his sleep. He did have some minor dysuria last night which resolved. He has occasional dribbling but no straining and no incontinence. There were no GI symptoms other than some constipation. Fatigue is not present and his nephew indicates he remains a strong, active man; he has no joint or muscle pain, no dizziness. He reports no chest pain or palpitations. No SOB or cough, though he does occasionally produce some phlegm. He has a tendency for easy bruising but no easy bleeding. He lives alone and his sister has come up from [place] to stay with him during this process.  A Yakut  was offered, but pt/nephew preferred nephew assist with translation. A comprehensive history was obtained and a physical examination was performed.  There were no significant findings on physical examination.  At our initial visit, we reviewed the results of the pathology report. I started to talk about staging of bladder cancer, but he indicated he was not interested in talking about the staging and he wanted to know more about the plan of treatment.  The CAT scan was performed on 2023, and the images were personally reviewed by me.  I showed them the areas of concern on the CAT scan.  He more recently went for his chest CT, which was performed on 3/16/24 and showed no evidence of metastatic disease within the chest. Also noted to have findings suggestive of pulmonary HTN. He has been seen by Dr. Holman.  Laboratory values associated with last visit revealed normal electrolytes.  The BUN was 22 with a creatinine of 1.29.  The albumin was 4.6.  The transaminases and alkaline phosphatase levels were normal.  The white blood cell count was 5.56 with a hemoglobin value of 12.3 g.  The indices were normal.  The platelet count was 306,000.  The differential was normal. Repeat CMP and CBC were ordered today.  Since we plan to give chemotherapy, hepatitis profile was added onto the ordered labs, and his panel is negative for Hep A, B, or C. Pending HepB surface antibody.  At our initial visit, we talked about the treatment of chemoradiation on bladder cancer.  Radical cystoprostatectomy would not be appropriate in this man who lives alone and is functional at this time.  Risk of major morbidity and mortality is high and he may not be able to function alone if he underwent radical surgery.  We talked about radiation.  The plan is for him to receive 20 fractions over 4 weeks.  I explained the rationale for adding chemotherapy to the radiation.  This has been shown to improve local control, but it does not have any impact on overall survival. The largest trial was C 2001 trial from the [place], published in [place] Journal of Medicine in 2012.  Patients with chemotherapy and an approximate 55% control without evidence of invasive tumor at 2 years.  This was increased to 67% in those that received chemotherapy.  The chemotherapy used in this trial was infusional 5-fluorouracil along with mitomycin, regimen not commonly utilized in the event it states for bladder cancer.  It also had significant logistical problems of having to have a pump for the 5-fluorouracil infusion over the course of 96 hours.  Today is his first day of gemcitabine as a radiosensitizer.  He started radiation therapy on Friday of last week and today is dose #3.  He is scheduled to receive weekly gemcitabine.  Theodore Garcia MD Hematology/Oncology Fellow, PGY-4  Attending note: The patient was evaluated initially by Dr. Garcia, oncology fellow.  He discussed the case with me and then I entered the room to confirm findings and to perform a focused physical examination.  He has started his radiation therapy, and today is his first day of gemcitabine as a radiosensitizer.  The chemistry panel revealed a BUN of 29 with a creatinine of 1.31.  The electrolytes were normal as were the transaminase levels.  Today's CBC revealed a white blood cell count of 4.5 with a hemoglobin value of 11.6 g and a platelet count of 231,000.  The differential showed an increase in monocyte percentage, potentially indicative of some underlying myelodysplasia.  However his counts are adequate for this treatment.  I agree with the assessment and plan as stated above in the note from Dr. Garcia, medical oncology fellow.  Jaime Peña MD

## 2024-04-10 NOTE — REASON FOR VISIT
[Follow-Up Visit] : a follow-up [Family Member] : family member [[employer] ] : provided by [employer]   [Other: _____] : [unfilled] [Patient Declined  Services] : - None: Patient declined  services [FreeTextEntry2] : urothelial cancer/bladder

## 2024-04-10 NOTE — PHYSICAL EXAM
[Normal] : affect appropriate [de-identified] : abdomen soft NTND [de-identified] : no anterior/posterior cervical JOSE, no inguinal JOSE appreciated, no submental JOSE appreciated

## 2024-04-10 NOTE — HISTORY OF PRESENT ILLNESS
[Disease: _____________________] : Disease: [unfilled] [T: ___] : T[unfilled] [N: ___] : N[unfilled] [M: ___] : M[unfilled] [AJCC Stage: ____] : AJCC Stage: [unfilled] [Date: ____________] : Patient's last distress assessment performed on [unfilled]. [0 - No Distress] : Distress Level: 0 [Patient given social work contact information and resource sheet] : Patient was given social work contact information and resource sheet [de-identified] : Christopher Gonzales is seen in consultation on 3/14/24, accompanied by his sister and his nephew.  He is referred by Dr Jayda Sanchez. Dr Manjeet Holman From her note..."pt developed gross hematuria about in Dec (around the time of his wife passing). Prior tobacco, quit 18y ago, 50 pack years. Went to see  (Dr Perez). Underwent CTU that showed filling defects in bladder. Upper tract negative. Also, with? of pancreatitis based on imaging. Cyto suspicious. Underwent TURBT with me on 2/27. 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  "Inspection of the bladder showed large necrotic papillary tumor x2 along the posterior wall.  There was significant trabeculation with cellules throughout.  Bilateral ureteral orifices were visualized with clear efflux.  Once surveillance was complete, resectoscope was used to carefully resect the posterior wall tumors.  This showed evidence of significant bladder wall thickening, concerning for muscle invasion.  Resection was taken down deep with care taken to avoid any significant injury, particularly given the patient's age and obtain appropriate pathology with resection."  Feels "good", had some gross hematuria a few days ago, then stopped. Appetite is good, no weight loss. No N/V/D, some constipation. Fatigue is not present. Did not sleep well last night, had to get up to void every 15 minutes. This was the first time this occurred. usually gets up 2-3 times. He does have some minor dysuria currently, at the initiation of voiding.  Occ incontinence with urgency. No chest pain/pressure. No edema. No cough, no RAMIREZ. No HA. Independent in ADLs, lives alone, sister from [place] will stay with him during this process [de-identified] : 4/9/24: Patient returns for follow-up.  Muscle-invasive bladder cancer in this 89-year-old man.  He is set to have chemoradiation for local disease control.  He started radiation this past Friday as well as yesterday, and today he will be receiving his first dose of gemcitabine at 100 mg/m to be given weekly as well as radiation. Taking oxybutynin 5mg daily and Tamsulosin 0.4mg daily. Also takes mens 50+ vitamin. Occasionally takes miralax and stool softener for constipation.

## 2024-04-11 ENCOUNTER — NON-APPOINTMENT (OUTPATIENT)
Age: 89
End: 2024-04-11

## 2024-04-11 NOTE — DISEASE MANAGEMENT
[Clinical] : TNM Stage: c [II] : II [TTNM] : 2 [NTNM] : 0 [MTNM] : x [de-identified] : 1375 cGy [de-identified] : 5450cGy [de-identified] : bladder

## 2024-04-11 NOTE — HISTORY OF PRESENT ILLNESS
[FreeTextEntry1] : 89M w/ T2N0 bladder cancer; initially presented with hematuria. CT urogram 12/29/23 had shown lesions in the posterior bladder, no LAD. He underwent TURBT 2/27/24 that showed multiple tumors on the posterior bladder wall. 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 Will receive hypofractionated RT to bladder with concurrent gemcitabine with Dr. Peña Started RT on 4/5/24. Gemzar started 4/9/24.   Labs 3/14/24 Hgb 12.3 Plt 306 ANC 2.39 Labs 4/9/24 Hgb 11.6  ANC 1.84  4/11/24 OTV 5/20 fx:  Patient accompanied by his nephew. He is feeling well. He is involved in all his normal activities. He denies pain or discomfort. He has no dysuria, hematuria or incontinence. He feels urinary frequency and urinary stream is still a issue. Nocturia 5 - 6x HS. Reports occasional constipation. Was constipated yesterday, took Senna and Miralax has diarrhea today. No rectal pain, bleeding or discharge.  No other interval events.

## 2024-04-11 NOTE — REASON FOR VISIT
[Routine On-Treatment] : a routine on-treatment visit for [Other: ___] : [unfilled] [Other: ______] : provided by HODAN [Interpreters_IDNumber] : [id number] [TWNoteComboBox1] : Bangladeshi

## 2024-04-11 NOTE — REASON FOR VISIT
[Routine On-Treatment] : a routine on-treatment visit for [Other: ___] : [unfilled] [Other: ______] : provided by HODAN [Interpreters_IDNumber] : [id number] [TWNoteComboBox1] : [place]

## 2024-04-11 NOTE — PHYSICAL EXAM
[Sclera] : the sclera and conjunctiva were normal [Extraocular Movements] : extraocular movements were intact [Outer Ear] : the ears and nose were normal in appearance [] : no respiratory distress [Respiration, Rhythm And Depth] : normal respiratory rhythm and effort [Exaggerated Use Of Accessory Muscles For Inspiration] : no accessory muscle use [Arterial Pulses Normal] : the arterial pulses were normal [Edema] : no peripheral edema present [Abdomen Soft] : soft [Nondistended] : nondistended [Nail Clubbing] : no clubbing  or cyanosis of the fingernails [Range of Motion to Joints] : range of motion to joints [Motor Tone] : muscle strength and tone were normal [Skin Color & Pigmentation] : normal skin color and pigmentation [Normal] : no focal deficits [Oriented To Time, Place, And Person] : oriented to person, place, and time [Affect] : the affect was normal [Mood] : the mood was normal [Not Anxious] : not anxious [de-identified] : Diminished hearing

## 2024-04-11 NOTE — PHYSICAL EXAM
[Sclera] : the sclera and conjunctiva were normal [Extraocular Movements] : extraocular movements were intact [Outer Ear] : the ears and nose were normal in appearance [] : no respiratory distress [Respiration, Rhythm And Depth] : normal respiratory rhythm and effort [Exaggerated Use Of Accessory Muscles For Inspiration] : no accessory muscle use [Arterial Pulses Normal] : the arterial pulses were normal [Edema] : no peripheral edema present [Abdomen Soft] : soft [Nondistended] : nondistended [Nail Clubbing] : no clubbing  or cyanosis of the fingernails [Range of Motion to Joints] : range of motion to joints [Motor Tone] : muscle strength and tone were normal [Skin Color & Pigmentation] : normal skin color and pigmentation [Normal] : no focal deficits [Oriented To Time, Place, And Person] : oriented to person, place, and time [Affect] : the affect was normal [Mood] : the mood was normal [Not Anxious] : not anxious [de-identified] : Diminished hearing

## 2024-04-11 NOTE — DISEASE MANAGEMENT
[Clinical] : TNM Stage: c [II] : II [TTNM] : 2 [NTNM] : 0 [MTNM] : x [de-identified] : 1375 cGy [de-identified] : 4000cGy [de-identified] : bladder

## 2024-04-12 ENCOUNTER — RX RENEWAL (OUTPATIENT)
Age: 89
End: 2024-04-12

## 2024-04-15 ENCOUNTER — NON-APPOINTMENT (OUTPATIENT)
Age: 89
End: 2024-04-15

## 2024-04-16 ENCOUNTER — APPOINTMENT (OUTPATIENT)
Dept: INFUSION THERAPY | Facility: HOSPITAL | Age: 89
End: 2024-04-16

## 2024-04-16 ENCOUNTER — APPOINTMENT (OUTPATIENT)
Dept: HEMATOLOGY ONCOLOGY | Facility: CLINIC | Age: 89
End: 2024-04-16
Payer: MEDICARE

## 2024-04-16 ENCOUNTER — APPOINTMENT (OUTPATIENT)
Dept: HEMATOLOGY ONCOLOGY | Facility: CLINIC | Age: 89
End: 2024-04-16

## 2024-04-16 ENCOUNTER — RESULT REVIEW (OUTPATIENT)
Age: 89
End: 2024-04-16

## 2024-04-16 ENCOUNTER — NON-APPOINTMENT (OUTPATIENT)
Age: 89
End: 2024-04-16

## 2024-04-16 VITALS
SYSTOLIC BLOOD PRESSURE: 117 MMHG | OXYGEN SATURATION: 93 % | HEART RATE: 80 BPM | TEMPERATURE: 97.9 F | RESPIRATION RATE: 18 BRPM | WEIGHT: 170.86 LBS | BODY MASS INDEX: 28.43 KG/M2 | DIASTOLIC BLOOD PRESSURE: 64 MMHG

## 2024-04-16 DIAGNOSIS — T45.1X5A AGRANULOCYTOSIS SECONDARY TO CANCER CHEMOTHERAPY: ICD-10-CM

## 2024-04-16 DIAGNOSIS — D70.1 AGRANULOCYTOSIS SECONDARY TO CANCER CHEMOTHERAPY: ICD-10-CM

## 2024-04-16 LAB
BASOPHILS # BLD AUTO: 0.02 K/UL — SIGNIFICANT CHANGE UP (ref 0–0.2)
BASOPHILS NFR BLD AUTO: 0.9 % — SIGNIFICANT CHANGE UP (ref 0–2)
EOSINOPHIL # BLD AUTO: 0.01 K/UL — SIGNIFICANT CHANGE UP (ref 0–0.5)
EOSINOPHIL NFR BLD AUTO: 0.4 % — SIGNIFICANT CHANGE UP (ref 0–6)
HCT VFR BLD CALC: 32.2 % — LOW (ref 39–50)
HGB BLD-MCNC: 10.9 G/DL — LOW (ref 13–17)
IMM GRANULOCYTES NFR BLD AUTO: 1.3 % — HIGH (ref 0–0.9)
LYMPHOCYTES # BLD AUTO: 0.73 K/UL — LOW (ref 1–3.3)
LYMPHOCYTES # BLD AUTO: 32.6 % — SIGNIFICANT CHANGE UP (ref 13–44)
MCHC RBC-ENTMCNC: 32 PG — SIGNIFICANT CHANGE UP (ref 27–34)
MCHC RBC-ENTMCNC: 33.9 G/DL — SIGNIFICANT CHANGE UP (ref 32–36)
MCV RBC AUTO: 94.4 FL — SIGNIFICANT CHANGE UP (ref 80–100)
MONOCYTES # BLD AUTO: 0.47 K/UL — SIGNIFICANT CHANGE UP (ref 0–0.9)
MONOCYTES NFR BLD AUTO: 21 % — HIGH (ref 2–14)
NEUTROPHILS # BLD AUTO: 0.98 K/UL — LOW (ref 1.8–7.4)
NEUTROPHILS NFR BLD AUTO: 43.8 % — SIGNIFICANT CHANGE UP (ref 43–77)
NRBC # BLD: 0 /100 WBCS — SIGNIFICANT CHANGE UP (ref 0–0)
PLATELET # BLD AUTO: 204 K/UL — SIGNIFICANT CHANGE UP (ref 150–400)
RBC # BLD: 3.41 M/UL — LOW (ref 4.2–5.8)
RBC # FLD: 12.9 % — SIGNIFICANT CHANGE UP (ref 10.3–14.5)
WBC # BLD: 2.24 K/UL — LOW (ref 3.8–10.5)
WBC # FLD AUTO: 2.24 K/UL — LOW (ref 3.8–10.5)

## 2024-04-16 PROCEDURE — G2211 COMPLEX E/M VISIT ADD ON: CPT

## 2024-04-16 PROCEDURE — 99214 OFFICE O/P EST MOD 30 MIN: CPT

## 2024-04-16 NOTE — REVIEW OF SYSTEMS
[Constipation: Grade 1 - Occasional or intermittent symptoms; occasional use of stool softeners, laxatives, dietary modification, or enema] : Constipation: Grade 1 - Occasional or intermittent symptoms; occasional use of stool softeners, laxatives, dietary modification, or enema [Diarrhea: Grade 1 - Increase of <4 stools per day over baseline; mild increase in ostomy output compared to baseline] : Diarrhea: Grade 1 - Increase of <4 stools per day over baseline; mild increase in ostomy output compared to baseline [Nausea: Grade 0] : Nausea: Grade 0 [Fatigue: Grade 0] : Fatigue: Grade 0 [Urinary Tract Pain: Grade 0] : Urinary Tract Pain: Grade 0 [Urinary Urgency: Grade 1 - Present] : Urinary Urgency: Grade 1 - Present [Urinary Frequency: Grade 1 - Present] : Urinary Frequency: Grade 1 - Present

## 2024-04-16 NOTE — PHYSICAL EXAM
[Sclera] : the sclera and conjunctiva were normal [Extraocular Movements] : extraocular movements were intact [Outer Ear] : the ears and nose were normal in appearance [] : no respiratory distress [Respiration, Rhythm And Depth] : normal respiratory rhythm and effort [Exaggerated Use Of Accessory Muscles For Inspiration] : no accessory muscle use [Arterial Pulses Normal] : the arterial pulses were normal [Edema] : no peripheral edema present [Abdomen Soft] : soft [Nondistended] : nondistended [Nail Clubbing] : no clubbing  or cyanosis of the fingernails [Range of Motion to Joints] : range of motion to joints [Motor Tone] : muscle strength and tone were normal [Skin Color & Pigmentation] : normal skin color and pigmentation [Normal] : no focal deficits [Oriented To Time, Place, And Person] : oriented to person, place, and time [Affect] : the affect was normal [Mood] : the mood was normal [Not Anxious] : not anxious [de-identified] : Diminished hearing

## 2024-04-16 NOTE — HISTORY OF PRESENT ILLNESS
[FreeTextEntry1] : 89M w/ T2N0 bladder cancer; initially presented with hematuria. CT urogram 12/29/23 had shown lesions in the posterior bladder, no LAD. He underwent TURBT 2/27/24 that showed multiple tumors on the posterior bladder wall. 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 Will receive hypofractionated RT to bladder with concurrent gemcitabine with Dr. Peña. Started RT on 4/5/24. Gemzar started 4/9/24.   Labs 3/14/24 Hgb 12.3 Plt 306 ANC 2.39 Labs 4/9/24 Hgb 11.6  ANC 1.84  4/11/24 OTV 5/20 fx:   Patient accompanied by his nephew. He is feeling well. He is involved in all his normal activities. He denies pain or discomfort. He has no dysuria, hematuria or incontinence. He feels urinary frequency and urinary stream is still an issue. Nocturia 5 - 6x HS. Reports occasional constipation. Was constipated yesterday, took Senna and Miralax has diarrhea today. No rectal pain, bleeding or discharge.  No other interval events.  4/16/24 OTV 8/20 fx: Pt accompanied by nephew. Overall doing well, but chemotherapy held today due to low ANC. Denies any new issues currently other than that it is hard to hold his bladder full for treatment; symptoms stable.  Labs 4/16/24 Hgb 10.9 Plt 204 ANC 0.98

## 2024-04-16 NOTE — DISEASE MANAGEMENT
[Clinical] : TNM Stage: c [II] : II [TTNM] : 2 [NTNM] : 0 [MTNM] : x [de-identified] : 2200 cGy [de-identified] : 4730cGy [de-identified] : bladder

## 2024-04-16 NOTE — REASON FOR VISIT
[Routine On-Treatment] : a routine on-treatment visit for [Other: ___] : [unfilled] [Patient Declined  Services] : - None: Patient declined  services [Interpreters_FullName] : Bhaskar  [Interpreters_Relationshiptopatient] : Nephew [TWNoteComboBox1] : Bel[place]ian

## 2024-04-17 ENCOUNTER — NON-APPOINTMENT (OUTPATIENT)
Age: 89
End: 2024-04-17

## 2024-04-17 LAB
ALBUMIN SERPL ELPH-MCNC: 4.5 G/DL
ALP BLD-CCNC: 86 U/L
ALT SERPL-CCNC: 18 U/L
ANION GAP SERPL CALC-SCNC: 14 MMOL/L
AST SERPL-CCNC: 18 U/L
BILIRUB SERPL-MCNC: 0.2 MG/DL
BUN SERPL-MCNC: 36 MG/DL
CALCIUM SERPL-MCNC: 9.4 MG/DL
CHLORIDE SERPL-SCNC: 104 MMOL/L
CO2 SERPL-SCNC: 22 MMOL/L
CREAT SERPL-MCNC: 1.4 MG/DL
EGFR: 48 ML/MIN/1.73M2
FERRITIN SERPL-MCNC: 207 NG/ML
FOLATE SERPL-MCNC: 19.5 NG/ML
GLUCOSE SERPL-MCNC: 121 MG/DL
IRON SATN MFR SERPL: 17 %
IRON SERPL-MCNC: 42 UG/DL
MAGNESIUM SERPL-MCNC: 2.2 MG/DL
POTASSIUM SERPL-SCNC: 4.2 MMOL/L
PROT SERPL-MCNC: 7.7 G/DL
SODIUM SERPL-SCNC: 139 MMOL/L
TIBC SERPL-MCNC: 252 UG/DL
UIBC SERPL-MCNC: 210 UG/DL
VIT B12 SERPL-MCNC: 465 PG/ML

## 2024-04-17 NOTE — REVIEW OF SYSTEMS
[Cough] : cough [Diarrhea: Grade 0] : Diarrhea: Grade 0 [Dysuria] : dysuria [Easy Bruising] : a tendency for easy bruising [Fever] : no fever [Chills] : no chills [Night Sweats] : no night sweats [Fatigue] : no fatigue [Eye Pain] : no eye pain [Vision Problems] : no vision problems [Dysphagia] : no dysphagia [Odynophagia] : no odynophagia [Mucosal Pain] : no mucosal pain [Chest Pain] : no chest pain [Palpitations] : no palpitations [Lower Ext Edema] : no lower extremity edema [Shortness Of Breath] : no shortness of breath [Abdominal Pain] : no abdominal pain [Vomiting] : no vomiting [Constipation] : no constipation [Incontinence] : no incontinence [Joint Pain] : no joint pain [Joint Stiffness] : no joint stiffness [Muscle Pain] : no muscle pain [Muscle Weakness] : no muscle weakness [Skin Rash] : no skin rash [Dizziness] : no dizziness [Easy Bleeding] : no tendency for easy bleeding

## 2024-04-17 NOTE — ASSESSMENT
[Palliative Care Plan] : not applicable at this time [FreeTextEntry1] : Christopher Gonzales is seen today for f/u of stage II bladder cancer. Chemoradiation with weekly gemcitabine started 4/5/24. Gemcitabine #2/4 held on 4/16/24 for neutropenia.   Bladder cancer: - HOLD gemcitabine dose #2/4 today for ANC of 0.98. I reiterated that radiation is the more important treatment modality, chemotherapy acts as a radiosensitizer. Will hold chemotherapy today and reassess at next week's visit whether he can receive chemotherapy at that time.   Neutropenia: - ANC = 0.98 today, 2/2 chemotherapy.  - Hold chemo as above. Will hold off on ppx ciprofloxacin as ANC is borderline low and will likely improve with holding treatment today.  - Instructed to contact the office immediately if he develops fever/chills.   Anemia: - 3/14/24 hgb = 12.3 - 4/9/24 hgb = 11.6 - 4/16/24 hgb = 10.9 - Denies signs of bleeding. Will check iron + TIBC, ferritin, and b12/folate today.   Instructed to contact our office with any new/worsening symptoms. Pt and family member educated regarding plan of care, all questions/concerns addressed to the best of my abilities and their apparent satisfaction. F/u on 4/23/24 for provider visit + gemcitabine #3/4

## 2024-04-17 NOTE — REASON FOR VISIT
[Follow-Up Visit] : a follow-up [Family Member] : family member [Other: _____] : [unfilled] [FreeTextEntry2] : urothelial cancer/bladder

## 2024-04-17 NOTE — PHYSICAL EXAM
[Restricted in physically strenuous activity but ambulatory and able to carry out work of a light or sedentary nature] : Status 1- Restricted in physically strenuous activity but ambulatory and able to carry out work of a light or sedentary nature, e.g., light house work, office work [Normal] : affect appropriate [de-identified] : anicteric  [de-identified] : no LE edema

## 2024-04-17 NOTE — HISTORY OF PRESENT ILLNESS
[Disease: _____________________] : Disease: [unfilled] [T: ___] : T[unfilled] [N: ___] : N[unfilled] [M: ___] : M[unfilled] [AJCC Stage: ____] : AJCC Stage: [unfilled] [de-identified] : Christopher Gonzales is seen in consultation on 3/14/24, accompanied by his sister and his nephew.  He is referred by Dr Jayda Sanchez. Dr Manjeet Holman From her note..."pt developed gross hematuria about in Dec (around the time of his wife passing). Prior tobacco, quit 18y ago, 50 pack years. Went to see  (Dr Perez). Underwent CTU that showed filling defects in bladder. Upper tract negative. Also, with? of pancreatitis based on imaging. Cyto suspicious. Underwent TURBT with me on 2/27. 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  "Inspection of the bladder showed large necrotic papillary tumor x2 along the posterior wall.  There was significant trabeculation with cellules throughout.  Bilateral ureteral orifices were visualized with clear efflux.  Once surveillance was complete, resectoscope was used to carefully resect the posterior wall tumors.  This showed evidence of significant bladder wall thickening, concerning for muscle invasion.  Resection was taken down deep with care taken to avoid any significant injury, particularly given the patient's age and obtain appropriate pathology with resection."  Feels "good", had some gross hematuria a few days ago, then stopped. Appetite is good, no weight loss. No N/V/D, some constipation. Fatigue is not present. Did not sleep well last night, had to get up to void every 15 minutes. This was the first time this occurred. usually gets up 2-3 times. He does have some minor dysuria currently, at the initiation of voiding.  Occ incontinence with urgency. No chest pain/pressure. No edema. No cough, no RAMIREZ. No HA. Independent in ADLs, lives alone, sister from [place] will stay with him during this process  4/9/24: Patient returns for follow-up.  Muscle-invasive bladder cancer in this 89-year-old man.  He is set to have chemoradiation for local disease control.  He started radiation this past Friday as well as yesterday, and today he will be receiving his first dose of gemcitabine at 100 mg/m to be given weekly as well as radiation. Taking oxybutynin 5mg daily and Tamsulosin 0.4mg daily. Also takes mens 50+ vitamin. Occasionally takes miralax and stool softener for constipation.  [FreeTextEntry1] : Chemoradiation with weekly gemcitabine started 4/5/24.  [de-identified] : 4/16/24 - chemoRT started 4/5/24, weekly gemcitabine #2/4 today. Overall feeling "ok", some fatigue, but still able to climb the stairs to his apartment and doing some gardening outdoors. Appetite is good. Occasional cough to clear phlegm. Mild constipation resolved with eating melon. Has minimal dysuria first thing in the morning and then resolves, no dysuria or hematuria. Nocturia 4-6x/night. Denies fever, chills, night sweats, headache, dizziness, balance issues, eye pain/problems, mucositis/odynophagia, chest pain, palpitations, SOB, nausea/vomiting, diarrhea/constipation, abdominal pain, dysuria, hematuria, incontinence, LE edema, bleeding, muscle or joint pain/weakness.

## 2024-04-18 ENCOUNTER — APPOINTMENT (OUTPATIENT)
Dept: HEMATOLOGY ONCOLOGY | Facility: CLINIC | Age: 89
End: 2024-04-18

## 2024-04-18 LAB
ANION GAP SERPL CALC-SCNC: 11 MMOL/L
BUN SERPL-MCNC: 33 MG/DL
CALCIUM SERPL-MCNC: 9.4 MG/DL
CHLORIDE SERPL-SCNC: 101 MMOL/L
CO2 SERPL-SCNC: 23 MMOL/L
CREAT SERPL-MCNC: 1.22 MG/DL
EGFR: 57 ML/MIN/1.73M2
GLUCOSE SERPL-MCNC: 106 MG/DL
POTASSIUM SERPL-SCNC: 4.2 MMOL/L
SODIUM SERPL-SCNC: 135 MMOL/L

## 2024-04-23 ENCOUNTER — RESULT REVIEW (OUTPATIENT)
Age: 89
End: 2024-04-23

## 2024-04-23 ENCOUNTER — APPOINTMENT (OUTPATIENT)
Dept: HEMATOLOGY ONCOLOGY | Facility: CLINIC | Age: 89
End: 2024-04-23
Payer: MEDICARE

## 2024-04-23 ENCOUNTER — APPOINTMENT (OUTPATIENT)
Dept: HEMATOLOGY ONCOLOGY | Facility: CLINIC | Age: 89
End: 2024-04-23

## 2024-04-23 ENCOUNTER — APPOINTMENT (OUTPATIENT)
Dept: INFUSION THERAPY | Facility: HOSPITAL | Age: 89
End: 2024-04-23

## 2024-04-23 VITALS
HEIGHT: 65 IN | HEART RATE: 81 BPM | BODY MASS INDEX: 28.32 KG/M2 | OXYGEN SATURATION: 96 % | WEIGHT: 170 LBS | SYSTOLIC BLOOD PRESSURE: 118 MMHG | TEMPERATURE: 98.4 F | RESPIRATION RATE: 16 BRPM | DIASTOLIC BLOOD PRESSURE: 57 MMHG

## 2024-04-23 LAB
BASOPHILS # BLD AUTO: 0.01 K/UL — SIGNIFICANT CHANGE UP (ref 0–0.2)
BASOPHILS NFR BLD AUTO: 0.3 % — SIGNIFICANT CHANGE UP (ref 0–2)
EOSINOPHIL # BLD AUTO: 0.03 K/UL — SIGNIFICANT CHANGE UP (ref 0–0.5)
EOSINOPHIL NFR BLD AUTO: 0.8 % — SIGNIFICANT CHANGE UP (ref 0–6)
HCT VFR BLD CALC: 33.6 % — LOW (ref 39–50)
HGB BLD-MCNC: 11.4 G/DL — LOW (ref 13–17)
IMM GRANULOCYTES NFR BLD AUTO: 2.2 % — HIGH (ref 0–0.9)
LYMPHOCYTES # BLD AUTO: 0.62 K/UL — LOW (ref 1–3.3)
LYMPHOCYTES # BLD AUTO: 17 % — SIGNIFICANT CHANGE UP (ref 13–44)
MCHC RBC-ENTMCNC: 31.7 PG — SIGNIFICANT CHANGE UP (ref 27–34)
MCHC RBC-ENTMCNC: 33.9 G/DL — SIGNIFICANT CHANGE UP (ref 32–36)
MCV RBC AUTO: 93.3 FL — SIGNIFICANT CHANGE UP (ref 80–100)
MONOCYTES # BLD AUTO: 1.06 K/UL — HIGH (ref 0–0.9)
MONOCYTES NFR BLD AUTO: 29 % — HIGH (ref 2–14)
NEUTROPHILS # BLD AUTO: 1.85 K/UL — SIGNIFICANT CHANGE UP (ref 1.8–7.4)
NEUTROPHILS NFR BLD AUTO: 50.7 % — SIGNIFICANT CHANGE UP (ref 43–77)
NRBC # BLD: 0 /100 WBCS — SIGNIFICANT CHANGE UP (ref 0–0)
PLATELET # BLD AUTO: 125 K/UL — LOW (ref 150–400)
RBC # BLD: 3.6 M/UL — LOW (ref 4.2–5.8)
RBC # FLD: 13.4 % — SIGNIFICANT CHANGE UP (ref 10.3–14.5)
WBC # BLD: 3.65 K/UL — LOW (ref 3.8–10.5)
WBC # FLD AUTO: 3.65 K/UL — LOW (ref 3.8–10.5)

## 2024-04-23 PROCEDURE — 99214 OFFICE O/P EST MOD 30 MIN: CPT

## 2024-04-23 NOTE — HISTORY OF PRESENT ILLNESS
[Disease: _____________________] : Disease: [unfilled] [T: ___] : T[unfilled] [N: ___] : N[unfilled] [M: ___] : M[unfilled] [AJCC Stage: ____] : AJCC Stage: [unfilled] [de-identified] : Christopher Gonzales is seen in consultation on 3/14/24, accompanied by his sister and his nephew.  He is referred by Dr Jayda Sanchez. Dr Manjeet Holman From her note..."pt developed gross hematuria about in Dec (around the time of his wife passing). Prior tobacco, quit 18y ago, 50 pack years. Went to see  (Dr Perez). Underwent CTU that showed filling defects in bladder. Upper tract negative. Also, with? of pancreatitis based on imaging. Cyto suspicious. Underwent TURBT with me on 2/27. 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  "Inspection of the bladder showed large necrotic papillary tumor x2 along the posterior wall.  There was significant trabeculation with cellules throughout.  Bilateral ureteral orifices were visualized with clear efflux.  Once surveillance was complete, resectoscope was used to carefully resect the posterior wall tumors.  This showed evidence of significant bladder wall thickening, concerning for muscle invasion.  Resection was taken down deep with care taken to avoid any significant injury, particularly given the patient's age and obtain appropriate pathology with resection."  Feels "good", had some gross hematuria a few days ago, then stopped. Appetite is good, no weight loss. No N/V/D, some constipation. Fatigue is not present. Did not sleep well last night, had to get up to void every 15 minutes. This was the first time this occurred. usually gets up 2-3 times. He does have some minor dysuria currently, at the initiation of voiding.  Occ incontinence with urgency. No chest pain/pressure. No edema. No cough, no RAMIREZ. No HA. Independent in ADLs, lives alone, sister from [place] will stay with him during this process  4/9/24: Patient returns for follow-up.  Muscle-invasive bladder cancer in this 89-year-old man.  He is set to have chemoradiation for local disease control.  He started radiation this past Friday as well as yesterday, and today he will be receiving his first dose of gemcitabine at 100 mg/m to be given weekly as well as radiation. Taking oxybutynin 5mg daily and Tamsulosin 0.4mg daily. Also takes mens 50+ vitamin. Occasionally takes miralax and stool softener for constipation.   4/16/24 - chemoRT started 4/5/24, weekly gemcitabine #2/4 today. Overall feeling "ok", some fatigue, but still able to climb the stairs to his apartment and doing some gardening outdoors. Appetite is good. Occasional cough to clear phlegm. Mild constipation resolved with eating melon. Has minimal dysuria first thing in the morning and then resolves, no dysuria or hematuria. Nocturia 4-6x/night. Denies fever, chills, night sweats, headache, dizziness, balance issues, eye pain/problems, mucositis/odynophagia, chest pain, palpitations, SOB, nausea/vomiting, diarrhea/constipation, abdominal pain, dysuria, hematuria, incontinence, LE edema, bleeding, muscle or joint pain/weakness. [FreeTextEntry1] : Chemoradiation with weekly gemcitabine started 4/5/24.  [de-identified] : 4/23/24: Pt currently receiving CCRT with weekly gemcitabine #3/4 scheduled today. Gemcitabine held last week (#2) for neutropenia.  He was noted with rising creatinine last week which resolved with increased PO fluids. Today he is feeling well. Reports has been drinking at least 8 glasses of water daily. Tolerating treatment well. No new complaints today. Reports spends most of the day resting since starting treatment. Denies fever, chills, night sweats, headache, dizziness, balance issues, eye pain/problems, mucositis/odynophagia, chest pain, palpitations, SOB, nausea/vomiting, diarrhea/constipation, abdominal pain, dysuria, hematuria, incontinence, LE edema, bleeding, muscle or joint pain/weakness.

## 2024-04-23 NOTE — ASSESSMENT
[Palliative Care Plan] : not applicable at this time [FreeTextEntry1] : Christopher Gonzales is seen today for f/u of stage II bladder cancer. Chemoradiation with weekly gemcitabine started 4/5/24. Gemcitabine #2/4 held on 4/16/24 for neutropenia.   Bladder cancer: - Treatment held last week. gemcitabine dose #2/4  for ANC of 0.98. Can proceed with treatment today. WBC 3.65 ANC 1.85 -Continue Radiation as scheduled and f/u with Dr. Holman. Completed 13/20fx RT  Neutropenia: Resolved - ANC = 1.85. Continue to monitor   Anemia: - 3/14/24 hgb = 12.3 - 4/9/24 hgb = 11.6 - 4/16/24 hgb = 10.9 - Today hgb 11.4 Instructed to contact our office with any new/worsening symptoms. Pt and family member educated regarding plan of care, all questions/concerns addressed to the best of my abilities and their apparent satisfaction. F/u on 4/30/24 for provider visit + gemcitabine #4/4

## 2024-04-23 NOTE — PHYSICAL EXAM
[Restricted in physically strenuous activity but ambulatory and able to carry out work of a light or sedentary nature] : Status 1- Restricted in physically strenuous activity but ambulatory and able to carry out work of a light or sedentary nature, e.g., light house work, office work [Normal] : affect appropriate [de-identified] : anicteric  [de-identified] : no LE edema

## 2024-04-24 ENCOUNTER — NON-APPOINTMENT (OUTPATIENT)
Age: 89
End: 2024-04-24

## 2024-04-24 ENCOUNTER — LABORATORY RESULT (OUTPATIENT)
Age: 89
End: 2024-04-24

## 2024-04-24 LAB
ALBUMIN SERPL ELPH-MCNC: 4.2 G/DL — SIGNIFICANT CHANGE UP (ref 3.3–5)
ALP SERPL-CCNC: 90 U/L — SIGNIFICANT CHANGE UP (ref 40–120)
ALT FLD-CCNC: 18 U/L — SIGNIFICANT CHANGE UP (ref 10–45)
ANION GAP SERPL CALC-SCNC: 16 MMOL/L — SIGNIFICANT CHANGE UP (ref 5–17)
AST SERPL-CCNC: 19 U/L — SIGNIFICANT CHANGE UP (ref 10–40)
BILIRUB SERPL-MCNC: 0.2 MG/DL — SIGNIFICANT CHANGE UP (ref 0.2–1.2)
BUN SERPL-MCNC: 25 MG/DL — HIGH (ref 7–23)
CALCIUM SERPL-MCNC: 9 MG/DL — SIGNIFICANT CHANGE UP (ref 8.4–10.5)
CHLORIDE SERPL-SCNC: 104 MMOL/L — SIGNIFICANT CHANGE UP (ref 96–108)
CO2 SERPL-SCNC: 17 MMOL/L — LOW (ref 22–31)
CREAT SERPL-MCNC: 1.17 MG/DL — SIGNIFICANT CHANGE UP (ref 0.5–1.3)
EGFR: 60 ML/MIN/1.73M2 — SIGNIFICANT CHANGE UP
GLUCOSE SERPL-MCNC: 83 MG/DL — SIGNIFICANT CHANGE UP (ref 70–99)
MAGNESIUM SERPL-MCNC: 2.2 MG/DL — SIGNIFICANT CHANGE UP (ref 1.6–2.6)
POTASSIUM SERPL-MCNC: 3.8 MMOL/L — SIGNIFICANT CHANGE UP (ref 3.5–5.3)
POTASSIUM SERPL-SCNC: 3.8 MMOL/L — SIGNIFICANT CHANGE UP (ref 3.5–5.3)
PROT SERPL-MCNC: 8 G/DL — SIGNIFICANT CHANGE UP (ref 6–8.3)
SODIUM SERPL-SCNC: 136 MMOL/L — SIGNIFICANT CHANGE UP (ref 135–145)

## 2024-04-24 NOTE — REASON FOR VISIT
[Routine On-Treatment] : a routine on-treatment visit for [Other: ___] : [unfilled] [Patient Declined  Services] : - None: Patient declined  services [Other: _____] : [unfilled] [Interpreters_FullName] : Bhaskar  [Interpreters_Relationshiptopatient] : Nephew [TWNoteComboBox1] : [place]

## 2024-04-24 NOTE — PHYSICAL EXAM
[Sclera] : the sclera and conjunctiva were normal [Extraocular Movements] : extraocular movements were intact [Outer Ear] : the ears and nose were normal in appearance [] : no respiratory distress [Respiration, Rhythm And Depth] : normal respiratory rhythm and effort [Exaggerated Use Of Accessory Muscles For Inspiration] : no accessory muscle use [Arterial Pulses Normal] : the arterial pulses were normal [Edema] : no peripheral edema present [Abdomen Soft] : soft [Nondistended] : nondistended [Nail Clubbing] : no clubbing  or cyanosis of the fingernails [Range of Motion to Joints] : range of motion to joints [Motor Tone] : muscle strength and tone were normal [Skin Color & Pigmentation] : normal skin color and pigmentation [Normal] : no focal deficits [Oriented To Time, Place, And Person] : oriented to person, place, and time [Affect] : the affect was normal [Mood] : the mood was normal [Not Anxious] : not anxious [de-identified] : Diminished hearing

## 2024-04-24 NOTE — DISEASE MANAGEMENT
[Clinical] : TNM Stage: c [II] : II [TTNM] : 2 [NTNM] : 0 [MTNM] : x [de-identified] : 1640 cGy [de-identified] : 5500 cGy [de-identified] : bladder/pelvis

## 2024-04-24 NOTE — REVIEW OF SYSTEMS
[Constipation: Grade 1 - Occasional or intermittent symptoms; occasional use of stool softeners, laxatives, dietary modification, or enema] : Constipation: Grade 1 - Occasional or intermittent symptoms; occasional use of stool softeners, laxatives, dietary modification, or enema [Nausea: Grade 0] : Nausea: Grade 0 [Fatigue: Grade 0] : Fatigue: Grade 0 [Urinary Tract Pain: Grade 0] : Urinary Tract Pain: Grade 0 [Urinary Urgency: Grade 1 - Present] : Urinary Urgency: Grade 1 - Present [Urinary Frequency: Grade 1 - Present] : Urinary Frequency: Grade 1 - Present [Diarrhea: Grade 0] : Diarrhea: Grade 0

## 2024-04-24 NOTE — HISTORY OF PRESENT ILLNESS
[FreeTextEntry1] : 89M w/ T2N0 bladder cancer; initially presented with hematuria. CT urogram 12/29/23 had shown lesions in the posterior bladder, no LAD. He underwent TURBT 2/27/24 that showed multiple tumors on the posterior bladder wall. 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 Will receive hypofractionated RT to bladder with concurrent gemcitabine with Dr. Peña. Started RT on 4/5/24. Gemzar started 4/9/24.   Labs 3/14/24 Hgb 12.3 Plt 306 ANC 2.39 Labs 4/9/24 Hgb 11.6  ANC 1.84  4/11/24 OTV 5/20 fx:   Patient accompanied by his nephew. He is feeling well. He is involved in all his normal activities. He denies pain or discomfort. He has no dysuria, hematuria or incontinence. He feels urinary frequency and urinary stream is still an issue. Nocturia 5 - 6x HS. Reports occasional constipation. Was constipated yesterday, took Senna and Miralax has diarrhea today. No rectal pain, bleeding or discharge.  No other interval events.  4/16/24 OTV 8/20 fx: Pt accompanied by nephew. Overall doing well, but chemotherapy held today due to low ANC. Denies any new issues currently other than that it is hard to hold his bladder full for treatment; symptoms stable.  Labs 4/16/24 Hgb 10.9 Plt 204 ANC 0.98   4/24/24 OTV 14/20 fx:  Received chemotherapy this week, tolerating well. No bowel issues, baseline constipation, following low residue diet. Noting a little more urgency of urination; has been taking flomax BID. Frequent nocturia. Not drinking much water in AM.  Labs 4/23/24 Hgb 11.4  ANC 1.85

## 2024-04-27 PROBLEM — Z79.899 ON ANTINEOPLASTIC CHEMOTHERAPY: Status: ACTIVE | Noted: 2024-04-05

## 2024-04-27 NOTE — ASSESSMENT
[FreeTextEntry1] : Christopher Gonzales is seen today for f/u of stage II bladder cancer. Chemoradiation with weekly gemcitabine started 4/5/24. Gemcitabine #2/4 held on 4/16/24 for neutropenia.   Bladder cancer: - Proceed with weekly gemcitabine dose #4/4 today.   Anemia: - 3/14/24 hgb = 12.3 - 4/9/24 hgb = 11.6 - 4/16/24 hgb = 10.9 - 4/23/24 hgb 11.4 -   Instructed to contact our office with any new/worsening symptoms. Pt and family member educated regarding plan of care, all questions/concerns addressed to the best of my abilities and their apparent satisfaction.   [Palliative Care Plan] : not applicable at this time

## 2024-04-27 NOTE — HISTORY OF PRESENT ILLNESS
[Disease: _____________________] : Disease: [unfilled] [T: ___] : T[unfilled] [N: ___] : N[unfilled] [M: ___] : M[unfilled] [AJCC Stage: ____] : AJCC Stage: [unfilled] [de-identified] : Christopher Gonzales is seen in consultation on 3/14/24, accompanied by his sister and his nephew.  He is referred by Dr Jayda Sanchez. Dr Manjeet Holman From her note..."pt developed gross hematuria about in Dec (around the time of his wife passing). Prior tobacco, quit 18y ago, 50 pack years. Went to see  (Dr Perez). Underwent CTU that showed filling defects in bladder. Upper tract negative. Also, with? of pancreatitis based on imaging. Cyto suspicious. Underwent TURBT with me on 2/27. 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  "Inspection of the bladder showed large necrotic papillary tumor x2 along the posterior wall.  There was significant trabeculation with cellules throughout.  Bilateral ureteral orifices were visualized with clear efflux.  Once surveillance was complete, resectoscope was used to carefully resect the posterior wall tumors.  This showed evidence of significant bladder wall thickening, concerning for muscle invasion.  Resection was taken down deep with care taken to avoid any significant injury, particularly given the patient's age and obtain appropriate pathology with resection."  Feels "good", had some gross hematuria a few days ago, then stopped. Appetite is good, no weight loss. No N/V/D, some constipation. Fatigue is not present. Did not sleep well last night, had to get up to void every 15 minutes. This was the first time this occurred. usually gets up 2-3 times. He does have some minor dysuria currently, at the initiation of voiding.  Occ incontinence with urgency. No chest pain/pressure. No edema. No cough, no RAMIREZ. No HA. Independent in ADLs, lives alone, sister from [place] will stay with him during this process  4/9/24: Patient returns for follow-up.  Muscle-invasive bladder cancer in this 89-year-old man.  He is set to have chemoradiation for local disease control.  He started radiation this past Friday as well as yesterday, and today he will be receiving his first dose of gemcitabine at 100 mg/m to be given weekly as well as radiation. Taking oxybutynin 5mg daily and Tamsulosin 0.4mg daily. Also takes mens 50+ vitamin. Occasionally takes miralax and stool softener for constipation.   4/16/24 - chemoRT started 4/5/24, weekly gemcitabine #2/4 today. Overall feeling "ok", some fatigue, but still able to climb the stairs to his apartment and doing some gardening outdoors. Appetite is good. Occasional cough to clear phlegm. Mild constipation resolved with eating melon. Has minimal dysuria first thing in the morning and then resolves, no dysuria or hematuria. Nocturia 4-6x/night. Denies fever, chills, night sweats, headache, dizziness, balance issues, eye pain/problems, mucositis/odynophagia, chest pain, palpitations, SOB, nausea/vomiting, diarrhea/constipation, abdominal pain, dysuria, hematuria, incontinence, LE edema, bleeding, muscle or joint pain/weakness.  4/23/24: Pt currently receiving CCRT with weekly gemcitabine #3/4 scheduled today. Gemcitabine held last week (#2) for neutropenia.  He was noted with rising creatinine last week which resolved with increased PO fluids. Today he is feeling well. Reports has been drinking at least 8 glasses of water daily. Tolerating treatment well. No new complaints today. Reports spends most of the day resting since starting treatment. Denies fever, chills, night sweats, headache, dizziness, balance issues, eye pain/problems, mucositis/odynophagia, chest pain, palpitations, SOB, nausea/vomiting, diarrhea/constipation, abdominal pain, dysuria, hematuria, incontinence, LE edema, bleeding, muscle or joint pain/weakness.   [FreeTextEntry1] : Chemoradiation with weekly gemcitabine started 4/5/24.  [de-identified] : 4/30/24 - chemoRT started 4/5/24, weekly gemcitabine #4/4 today.   Denies fever, chills, night sweats, headache, dizziness, balance issues, eye pain/problems, mucositis/odynophagia, chest pain, palpitations, SOB, cough, nausea/vomiting, diarrhea/constipation, abdominal pain, dysuria, hematuria, incontinence, LE edema, rash/pruritus, bleeding, muscle or joint pain/weakness.

## 2024-04-30 ENCOUNTER — RESULT REVIEW (OUTPATIENT)
Age: 89
End: 2024-04-30

## 2024-04-30 ENCOUNTER — LABORATORY RESULT (OUTPATIENT)
Age: 89
End: 2024-04-30

## 2024-04-30 ENCOUNTER — APPOINTMENT (OUTPATIENT)
Dept: INFUSION THERAPY | Facility: HOSPITAL | Age: 89
End: 2024-04-30

## 2024-04-30 ENCOUNTER — APPOINTMENT (OUTPATIENT)
Dept: HEMATOLOGY ONCOLOGY | Facility: CLINIC | Age: 89
End: 2024-04-30
Payer: MEDICARE

## 2024-04-30 ENCOUNTER — NON-APPOINTMENT (OUTPATIENT)
Age: 89
End: 2024-04-30

## 2024-04-30 ENCOUNTER — APPOINTMENT (OUTPATIENT)
Dept: HEMATOLOGY ONCOLOGY | Facility: CLINIC | Age: 89
End: 2024-04-30

## 2024-04-30 VITALS
RESPIRATION RATE: 16 BRPM | HEART RATE: 79 BPM | SYSTOLIC BLOOD PRESSURE: 103 MMHG | TEMPERATURE: 98.9 F | BODY MASS INDEX: 28.47 KG/M2 | OXYGEN SATURATION: 95 % | WEIGHT: 171.08 LBS | DIASTOLIC BLOOD PRESSURE: 59 MMHG

## 2024-04-30 DIAGNOSIS — Z79.899 OTHER LONG TERM (CURRENT) DRUG THERAPY: ICD-10-CM

## 2024-04-30 DIAGNOSIS — R19.7 DIARRHEA, UNSPECIFIED: ICD-10-CM

## 2024-04-30 LAB
BASOPHILS # BLD AUTO: 0.01 K/UL — SIGNIFICANT CHANGE UP (ref 0–0.2)
BASOPHILS NFR BLD AUTO: 0.2 % — SIGNIFICANT CHANGE UP (ref 0–2)
EOSINOPHIL # BLD AUTO: 0.01 K/UL — SIGNIFICANT CHANGE UP (ref 0–0.5)
EOSINOPHIL NFR BLD AUTO: 0.2 % — SIGNIFICANT CHANGE UP (ref 0–6)
HCT VFR BLD CALC: 30.6 % — LOW (ref 39–50)
HGB BLD-MCNC: 10.7 G/DL — LOW (ref 13–17)
IMM GRANULOCYTES NFR BLD AUTO: 0.8 % — SIGNIFICANT CHANGE UP (ref 0–0.9)
LYMPHOCYTES # BLD AUTO: 0.47 K/UL — LOW (ref 1–3.3)
LYMPHOCYTES # BLD AUTO: 7.5 % — LOW (ref 13–44)
MCHC RBC-ENTMCNC: 31.8 PG — SIGNIFICANT CHANGE UP (ref 27–34)
MCHC RBC-ENTMCNC: 35 G/DL — SIGNIFICANT CHANGE UP (ref 32–36)
MCV RBC AUTO: 90.8 FL — SIGNIFICANT CHANGE UP (ref 80–100)
MONOCYTES # BLD AUTO: 1.02 K/UL — HIGH (ref 0–0.9)
MONOCYTES NFR BLD AUTO: 16.3 % — HIGH (ref 2–14)
NEUTROPHILS # BLD AUTO: 4.71 K/UL — SIGNIFICANT CHANGE UP (ref 1.8–7.4)
NEUTROPHILS NFR BLD AUTO: 75 % — SIGNIFICANT CHANGE UP (ref 43–77)
NRBC # BLD: 0 /100 WBCS — SIGNIFICANT CHANGE UP (ref 0–0)
PLATELET # BLD AUTO: 121 K/UL — LOW (ref 150–400)
RBC # BLD: 3.37 M/UL — LOW (ref 4.2–5.8)
RBC # FLD: 13.4 % — SIGNIFICANT CHANGE UP (ref 10.3–14.5)
WBC # BLD: 6.27 K/UL — SIGNIFICANT CHANGE UP (ref 3.8–10.5)
WBC # FLD AUTO: 6.27 K/UL — SIGNIFICANT CHANGE UP (ref 3.8–10.5)

## 2024-04-30 PROCEDURE — 99214 OFFICE O/P EST MOD 30 MIN: CPT

## 2024-04-30 PROCEDURE — G2211 COMPLEX E/M VISIT ADD ON: CPT

## 2024-04-30 RX ORDER — OXYBUTYNIN CHLORIDE 5 MG/1
5 TABLET ORAL
Qty: 30 | Refills: 5 | Status: DISCONTINUED | COMMUNITY
Start: 2024-03-12 | End: 2024-04-30

## 2024-04-30 NOTE — REASON FOR VISIT
[Routine On-Treatment] : a routine on-treatment visit for [Other: ___] : [unfilled] [Other: _____] : [unfilled] [Patient Declined  Services] : - None: Patient declined  services [Interpreters_FullName] : Bhaskar  [Interpreters_Relationshiptopatient] : Nephew [TWNoteComboBox1] : [place] citizen

## 2024-04-30 NOTE — DISEASE MANAGEMENT
[Clinical] : TNM Stage: c [II] : II [TTNM] : 2 [NTNM] : 0 [MTNM] : x [de-identified] : 3620cGy [de-identified] : 5500 cGy [de-identified] : bladder/pelvis

## 2024-04-30 NOTE — HISTORY OF PRESENT ILLNESS
[FreeTextEntry1] : 89M w/ T2N0 bladder cancer; initially presented with hematuria. CT urogram 12/29/23 had shown lesions in the posterior bladder, no LAD. He underwent TURBT 2/27/24 that showed multiple tumors on the posterior bladder wall. 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 Will receive hypofractionated RT to bladder with concurrent gemcitabine with Dr. Peña. Started RT on 4/5/24. Gemzar started 4/9/24.   Labs 3/14/24 Hgb 12.3 Plt 306 ANC 2.39 Labs 4/9/24 Hgb 11.6  ANC 1.84  4/11/24 OTV 5/20 fx:   Patient accompanied by his nephew. He is feeling well. He is involved in all his normal activities. He denies pain or discomfort. He has no dysuria, hematuria or incontinence. He feels urinary frequency and urinary stream is still an issue. Nocturia 5 - 6x HS. Reports occasional constipation. Was constipated yesterday, took Senna and Miralax has diarrhea today. No rectal pain, bleeding or discharge.  No other interval events.  4/16/24 OTV 8/20 fx: Pt accompanied by nephew. Overall doing well, but chemotherapy held today due to low ANC. Denies any new issues currently other than that it is hard to hold his bladder full for treatment; symptoms stable.  Labs 4/16/24 Hgb 10.9 Plt 204 ANC 0.98   4/24/24 OTV 14/20 fx:  Received chemotherapy this week, tolerating well. No bowel issues, baseline constipation, following low residue diet. Noting a little more urgency of urination; has been taking flomax BID. Frequent nocturia. Not drinking much water in AM.  Labs 4/23/24 Hgb 11.4  ANC 1.85  4/30/24 OTV 18/20 fx - chemo today held; hydrating instead; see vitals in [facility] from 4/30/24 3:32 pm Reports loose bowel movements with some blood on the toilet paper after frequent wiping. Has been taking Dulcolax and Senna, advised to stop. Urgency of urination continues, stable on tamsulosin. Decreased appetite. Seen in medical oncology, receiving hydration today.  Labs 4/30/24 Hgb 10.7 Plt 121 ANC 4.71

## 2024-04-30 NOTE — REVIEW OF SYSTEMS
[Nausea: Grade 0] : Nausea: Grade 0 [Urinary Tract Pain: Grade 0] : Urinary Tract Pain: Grade 0 [Urinary Urgency: Grade 1 - Present] : Urinary Urgency: Grade 1 - Present [Urinary Frequency: Grade 1 - Present] : Urinary Frequency: Grade 1 - Present [Constipation: Grade 0] : Constipation: Grade 0 [Diarrhea: Grade 2 - Increase of 4 - 6 stools per day over baseline; moderate increase in ostomy output compared to baseline] : Diarrhea: Grade 2 - Increase of 4 - 6 stools per day over baseline; moderate increase in ostomy output compared to baseline [Fatigue: Grade 1 - Fatigue relieved by rest] : Fatigue: Grade 1 - Fatigue relieved by rest

## 2024-04-30 NOTE — REVIEW OF SYSTEMS
[Fever] : fever [Chills] : chills [Fatigue] : fatigue [Cough] : cough [Diarrhea: Grade 3 - Increase of >=7 stools per day over baseline; incontinence; hospitalization indicated; severe increase in ostomy output compared to baseline; limiting self care ADL] : Diarrhea: Grade 3 - Increase of >=7 stools per day over baseline; incontinence; hospitalization indicated; severe increase in ostomy output compared to baseline; limiting self care ADL

## 2024-04-30 NOTE — PHYSICAL EXAM
[Sclera] : the sclera and conjunctiva were normal [Extraocular Movements] : extraocular movements were intact [Outer Ear] : the ears and nose were normal in appearance [] : no respiratory distress [Respiration, Rhythm And Depth] : normal respiratory rhythm and effort [Exaggerated Use Of Accessory Muscles For Inspiration] : no accessory muscle use [Arterial Pulses Normal] : the arterial pulses were normal [Edema] : no peripheral edema present [Abdomen Soft] : soft [Nondistended] : nondistended [Nail Clubbing] : no clubbing  or cyanosis of the fingernails [Range of Motion to Joints] : range of motion to joints [Motor Tone] : muscle strength and tone were normal [Skin Color & Pigmentation] : normal skin color and pigmentation [Normal] : no focal deficits [Oriented To Time, Place, And Person] : oriented to person, place, and time [Affect] : the affect was normal [Mood] : the mood was normal [Not Anxious] : not anxious [de-identified] : Diminished hearing

## 2024-05-01 DIAGNOSIS — E86.0 DEHYDRATION: ICD-10-CM

## 2024-05-01 LAB
ALBUMIN SERPL ELPH-MCNC: 3.9 G/DL — SIGNIFICANT CHANGE UP (ref 3.3–5)
ALP SERPL-CCNC: 80 U/L — SIGNIFICANT CHANGE UP (ref 40–120)
ALT FLD-CCNC: 36 U/L — SIGNIFICANT CHANGE UP (ref 10–45)
ANION GAP SERPL CALC-SCNC: 12 MMOL/L — SIGNIFICANT CHANGE UP (ref 5–17)
AST SERPL-CCNC: 26 U/L — SIGNIFICANT CHANGE UP (ref 10–40)
BILIRUB SERPL-MCNC: 0.2 MG/DL — SIGNIFICANT CHANGE UP (ref 0.2–1.2)
BUN SERPL-MCNC: 18 MG/DL — SIGNIFICANT CHANGE UP (ref 7–23)
CALCIUM SERPL-MCNC: 9 MG/DL — SIGNIFICANT CHANGE UP (ref 8.4–10.5)
CHLORIDE SERPL-SCNC: 96 MMOL/L — SIGNIFICANT CHANGE UP (ref 96–108)
CO2 SERPL-SCNC: 22 MMOL/L — SIGNIFICANT CHANGE UP (ref 22–31)
CREAT SERPL-MCNC: 1.3 MG/DL — SIGNIFICANT CHANGE UP (ref 0.5–1.3)
EGFR: 53 ML/MIN/1.73M2 — LOW
GLUCOSE SERPL-MCNC: 107 MG/DL — HIGH (ref 70–99)
MAGNESIUM SERPL-MCNC: 1.8 MG/DL — SIGNIFICANT CHANGE UP (ref 1.6–2.6)
POTASSIUM SERPL-MCNC: 3.7 MMOL/L — SIGNIFICANT CHANGE UP (ref 3.5–5.3)
POTASSIUM SERPL-SCNC: 3.7 MMOL/L — SIGNIFICANT CHANGE UP (ref 3.5–5.3)
PROT SERPL-MCNC: 8 G/DL — SIGNIFICANT CHANGE UP (ref 6–8.3)
SODIUM SERPL-SCNC: 130 MMOL/L — LOW (ref 135–145)

## 2024-05-02 ENCOUNTER — NON-APPOINTMENT (OUTPATIENT)
Age: 89
End: 2024-05-02

## 2024-05-06 ENCOUNTER — LABORATORY RESULT (OUTPATIENT)
Age: 89
End: 2024-05-06

## 2024-05-07 LAB
ALBUMIN SERPL ELPH-MCNC: 3.5 G/DL
ALP BLD-CCNC: 79 U/L
ALT SERPL-CCNC: 31 U/L
ANION GAP SERPL CALC-SCNC: 14 MMOL/L
AST SERPL-CCNC: 32 U/L
BASOPHILS # BLD AUTO: 0 K/UL
BASOPHILS NFR BLD AUTO: 0 %
BILIRUB SERPL-MCNC: <0.2 MG/DL
BUN SERPL-MCNC: 17 MG/DL
CALCIUM SERPL-MCNC: 8.2 MG/DL
CHLORIDE SERPL-SCNC: 94 MMOL/L
CO2 SERPL-SCNC: 22 MMOL/L
CREAT SERPL-MCNC: 1.24 MG/DL
EGFR: 56 ML/MIN/1.73M2
EOSINOPHIL # BLD AUTO: 0 K/UL
EOSINOPHIL NFR BLD AUTO: 0 %
GLUCOSE SERPL-MCNC: 111 MG/DL
HCT VFR BLD CALC: 31 %
HGB BLD-MCNC: 10.5 G/DL
LYMPHOCYTES # BLD AUTO: 0 K/UL
LYMPHOCYTES NFR BLD AUTO: 0 %
MAGNESIUM SERPL-MCNC: 1.8 MG/DL
MAN DIFF?: NORMAL
MCHC RBC-ENTMCNC: 31.3 PG
MCHC RBC-ENTMCNC: 33.9 GM/DL
MCV RBC AUTO: 92.3 FL
MONOCYTES # BLD AUTO: 2.11 K/UL
MONOCYTES NFR BLD AUTO: 25.4 %
NEUTROPHILS # BLD AUTO: 5.83 K/UL
NEUTROPHILS NFR BLD AUTO: 59.7 %
PLATELET # BLD AUTO: 352 K/UL
POTASSIUM SERPL-SCNC: 3 MMOL/L
PROT SERPL-MCNC: 7.4 G/DL
RBC # BLD: 3.36 M/UL
RBC # FLD: 14 %
SODIUM SERPL-SCNC: 130 MMOL/L
WBC # FLD AUTO: 8.31 K/UL

## 2024-05-11 PROBLEM — R39.15 URINARY URGENCY: Status: ACTIVE | Noted: 2024-03-12

## 2024-05-11 PROBLEM — E87.1 HYPONATREMIA: Status: ACTIVE | Noted: 2024-01-01

## 2024-05-16 ENCOUNTER — RESULT REVIEW (OUTPATIENT)
Age: 89
End: 2024-05-16

## 2024-05-16 ENCOUNTER — APPOINTMENT (OUTPATIENT)
Dept: HEMATOLOGY ONCOLOGY | Facility: CLINIC | Age: 89
End: 2024-05-16
Payer: MEDICARE

## 2024-05-16 VITALS
HEART RATE: 69 BPM | TEMPERATURE: 97.9 F | WEIGHT: 163.14 LBS | BODY MASS INDEX: 27.15 KG/M2 | DIASTOLIC BLOOD PRESSURE: 67 MMHG | SYSTOLIC BLOOD PRESSURE: 115 MMHG | RESPIRATION RATE: 16 BRPM | OXYGEN SATURATION: 98 %

## 2024-05-16 DIAGNOSIS — E87.6 HYPOKALEMIA: ICD-10-CM

## 2024-05-16 DIAGNOSIS — R39.15 URGENCY OF URINATION: ICD-10-CM

## 2024-05-16 DIAGNOSIS — E87.1 HYPO-OSMOLALITY AND HYPONATREMIA: ICD-10-CM

## 2024-05-16 DIAGNOSIS — R54 AGE-RELATED PHYSICAL DEBILITY: ICD-10-CM

## 2024-05-16 DIAGNOSIS — R63.4 ABNORMAL WEIGHT LOSS: ICD-10-CM

## 2024-05-16 DIAGNOSIS — R53.83 OTHER FATIGUE: ICD-10-CM

## 2024-05-16 PROCEDURE — 99213 OFFICE O/P EST LOW 20 MIN: CPT

## 2024-05-16 PROCEDURE — G2211 COMPLEX E/M VISIT ADD ON: CPT

## 2024-05-16 RX ORDER — ONDANSETRON 8 MG/1
8 TABLET, ORALLY DISINTEGRATING ORAL EVERY 8 HOURS
Qty: 30 | Refills: 1 | Status: DISCONTINUED | COMMUNITY
Start: 2024-04-09 | End: 2024-05-16

## 2024-05-17 LAB
ALBUMIN SERPL ELPH-MCNC: 3.4 G/DL
ALP BLD-CCNC: 79 U/L
ALT SERPL-CCNC: 51 U/L
ANION GAP SERPL CALC-SCNC: 12 MMOL/L
AST SERPL-CCNC: 34 U/L
BILIRUB SERPL-MCNC: 0.2 MG/DL
BUN SERPL-MCNC: 14 MG/DL
CALCIUM SERPL-MCNC: 8.4 MG/DL
CHLORIDE SERPL-SCNC: 99 MMOL/L
CO2 SERPL-SCNC: 26 MMOL/L
CREAT SERPL-MCNC: 1.16 MG/DL
EGFR: 60 ML/MIN/1.73M2
GLUCOSE SERPL-MCNC: 90 MG/DL
MAGNESIUM SERPL-MCNC: 2.2 MG/DL
POTASSIUM SERPL-SCNC: 3.5 MMOL/L
PROT SERPL-MCNC: 6.7 G/DL
SODIUM SERPL-SCNC: 136 MMOL/L

## 2024-05-17 NOTE — PHYSICAL EXAM
Chief Complaint   Patient presents with    Dysphagia     Patient complains of increased difficulty swallowing for the last six months, states he has skin cancer and has a PET scan tomorrow in [employer], states he has mentioned the swallowing issues to his oncologist, also complains of generalized weakness for the last two weeks, states he is drinking but not eating due to swallowing issues, has lost 34 lbs since Sept        Patient History    Past Medical History:   Diagnosis Date    Cancer (CMS/HCC)       Past Surgical History:   Procedure Laterality Date    MR HEAD ANGIO WO IV CONTRAST  9/18/2023    MR HEAD ANGIO WO IV CONTRAST 9/18/2023 [facility] MRI      Family History   Problem Relation Name Age of Onset    Hypertension Mother      Prostate cancer Father      Stroke Maternal Grandfather        Social History     Social History Narrative    Not on file      No Known Allergies     PMH: Reviewed  PSH: Reviewed  Social History: Reviewed.   Allergies reviewed.     HPI: Mike Olson is a 68 y.o. male who presents to the ED today accompanied by family with complaints of dysphagia x6 months. States he's concerned he's getting dehydrated because he's not eating well. He states he can tolerate liquids and has been drinking lots of water and occasionally drinking nutrition drinks such as Ensure. He has not had Ensure for 3 days however and over the past 24 hours has only had small bites of cereal. He has an appt tomorrow for PET scan per his oncologist due to a right axillary mass. Denies history of thyroid disease. Denies trouble swallowing own saliva.       REVIEW OF SYSTEMS:  All other systems reviewed and negative except as listed in HPI.    PHYSICAL EXAM:    GENERAL: Vitals noted, no distress. Alert and oriented x 3. Non-toxic.      EENT: TMs clear. Posterior oropharynx unremarkable. EOMI, no nystagmus noted. Tongue midline. Swallowing own secretions.     NECK: Supple. No masses. No midline tenderness. No meningeal signs.      CARDIAC: Regular rate, rhythm. No murmurs rubs or gallops. No JVD.    PULMONARY: Lungs clear and equal bilaterally. No wheezes rales or rhonchi. No respiratory distress.     ABDOMEN: Soft, nondistended, and nontender. No peritoneal signs. Bowel sounds are present and normoactive in all 4 quadrants. No pulsatile masses.     EXTREMITIES: No peripheral edema.     SKIN: No rash. Warm, dry, and intact.     NEURO: No focal neurologic deficits.     Labs Reviewed   CBC WITH AUTO DIFFERENTIAL - Abnormal       Result Value    WBC 13.3 (*)     nRBC 0.0      RBC 4.38 (*)     Hemoglobin 12.8 (*)     Hematocrit 38.9 (*)     MCV 89      MCH 29.2      MCHC 32.9      RDW 11.4 (*)     Platelets 447      Neutrophils % 79.0      Immature Granulocytes %, Automated 0.6      Lymphocytes % 9.7      Monocytes % 8.8      Eosinophils % 1.6      Basophils % 0.3      Neutrophils Absolute 10.55 (*)     Immature Granulocytes Absolute, Automated 0.08      Lymphocytes Absolute 1.29      Monocytes Absolute 1.17 (*)     Eosinophils Absolute 0.21      Basophils Absolute 0.04     BASIC METABOLIC PANEL - Abnormal    Glucose 131 (*)     Sodium 137      Potassium 3.8      Chloride 98      Bicarbonate 27      Anion Gap 16      Urea Nitrogen 52 (*)     Creatinine 1.50 (*)     eGFR 50 (*)     Calcium 11.1 (*)    TSH WITH REFLEX TO FREE T4 IF ABNORMAL - Normal    Thyroid Stimulating Hormone 0.89      Narrative:     TSH testing is performed using different testing methodology at [facility] than at other [facility]. Direct result comparisons should only be made within the same method.          XR neck soft tissue   Final Result   No detectable soft tissue abnormality. Remainder as above.   Signed by Emory Gonzalez MD           Medical Decision Making         ED COURSE: This patient was seen and examined by myself independently. IV established. Labs drawn and noted above. Hydrated with NS 1L bolus. TSH within normal limits. Xray soft  tissues of the neck unremarkable. His creatinine is elevated at 1.51 today; most recently 0.82 in September. Discussed further hydration and offered additional IV here and he declined. He is able to swallow liquids and has referral to ENT established and has PET scan scheduled for tomororw. Recommended continuing with these evaluations and followup with his oncology team. He verbalized understanding. He is discharged home in a stable condition with computer instructions given and is encouraged to return to the ER for any new or worsening symptoms.              Differential Diagnoses Considered: dehydration, metastatic disease, thyroid disorder    Chronic Medical Conditions Significantly Affecting Care: see above    External Records Reviewed: I reviewed recent and relevant outside records including: PCP notes, prior discharge summary, previous radiologic studies    Diagnostic testing considered: blood, xray    Escalation of Care: Appropriate for outpatient management          DIAGNOSTIC IMPRESSION: #1 dysphagia      Amy Knight, JEFF-CNP  11/16/23 3064     [Ambulatory and capable of all self care but unable to carry out any work activities] : Status 2- Ambulatory and capable of all self care but unable to carry out any work activities. Up and about more than 50% of waking hours [Normal] : affect appropriate [de-identified] : no edema noted [de-identified] : lentigines, actinic changes

## 2024-05-17 NOTE — ASSESSMENT
[Palliative Care Plan] : not applicable at this time [FreeTextEntry1] : Christopher Gonzales is seen today for f/u of stage II bladder cancer. Chemoradiation with weekly gemcitabine started 4/5/24. Gemcitabine #2/4 held on 4/16/24 for neutropenia.   Radiation ended on May 2.  He reports that he feels "all right".  He does note some fatigue.  He has to get up every hour to void at night.  He does not sleep well as a result.  He is taking tamsulosin 2 tablets, 2 hours before bed with reduction in oral intake before bed.  There is no hematuria.  He has some minor dysuria upon initiation.  Urgency is present and he may lose some urine.  There were no fevers or chills.  There were no respiratory or GI complaints.  He is not very active but does do some gardening.  His potassium was recently 3 and he has been taking electrolyte solution.  He is independent in his ADLs.  He lost 3.6 kg from April 30.  Bladder cancer:  - plan for repeat cystoscopy and imaging approx 12wks after completion of RT to assess tx response, he completed RT on 5/2/24.   Anemia: - 3/14/24 hgb = 12.3 - 4/9/24 hgb = 11.6 - 4/16/24 hgb = 10.9 - 4/23/24 hgb 11.4 - 4/30/24 hgb = 10.7 - 5/16/2024 hgb = 9.9 - Continue to monitor  Diarrhea: - Mostly resolved at this time - Diarrhea likely 2/2 radiation effect. He is hemodynamically stable and feeling relatively well considering the diarrhea.  - Instructed to contact the office with any new/worsening symptoms.   The chemistry panel from this visit revealed a sodium of 133 and a potassium of 3.5.  On May 6 the potassium was 3.0.  He should continue on electrolyte solution and consume some bananas.  The ALT was 51, just slightly elevated.  It was previously normal.  This will be followed.  Instructed to contact our office with any new/worsening symptoms. Pt and family member educated regarding plan of care, all questions/concerns addressed to the best of my abilities and their apparent satisfaction. F/u in 2wks.

## 2024-05-17 NOTE — HISTORY OF PRESENT ILLNESS
[Disease: _____________________] : Disease: [unfilled] [T: ___] : T[unfilled] [N: ___] : N[unfilled] [M: ___] : M[unfilled] [AJCC Stage: ____] : AJCC Stage: [unfilled] [de-identified] : Christopher Gonzales is seen in consultation on 3/14/24, accompanied by his sister and his nephew.  He is referred by Dr Jayda Sanchez. Dr Manjeet Holman From her note..."pt developed gross hematuria about in Dec (around the time of his wife passing). Prior tobacco, quit 18y ago, 50 pack years. Went to see  (Dr Perez). Underwent CTU that showed filling defects in bladder. Upper tract negative. Also, with? of pancreatitis based on imaging. Cyto suspicious. Underwent TURBT with me on 2/27. 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  "Inspection of the bladder showed large necrotic papillary tumor x2 along the posterior wall.  There was significant trabeculation with cellules throughout.  Bilateral ureteral orifices were visualized with clear efflux.  Once surveillance was complete, resectoscope was used to carefully resect the posterior wall tumors.  This showed evidence of significant bladder wall thickening, concerning for muscle invasion.  Resection was taken down deep with care taken to avoid any significant injury, particularly given the patient's age and obtain appropriate pathology with resection."  Feels "good", had some gross hematuria a few days ago, then stopped. Appetite is good, no weight loss. No N/V/D, some constipation. Fatigue is not present. Did not sleep well last night, had to get up to void every 15 minutes. This was the first time this occurred. usually gets up 2-3 times. He does have some minor dysuria currently, at the initiation of voiding.  Occ incontinence with urgency. No chest pain/pressure. No edema. No cough, no RAMIREZ. No HA. Independent in ADLs, lives alone, sister from [place] will stay with him during this process  4/9/24: Patient returns for follow-up.  Muscle-invasive bladder cancer in this 89-year-old man.  He is set to have chemoradiation for local disease control.  He started radiation this past Friday as well as yesterday, and today he will be receiving his first dose of gemcitabine at 100 mg/m to be given weekly as well as radiation. Taking oxybutynin 5mg daily and Tamsulosin 0.4mg daily. Also takes mens 50+ vitamin. Occasionally takes miralax and stool softener for constipation.   4/16/24 - chemoRT started 4/5/24, weekly gemcitabine #2/4 today. Overall feeling "ok", some fatigue, but still able to climb the stairs to his apartment and doing some gardening outdoors. Appetite is good. Occasional cough to clear phlegm. Mild constipation resolved with eating melon. Has minimal dysuria first thing in the morning and then resolves, no dysuria or hematuria. Nocturia 4-6x/night. Denies fever, chills, night sweats, headache, dizziness, balance issues, eye pain/problems, mucositis/odynophagia, chest pain, palpitations, SOB, nausea/vomiting, diarrhea/constipation, abdominal pain, dysuria, hematuria, incontinence, LE edema, bleeding, muscle or joint pain/weakness.  4/23/24: Pt currently receiving CCRT with weekly gemcitabine #3/4 scheduled today. Gemcitabine held last week (#2) for neutropenia.  He was noted with rising creatinine last week which resolved with increased PO fluids. Today he is feeling well. Reports has been drinking at least 8 glasses of water daily. Tolerating treatment well. No new complaints today. Reports spends most of the day resting since starting treatment. Denies fever, chills, night sweats, headache, dizziness, balance issues, eye pain/problems, mucositis/odynophagia, chest pain, palpitations, SOB, nausea/vomiting, diarrhea/constipation, abdominal pain, dysuria, hematuria, incontinence, LE edema, bleeding, muscle or joint pain/weakness.  4/30/24 - chemoRT started 4/5/24, presents for weekly gemcitabine #4/4 today. Feeling weak and tired. For the past week he's had cold like symptoms with a cough, now improving with OTC Delsym. Sun night he had a temp of 100.4F, he took Tylenol and fever has not recurred. Mild chills at times. Appetite is decreased, no dysgeusia, weight is stable. Small volume loose/watery stools approx 10x/day for the past 3 days. Occasional bright red blood on the toilet paper with wiping but none in the bowl and not with every BM. He's been drinking plenty of water. Urine frequency is improved with taking tamsulosin at bedtime. Denies night sweats, headache, dizziness, balance issues, eye pain/problems, mucositis/odynophagia, chest pain, palpitations, SOB, nausea/vomiting, constipation, abdominal pain, dysuria, hematuria, incontinence, LE edema, rash/pruritus, muscle or joint pain/weakness.   [FreeTextEntry1] : Chemoradiation with weekly gemcitabine started 4/5/24.  [de-identified] : 5/16/24... chemoRT started 4/5/24, completed weekly gemcitabine #4/4 4/30/24. Rt ended 5/2/24.  Feels "all right". Feels some weakness and he is tired. he has to get up every hour to void. Does not sleep well as a result. He is taking the tamsulosin 2 hours before bed, with reduced oral intake. No blood, minor dysuria on initiation. Urgency is present and may lose some urine. No fevers, no chills. No cough, no RAMIREZ. No chest pain/pressure/palpitations. No edema. No HA, no dizziness. Watches soccer on TV, not much exercise, may do some gardening. No N/V/C, diarrheal stools resolved. Appetite is decreased, has some dysgeusia. Dropped 3.6 kilos from 4/30/24. he had blood work on 5/6, sodium was 130, potassium was 3.0. He has been taking an electrolyte solution. Independent in ADLs.   l

## 2024-05-17 NOTE — REVIEW OF SYSTEMS
[Fatigue] : fatigue [Recent Change In Weight] : ~T recent weight change [Cough] : cough [Incontinence] : incontinence [Muscle Weakness] : muscle weakness [Negative] : Gastrointestinal [Fever] : no fever [Chills] : no chills [Dysphagia] : no dysphagia [Hoarseness] : no hoarseness [Odynophagia] : no odynophagia [Chest Pain] : no chest pain [Palpitations] : no palpitations [Lower Ext Edema] : no lower extremity edema [Wheezing] : no wheezing [SOB on Exertion] : no shortness of breath during exertion [Dysuria] : no dysuria [Joint Pain] : no joint pain [Joint Stiffness] : no joint stiffness [Skin Rash] : no skin rash [Dizziness] : no dizziness [Fainting] : no fainting [Difficulty Walking] : no difficulty walking [Anxiety] : no anxiety [Depression] : no depression [Easy Bleeding] : no tendency for easy bleeding [Easy Bruising] : no tendency for easy bruising [FreeTextEntry4] : altered taste, decreased appetite [de-identified] : No HA

## 2024-06-04 ENCOUNTER — APPOINTMENT (OUTPATIENT)
Dept: RADIATION ONCOLOGY | Facility: CLINIC | Age: 89
End: 2024-06-04
Payer: MEDICARE

## 2024-06-04 VITALS
RESPIRATION RATE: 16 BRPM | SYSTOLIC BLOOD PRESSURE: 120 MMHG | HEART RATE: 70 BPM | WEIGHT: 157.3 LBS | OXYGEN SATURATION: 96 % | DIASTOLIC BLOOD PRESSURE: 59 MMHG | TEMPERATURE: 96.3 F | BODY MASS INDEX: 26.18 KG/M2

## 2024-06-04 PROCEDURE — 99024 POSTOP FOLLOW-UP VISIT: CPT

## 2024-06-04 NOTE — REASON FOR VISIT
[Post-Treatment Evaluation] : post-treatment evaluation for [Other: ___] : [unfilled] [Other: _____] : [unfilled] [Patient Declined  Services] : - None: Patient declined  services [Interpreters_FullName] : Bhaskar  [Interpreters_Relationshiptopatient] : Nephew [TWNoteComboBox1] : Togolese

## 2024-06-04 NOTE — REVIEW OF SYSTEMS
[Constipation: Grade 0] : Constipation: Grade 0 [Diarrhea: Grade 2 - Increase of 4 - 6 stools per day over baseline; moderate increase in ostomy output compared to baseline] : Diarrhea: Grade 2 - Increase of 4 - 6 stools per day over baseline; moderate increase in ostomy output compared to baseline [Nausea: Grade 0] : Nausea: Grade 0 [Fatigue: Grade 1 - Fatigue relieved by rest] : Fatigue: Grade 1 - Fatigue relieved by rest [Urinary Tract Pain: Grade 0] : Urinary Tract Pain: Grade 0 [Urinary Urgency: Grade 1 - Present] : Urinary Urgency: Grade 1 - Present [Urinary Frequency: Grade 1 - Present] : Urinary Frequency: Grade 1 - Present [Constipation: Grade 1 - Occasional or intermittent symptoms; occasional use of stool softeners, laxatives, dietary modification, or enema] : Constipation: Grade 1 - Occasional or intermittent symptoms; occasional use of stool softeners, laxatives, dietary modification, or enema [Diarrhea: Grade 0] : Diarrhea: Grade 0 [Nausea: Grade 1 - Loss of appetite without alteration in eating habits] : Nausea: Grade 1 - Loss of appetite without alteration in eating habits [Vomiting: Grade 0] : Vomiting: Grade 0 [Urinary Incontinence: Grade 1 - Occasional (e.g., with coughing, sneezing, etc.), pads not indicated] : Urinary Incontinence: Grade 1 - Occasional (e.g., with coughing, sneezing, etc.), pads not indicated

## 2024-06-04 NOTE — HISTORY OF PRESENT ILLNESS
[FreeTextEntry1] : 89M w/ T2N0 bladder cancer; initially presented with hematuria. CT urogram 12/29/23 had shown lesions in the posterior bladder, no LAD. He underwent TURBT 2/27/24 that showed multiple tumors on the posterior bladder wall. 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  Received hypofractionated RT to bladder with concurrent gemcitabine with Dr. Peña Started RT on 4/5/24. Gemzar started 4/9/24. Completed RT 5/2/24; 5500 cGy in 20 fractions. Received half his chemotherapy doses due to poor counts and bowel issues.  - on treatment, loose bowel movements with some blood on the toilet paper after frequent wiping. Urgency of urination, decreased appetite.   Follow up 6/4/24:  Symptoms have improved significantly since completing treatment. Notes fatigue and intermittent urinary incontinence. Denies abdominal pain, dysuria, hematuria or bowel changes. Does get some nausea if he eats too quickly. Tamsulosin 1 tab at night.

## 2024-06-04 NOTE — PHYSICAL EXAM
[Sclera] : the sclera and conjunctiva were normal [Extraocular Movements] : extraocular movements were intact [Outer Ear] : the ears and nose were normal in appearance [] : no respiratory distress [Respiration, Rhythm And Depth] : normal respiratory rhythm and effort [Exaggerated Use Of Accessory Muscles For Inspiration] : no accessory muscle use [Arterial Pulses Normal] : the arterial pulses were normal [Edema] : no peripheral edema present [Abdomen Soft] : soft [Nondistended] : nondistended [Nail Clubbing] : no clubbing  or cyanosis of the fingernails [Range of Motion to Joints] : range of motion to joints [Motor Tone] : muscle strength and tone were normal [Skin Color & Pigmentation] : normal skin color and pigmentation [Normal] : no focal deficits [Oriented To Time, Place, And Person] : oriented to person, place, and time [Affect] : the affect was normal [Mood] : the mood was normal [Not Anxious] : not anxious [General Appearance - Alert] : alert [General Appearance - In No Acute Distress] : in no acute distress [General Appearance - Well Developed] : well developed [de-identified] : elderly [de-identified] : Diminished hearing

## 2024-06-14 ENCOUNTER — RX RENEWAL (OUTPATIENT)
Age: 89
End: 2024-06-14

## 2024-06-14 RX ORDER — TAMSULOSIN HYDROCHLORIDE 0.4 MG/1
0.4 CAPSULE ORAL
Qty: 120 | Refills: 0 | Status: ACTIVE | COMMUNITY
Start: 2024-01-05 | End: 1900-01-01

## 2024-06-19 PROBLEM — C67.8 MALIGNANT NEOPLASM OF OVERLAPPING SITES OF BLADDER: Status: ACTIVE | Noted: 2024-03-10

## 2024-06-19 PROBLEM — D64.9 ANEMIA, UNSPECIFIED TYPE: Status: ACTIVE | Noted: 2024-01-01

## 2024-06-19 NOTE — HISTORY OF PRESENT ILLNESS
[Disease: _____________________] : Disease: [unfilled] [T: ___] : T[unfilled] [N: ___] : N[unfilled] [M: ___] : M[unfilled] [AJCC Stage: ____] : AJCC Stage: [unfilled] [de-identified] : Christopher Gonzales is seen in consultation on 3/14/24, accompanied by his sister and his nephew.  He is referred by Dr Jayda Sanchez. Dr Manjeet Holman From her note..."pt developed gross hematuria about in Dec (around the time of his wife passing). Prior tobacco, quit 18y ago, 50 pack years. Went to see  (Dr Perez). Underwent CTU that showed filling defects in bladder. Upper tract negative. Also, with? of pancreatitis based on imaging. Cyto suspicious. Underwent TURBT with me on 2/27. 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  "Inspection of the bladder showed large necrotic papillary tumor x2 along the posterior wall.  There was significant trabeculation with cellules throughout.  Bilateral ureteral orifices were visualized with clear efflux.  Once surveillance was complete, resectoscope was used to carefully resect the posterior wall tumors.  This showed evidence of significant bladder wall thickening, concerning for muscle invasion.  Resection was taken down deep with care taken to avoid any significant injury, particularly given the patient's age and obtain appropriate pathology with resection."  Feels "good", had some gross hematuria a few days ago, then stopped. Appetite is good, no weight loss. No N/V/D, some constipation. Fatigue is not present. Did not sleep well last night, had to get up to void every 15 minutes. This was the first time this occurred. usually gets up 2-3 times. He does have some minor dysuria currently, at the initiation of voiding.  Occ incontinence with urgency. No chest pain/pressure. No edema. No cough, no RAMIREZ. No HA. Independent in ADLs, lives alone, sister from [place] will stay with him during this process  4/9/24: Patient returns for follow-up.  Muscle-invasive bladder cancer in this 89-year-old man.  He is set to have chemoradiation for local disease control.  He started radiation this past Friday as well as yesterday, and today he will be receiving his first dose of gemcitabine at 100 mg/m to be given weekly as well as radiation. Taking oxybutynin 5mg daily and Tamsulosin 0.4mg daily. Also takes mens 50+ vitamin. Occasionally takes miralax and stool softener for constipation.   4/16/24 - chemoRT started 4/5/24, weekly gemcitabine #2/4 today. Overall feeling "ok", some fatigue, but still able to climb the stairs to his apartment and doing some gardening outdoors. Appetite is good. Occasional cough to clear phlegm. Mild constipation resolved with eating melon. Has minimal dysuria first thing in the morning and then resolves, no dysuria or hematuria. Nocturia 4-6x/night. Denies fever, chills, night sweats, headache, dizziness, balance issues, eye pain/problems, mucositis/odynophagia, chest pain, palpitations, SOB, nausea/vomiting, diarrhea/constipation, abdominal pain, dysuria, hematuria, incontinence, LE edema, bleeding, muscle or joint pain/weakness.  4/23/24: Pt currently receiving CCRT with weekly gemcitabine #3/4 scheduled today. Gemcitabine held last week (#2) for neutropenia.  He was noted with rising creatinine last week which resolved with increased PO fluids. Today he is feeling well. Reports has been drinking at least 8 glasses of water daily. Tolerating treatment well. No new complaints today. Reports spends most of the day resting since starting treatment. Denies fever, chills, night sweats, headache, dizziness, balance issues, eye pain/problems, mucositis/odynophagia, chest pain, palpitations, SOB, nausea/vomiting, diarrhea/constipation, abdominal pain, dysuria, hematuria, incontinence, LE edema, bleeding, muscle or joint pain/weakness.  4/30/24 - chemoRT started 4/5/24, presents for weekly gemcitabine #4/4 today. Feeling weak and tired. For the past week he's had cold like symptoms with a cough, now improving with OTC Delsym. Sun night he had a temp of 100.4F, he took Tylenol and fever has not recurred. Mild chills at times. Appetite is decreased, no dysgeusia, weight is stable. Small volume loose/watery stools approx 10x/day for the past 3 days. Occasional bright red blood on the toilet paper with wiping but none in the bowl and not with every BM. He's been drinking plenty of water. Urine frequency is improved with taking tamsulosin at bedtime. Denies night sweats, headache, dizziness, balance issues, eye pain/problems, mucositis/odynophagia, chest pain, palpitations, SOB, nausea/vomiting, constipation, abdominal pain, dysuria, hematuria, incontinence, LE edema, rash/pruritus, muscle or joint pain/weakness.   5/16/24... chemoRT started 4/5/24, completed weekly gemcitabine #4/4 4/30/24. Rt ended 5/2/24.  Feels "all right". Feels some weakness and he is tired. he has to get up every hour to void. Does not sleep well as a result. He is taking the tamsulosin 2 hours before bed, with reduced oral intake. No blood, minor dysuria on initiation. Urgency is present and may lose some urine. No fevers, no chills. No cough, no RAMIREZ. No chest pain/pressure/palpitations. No edema. No HA, no dizziness. Watches soccer on TV, not much exercise, may do some gardening. No N/V/C, diarrheal stools resolved. Appetite is decreased, has some dysgeusia. Dropped 3.6 kilos from 4/30/24. he had blood work on 5/6, sodium was 130, potassium was 3.0. He has been taking an electrolyte solution. Independent in ADLs.  [FreeTextEntry1] : Chemoradiation with weekly gemcitabine started 4/5/24, completed 5/2/24.  [de-identified] : 6/25/24 - s/p chemoRT with weekly gemcitabine, completed 5/2/24.   Denies fever, chills, night sweats, headache, dizziness, balance issues, eye pain/problems, mucositis/odynophagia, chest pain, palpitations, SOB, cough, nausea/vomiting, diarrhea/constipation, abdominal pain, dysuria, hematuria, incontinence, LE edema, rash/pruritus, bleeding, muscle or joint pain/weakness.

## 2024-06-19 NOTE — ASSESSMENT
[FreeTextEntry1] : Christopher Gonzales is seen today for f/u of stage II bladder cancer. Chemoradiation with weekly gemcitabine started 4/5/24. Gemcitabine #2/4 held on 4/16/24 for neutropenia. RT completed 5/2/24.   Bladder cancer: - S/p chemoRT with weekly gemcitabine, completed 5/2/24.  - Plan for repeat cystoscopy and imaging approx 12wks after completion of RT to assess tx response. Scans ordered to be done the week of 7/22/24. He has a cystoscopy scheduled for 8/6/24.   Anemia: - 3/14/24 hgb = 12.3 - 4/9/24 hgb = 11.6 - 4/16/24 hgb = 10.9 - 4/23/24 hgb 11.4 - 4/30/24 hgb = 10.7 - 5/16/2024 hgb = 9.9 - Continue to monitor  Diarrhea: - Mostly resolved at this time - Diarrhea likely 2/2 radiation effect. He is hemodynamically stable and feeling relatively well considering the diarrhea.  - Instructed to contact the office with any new/worsening symptoms.   Instructed to contact our office with any new/worsening symptoms. Pt and family member educated regarding plan of care, all questions/concerns addressed to the best of my abilities and their apparent satisfaction. F/u in 6wks [Palliative Care Plan] : not applicable at this time

## 2024-06-20 ENCOUNTER — RESULT REVIEW (OUTPATIENT)
Age: 89
End: 2024-06-20

## 2024-06-20 ENCOUNTER — NON-APPOINTMENT (OUTPATIENT)
Age: 89
End: 2024-06-20

## 2024-06-20 ENCOUNTER — APPOINTMENT (OUTPATIENT)
Dept: HEMATOLOGY ONCOLOGY | Facility: CLINIC | Age: 89
End: 2024-06-20

## 2024-06-20 VITALS
TEMPERATURE: 98.1 F | RESPIRATION RATE: 16 BRPM | SYSTOLIC BLOOD PRESSURE: 119 MMHG | HEART RATE: 95 BPM | OXYGEN SATURATION: 94 % | DIASTOLIC BLOOD PRESSURE: 70 MMHG

## 2024-06-20 LAB
ALBUMIN SERPL ELPH-MCNC: 3 G/DL
ALP BLD-CCNC: 96 U/L
ALT SERPL-CCNC: 67 U/L
ANION GAP SERPL CALC-SCNC: 12 MMOL/L
AST SERPL-CCNC: 47 U/L
BILIRUB SERPL-MCNC: 0.3 MG/DL
BUN SERPL-MCNC: 24 MG/DL
CALCIUM SERPL-MCNC: 8.9 MG/DL
CHLORIDE SERPL-SCNC: 89 MMOL/L
CO2 SERPL-SCNC: 19 MMOL/L
CREAT SERPL-MCNC: 0.98 MG/DL
EGFR: 74 ML/MIN/1.73M2
GLUCOSE SERPL-MCNC: 166 MG/DL
MAGNESIUM SERPL-MCNC: 1.8 MG/DL
POTASSIUM SERPL-SCNC: 5.3 MMOL/L
PROT SERPL-MCNC: 7.4 G/DL
SODIUM SERPL-SCNC: 120 MMOL/L

## 2024-06-20 RX ORDER — POLYETHYLENE GLYCOL 3350 17 G/17G
17 POWDER, FOR SOLUTION ORAL
Qty: 0 | Refills: 0 | DISCHARGE

## 2024-06-20 NOTE — ED PROVIDER NOTE - PHYSICAL EXAMINATION
General: appears uncomfortable  Psych: mood appropriate  Head: normocephalic; atraumatic  Eyes: conjunctivae clear bilaterally, sclerae anicteric  ENT: no nasal flaring, patent nares  Cardio: regular rate and rhythm; normal heart sounds  Resp: diminished but clear bilaterally; tachypneic, running out of breath while trying to speak in full sentences, sat 90-92 on 5L NC  GI: abdomen soft, nontender, nondistended  Neuro: A&Ox3  MSK: normal movement of extremities  Lymph/Vasc: 1+ pitting edema

## 2024-06-20 NOTE — H&P ADULT - PROBLEM SELECTOR PLAN 2
Significant hyponatremia to 120. Pt has previously been hyponatremic down to 130 however last month, Na was 136.  - Urine studies sent   - Will trial IV lasix as above  - Repeat BMP in AM  - Goal Na in 24 hrs 126-128. Significant hyponatremia to 120. Pt has previously been hyponatremic down to 130 however last month, Na was 136.  - Urine studies sent - low Eleanor and high Uosm suggesting low EABV; either true hypovolemia or sensed hypovolemia  - Will trial IV lasix as above  - Repeat BMP after lasix dose  - Goal Na in 24 hrs 126-128.

## 2024-06-20 NOTE — H&P ADULT - PROBLEM SELECTOR PLAN 1
Acute hypoxic resp failure likely due to large L pleural effusion. Unclear etiology of effusion, but possibly due to cardiac etiology vs malignancy vs sequelae of chemoradiation. CTA neg for PE.   - Currently maintaining saturation on 12L venti mask; wean as tolerated  - Will add on duonebs prn  - Given effusion and 1+ LE edema, will trial diuresis for improvement. Ordered 20mg IV lasix x1; monitor response  - Strict I/Os  - TTE ordered to evaluate cardiac function  - Pulm consult (emailed overnight) for possible thoracentesis and evaluation of pleural fluid  - Continuous pulse ox

## 2024-06-20 NOTE — ED CLERICAL - NS ED CLERK NOTE PRE-ARRIVAL INFORMATION; ADDITIONAL PRE-ARRIVAL INFORMATION
pt with hx bladder ca s/p completion of chemo/RT in early May with c/o shortness of breath, inability to lay flat, tachypnea and decreased PO intake x today  called in by: Barb

## 2024-06-20 NOTE — H&P ADULT - PROBLEM SELECTOR PLAN 3
Stage II; s/p TURBT and chemoradiation  - per outpatient records, plan for repeat cystoscopy after treatment; around August 2024  - CT imaging with new 8mm pulm nodule in RUL concerning for metastasis.  - Continue tamsulosin  - Monitor I/Os

## 2024-06-20 NOTE — H&P ADULT - ASSESSMENT
89 year old, [place]ese Speaking, male with hx of Stage II bladder cancer s/p TURBT and chemoradiation, presenting with dyspnea and orthopnea. Found to have large L pleural effusion. Now admitted for further workup and management.

## 2024-06-20 NOTE — ED ADULT NURSE NOTE - NSFALLHARMRISKINTERV_ED_ALL_ED

## 2024-06-20 NOTE — H&P ADULT - NSHPLABSRESULTS_GEN_ALL_CORE
120<LL>  |  88<L>  |  25<H>  ----------------------------<  124<H>  4.9   |  18<L>  |  0.91    Ca    9.2      2024 16:36  Phos  3.2       Mg     1.8         TPro  8.2  /  Alb  3.1<L>  /  TBili  0.4  /  DBili  x   /  AST  45<H>  /  ALT  71<H>  /  AlkPhos  96      Magnesium: 1.8 mg/dL (24 @ 16:36)    Phosphorus: 3.2 mg/dL (24 @ 16:36)               Urinalysis Basic - ( 2024 20:54 )    Color: Yellow / Appearance: Clear / S.013 / pH: x  Gluc: x / Ketone: Negative mg/dL  / Bili: Negative / Urobili: 0.2 mg/dL   Blood: x / Protein: 30 mg/dL / Nitrite: Negative   Leuk Esterase: Trace / RBC: 30 /HPF / WBC 22 /HPF   Sq Epi: x / Non Sq Epi: 0 /HPF / Bacteria: Negative /HPF                              10.3   13.96 )-----------( 328      ( 2024 16:36 )             30.8                         10.2   12.53 )-----------( 307      ( 2024 13:23 )             29.9       Blood Gas Source Venous: Venous (24 @ 16:36)    IMAGING    < from: CT Angio Chest PE Protocol w/ IV Cont (24 @ 21:24) >    FINDINGS:    LUNGS AND LARGE AIRWAYS: Patent central airways. Complete left lower lobe   passive atelectasis adjacent to a large pleural effusion. Mild passive   atelectasis in the right middle lobe and right lower lobe.  A new 8 mm nodule in the right upper lobe (302:226)..  PLEURA: Large left pleural effusion and small partially loculated right   pleural effusion.  VESSELS: No pulmonary embolus. Dilated right and left main pulmonary   arteries. Atherosclerotic desiccation is of the aorta and coronary   arteries.  HEART: Heart size is normal. No pericardial effusion.  MEDIASTINUM AND GRANT: No lymphadenopathy.  CHEST WALL AND LOWER NECK: Within normal limits.  VISUALIZED UPPER ABDOMEN: Bilateral adrenal gland thickening, slightly   progressed from prior study.  BONES: Degenerative changes.    IMPRESSION:  No pulmonary embolus.    Large left pleural effusion with complete passive atelectasis of the left   lower lobe. Small, partially loculated right pleural effusion.    A new 8 mm right upper lobe pulmonary nodule concerning for metastasis.    Further thickening of bilateral adrenal glands, nonspecific.    < end of copied text >

## 2024-06-20 NOTE — ED PROVIDER NOTE - CLINICAL SUMMARY MEDICAL DECISION MAKING FREE TEXT BOX
*  *      Attending note.  Patient was seen in room #24.  Patient is Tajik speaking and translation provided by the patient's son and additional history provided.  Patient has a history of bladder cancer which was treated with surgery followed by radiation.  Patient reports increasing dyspnea on exertion, orthopnea for last 2 to 3 days.  Denies any chest pain, abdominal pain, leg edema.  Was a previous smoker but no history of CAD or COPD.  He denies any fever or coughing.  He has no nausea, vomiting or diarrhea.  The patient and his son reports patient is pretty healthy except for the recent bladder cancer.     ROS-as above, otherwise negative.  PE-patient is alert with respiratory distress.  Oxygen saturation is 91-92% on 5 L nasal cannula.  There is no cyanosis or pallor.  There is no JVD or back jugular reflux.  Lungs are clear with decreased breath sounds at the bases bilaterally.  Chest is dull to percussion at the bases.  Heart is regular rate and rhythm.  Abdomen is soft, nontender nondistended.  Patient has trace pitting edema in the ankles bilaterally.  There is no calf tenderness.  Neurologic examination grossly intact.      A/P-respiratory distress with dyspnea on exertion and orthopnea which began 2 days ago.  Patient has no history of COPD or cardiac disease.  As patient has history of cancer, concern for pulmonary embolus.  Patient changed from nasal cannula to nonrebreather.  CT chest pulmonary angiogram ordered to rule out pulmonary embolus.  EKG, troponin, proBNP, labs and reassess.  Admission. JAGDISH Steve PGY1- patient with 2-3 days dyspnea worse with lying flat. hx bladder cancer on chemo, former smoker no hx COPD. satting 90-92% on 5L NC, having difficulty speaking in full sentences. ddx including PE, pleural effusion, COPD, CHF. will obtain labs, CTA, place on ventimask, admit.  *  *      Attending note.  Patient was seen in room #24.  Patient is Latvian speaking and translation provided by the patient's son and additional history provided.  Patient has a history of bladder cancer which was treated with surgery followed by radiation.  Patient reports increasing dyspnea on exertion, orthopnea for last 2 to 3 days.  Denies any chest pain, abdominal pain, leg edema.  Was a previous smoker but no history of CAD or COPD.  He denies any fever or coughing.  He has no nausea, vomiting or diarrhea.  The patient and his son reports patient is pretty healthy except for the recent bladder cancer.     ROS-as above, otherwise negative.  PE-patient is alert with respiratory distress.  Oxygen saturation is 91-92% on 5 L nasal cannula.  There is no cyanosis or pallor.  There is no JVD or back jugular reflux.  Lungs are clear with decreased breath sounds at the bases bilaterally.  Chest is dull to percussion at the bases.  Heart is regular rate and rhythm.  Abdomen is soft, nontender nondistended.  Patient has trace pitting edema in the ankles bilaterally.  There is no calf tenderness.  Neurologic examination grossly intact.      A/P-respiratory distress with dyspnea on exertion and orthopnea which began 2 days ago.  Patient has no history of COPD or cardiac disease.  As patient has history of cancer, concern for pulmonary embolus.  Patient changed from nasal cannula to nonrebreather.  CT chest pulmonary angiogram ordered to rule out pulmonary embolus.  EKG, troponin, proBNP, labs and reassess.  Admission.

## 2024-06-20 NOTE — ED PROVIDER NOTE - OBJECTIVE STATEMENT
The patient is an 89 y/o M with past medical history of bladder cancer on chemo and radiation (last had both of these 7 weeks ago, surgery several weeks prior to that) presenting with 2-3 days of dyspnea, worse with lying down. The patient is an 89 y/o M with past medical history of bladder cancer on chemo and radiation (last had both of these 7 weeks ago, surgery several weeks prior to that) presenting with 2-3 days of dyspnea, worse with lying down. Former smoker, no hx COPD, not on O2 at home. No associated chest pain, fever, chills, abdominal pain, nausea, vomiting, diarrhea, or any other symptoms.

## 2024-06-20 NOTE — H&P ADULT - NSHPREVIEWOFSYSTEMS_GEN_ALL_CORE
Review of Systems:   CONSTITUTIONAL: No fever, +weight loss  EYES: No eye pain, visual disturbances, or discharge  ENMT:  No difficulty hearing, tinnitus, vertigo; No sinus or throat pain  RESPIRATORY: +SOB. No cough, wheezing, chills or hemoptysis  CARDIOVASCULAR: No chest pain, palpitations, dizziness, or leg swelling  GASTROINTESTINAL: No abdominal or epigastric pain. No nausea, vomiting, or hematemesis; No diarrhea or constipation. No melena or hematochezia.+decreased appetite  GENITOURINARY: No dysuria, + frequency, no hematuria, or incontinence  NEUROLOGICAL: No headaches, memory loss, loss of strength, numbness, or tremors  SKIN: No itching, burning, rashes, or lesions   MUSCULOSKELETAL: No joint pain or swelling; No muscle, back pain  HEME/LYMPH: No easy bruising, or bleeding gums

## 2024-06-20 NOTE — ED ADULT NURSE NOTE - CAS EDN DISCHARGE ASSESSMENT
[facility]  Empiece despacio  Estos son Alv Abbot [place]  Realice los [address]  Bernie esto para [address] se mantengan leanna  Entre en calor antes de hacer los Alvah Abbot para la rodilla  Use jaqueline bicicleta estacionaria o camine alaina Bernie estos ejercicios de estiramiento jaqueline jonathan Ll[place] y presione con delaney caderas hacia adetaylorte   Marquis Mendez estiramiento por 30 segundos  Cambie de pierna  Repita palma ejercicio 2 o 3 veces con cada pierna  Estiramiento de los cuádriceps de pie: [employer], coloque jaqueline mano contra jaqueline delaney hombros  Lleve jones espalda contra jaqueline pared o agárrese del espaldar de jaqueline silla para mantener el equilibrio, si es necesario  [id number] y baje unas 10 pulgadas lentamente, [place] si fuera a sentarse en jaqueline silla  [place] de jones genet hacia adelante  Coloque todo jones peso sobre jaqueline kelli jones condición y [place] darle tratamiento  Discuta delaney opciones de tratamiento con delaney [facility] o tratamientos  Colsulte con jones Vincent Pinta [address] [place] [address] Alert and oriented to person, place and time

## 2024-06-20 NOTE — ED ADULT TRIAGE NOTE - CHIEF COMPLAINT QUOTE
SOB x2-3 days, denies any chest pain, fever, chills  currently on chemo for bladder CA, last chemo 7weeks ago

## 2024-06-20 NOTE — H&P ADULT - HISTORY OF PRESENT ILLNESS
89 year old male with hx of stage II bladder cancer s/p TURBT and chemoradiation with gemcitabine (completed 5/2/24). Pt presenting with worsening dyspnea on exertion and orthopnea for the past 3 days. Since starting chemoradiation, patient has been feeling weaker and with poor appetite. He has urinary urgency since the TURBT; he takes tamsulosin in the evening. Per nephew, patient has been keeping his A/C low at home so it has been hot in side his home. He started developing worsening shortness of breath a few days ago. No flu like symptoms, no cough, no fever, no chest pain. Pt has been trying to drink lots of fluids and would also drink ensure. He lives alone and is independent in his ADLs.     In the ED, patient was noted to be hypoxic requiring nonrebreather. CXR showed B/L pleural effusions, L>R. CTA chest performed without evidence of PE. Large pleural effusion noted on L with passive atelectasis. New 8mm pulm nodule in RUL also noted. Pt also significantly hyponatremic to 120. Pt admitted for further workup and management.

## 2024-06-20 NOTE — ED ADULT NURSE NOTE - OBJECTIVE STATEMENT
Patient  is  alert  and  oriented x4.  Color is  good and  skin warm to touch.  He  is  tachypneic  and  hypoxic  upon to  ER  arrival.   Patient denies  cough, fever or  chills.

## 2024-06-20 NOTE — H&P ADULT - PROBLEM SELECTOR PLAN 4
Hypertensive likely due to respiratory distress  - Continue to monitor, if persistently elevated, can consider starting antihypertensive meds with CCB or ACEi/ARB

## 2024-06-20 NOTE — H&P ADULT - NSHPPHYSICALEXAM_GEN_ALL_CORE
Vital Signs Last 24 Hrs  T(C): 36.9 (20 Jun 2024 20:32), Max: 36.9 (20 Jun 2024 17:44)  T(F): 98.5 (20 Jun 2024 20:32), Max: 98.5 (20 Jun 2024 20:32)  HR: 86 (20 Jun 2024 20:32) (86 - 97)  BP: 155/86 (20 Jun 2024 20:32) (145/74 - 175/82)  BP(mean): --  RR: 20 (20 Jun 2024 20:32) (20 - 24)  SpO2: 95% (20 Jun 2024 20:32) (92% - 97%)    Parameters below as of 20 Jun 2024 20:32  Patient On (Oxygen Delivery Method): mask, Venturi  O2 Flow (L/min): 12      CONSTITUTIONAL: Well-groomed, in no apparent distress  EYES: No conjunctival or scleral injection, non-icteric; PERRLA and symmetric  ENMT: No external nasal lesions; nasal mucosa not inflamed; oral mucosa with dry membranes  RESPIRATORY: Increased work of breathing; decreased breath sounds in L lung base. Mild expiratory wheezing appreciated in R lung base  CARDIOVASCULAR: +S1S2, tachycardic, no M/G/R; 1+ lower extremity edema  GASTROINTESTINAL: No palpable masses or tenderness, +BS throughout, no rebound/guarding; no hepatosplenomegaly; no hernia palpated  MUSCULOSKELETAL: No digital clubbing or cyanosis; no paraspinal tenderness; no malalignment of extremities  SKIN: No rashes or ulcers noted; no subcutaneous nodules or induration palpable  NEUROLOGIC: CN grossly intact; sensation intact in LEs b/l to light touch; normal strength and tone  PSYCHIATRIC: A+O x 3; mood and affect appropriate; appropriate insight and judgment

## 2024-06-21 ENCOUNTER — RESULT REVIEW (OUTPATIENT)
Age: 89
End: 2024-06-21

## 2024-06-21 NOTE — CHART NOTE - NSCHARTNOTEFT_GEN_A_CORE
Patient noted to be tachycardic with EKG showing atrial fibrillation. This appears to be new; patient has no known history of afib. He received 20mg IV lasix at around 23:00, with about 950cc urine output since. Patient was previously on venturi mask but desaturated and is now on high flow nasal cannula. Afib possibly due to hypovolemia (after diuresis?) vs large pleural effusion vs infection. Worsening hypoxia probably due to new Afib rather than worsening effusion.     Plan  - Ekg obtained showed Afib with RVR, HR 120s. BP currently stable. S/p 2.5mg IV lopressor without improvement.   - Continue HFNC for now  - Started heparin drip for stroke prevention given CHADSVASC score 2 and anticipating possible need for thoracentesis  - Concern for overdiuresis, will resuscitate with 500cc NS over 5 hours. Given unclear etiology of pleural effusion and unclear cardiac function, hesitant to give fluids too quickly.   - Pt also hyponatremic to 120, improved to 121 after 20mg IV lasix. Will repeat another BMP in AM  - F/u TTE  - switched admission to telemetry

## 2024-06-21 NOTE — CONSULT NOTE ADULT - SUBJECTIVE AND OBJECTIVE BOX
Wound Surgery Consult Note:    HPI:  89 year old male with hx of stage II bladder cancer s/p TURBT and chemoradiation with gemcitabine (completed 5/2/24). Pt presenting with worsening dyspnea on exertion and orthopnea for the past 3 days. Since starting chemoradiation, patient has been feeling weaker and with poor appetite. He has urinary urgency since the TURBT; he takes tamsulosin in the evening. Per nephew, patient has been keeping his A/C low at home so it has been hot in side his home. He started developing worsening shortness of breath a few days ago. No flu like symptoms, no cough, no fever, no chest pain. Pt has been trying to drink lots of fluids and would also drink ensure. He lives alone and is independent in his ADLs.     In the ED, patient was noted to be hypoxic requiring nonrebreather. CXR showed B/L pleural effusions, L>R. CTA chest performed without evidence of PE. Large pleural effusion noted on L with passive atelectasis. New 8mm pulm nodule in RUL also noted. Pt also significantly hyponatremic to 120. Pt admitted for further workup and management.  (20 Jun 2024 21:49)    Request for wound care reevaluation of the sacrum and bilateral buttocks received from nursing. Mr. Gonzales was encountered on an alternating air with low air loss surface. He was seen with his clinical nurse. He is incontinent of stool and urine. His immobility, inactivity, incontinence of bowel and bladder in addition to poor nutritional status all contribute to his risk of pressure injury development and hinder healing. Principles of pressure injury prevention including but not limited to turning and positioning reviewed with patient.     PAST MEDICAL & SURGICAL HISTORY:  Malignant neoplasm of bladder, unspecified  HLD (hyperlipidemia)  S/P bilateral inguinal hernia repair    REVIEW OF SYSTEMS:  CONSTITUTIONAL: no weakness, no fevers or chills  EYES/ENT: No visual changes;  No vertigo or throat pain   MOUTH: No oral lesion, moist  NECK: No pain or stiffness  RESPIRATORY: No cough, wheezing, hemoptysis; + shortness of breath  CARDIOVASCULAR: No chest pain or palpitations  GASTROINTESTINAL: No abdominal or epigastric pain. No nausea, vomiting, or hematemesis; No diarrhea or constipation. No melena or hematochezia.  GENITOURINARY: No dysuria, frequency or hematuria  NEUROLOGICAL: no weakness  SKIN: discoloration on sacrum/bilateral buttocks  PSYCH: no confusion or altered mental status    MEDICATIONS  (STANDING):  chlorhexidine 2% Cloths 1 Application(s) Topical daily  heparin  Infusion.  Unit(s)/Hr (12 mL/Hr) IV Continuous <Continuous>  pantoprazole    Tablet 40 milliGRAM(s) Oral before breakfast  sodium chloride 0.9%. 500 milliLiter(s) (100 mL/Hr) IV Continuous <Continuous>  tamsulosin 0.4 milliGRAM(s) Oral at bedtime    MEDICATIONS  (PRN):  acetaminophen     Tablet .. 650 milliGRAM(s) Oral every 6 hours PRN Temp greater or equal to 38C (100.4F), Mild Pain (1 - 3)  albuterol/ipratropium for Nebulization 3 milliLiter(s) Nebulizer every 6 hours PRN Shortness of Breath and/or Wheezing  aluminum hydroxide/magnesium hydroxide/simethicone Suspension 30 milliLiter(s) Oral every 4 hours PRN Dyspepsia  heparin   Injectable 5500 Unit(s) IV Push every 6 hours PRN For aPTT less than 40  heparin   Injectable 2500 Unit(s) IV Push every 6 hours PRN For aPTT between 40 - 57    Allergies    No Known Allergies    Intolerances    SOCIAL HISTORY:  /Former smoker, Denies smoking, ETOH, drugs    FAMILY HISTORY: no pertinent family history among first degree relatives    Vital Signs Last 24 Hrs  T(C): 36.6 (21 Jun 2024 11:22), Max: 37.1 (21 Jun 2024 05:50)  T(F): 97.9 (21 Jun 2024 11:22), Max: 98.7 (21 Jun 2024 05:50)  HR: 93 (21 Jun 2024 14:01) (86 - 134)  BP: 101/64 (21 Jun 2024 11:22) (101/64 - 161/83)  BP(mean): --  RR: 20 (21 Jun 2024 14:01) (18 - 26)  SpO2: 94% (21 Jun 2024 14:01) (92% - 97%)    Parameters below as of 21 Jun 2024 14:01  Patient On (Oxygen Delivery Method): nasal cannula, high flow  O2 Flow (L/min): 50  O2 Concentration (%): 50    Physical Exam:  General: alert, WN  Ophthamology: sclera clear  ENMT: moist mucous membranes, trachea midline  Respiratory: equal chest rise with respirations, hi miriam 02 applied  Gastrointestinal: soft NT/ND  Neurology: verbal,  following commands  Psych: calm, cooperative  Musculoskeletal: no contractures  Vascular: BLE edema equal  Skin:  Sacral/bilateral buttocks intact deep maroon discolored skin, no drainage L 4cm x 4cm x D none, drainage  No odor, erythema, increased warmth, tenderness, induration, fluctuance    LABS:  06-21    121<L>  |  89<L>  |  22  ----------------------------<  134<H>  4.6   |  19<L>  |  0.87    Ca    9.0      21 Jun 2024 06:45  Phos  3.5     06-21  Mg     1.8     06-21    TPro  8.2  /  Alb  3.1<L>  /  TBili  0.4  /  DBili  x   /  AST  45<H>  /  ALT  71<H>  /  AlkPhos  96  06-20                          10.6   14.38 )-----------( 369      ( 21 Jun 2024 09:12 )             31.0     PTT - ( 21 Jun 2024 09:13 )  PTT:35.7 sec  Urinalysis Basic - ( 21 Jun 2024 06:45 )    Color: x / Appearance: x / SG: x / pH: x  Gluc: 134 mg/dL / Ketone: x  / Bili: x / Urobili: x   Blood: x / Protein: x / Nitrite: x   Leuk Esterase: x / RBC: x / WBC x   Sq Epi: x / Non Sq Epi: x / Bacteria: x

## 2024-06-21 NOTE — CHART NOTE - NSCHARTNOTEFT_GEN_A_CORE
CC: SpO2 90% on venti-mask      HPI:  Called by RN to report patient satting 90% on Venti mask. Patient seen and assessed at bedside; he is alert, awake, NAD.   Patient denied         ROS:  CONSTITUTIONAL:  No fever, chills, rigors  CARDIOVASCULAR:  No chest pain or palpitations  RESPIRATORY:   No SOB, cough, wheezing  GASTROINTESTINAL:  No abd pain, N/V/D  NEUROLOGIC:  No HA, visual disturbances        PAST MEDICAL & SURGICAL HISTORY:  Malignant neoplasm of bladder, unspecified  HLD (hyperlipidemia)  S/P bilateral inguinal hernia repair              Vital Signs Last 24 Hrs  T(C): 36.2 (20 Jun 2024 21:57), Max: 36.9 (20 Jun 2024 17:44)  T(F): 97.2 (20 Jun 2024 21:57), Max: 98.5 (20 Jun 2024 20:32)  HR: 97 (20 Jun 2024 21:57) (86 - 97)  BP: 150/81 (20 Jun 2024 21:57) (145/74 - 175/82)  RR: 18 (20 Jun 2024 21:57) (18 - 24)  SpO2: 94% (20 Jun 2024 21:57) (92% - 97%)    Parameters below as of 20 Jun 2024 21:57  Patient On (Oxygen Delivery Method): mask, Venturi  O2 Flow (L/min): 12        Physical Exam:  General: Thin, elderly male sitting in bed, NAD, alert and awake, nontoxic appearing  Head:  NC/AT  CV: (+) tachycardic , S1S2   Respiratory: (+) decreased breath sounds at left lung base, otherwise CTA B/L, nonlabored on VM  Abdominal: (+) bowel sounds x4. Large, soft abdomen, NT, no guarding or rebound tenderness  MSK: No BLLE edema, + peripheral pulses, FROM all 4 extremity  Skin: (+) warm, dry         Labs:                        10.3   13.96 )-----------( 328      ( 20 Jun 2024 16:36 )             30.8     06-20    120<LL>  |  88<L>  |  25<H>  ----------------------------<  124<H>  4.9   |  18<L>  |  0.91    Ca    9.2      20 Jun 2024 16:36  Phos  3.2     06-20  Mg     1.8     06-20    TPro  8.2  /  Alb  3.1<L>  /  TBili  0.4  /  DBili  x   /  AST  45<H>  /  ALT  71<H>  /  AlkPhos  96  06-20      Urinalysis Basic - ( 06-20 @ 20:54 )  Color: Negative / Appearance: Negative / SG: -- / pH: Negative  Gluc: Large / Ketone: Yellow  / Bili: -- / Urobili: --   Blood: 0 / Protein: -- / Nitrite: Negative   Leuk Esterase: Trace / RBC: 1.013 / WBC --   Sq Epi: 30 / Non Sq Epi: 30 / Bacteria: 5.5          Radiology:  < from: CT Angio Chest PE Protocol w/ IV Cont (06.20.24 @ 21:24) >  No pulmonary embolus.  Large left pleural effusion with complete passive atelectasis of the left   lower lobe. Small, partially loculated right pleural effusion.  A new 8 mm right upper lobe pulmonary nodule concerning for metastasis.  Further thickening of bilateral adrenal glands, nonspecific.  < end of copied text >                Assessment & Plan:  89yoM PMHx bladder cancer on chemo and radiation (last had both of these 7 weeks ago, surgery several weeks prior to that) p/w 2-3 days of dyspnea, worse with lying down.      #Acute hypoxic respiratory failure  -  -  -Will continue to closely monitor patient/vitals  -Will discuss with day team, attending to follow         Can Campos PA-C  Dept of Medicine  [id number]        Time-based billing (NON-critical care).   ____ minutes spent on total encounter. The necessity of the time spent during the encounter on this date of service was due to:   Need to interview and examine patient and family, coordinate care with hospitalist, place orders, document, personally review labs, and review prior medical records. CC: SpO2 90% on venti-mask      HPI:  Called by RN to report patient satting 90% on Venti mask. Patient seen and assessed at bedside; he is alert, awake, NAD. Patient denied headache, dizziness, chest pain, acute dyspnea, nausea, vomiting, or abdominal pain. Patient stated he feels "better" since admission.         ROS:  CONSTITUTIONAL:  No fever, chills, rigors  CARDIOVASCULAR:  No chest pain or palpitations  RESPIRATORY:   No SOB, cough, wheezing  GASTROINTESTINAL:  No abd pain, N/V/D  NEUROLOGIC:  No HA, visual disturbances        PAST MEDICAL & SURGICAL HISTORY:  Malignant neoplasm of bladder, unspecified  HLD (hyperlipidemia)  S/P bilateral inguinal hernia repair              Vital Signs Last 24 Hrs  T(C): 36.2 (20 Jun 2024 21:57), Max: 36.9 (20 Jun 2024 17:44)  T(F): 97.2 (20 Jun 2024 21:57), Max: 98.5 (20 Jun 2024 20:32)  HR: 97 (20 Jun 2024 21:57) (86 - 97)  BP: 150/81 (20 Jun 2024 21:57) (145/74 - 175/82)  RR: 18 (20 Jun 2024 21:57) (18 - 24)  SpO2: 94% (20 Jun 2024 21:57) (92% - 97%)    Parameters below as of 20 Jun 2024 21:57  Patient On (Oxygen Delivery Method): mask, Venturi  O2 Flow (L/min): 12        Physical Exam:  General: Thin, elderly male sitting in bed, NAD, alert and awake, nontoxic appearing  Head:  NC/AT  CV: (+) tachycardic , S1S2   Respiratory: (+) tachypneic, (+) decreased breath sounds at left lung base, otherwise CTA B/L, nonlabored on VM  Abdominal: (+) bowel sounds x4. Large, soft abdomen, NT, no guarding or rebound tenderness  MSK: No BLLE edema, + peripheral pulses, FROM all 4 extremity  Skin: (+) warm, dry         Labs:                        10.3   13.96 )-----------( 328      ( 20 Jun 2024 16:36 )             30.8     06-20    120<LL>  |  88<L>  |  25<H>  ----------------------------<  124<H>  4.9   |  18<L>  |  0.91    Ca    9.2      20 Jun 2024 16:36  Phos  3.2     06-20  Mg     1.8     06-20    TPro  8.2  /  Alb  3.1<L>  /  TBili  0.4  /  DBili  x   /  AST  45<H>  /  ALT  71<H>  /  AlkPhos  96  06-20      Urinalysis Basic - ( 06-20 @ 20:54 )  Color: Negative / Appearance: Negative / SG: -- / pH: Negative  Gluc: Large / Ketone: Yellow  / Bili: -- / Urobili: --   Blood: 0 / Protein: -- / Nitrite: Negative   Leuk Esterase: Trace / RBC: 1.013 / WBC --   Sq Epi: 30 / Non Sq Epi: 30 / Bacteria: 5.5          Radiology:  < from: CT Angio Chest PE Protocol w/ IV Cont (06.20.24 @ 21:24) >  No pulmonary embolus.  Large left pleural effusion with complete passive atelectasis of the left   lower lobe. Small, partially loculated right pleural effusion.  A new 8 mm right upper lobe pulmonary nodule concerning for metastasis.  Further thickening of bilateral adrenal glands, nonspecific.  < end of copied text >                Assessment & Plan:  89yoM PMHx bladder cancer on chemo and radiation (last had both of these 7 weeks ago, surgery several weeks prior to that) p/w 2-3 days of dyspnea, worse with lying down.  Patient now presenting acutely with acute hypoxic respiratory failure, satting 90% while on Venti mask 12L.       #Acute hypoxic respiratory failure likely 2/2 large left pleural effusion +/- AFib RVR  -Vital signs hemodynamically stable, noted with tachycardia and SpO2 90% on Venti mask  -Patient placed on HFNC (60L/80%) with improvement in SpO2 to mid-high 90s  -Patient received Lasix 20mg IVP x1  -CTA chest (6/20) negative for PE, noted with "Large left pleural effusion with complete passive atelectasis of the left lower lobe."  -Pulmonology emailed, follow up recommendations  -Continue with duonebs Q6h PRN  -Wean oxygen as tolerated  -Will continue to closely monitor patient/vitals  -Will discuss with day team, attending to follow         #New onset AFib with RVR ?secondary to large left pleural effusion/hypoxia  -Patient noted with tachycardia with -130s, patient is afebrile  -Chart reviewed, no documented history of atrial fibrillation noted  -STAT EKG reviewed: AFib,  BPM. No prior EKG available for comparison.   -Labs reviewed, K 4.9 and Mg 1.8 on admission  -Keep K >4.0, Mg >2.0  -TSH ordered for AM  -Lopressor 2.5mg IVP x1 given persistent AFib RVR  -Consider initiation of BB for rate control  -The above was discussed with hospitalist Dr. Bullard, patient started on heparin gtt for anticoagulation (CHADSVASC score 2)  -TTE pending  -Patient upgraded to telemetry for closer cardiac monitoring  -Cardiology evaluation in AM  -Will continue to closely monitor patient/vitals      Can Campos PA-C  Dept of Medicine  [id number]        Time-based billing (NON-critical care).   48 minutes spent on total encounter. The necessity of the time spent during the encounter on this date of service was due to:   Need to interview and examine patient and family, coordinate care with hospitalist, place orders, document, personally review labs, and review prior medical records.

## 2024-06-21 NOTE — PROGRESS NOTE ADULT - PROBLEM SELECTOR PLAN 1
Acute hypoxic resp failure. Unclear cause at this time. ? Chemo induced  CTA neg for PE.   -  duonebs prn  - TTE - normal LV and RV fxn, no diastolic dysfunction  - Pulm consult (emailed overnight) for evaluation for thoracentesis

## 2024-06-21 NOTE — CONSULT NOTE ADULT - SUBJECTIVE AND OBJECTIVE BOX
Oncology Consult Note    HPI as per admitting team:   89 year old male with hx of stage II bladder cancer s/p TURBT and chemoradiation with gemcitabine (completed 5/2/24). Pt presenting with worsening dyspnea on exertion and orthopnea for the past 3 days. Since starting chemoradiation, patient has been feeling weaker and with poor appetite. He has urinary urgency since the TURBT; he takes tamsulosin in the evening. Per nephew, patient has been keeping his A/C low at home so it has been hot in side his home. He started developing worsening shortness of breath a few days ago. No flu like symptoms, no cough, no fever, no chest pain. Pt has been trying to drink lots of fluids and would also drink ensure. He lives alone and is independent in his ADLs.     In the ED, patient was noted to be hypoxic requiring nonrebreather. CXR showed B/L pleural effusions, L>R. CTA chest performed without evidence of PE. Large pleural effusion noted on L with passive atelectasis. New 8mm pulm nodule in RUL also noted. Pt also significantly hyponatremic to 120. Pt admitted for further workup and management.  (20 Jun 2024 21:49)    Onc hx:  Christopher Gonzales is seen in consultation on 3/14/24, accompanied by his sister and his nephew.  He is referred by Dr Jayda Sanchez. Dr Manjeet Holman  From her note..."pt developed gross hematuria about in Dec (around the time of his wife passing). Prior tobacco, quit 18y ago, 50 pack years. Went to see  (Dr Perez). Underwent CTU that showed filling defects in bladder. Upper tract negative. Also, with? of pancreatitis based on imaging. Cyto suspicious. Underwent TURBT with me on 2/27. 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 "Inspection of the bladder showed large necrotic papillary tumor x2 along the posterior wall. There was significant trabeculation with cellules throughout. Bilateral ureteral orifices were visualized with clear efflux. Once surveillance was complete, resectoscope was used to carefully resect the posterior wall tumors. This showed evidence of significant bladder wall thickening, concerning for muscle invasion. Resection was taken down deep with care taken to avoid any significant injury, particularly given the patient's age and obtain appropriate pathology with resection."  ?  Feels "good", had some gross hematuria a few days ago, then stopped. Appetite is good, no weight loss. No N/V/D, some constipation. Fatigue is not present. Did not sleep well last night, had to get up to void every 15 minutes. This was the first time this occurred. usually gets up 2-3 times. He does have some minor dysuria currently, at the initiation of voiding. Occ incontinence with urgency. No chest pain/pressure. No edema. No cough, no RAMIREZ. No HA. Independent in ADLs, lives alone, sister from [place] will stay with him during this process  ?  4/9/24: Patient returns for follow-up. Muscle-invasive bladder cancer in this 89-year-old man. He is set to have chemoradiation for local disease control. He started radiation this past Friday as well as yesterday, and today he will be receiving his first dose of gemcitabine at 100 mg/m to be given weekly as well as radiation. Taking oxybutynin 5mg daily and Tamsulosin 0.4mg daily. Also takes mens 50+ vitamin. Occasionally takes miralax and stool softener for constipation.  ?  4/16/24 - chemoRT started 4/5/24, weekly gemcitabine #2/4 today. Overall feeling "ok", some fatigue, but still able to climb the stairs to his apartment and doing some gardening outdoors. Appetite is good. Occasional cough to clear phlegm. Mild constipation resolved with eating melon. Has minimal dysuria first thing in the morning and then resolves, no dysuria or hematuria. Nocturia 4-6x/night. Denies fever, chills, night sweats, headache, dizziness, balance issues, eye pain/problems, mucositis/odynophagia, chest pain, palpitations, SOB, nausea/vomiting, diarrhea/constipation, abdominal pain, dysuria, hematuria, incontinence, LE edema, bleeding, muscle or joint pain/weakness.  ?  4/23/24: Pt currently receiving CCRT with weekly gemcitabine #3/4 scheduled today. Gemcitabine held last week (#2) for neutropenia. He was noted with rising creatinine last week which resolved with increased PO fluids. Today he is feeling well. Reports has been drinking at least 8 glasses of water daily. Tolerating treatment well. No new complaints today. Reports spends most of the day resting since starting treatment. Denies fever, chills, night sweats, headache, dizziness, balance issues, eye pain/problems, mucositis/odynophagia, chest pain, palpitations, SOB, nausea/vomiting, diarrhea/constipation, abdominal pain, dysuria, hematuria, incontinence, LE edema, bleeding, muscle or joint pain/weakness.  ?  4/30/24 - chemoRT started 4/5/24, presents for weekly gemcitabine #4/4 today. Feeling weak and tired. For the past week he's had cold like symptoms with a cough, now improving with OTC Delsym. Sun night he had a temp of 100.4F, he took Tylenol and fever has not recurred. Mild chills at times. Appetite is decreased, no dysgeusia, weight is stable. Small volume loose/watery stools approx 10x/day for the past 3 days. Occasional bright red blood on the toilet paper with wiping but none in the bowl and not with every BM. He's been drinking plenty of water. Urine frequency is improved with taking tamsulosin at bedtime. Denies night sweats, headache, dizziness, balance issues, eye pain/problems, mucositis/odynophagia, chest pain, palpitations, SOB, nausea/vomiting, constipation, abdominal pain, dysuria, hematuria, incontinence, LE edema, rash/pruritus, muscle or joint pain/weakness.     Disease: bladder cancer    TNM stage: T2, N0, M0  AJCC Stage: clinical II    REVIEW OF SYSTEMS:      PAST MEDICAL & SURGICAL HISTORY:  Malignant neoplasm of bladder, unspecified  HLD (hyperlipidemia)  S/P bilateral inguinal hernia repair    FAMILY HISTORY:  Father - bladder cancer    SOCIAL HISTORY:   Former smoker. No EtOH or illicit drug use.    Allergies  No Known Allergies      MEDICATIONS  (STANDING):  chlorhexidine 2% Cloths 1 Application(s) Topical daily  heparin  Infusion.  Unit(s)/Hr (12 mL/Hr) IV Continuous <Continuous>  pantoprazole    Tablet 40 milliGRAM(s) Oral before breakfast  sodium chloride 0.9%. 500 milliLiter(s) (100 mL/Hr) IV Continuous <Continuous>  tamsulosin 0.4 milliGRAM(s) Oral at bedtime    MEDICATIONS  (PRN):  acetaminophen     Tablet .. 650 milliGRAM(s) Oral every 6 hours PRN Temp greater or equal to 38C (100.4F), Mild Pain (1 - 3)  albuterol/ipratropium for Nebulization 3 milliLiter(s) Nebulizer every 6 hours PRN Shortness of Breath and/or Wheezing  aluminum hydroxide/magnesium hydroxide/simethicone Suspension 30 milliLiter(s) Oral every 4 hours PRN Dyspepsia  heparin   Injectable 5500 Unit(s) IV Push every 6 hours PRN For aPTT less than 40  heparin   Injectable 2500 Unit(s) IV Push every 6 hours PRN For aPTT between 40 - 57      OBJECTIVE   Height (cm): 170.2 (06-20 @ 14:44)  Weight (kg): 68 (06-20 @ 14:44)  BMI (kg/m2): 23.5 (06-20 @ 14:44)  BSA (m2): 1.79 (06-20 @ 14:44)    T(F): 97.9 (06-21-24 @ 11:22), Max: 98.7 (06-21-24 @ 05:50)  HR: 134 (06-21-24 @ 11:22)  BP: 101/64 (06-21-24 @ 11:22)  RR: 20 (06-21-24 @ 11:22)  SpO2: 94% (06-21-24 @ 11:22)  Wt(kg): --    PHYSICAL EXAM                             10.6   14.38 )-----------( 369      ( 21 Jun 2024 09:12 )             31.0       06-21    121<L>  |  89<L>  |  22  ----------------------------<  134<H>  4.6   |  19<L>  |  0.87    Ca    9.0      21 Jun 2024 06:45  Phos  3.5     06-21  Mg     1.8     06-21    TPro  8.2  /  Alb  3.1<L>  /  TBili  0.4  /  DBili  x   /  AST  45<H>  /  ALT  71<H>  /  AlkPhos  96  06-20      Magnesium: 1.8 mg/dL (06-21 @ 06:45)  Phosphorus: 3.5 mg/dL (06-21 @ 06:45)  Magnesium: 1.8 mg/dL (06-21 @ 02:28)  Phosphorus: 3.3 mg/dL (06-21 @ 02:28)  Magnesium: 1.8 mg/dL (06-20 @ 16:36)  Phosphorus: 3.2 mg/dL (06-20 @ 16:36)    RADIOLOGY & ADDITIONAL TESTING:   < from: CT Angio Chest PE Protocol w/ IV Cont (06.20.24 @ 21:24) >  FINDINGS:    LUNGS AND LARGE AIRWAYS: Patent central airways. Complete left lower lobe   passive atelectasis adjacent to a large pleural effusion. Mild passive   atelectasis in the right middle lobe and right lower lobe.  A new 8 mm nodule in the right upper lobe (302:226)..  PLEURA: Large left pleural effusion and small partially loculated right   pleural effusion.  VESSELS: No pulmonary embolus. Dilated right and left main pulmonary   arteries. Atherosclerotic desiccation is of the aorta and coronary   arteries.  HEART: Heart size is normal. No pericardial effusion.  MEDIASTINUM AND GRANT: No lymphadenopathy.  CHEST WALL AND LOWER NECK: Within normal limits.  VISUALIZED UPPER ABDOMEN: Bilateral adrenal gland thickening, slightly   progressed from prior study.  BONES: Degenerative changes.    IMPRESSION:  No pulmonary embolus.    Large left pleural effusion with complete passive atelectasis of the left   lower lobe. Small, partially loculated right pleural effusion.    A new 8 mm right upper lobe pulmonary nodule concerning for metastasis.    Further thickening of bilateral adrenal glands, nonspecific.    --- End of Report ---    FRANCIA FLOR MD; Attending Radiologist  This document has been electronically signed. Jun 20 2024  9:36PM    < end of copied text >

## 2024-06-21 NOTE — PROGRESS NOTE ADULT - PROBLEM SELECTOR PLAN 2
Significant hyponatremia to 120. Pt has previously been hyponatremic down to 130 however last month, Na was 136.  - Urine studies sent - low Eleanor and high Uosm suggesting low EABV;   - hold further lasix  - Repeat BMP after lasix dose  - Goal Na in 24 hrs 126-128.

## 2024-06-21 NOTE — PROGRESS NOTE ADULT - SUBJECTIVE AND OBJECTIVE BOX
Andre Reyes, M.D.  Office: [phone]  Available thru Microsoft Teams     Patient is a 89y old  Male who presents with a chief complaint of SOB (20 Jun 2024 21:49)          SUBJECTIVE / OVERNIGHT EVENTS:    No acute overnight events.    ROS: ( - ) Fever, ( - )Chills,  ( - )Nausea/Vomiting, ( - ) Cough, ( - )Shortness of breath, ( - )Chest Pain    MEDICATIONS  (STANDING):  heparin  Infusion.  Unit(s)/Hr (12 mL/Hr) IV Continuous <Continuous>  pantoprazole    Tablet 40 milliGRAM(s) Oral before breakfast  sodium chloride 0.9%. 500 milliLiter(s) (100 mL/Hr) IV Continuous <Continuous>  tamsulosin 0.4 milliGRAM(s) Oral at bedtime    MEDICATIONS  (PRN):  acetaminophen     Tablet .. 650 milliGRAM(s) Oral every 6 hours PRN Temp greater or equal to 38C (100.4F), Mild Pain (1 - 3)  albuterol/ipratropium for Nebulization 3 milliLiter(s) Nebulizer every 6 hours PRN Shortness of Breath and/or Wheezing  aluminum hydroxide/magnesium hydroxide/simethicone Suspension 30 milliLiter(s) Oral every 4 hours PRN Dyspepsia  heparin   Injectable 2500 Unit(s) IV Push every 6 hours PRN For aPTT between 40 - 57  heparin   Injectable 5500 Unit(s) IV Push every 6 hours PRN For aPTT less than 40          T(C): 37.1 (06-21 @ 05:50), Max: 37.1 (06-21 @ 05:50)   HR: 115   BP: 130/76   RR: 20   SpO2: 94%    PHYSICAL EXAM:    CONSTITUTIONAL: NAD, well-developed, well-groomed  EYES: PERRLA; conjunctiva and sclera clear  ENMT: Moist oral mucosa, no pharyngeal injection or exudates; normal dentition  NECK: Supple, no palpable masses; no thyromegaly  RESPIRATORY: Normal respiratory effort; lungs are clear to auscultation bilaterally  CARDIOVASCULAR: Regular rate and rhythm, normal S1 and S2, no murmur/rub/gallop; No lower extremity edema; Peripheral pulses are 2+ bilaterally  ABDOMEN: Nontender to palpation, normoactive bowel sounds, no rebound/guarding; No hepatosplenomegaly  MUSCULOSKELETAL:  no clubbing or cyanosis of digits; no joint swelling or tenderness to palpation  PSYCH: A+O to person, place, and time; affect appropriate  NEUROLOGY: CN 2-12 are intact and symmetric; no gross sensory deficits   SKIN: No rashes; no palpable lesions      LABS:                        10.6   14.38 )-----------( 369      ( 21 Jun 2024 09:12 )             31.0      06-21    121<L>  |  89<L>  |  22  ----------------------------<  134<H>  4.6   |  19<L>  |  0.87    Ca    9.0      21 Jun 2024 06:45  Phos  3.5     06-21  Mg     1.8     06-21    TPro  8.2  /  Alb  3.1<L>  /  TBili  0.4  /  DBili  x   /  AST  45<H>  /  ALT  71<H>  /  AlkPhos  96  06-20       CAPILLARY BLOOD GLUCOSE          RADIOLOGY & ADDITIONAL TESTS:    Imaging Personally Reviewed:  Consultant(s) Notes Reviewed:    Care Discussed with Consultants/Other Providers:   Andre Reyes, M.D.  Office: [phone]  Available thru Microsoft Teams     Patient is a 89y old  Male who presents with a chief complaint of SOB (20 Jun 2024 21:49)          SUBJECTIVE / OVERNIGHT EVENTS:    No acute overnight events.  On high flow feeling comfortable    ROS: ( - ) Fever, ( - )Chills,  ( - )Nausea/Vomiting, ( - ) Cough, ( - )Shortness of breath, ( - )Chest Pain    MEDICATIONS  (STANDING):  heparin  Infusion.  Unit(s)/Hr (12 mL/Hr) IV Continuous <Continuous>  pantoprazole    Tablet 40 milliGRAM(s) Oral before breakfast  sodium chloride 0.9%. 500 milliLiter(s) (100 mL/Hr) IV Continuous <Continuous>  tamsulosin 0.4 milliGRAM(s) Oral at bedtime    MEDICATIONS  (PRN):  acetaminophen     Tablet .. 650 milliGRAM(s) Oral every 6 hours PRN Temp greater or equal to 38C (100.4F), Mild Pain (1 - 3)  albuterol/ipratropium for Nebulization 3 milliLiter(s) Nebulizer every 6 hours PRN Shortness of Breath and/or Wheezing  aluminum hydroxide/magnesium hydroxide/simethicone Suspension 30 milliLiter(s) Oral every 4 hours PRN Dyspepsia  heparin   Injectable 2500 Unit(s) IV Push every 6 hours PRN For aPTT between 40 - 57  heparin   Injectable 5500 Unit(s) IV Push every 6 hours PRN For aPTT less than 40          T(C): 37.1 (06-21 @ 05:50), Max: 37.1 (06-21 @ 05:50)   HR: 115   BP: 130/76   RR: 20   SpO2: 94%    PHYSICAL EXAM:    CONSTITUTIONAL: NAD, well-developed, well-groomed  EYES: PERRLA; conjunctiva and sclera clear  ENMT: Moist oral mucosa, no pharyngeal injection or exudates; normal dentition  NECK: Supple, no palpable masses; no thyromegaly  RESPIRATORY: Normal respiratory effort; decreased breath sounds on the Left, Rales on the right  CARDIOVASCULAR: Regular rate and rhythm, normal S1 and S2, no murmur/rub/gallop; No lower extremity edema; Peripheral pulses are 2+ bilaterally  ABDOMEN: Nontender to palpation, normoactive bowel sounds, no rebound/guarding; No hepatosplenomegaly  MUSCULOSKELETAL:  no clubbing or cyanosis of digits; no joint swelling or tenderness to palpation  PSYCH: A+O to person, place, and time; affect appropriate  NEUROLOGY: CN 2-12 are intact and symmetric; no gross sensory deficits   SKIN: No rashes; no palpable lesions      LABS:                        10.6   14.38 )-----------( 369      ( 21 Jun 2024 09:12 )             31.0      06-21    121<L>  |  89<L>  |  22  ----------------------------<  134<H>  4.6   |  19<L>  |  0.87    Ca    9.0      21 Jun 2024 06:45  Phos  3.5     06-21  Mg     1.8     06-21    TPro  8.2  /  Alb  3.1<L>  /  TBili  0.4  /  DBili  x   /  AST  45<H>  /  ALT  71<H>  /  AlkPhos  96  06-20       CAPILLARY BLOOD GLUCOSE          RADIOLOGY & ADDITIONAL TESTS:    Imaging Personally Reviewed:  Consultant(s) Notes Reviewed:    Care Discussed with Consultants/Other Providers:

## 2024-06-21 NOTE — PROGRESS NOTE ADULT - ASSESSMENT
89M with hx of Stage II bladder cancer s/p TURBT and chemoradiation, presenting with dyspnea and orthopnea. a/w Acute hypoxic respiratory failure also found to have large L pleural effusion. Now admitted for further workup and management.

## 2024-06-21 NOTE — CONSULT NOTE ADULT - ASSESSMENT
89M hx stage II bladder cancer s/p TURBT and CRT presented with worsening dyspnea on exertion and orthopnea for 3 days. Oncology consulted for further management of bladder cancer.    # Muscle-Invasive Bladder Cancer, Stage II  Follows with Dr. Peña at the [facility]. Pt had developed gross hematuria about in Dec 2023 (around the time of his wife passing). Prior tobacco, quit 18y ago, 50 pack years. Went to see  (Dr Perez). Underwent CTU that showed filling defects in bladder. Upper tract negative. Also, with? of pancreatitis based on imaging. Cyto suspicious. Underwent TURBT 2/27/24 with path IHC positive for GATA3, negative for P40, NKX3.1, and TTF1, indicating urothelial carcinoma. Muscle was present with invasion. From the TURBT on 2/27/24. "Inspection of the bladder showed large necrotic papillary tumor x2 along the posterior wall. There was significant trabeculation with cellules throughout. Bilateral ureteral orifices were visualized with clear efflux. Once surveillance was complete, resectoscope was used to carefully resect the posterior wall tumors. This showed evidence of significant bladder wall thickening, concerning for muscle invasion. Resection was taken down deep with care taken to avoid any significant injury, particularly given the patient's age and obtain appropriate pathology with resection."  - S/p chemoRT with gemcitabine (last dose 4/30/24)  - CTA chest 6/20/24 does not show PE. Showed a large left pleural effusion with complete passive atelectasis of the left lower lobe. Small, partially loculated right pleural effusion. A new 8 mm right   upper lobe pulmonary nodule concerning for metastasis. Further thickening of bilateral adrenal glands, nonspecific.  - New subcentimeter RUL pulm nodule is too small to biopsy.   - Agree with diagnostic and therapeutic thoracentesis for his large left pleural effusion    ***Note is incomplete. Will discuss plan with attending.  Note is not finalized until signed by attending.     Vasyl Flores MD  Hematology/Oncology Fellow PGY-5  Pager: [facility] [phone] / [place] [phone]  After 5pm and on weekends please page on-call fellow    89M hx stage II bladder cancer s/p TURBT and CRT presented with worsening dyspnea on exertion and orthopnea for 3 days. Oncology consulted for further management of bladder cancer.    # Muscle-Invasive Bladder Cancer, Stage II  Follows with Dr. Peña at the [facility]. Pt had developed gross hematuria about in Dec 2023 (around the time of his wife passing). Prior tobacco, quit 18y ago, 50 pack years. Went to see  (Dr Perez). Underwent CTU that showed filling defects in bladder. Upper tract negative. Also, with? of pancreatitis based on imaging. Cyto suspicious. Underwent TURBT 2/27/24 with path IHC positive for GATA3, negative for P40, NKX3.1, and TTF1, indicating urothelial carcinoma. Muscle was present with invasion. From the TURBT on 2/27/24. "Inspection of the bladder showed large necrotic papillary tumor x2 along the posterior wall. There was significant trabeculation with cellules throughout. Bilateral ureteral orifices were visualized with clear efflux. Once surveillance was complete, resectoscope was used to carefully resect the posterior wall tumors. This showed evidence of significant bladder wall thickening, concerning for muscle invasion. Resection was taken down deep with care taken to avoid any significant injury, particularly given the patient's age and obtain appropriate pathology with resection."  - S/p chemoRT with gemcitabine (last dose 4/30/24). Gemcitabine is unlikely to be the cause of his pleural effusion  - CTA chest 6/20/24 does not show PE. Showed a large left pleural effusion with complete passive atelectasis of the left lower lobe. Small, partially loculated right pleural effusion. A new 8 mm right   upper lobe pulmonary nodule concerning for metastasis. Further thickening of bilateral adrenal glands, nonspecific.  - New subcentimeter RUL pulm nodule is too small to biopsy but is indeed suspicious for metastasis  - Agree with diagnostic and therapeutic thoracentesis for his large left pleural effusion. Please make sure to send for cytopathology.      Vasyl Flores MD  Hematology/Oncology Fellow PGY-5  Pager: [facility] [phone] / [place] [phone]  After 5pm and on weekends please page on-call fellow

## 2024-06-21 NOTE — PATIENT PROFILE ADULT - FALL HARM RISK - HARM RISK INTERVENTIONS

## 2024-06-21 NOTE — CONSULT NOTE ADULT - ASSESSMENT
Impression:    Sacral/bilateral Buttocks deep tissue injury present on admission  Incontinence of bowel and bladder  Incontinence Dermatitis    Recommend:  1.) topical therapy: sacral/buttock wound - cleanse with incontinence cleanser, pat dry, apply Colby ointment BID and PRN for incontinent episodes  2.) Incontinence Management - incontinence cleanser, pads, pericare BID  3.) Maintain on an alternating air with low air loss surface  4.) Turn and reposition Q 2 hours  5.) Nutrition optimization - please add Marcio  6.) Offload heels/feet with complete cair air fluidized boots/pillows; ensure that the soles of the feet are not resting on the foot board of the bed.  7.) chair cushion for chair sitting    Care as per medicine. Will not actively follow but will remain available. Please recall for new issues or deterioration.  Upon discharge [facility] [address] [place] [address] [phone]  Thank you for this consult  Yolanda Navarro, CHAIM-C, CWOCN via TEAMS

## 2024-06-22 ENCOUNTER — RESULT REVIEW (OUTPATIENT)
Age: 89
End: 2024-06-22

## 2024-06-22 NOTE — CHART NOTE - NSCHARTNOTEFT_GEN_A_CORE
MICU Attending Note    Called to patient bedside to eval for urgent thora in the setting of increasing respiratory distress.  Patient hx/labs/meds/rad all reviewed.  Patient is an 89yo man with bladder cancer with new afib and new acute hypoxic resp failure.  Has L>>R pleural effusions on CT, with associated pulmonary nodule(s), so ddx for effusion includes metastatic disease.    On exam patient is awake, sitting straight up, can speak in short sentences, saturating well on HFNC.    Strong robust speech.  Decreased breath sounds L>R.  Complains that he can not lie flat and could not sleep overnight.      -Patient is dyspneic but well tolerated right now.  THoracentesis is urgent but not emergent.  -Thoracentesis is not safe right now as patient is fully anticoagulated  -Heparin stopped now on my eval  -Recommend drainage of L chect via thora or chest tube by pulm service later today  -Pulm service made aware and they will see/drain patient today  -Discussed with patient and his nephew who was translating in Syrian.    Please contact pulm fellow with questions/concerns.

## 2024-06-22 NOTE — CHART NOTE - NSCHARTNOTEFT_GEN_A_CORE
NP Medicine Episodic     Pt notified he's been dyspneic since last night, hasn't slept much through the night, pt noted sitting up with moderate exertional breathing.  Duoneb given and increased HFNC from 50% to 75%/50L for now.      HPI:  89 year old male with hx of stage II bladder cancer s/p TURBT and chemoradiation with gemcitabine (completed 5/2/24). Pt presenting with worsening dyspnea on exertion and orthopnea for the past 3 days. Since starting chemoradiation, patient has been feeling weaker and with poor appetite. He has urinary urgency since the TURBT; he takes tamsulosin in the evening. Per nephew, patient has been keeping his A/C low at home so it has been hot in side his home. He started developing worsening shortness of breath a few days ago. No flu like symptoms, no cough, no fever, no chest pain. Pt has been trying to drink lots of fluids and would also drink ensure. He lives alone and is independent in his ADLs.     In the ED, patient was noted to be hypoxic requiring nonrebreather. CXR showed B/L pleural effusions, L>R. CTA chest performed without evidence of PE. Large pleural effusion noted on L with passive atelectasis. New 8mm pulm nodule in RUL also noted. Pt also significantly hyponatremic to 120. Pt admitted for further workup and management.  (20 Jun 2024 21:49)  Pt now with dyspnea, FIO2 increased from 50 to 75%.     Vital Signs Last 24 Hrs  T(C): 36.6 (22 Jun 2024 04:30), Max: 36.6 (21 Jun 2024 11:22)  T(F): 97.9 (22 Jun 2024 04:30), Max: 97.9 (21 Jun 2024 11:22)  HR: 90 (22 Jun 2024 05:04) (88 - 134)  BP: 137/69 (22 Jun 2024 04:30) (101/64 - 137/69)  BP(mean): --  RR: 22 (22 Jun 2024 05:04) (18 - 22)  SpO2: 91% (22 Jun 2024 05:04) (91% - 95%)    Parameters below as of 22 Jun 2024 05:04  Patient On (Oxygen Delivery Method): nasal cannula, high flow  O2 Flow (L/min): 50  O2 Concentration (%): 50    Physical Exam:  General:  non-toxic,  NAD  Neurology: A&Ox3, normal speech, CN II-XII intact, nonfocal, ACKERMAN x 4  Head:  Normocephalic, atraumatic  Respiratory: CTA B/L  CV: RRR, S1S2, no murmur  Abdominal: Soft, NT, ND no palpable mass  MSK: No edema, + peripheral pulses, FROM all 4 extremity    Labs:                          10.6   14.38 )-----------( 369      ( 21 Jun 2024 09:12 )             31.0     06-21    121<L>  |  89<L>  |  22  ----------------------------<  134<H>  4.6   |  19<L>  |  0.87    Ca    9.0      21 Jun 2024 06:45  Phos  3.5     06-21  Mg     1.8     06-21    TPro  8.2  /  Alb  3.1<L>  /  TBili  0.4  /  DBili  x   /  AST  45<H>  /  ALT  71<H>  /  AlkPhos  96  06-20        Radiology:    < from: CT Angio Chest PE Protocol w/ IV Cont (06.20.24 @ 21:24) >    No pulmonary embolus.    Large left pleural effusion with complete passive atelectasis of the left   lower lobe. Small, partially loculated right pleural effusion.    A new 8 mm right upper lobe pulmonary nodule concerning for metastasis.    Further thickening of bilateral adrenal glands, nonspecific.    < end of copied text >        Assessment & Plan:  89 year old male with hx of stage II bladder cancer s/p TURBT and chemoradiation with gemcitabine (completed 5/2/24). Pt presenting with worsening dyspnea on exertion and orthopnea for the past 3 days. Patient was noted to be hypoxic requiring nonrebreather. CXR showed B/L pleural effusions, L>R. CTA chest performed without evidence of PE. Large pleural effusion noted on L with passive atelectasis. New 8mm pulm nodule in RUL also noted. Pt now dyspneic with inc FIO2 requirement.    - Called Night Hospitalist in charge Dr. Hiro Johnson pt may require Thoracentesis for previously noted Large Pleural Effusion  suggested MICU consult called and ABG w/ Lactate and CXR ordered to re-evaluated if effusion worsening.   MICU fellow at bedside for consult.    Katharine Parmar, CHAIM  [id number]      Follow up with Attending in AM. NP Medicine Episodic     Pt notified he's been dyspneic since last night, hasn't slept much through the night, pt noted sitting up with moderate exertional breathing.  Duoneb given and increased HFNC from 50% to 75%/50L for now.      HPI:  89 year old male with hx of stage II bladder cancer s/p TURBT and chemoradiation with gemcitabine (completed 5/2/24). Pt presenting with worsening dyspnea on exertion and orthopnea for the past 3 days. Since starting chemoradiation, patient has been feeling weaker and with poor appetite. He has urinary urgency since the TURBT; he takes tamsulosin in the evening. Per nephew, patient has been keeping his A/C low at home so it has been hot in side his home. He started developing worsening shortness of breath a few days ago. No flu like symptoms, no cough, no fever, no chest pain. Pt has been trying to drink lots of fluids and would also drink ensure. He lives alone and is independent in his ADLs.     In the ED, patient was noted to be hypoxic requiring nonrebreather. CXR showed B/L pleural effusions, L>R. CTA chest performed without evidence of PE. Large pleural effusion noted on L with passive atelectasis. New 8mm pulm nodule in RUL also noted. Pt also significantly hyponatremic to 120. Pt admitted for further workup and management.  (20 Jun 2024 21:49)  Pt now with dyspnea, FIO2 increased from 50 to 75%.     Vital Signs Last 24 Hrs  T(C): 36.6 (22 Jun 2024 04:30), Max: 36.6 (21 Jun 2024 11:22)  T(F): 97.9 (22 Jun 2024 04:30), Max: 97.9 (21 Jun 2024 11:22)  HR: 90 (22 Jun 2024 05:04) (88 - 134)  BP: 137/69 (22 Jun 2024 04:30) (101/64 - 137/69)  BP(mean): --  RR: 22 (22 Jun 2024 05:04) (18 - 22)  SpO2: 91% (22 Jun 2024 05:04) (91% - 95%)    Parameters below as of 22 Jun 2024 05:04  Patient On (Oxygen Delivery Method): nasal cannula, high flow  O2 Flow (L/min): 50  O2 Concentration (%): 50    Physical Exam:  General:  non-toxic,  NAD  Neurology: A&Ox3, normal speech, CN II-XII intact, nonfocal, ACKERMAN x 4  Head:  Normocephalic, atraumatic  Respiratory: CTA B/L  CV: RRR, S1S2, no murmur  Abdominal: Soft, NT, ND no palpable mass  MSK: No edema, + peripheral pulses, FROM all 4 extremity    Labs:                          10.6   14.38 )-----------( 369      ( 21 Jun 2024 09:12 )             31.0     06-21    121<L>  |  89<L>  |  22  ----------------------------<  134<H>  4.6   |  19<L>  |  0.87    Ca    9.0      21 Jun 2024 06:45  Phos  3.5     06-21  Mg     1.8     06-21    TPro  8.2  /  Alb  3.1<L>  /  TBili  0.4  /  DBili  x   /  AST  45<H>  /  ALT  71<H>  /  AlkPhos  96  06-20        Radiology:    < from: CT Angio Chest PE Protocol w/ IV Cont (06.20.24 @ 21:24) >    No pulmonary embolus.    Large left pleural effusion with complete passive atelectasis of the left   lower lobe. Small, partially loculated right pleural effusion.    A new 8 mm right upper lobe pulmonary nodule concerning for metastasis.    Further thickening of bilateral adrenal glands, nonspecific.    < end of copied text >        Assessment & Plan:  89 year old male with hx of stage II bladder cancer s/p TURBT and chemoradiation with gemcitabine (completed 5/2/24). Pt presenting with worsening dyspnea on exertion and orthopnea for the past 3 days. Patient was noted to be hypoxic requiring nonrebreather. CXR showed B/L pleural effusions, L>R. CTA chest performed without evidence of PE. Large pleural effusion noted on L with passive atelectasis. New 8mm pulm nodule in RUL also noted. Pt now dyspneic with inc FIO2 requirement.    - Called Night Hospitalist in charge Dr. Hiro Johnson pt may require Thoracentesis for previously noted Large Pleural Effusion  suggested MICU consult called and ABG w/ Lactate and CXR ordered to re-evaluated if effusion worsening.   MICU fellow at bedside for consult.    Katharine Parmar NP  [id number]      Addendum @ 7:06 am  6/22/24    Dr. Farmer, MICU attending at bedside, spoke with patient and family for thoracentesis vs CT tube with possible cytology w/u of pleural fluid  Heparin gtt placed on hold now for procedure later on  Dr. Farmer informed she'll sign out to [facility] for procedure.    Katharine Parmar NP  [phone]

## 2024-06-22 NOTE — PROCEDURE NOTE - ADDITIONAL PROCEDURE DETAILS
Left sided thoracentesis - successful drainage of 2.1L of serosanguinous fluid  patient tolerated procedure well.

## 2024-06-22 NOTE — CONSULT NOTE ADULT - ASSESSMENT
89 year old, Trinidadian Speaking, male with hx of Stage II bladder cancer s/p TURBT and chemoradiation, presenting with dyspnea and AHRF. Found to have b/l pleural effusions with L > R    Broad differential, most concerning is malignancy    #AHRF  #Bilateral pleural effusion    Plan:  - s/p drainage of 2.1L of pleural fluid from left side, serosanguinous  - follow up fluid studies  - check serum protein/ldh  - check post thora cbc, recheck cbc in am  - follow up post thora CXR  - would continue to hold heparin gtt for now until 2 stable cbc - the thora fluid was serosanguinous  - follow up cytology   - will eventually consider right sided thoracentesis pending results and clinical course  - wean oxygen as tolerated  - out of bed with fall precautions  - incentive spirometer

## 2024-06-22 NOTE — PROGRESS NOTE ADULT - SUBJECTIVE AND OBJECTIVE BOX
Jeff Bertrand MD  Internal Medicine  Available on TEAMS      Patient is a 89y old  Male who presents with a chief complaint of SOB (22 Jun 2024 17:36)      SUBJECTIVE / OVERNIGHT EVENTS:    Improving dyspnea status post thora with 2.1L removed. Patient is hopeful his appetite will return and sleep will improve following thoracentesis.     Relative at bedside provided translation into Salvadorean.      ADDITIONAL REVIEW OF SYSTEMS:    As above.     MEDICATIONS  (STANDING):  chlorhexidine 2% Cloths 1 Application(s) Topical daily  pantoprazole    Tablet 40 milliGRAM(s) Oral before breakfast  sodium chloride 0.9%. 500 milliLiter(s) (100 mL/Hr) IV Continuous <Continuous>  tamsulosin 0.4 milliGRAM(s) Oral at bedtime    MEDICATIONS  (PRN):  acetaminophen     Tablet .. 650 milliGRAM(s) Oral every 6 hours PRN Temp greater or equal to 38C (100.4F), Mild Pain (1 - 3)  albuterol/ipratropium for Nebulization 3 milliLiter(s) Nebulizer every 6 hours PRN Shortness of Breath and/or Wheezing  aluminum hydroxide/magnesium hydroxide/simethicone Suspension 30 milliLiter(s) Oral every 4 hours PRN Dyspepsia      CAPILLARY BLOOD GLUCOSE        I&O's Summary    21 Jun 2024 07:01  -  22 Jun 2024 07:00  --------------------------------------------------------  IN: 532 mL / OUT: 400 mL / NET: 132 mL    22 Jun 2024 07:01  -  22 Jun 2024 19:03  --------------------------------------------------------  IN: 300 mL / OUT: 100 mL / NET: 200 mL        PHYSICAL EXAM:  Vital Signs Last 24 Hrs  T(C): 36.7 (22 Jun 2024 12:04), Max: 36.7 (22 Jun 2024 12:04)  T(F): 98 (22 Jun 2024 12:04), Max: 98 (22 Jun 2024 12:04)  HR: 94 (22 Jun 2024 15:24) (88 - 95)  BP: 146/72 (22 Jun 2024 12:04) (110/63 - 146/72)  BP(mean): --  RR: 20 (22 Jun 2024 15:24) (18 - 22)  SpO2: 95% (22 Jun 2024 15:24) (91% - 95%)    Parameters below as of 22 Jun 2024 15:24  Patient On (Oxygen Delivery Method): nasal cannula, high flow  O2 Flow (L/min): 50  O2 Concentration (%): 75    CONSTITUTIONAL: NAD, well-developed, well-groomed  RESPIRATORY: Normal respiratory effort; decreased breath sounds on the Left, Rales on the right, thoracentesis site is c/d/i  CARDIOVASCULAR: Regular rate and rhythm, normal S1 and S2, no murmur/rub/gallop; No lower extremity edema; Peripheral pulses are 2+ bilaterally  ABDOMEN: Nontender to palpation, normoactive bowel sounds, no rebound/guarding; No hepatosplenomegaly  PSYCH: A+O to person, place, and time; affect appropriate  SKIN: No rashes; no palpable lesions      LABS:                        10.6   16.27 )-----------( 352      ( 22 Jun 2024 17:19 )             31.4     06-22    121<L>  |  88<L>  |  26<H>  ----------------------------<  107<H>  4.7   |  22  |  0.92    Ca    9.0      22 Jun 2024 17:19  Phos  3.5     06-21  Mg     1.8     06-21    TPro  7.4  /  Alb  3.0<L>  /  TBili  0.4  /  DBili  x   /  AST  28  /  ALT  48<H>  /  AlkPhos  89  06-22    PT/INR - ( 22 Jun 2024 17:19 )   PT: 12.5 sec;   INR: 1.20 ratio         PTT - ( 22 Jun 2024 17:19 )  PTT:27.3 sec      Urinalysis Basic - ( 22 Jun 2024 17:19 )    Color: x / Appearance: x / SG: x / pH: x  Gluc: 107 mg/dL / Ketone: x  / Bili: x / Urobili: x   Blood: x / Protein: x / Nitrite: x   Leuk Esterase: x / RBC: x / WBC x   Sq Epi: x / Non Sq Epi: x / Bacteria: x          RADIOLOGY & ADDITIONAL TESTS:  Results Reviewed:   Imaging Personally Reviewed:  Electrocardiogram Personally Reviewed:    COORDINATION OF CARE:  Care Discussed with Consultants/Other Providers [Y/N]:   Prior or Outpatient Records Reviewed [Y/N]:

## 2024-06-22 NOTE — CHART NOTE - NSCHARTNOTEFT_GEN_A_CORE
: Dr. Adan Craig    INDICATION: pleural effusion     PROCEDURE:  [ ] LIMITED ECHO  [ x] LIMITED CHEST  [ ] LIMITED RETROPERITONEAL  [ ] LIMITED ABDOMINAL  [ ] LIMITED DVT  [ ] NEEDLE GUIDANCE VASCULAR  [ ] NEEDLE GUIDANCE THORACENTESIS  [ ] NEEDLE GUIDANCE PARACENTESIS  [ ] NEEDLE GUIDANCE PERICARDIOCENTESIS  [ ] OTHER    FINDINGS/INTERPRETATION:      Chest: large L and moderate R sized pleural effusion  Some stranding worse on R then left    s/p thora with catheter visualized in pleural space  lung sliding noted     images uploaded to [employer]

## 2024-06-22 NOTE — CONSULT NOTE ADULT - SUBJECTIVE AND OBJECTIVE BOX
HPI:    89 year old male with hx of stage II bladder cancer s/p TURBT and chemoradiation with gemcitabine (completed 24). Pt presenting with worsening dyspnea on exertion and orthopnea for the past 3 days. Since starting chemoradiation, patient has been feeling weaker and with poor appetite. He has urinary urgency since the TURBT; he takes tamsulosin in the evening. Per nephew, patient has been keeping his A/C low at home so it has been hot in side his home. He started developing worsening shortness of breath a few days ago. No flu like symptoms, no cough, no fever, no chest pain. Pt has been trying to drink lots of fluids and would also drink ensure. He lives alone and is independent in his ADLs.     In the ED, patient was noted to be hypoxic requiring nonrebreather. CXR showed B/L pleural effusions, L>R. CTA chest performed without evidence of PE. Large pleural effusion noted on L with passive atelectasis. New 8mm pulm nodule in RUL also noted. Pt also significantly hyponatremic to 120. Pt admitted for further workup and management.    [facility] evaluating patient for shortness of breath and pleural effusion b/l   On heparin gtt - stopped at 7 am         PAST MEDICAL & SURGICAL HISTORY:  Malignant neoplasm of bladder, unspecified      HLD (hyperlipidemia)      S/P bilateral inguinal hernia repair          FAMILY HISTORY:      SOCIAL HISTORY:  Smokin  EtOH Use:  Marital Status:  Occupation:  Exposures:  Recent Travel:    Allergies    No Known Allergies    Intolerances        HOME MEDICATIONS:    REVIEW OF SYSTEMS:  Constitutional: No fevers or chills. No weight loss. No fatigue or generalised malaise.  Eyes: No itching or discharge from the eyes  ENT: No ear pain. No ear discharge. No nasal congestion. No post nasal drip. No epistaxis. No throat pain. No sore throat. No difficulty swallowing.   CV: No chest pain. No palpitations. No lightheadedness or dizziness.   Resp: No dyspnea at rest. No dyspnea on exertion. No orthopnea. No wheezing. No cough. No stridor. No sputum production. No chest pain with respiration.  GI: No nausea. No vomiting. No diarrhea.  MSK: No joint pain or pain in any extremities  Integumentary: No skin lesions. No pedal edema.  Neurological: No gross motor weakness. No sensory changes.    [ ] All other systems negative  [ ] Unable to assess ROS because ________    OBJECTIVE:  ICU Vital Signs Last 24 Hrs  T(C): 36.7 (2024 12:04), Max: 36.7 (2024 12:04)  T(F): 98 (2024 12:04), Max: 98 (2024 12:04)  HR: 94 (2024 15:24) (88 - 95)  BP: 146/72 (2024 12:04) (110/63 - 146/72)  BP(mean): --  ABP: --  ABP(mean): --  RR: 20 (2024 15:24) (18 - 22)  SpO2: 95% (2024 15:24) (91% - 95%)    O2 Parameters below as of 2024 15:24  Patient On (Oxygen Delivery Method): nasal cannula, high flow  O2 Flow (L/min): 50  O2 Concentration (%): 75           @ 07:01  -   @ 07:00  --------------------------------------------------------  IN: 532 mL / OUT: 400 mL / NET: 132 mL     @ 07:01  -   @ 17:37  --------------------------------------------------------  IN: 300 mL / OUT: 100 mL / NET: 200 mL      CAPILLARY BLOOD GLUCOSE          PHYSICAL EXAM:  General: Awake, alert, oriented X 3.   HEENT: Atraumatic, normocephalic.                  No tonsillar or pharyngeal exudates.  Lymph Nodes: No palpable lymphadenopathy  Neck: No JVD. No carotid bruit.   Respiratory: decreased breath sounds on left   Cardiovascular: S1 S2 normal. No murmurs, rubs or gallops.   Abdomen: Soft, non-tender, non-distended. No organomegaly.  Extremities: Warm to touch. Peripheral pulse palpable. No pedal edema.       HOSPITAL MEDICATIONS:  MEDICATIONS  (STANDING):  chlorhexidine 2% Cloths 1 Application(s) Topical daily  pantoprazole    Tablet 40 milliGRAM(s) Oral before breakfast  sodium chloride 0.9%. 500 milliLiter(s) (100 mL/Hr) IV Continuous <Continuous>  tamsulosin 0.4 milliGRAM(s) Oral at bedtime    MEDICATIONS  (PRN):  acetaminophen     Tablet .. 650 milliGRAM(s) Oral every 6 hours PRN Temp greater or equal to 38C (100.4F), Mild Pain (1 - 3)  albuterol/ipratropium for Nebulization 3 milliLiter(s) Nebulizer every 6 hours PRN Shortness of Breath and/or Wheezing  aluminum hydroxide/magnesium hydroxide/simethicone Suspension 30 milliLiter(s) Oral every 4 hours PRN Dyspepsia      LABS:                        10.6   16.27 )-----------( 352      ( 2024 17:19 )             31.4     -    121<L>  |  89<L>  |  22  ----------------------------<  134<H>  4.6   |  19<L>  |  0.87    Ca    9.0      2024 06:45  Phos  3.5       Mg     1.8           PT/INR - ( 2024 17:19 )   PT: 12.5 sec;   INR: 1.20 ratio         PTT - ( 2024 17:19 )  PTT:27.3 sec  Urinalysis Basic - ( 2024 06:45 )    Color: x / Appearance: x / SG: x / pH: x  Gluc: 134 mg/dL / Ketone: x  / Bili: x / Urobili: x   Blood: x / Protein: x / Nitrite: x   Leuk Esterase: x / RBC: x / WBC x   Sq Epi: x / Non Sq Epi: x / Bacteria: x      Arterial Blood Gas:   @ 09:08  7.40/37/79/23/97.6/-1.6  ABG lactate: --    Venous Blood Gas:   @ 06:43  7.42/33/67/21/95.0  VBG Lactate: 2.4      MICROBIOLOGY:     RADIOLOGY:  [ ] Reviewed and interpreted by me    Point of Care Ultrasound Findings;    PFT:    EKG:    < from: CT Angio Chest PE Protocol w/ IV Cont (24 @ 21:24) >  FINDINGS:    LUNGS AND LARGE AIRWAYS: Patent central airways. Complete left lower lobe   passive atelectasis adjacent to a large pleural effusion. Mild passive   atelectasis in the right middle lobe and right lower lobe.  A new 8 mm nodule in the right upper lobe (302:226)..  PLEURA: Large left pleural effusion and small partially loculated right   pleural effusion.  VESSELS: No pulmonary embolus. Dilated right and left main pulmonary   arteries. Atherosclerotic desiccation is of the aorta and coronary   arteries.  HEART: Heart size is normal. No pericardial effusion.  MEDIASTINUM AND GRANT: No lymphadenopathy.  CHEST WALL AND LOWER NECK: Within normal limits.  VISUALIZED UPPER ABDOMEN: Bilateral adrenal gland thickening, slightly   progressed from prior study.  BONES: Degenerative changes.    IMPRESSION:  No pulmonary embolus.    Large left pleural effusion with complete passive atelectasis of the left   lower lobe. Small, partially loculated right pleural effusion.    A new 8 mm right upper lobe pulmonary nodule concerning for metastasis.    Further thickening of bilateral adrenal glands, nonspecific.    --- End of Report ---    < end of copied text >

## 2024-06-22 NOTE — PROGRESS NOTE ADULT - PROBLEM SELECTOR PLAN 1
Acute hypoxic resp failure. Unclear cause at this time. ? Chemo induced  CTA neg for PE.   -  duonebs prn  - TTE - normal LV and RV fxn, no diastolic dysfunction  - status post thoracentesis L with 2.1L serosanguinous removed. pending studies and possible R side thoracentesis.

## 2024-06-22 NOTE — CHART NOTE - NSCHARTNOTEFT_GEN_A_CORE
NP Medicine Episodic     Patient s/p Thoracentesis this evening with 2.1 liters removed. Heparin gtt for new onset Afib held in AM for procedure.  Contacted [facility] when to initiate anticoagulation post procedure.       HPI:  89 year old male with hx of stage II bladder cancer s/p TURBT and chemoradiation with gemcitabine (completed 5/2/24). Pt presenting with worsening dyspnea on exertion and orthopnea for the past 3 days. Pt admitted with AHRF 2/2 Large L pleural effusion now s/p thoracentesis of L side pending pleural fluid cytology. Hospital stay complicated by new onset of Afib with Heparin gtt initiated in ED. Contacted [facility] when to restart anticoagulation post procedure.       Vital Signs Last 24 Hrs  T(C): 36.7 (22 Jun 2024 12:04), Max: 36.7 (22 Jun 2024 12:04)  T(F): 98 (22 Jun 2024 12:04), Max: 98 (22 Jun 2024 12:04)  HR: 96 (22 Jun 2024 20:36) (90 - 96)  BP: 100/63 (22 Jun 2024 20:26) (100/63 - 146/72)  BP(mean): --  RR: 20 (22 Jun 2024 20:36) (18 - 22)  SpO2: 98% (22 Jun 2024 20:36) (91% - 99%)    Parameters below as of 22 Jun 2024 20:36  Patient On (Oxygen Delivery Method): nasal cannula, high flow  O2 Flow (L/min): 50  O2 Concentration (%): 65    Physical Exam:  General:  non-toxic,  NAD  Neurology: A&Ox3, normal speech, CN II-XII intact, nonfocal, ACKERMAN x 4  Head:  Normocephalic, atraumatic  Respiratory: CTA B/L  CV: RRR, S1S2, no murmur  Abdominal: Soft, NT, ND no palpable mass  MSK: No edema, + peripheral pulses, FROM all 4 extremity    Labs:                          11.0   17.29 )-----------( 357      ( 22 Jun 2024 22:08 )             32.7     06-22    121<L>  |  88<L>  |  26<H>  ----------------------------<  107<H>  4.7   |  22  |  0.92    Ca    9.0      22 Jun 2024 17:19  Phos  3.5     06-21  Mg     1.8     06-21    TPro  7.4  /  Alb  3.0<L>  /  TBili  0.4  /  DBili  x   /  AST  28  /  ALT  48<H>  /  AlkPhos  89  06-22    ABG - ( 22 Jun 2024 09:08 )  pH, Arterial: 7.40  pH, Blood: x     /  pCO2: 37    /  pO2: 79    / HCO3: 23    / Base Excess: -1.6  /  SaO2: 97.6        Radiology:    < from: Xray Chest 1 View- PORTABLE-Urgent (Xray Chest 1 View- PORTABLE-Urgent .) (06.22.24 @ 07:57) >    IMPRESSION:  Large right and moderate left pleural effusions, similar to prior imaging.    < end of copied text >        Assessment & Plan:  89 year old male with hx of stage II bladder cancer s/p TURBT and chemoradiation with gemcitabine (completed 5/2/24). Pt presenting with worsening dyspnea on exertion and orthopnea for the past 3 days. Pt admitted with AHRF 2/2 Large L pleural effusion now s/p thoracentesis of L side pending pleural fluid cytology. Hospital stay complicated by new onset of Afib with Heparin gtt initiated in ED. Contacted [facility] when to restart anticoagulation post procedure.     - Spoke with Dr. Tammy Arellano from [facility]  Requested repeat CBC tonight if stable to restart Heparin gtt tonight - H/H returned 11/32.7  Repeat CBC in AM   - Also downgraded HFNC from 75% to 65%/50L this evening  L sided thoracentesis site clean dry and [place]  [id number] NP Medicine Episodic     Patient s/p Thoracentesis this evening with 2.1 liters removed. Heparin gtt for new onset Afib held in AM for procedure.  Contacted [facility] when to initiate anticoagulation post procedure.       HPI:  89 year old male with hx of stage II bladder cancer s/p TURBT and chemoradiation with gemcitabine (completed 5/2/24). Pt presenting with worsening dyspnea on exertion and orthopnea for the past 3 days. Pt admitted with AHRF 2/2 Large L pleural effusion now s/p thoracentesis of L side pending pleural fluid cytology. Hospital stay complicated by new onset of Afib with Heparin gtt initiated in ED. Contacted [facility] when to restart anticoagulation post procedure.       Vital Signs Last 24 Hrs  T(C): 36.7 (22 Jun 2024 12:04), Max: 36.7 (22 Jun 2024 12:04)  T(F): 98 (22 Jun 2024 12:04), Max: 98 (22 Jun 2024 12:04)  HR: 96 (22 Jun 2024 20:36) (90 - 96)  BP: 100/63 (22 Jun 2024 20:26) (100/63 - 146/72)  BP(mean): --  RR: 20 (22 Jun 2024 20:36) (18 - 22)  SpO2: 98% (22 Jun 2024 20:36) (91% - 99%)    Parameters below as of 22 Jun 2024 20:36  Patient On (Oxygen Delivery Method): nasal cannula, high flow  O2 Flow (L/min): 50  O2 Concentration (%): 65    Physical Exam:  General:  non-toxic,  NAD  Neurology: A&Ox3, normal speech, CN II-XII intact, nonfocal, ACKERMAN x 4  Head:  Normocephalic, atraumatic  Respiratory: CTA B/L  CV: RRR, S1S2, no murmur  Abdominal: Soft, NT, ND no palpable mass  MSK: No edema, + peripheral pulses, FROM all 4 extremity    Labs:                          11.0   17.29 )-----------( 357      ( 22 Jun 2024 22:08 )             32.7     06-22    121<L>  |  88<L>  |  26<H>  ----------------------------<  107<H>  4.7   |  22  |  0.92    Ca    9.0      22 Jun 2024 17:19  Phos  3.5     06-21  Mg     1.8     06-21    TPro  7.4  /  Alb  3.0<L>  /  TBili  0.4  /  DBili  x   /  AST  28  /  ALT  48<H>  /  AlkPhos  89  06-22    ABG - ( 22 Jun 2024 09:08 )  pH, Arterial: 7.40  pH, Blood: x     /  pCO2: 37    /  pO2: 79    / HCO3: 23    / Base Excess: -1.6  /  SaO2: 97.6        Radiology:    < from: Xray Chest 1 View- PORTABLE-Urgent (Xray Chest 1 View- PORTABLE-Urgent .) (06.22.24 @ 07:57) >    IMPRESSION:  Large right and moderate left pleural effusions, similar to prior imaging.    < end of copied text >        Assessment & Plan:  89 year old male with hx of stage II bladder cancer s/p TURBT and chemoradiation with gemcitabine (completed 5/2/24). Pt presenting with worsening dyspnea on exertion and orthopnea for the past 3 days. Pt admitted with AHRF 2/2 Large L pleural effusion now s/p thoracentesis of L side pending pleural fluid cytology. Hospital stay complicated by new onset of Afib with Heparin gtt initiated in ED. Contacted [facility] when to restart anticoagulation post procedure.     - Spoke with Dr. Tammy Arellano from [facility]  Requested repeat CBC tonight if stable to restart Heparin gtt tonight - H/H returned 11/32.7  Repeat CBC in AM   - CXR post procedure pending result, contacted Radiologist resident tonight, informed will place preliminary report  still pending.   - Also downgraded HFNC from 75% to 65%/50L this evening  L sided thoracentesis site clean dry and intact.    Katharine Parmar, CHAIM  [id number] NP Medicine Episodic     Patient s/p Thoracentesis this evening with 2.1 liters removed. Heparin gtt for new onset Afib held in AM for procedure.  Contacted [facility] when to initiate anticoagulation post procedure.       HPI:  89 year old male with hx of stage II bladder cancer s/p TURBT and chemoradiation with gemcitabine (completed 5/2/24). Pt presenting with worsening dyspnea on exertion and orthopnea for the past 3 days. Pt admitted with AHRF 2/2 Large L pleural effusion now s/p thoracentesis of L side pending pleural fluid cytology. Hospital stay complicated by new onset of Afib with Heparin gtt initiated in ED. Contacted [facility] when to restart anticoagulation post procedure.       Vital Signs Last 24 Hrs  T(C): 36.7 (22 Jun 2024 12:04), Max: 36.7 (22 Jun 2024 12:04)  T(F): 98 (22 Jun 2024 12:04), Max: 98 (22 Jun 2024 12:04)  HR: 96 (22 Jun 2024 20:36) (90 - 96)  BP: 100/63 (22 Jun 2024 20:26) (100/63 - 146/72)  BP(mean): --  RR: 20 (22 Jun 2024 20:36) (18 - 22)  SpO2: 98% (22 Jun 2024 20:36) (91% - 99%)    Parameters below as of 22 Jun 2024 20:36  Patient On (Oxygen Delivery Method): nasal cannula, high flow  O2 Flow (L/min): 50  O2 Concentration (%): 65    Physical Exam:  General:  non-toxic,  NAD  Neurology: A&Ox3, normal speech, CN II-XII intact, nonfocal, ACKERMAN x 4  Head:  Normocephalic, atraumatic  Respiratory: CTA B/L  CV: RRR, S1S2, no murmur  Abdominal: Soft, NT, ND no palpable mass  MSK: No edema, + peripheral pulses, FROM all 4 extremity    Labs:                          11.0   17.29 )-----------( 357      ( 22 Jun 2024 22:08 )             32.7     06-22    121<L>  |  88<L>  |  26<H>  ----------------------------<  107<H>  4.7   |  22  |  0.92    Ca    9.0      22 Jun 2024 17:19  Phos  3.5     06-21  Mg     1.8     06-21    TPro  7.4  /  Alb  3.0<L>  /  TBili  0.4  /  DBili  x   /  AST  28  /  ALT  48<H>  /  AlkPhos  89  06-22    ABG - ( 22 Jun 2024 09:08 )  pH, Arterial: 7.40  pH, Blood: x     /  pCO2: 37    /  pO2: 79    / HCO3: 23    / Base Excess: -1.6  /  SaO2: 97.6        Radiology:    < from: Xray Chest 1 View- PORTABLE-Urgent (Xray Chest 1 View- PORTABLE-Urgent .) (06.22.24 @ 07:57) >    IMPRESSION:  Large right and moderate left pleural effusions, similar to prior imaging.    < end of copied text >        Assessment & Plan:  89 year old male with hx of stage II bladder cancer s/p TURBT and chemoradiation with gemcitabine (completed 5/2/24). Pt presenting with worsening dyspnea on exertion and orthopnea for the past 3 days. Pt admitted with AHRF 2/2 Large L pleural effusion now s/p thoracentesis of L side pending pleural fluid cytology. Hospital stay complicated by new onset of Afib with Heparin gtt initiated in ED. Contacted [facility] when to restart anticoagulation post procedure.     - Spoke with Dr. Tammy Arellano from [facility]  Requested repeat CBC tonight if stable to restart Heparin gtt tonight - H/H returned 11/32.7  Repeat CBC in AM   - CXR post procedure pending result, contacted Radiologist resident tonight, pending preliminary report  VERBALIZED - no pneumo, decreased in size of effusion on L side.   - Also downgraded HFNC from 75% to 65%/50L this evening  L sided thoracentesis site clean dry and intact.    Katharine Parmar, CHAIM  [id number]

## 2024-06-23 NOTE — DIETITIAN INITIAL EVALUATION ADULT - ADD RECOMMEND
1. Continue no therapeutic dietary restrictions   2. Recommend increased Ensure oral nutritional supplement to 2x/day   3. Consider multivitamin and vitamin C supplements if no medical contraindications to aid in wound healing.   4. Encourage adequate PO intakes as tolerated. Staff to provide assistance with feeding during meal times as warranted by patient  5. Monitor PO intake, GI tolerance, skin integrity and labs. RD remains available if needed

## 2024-06-23 NOTE — PROGRESS NOTE ADULT - PROBLEM SELECTOR PLAN 1
Acute hypoxic resp failure. Unclear cause at this time. ? Chemo induced  CTA neg for PE.   -  duonebs prn  - TTE - normal LV and RV fxn, no diastolic dysfunction  - status post thoracentesis L with 2.1L serosanguinous removed. pending studies and possible R side thoracentesis.  -weaning o2 as tolerated

## 2024-06-23 NOTE — DIETITIAN INITIAL EVALUATION ADULT - NSFNSPHYEXAMSKINFT_GEN_A_CORE
-- Per nursing skin care documentation: Pt with Sacral/bilateral Buttocks deep tissue injury present on admission

## 2024-06-23 NOTE — PROGRESS NOTE ADULT - ASSESSMENT
89M with hx of Stage II bladder cancer s/p TURBT and chemoradiation, presenting with dyspnea and orthopnea. a/w Acute hypoxic respiratory failure also found to have large L pleural effusion status post L thoracentesis with pending cytology and ongoing oxygen requirements.

## 2024-06-23 NOTE — DIETITIAN INITIAL EVALUATION ADULT - PHYSCIAL ASSESSMENT
Weights:    Current Admission Weights:  - Drug Dosing Weight: 68 kg/ 149.9 pounds (6/20/24)  - Daily weight: None Available       Weight History per Luis PICKARD:  - 74 kg/ 163.17 pounds (5/16/24), 78.2 kg/ 172.4 pounds (3/14/24), 76.2 kg/ 168 pounds (12/21/23)    Weight Change:  - Pt noted with significant weight loss X 1,3 and 6 months      IBW: 148 pounds   %IBW: 101%

## 2024-06-23 NOTE — DIETITIAN INITIAL EVALUATION ADULT - PERTINENT MEDS FT
Statement Selected
MEDICATIONS  (STANDING):  chlorhexidine 2% Cloths 1 Application(s) Topical daily  heparin  Infusion. 1500 Unit(s)/Hr (15 mL/Hr) IV Continuous <Continuous>  pantoprazole    Tablet 40 milliGRAM(s) Oral before breakfast  sodium chloride 0.9%. 500 milliLiter(s) (100 mL/Hr) IV Continuous <Continuous>  tamsulosin 0.4 milliGRAM(s) Oral at bedtime    MEDICATIONS  (PRN):  acetaminophen     Tablet .. 650 milliGRAM(s) Oral every 6 hours PRN Temp greater or equal to 38C (100.4F), Mild Pain (1 - 3)  albuterol/ipratropium for Nebulization 3 milliLiter(s) Nebulizer every 6 hours PRN Shortness of Breath and/or Wheezing  aluminum hydroxide/magnesium hydroxide/simethicone Suspension 30 milliLiter(s) Oral every 4 hours PRN Dyspepsia  heparin   Injectable 5500 Unit(s) IV Push every 6 hours PRN For aPTT less than 40  heparin   Injectable 2500 Unit(s) IV Push every 6 hours PRN For aPTT between 40 - 57

## 2024-06-23 NOTE — DIETITIAN INITIAL EVALUATION ADULT - SIGNS/SYMPTOMS
Sacral/bilateral Buttocks deep tissue injury present on admission per nursing wound care Significant weight loss X 1,3 & 6 months, hx of Bladder CA, pressure injuries

## 2024-06-23 NOTE — DIETITIAN INITIAL EVALUATION ADULT - REASON INDICATOR FOR ASSESSMENT
Stage 2 Pressure Injury or greater  Information obtained from: Review of pt's current medical record

## 2024-06-23 NOTE — DIETITIAN INITIAL EVALUATION ADULT - NS FNS DIET ORDER
Diet, Regular:   Supplement Feeding Modality:  Oral  Ensure Enlive Cans or Servings Per Day:  1       Frequency:  Daily (06-20-24 @ 21:45) [Active]

## 2024-06-23 NOTE — DIETITIAN INITIAL EVALUATION ADULT - REASON FOR ADMISSION
Chart Reviewed, Events Noted  "Patient is a 89y old  Male who presents with a chief complaint of SOB"

## 2024-06-23 NOTE — DIETITIAN INITIAL EVALUATION ADULT - PERTINENT LABORATORY DATA
06-23    127<L>  |  89<L>  |  24<H>  ----------------------------<  98  3.9   |  20<L>  |  0.92    Ca    9.3      23 Jun 2024 06:36    TPro  7.4  /  Alb  3.0<L>  /  TBili  0.4  /  DBili  x   /  AST  28  /  ALT  48<H>  /  AlkPhos  89  06-22

## 2024-06-23 NOTE — DIETITIAN INITIAL EVALUATION ADULT - PROBLEM SELECTOR PLAN 2
Significant hyponatremia to 120. Pt has previously been hyponatremic down to 130 however last month, Na was 136.  - Urine studies sent - low Eleanor and high Uosm suggesting low EABV; either true hypovolemia or sensed hypovolemia  - Will trial IV lasix as above  - Repeat BMP after lasix dose  - Goal Na in 24 hrs 126-128.

## 2024-06-23 NOTE — PROGRESS NOTE ADULT - PROBLEM SELECTOR PLAN 2
Significant hyponatremia to 120. Pt has previously been hyponatremic down to 130 however last month, Na was 136.  - Urine studies sent - low Eleanor and high Uosm suggesting low EABV;   - hold further lasix  - Na improving  - Goal 6-8 meq/24hrs

## 2024-06-23 NOTE — CONSULT NOTE ADULT - SUBJECTIVE AND OBJECTIVE BOX
DATE OF SERVICE: 06-23-24 @ 11:49    CHIEF COMPLAINT:Patient is a 89y old  Male who presents with a chief complaint of Chart Reviewed, Events Noted  "Patient is a 89y old  Male who presents with a chief complaint of SOB"     (23 Jun 2024 11:32)      HISTORY OF PRESENT ILLNESS:  89 year old male with hx of stage II bladder cancer s/p TURBT and chemoradiation with gemcitabine (completed 5/2/24). Pt presenting with worsening dyspnea on exertion and orthopnea for the past 3 days. Since starting chemoradiation, patient has been feeling weaker and with poor appetite. He has urinary urgency since the TURBT; he takes tamsulosin in the evening. Per nephew, patient has been keeping his A/C low at home so it has been hot in side his home. He started developing worsening shortness of breath a few days ago. No flu like symptoms, no cough, no fever, no chest pain. Pt has been trying to drink lots of fluids and would also drink ensure. He lives alone and is independent in his ADLs.     In the ED, patient was noted to be hypoxic requiring nonrebreather. CXR showed B/L pleural effusions, L>R. CTA chest performed without evidence of PE. Large pleural effusion noted on L with passive atelectasis. New 8mm pulm nodule in RUL also noted. Pt also significantly hyponatremic to 120. Pt admitted for further workup and management.  (20 Jun 2024 21:49)      PAST MEDICAL & SURGICAL HISTORY:  Malignant neoplasm of bladder, unspecified      HLD (hyperlipidemia)      S/P bilateral inguinal hernia repair              MEDICATIONS:  heparin   Injectable 5500 Unit(s) IV Push every 6 hours PRN  heparin   Injectable 2500 Unit(s) IV Push every 6 hours PRN  heparin  Infusion. 1500 Unit(s)/Hr IV Continuous <Continuous>      albuterol/ipratropium for Nebulization 3 milliLiter(s) Nebulizer every 6 hours PRN    acetaminophen     Tablet .. 650 milliGRAM(s) Oral every 6 hours PRN    aluminum hydroxide/magnesium hydroxide/simethicone Suspension 30 milliLiter(s) Oral every 4 hours PRN  pantoprazole    Tablet 40 milliGRAM(s) Oral before breakfast      chlorhexidine 2% Cloths 1 Application(s) Topical daily  sodium chloride 0.9%. 500 milliLiter(s) IV Continuous <Continuous>  tamsulosin 0.4 milliGRAM(s) Oral at bedtime      FAMILY HISTORY:      Non-contributory    SOCIAL HISTORY:    [ ] Tobacco  [ ] Drugs  [ ] Alcohol    Allergies    No Known Allergies    Intolerances    	    REVIEW OF SYSTEMS:  CONSTITUTIONAL: No fever  EYES: No eye pain, visual disturbances, or discharge  ENMT:  No difficulty hearing, tinnitus  NECK: No pain or stiffness  RESPIRATORY: No cough, wheezing,  CARDIOVASCULAR: No chest pain, palpitations, passing out, dizziness, or leg swelling  GASTROINTESTINAL:  No nausea, vomiting, diarrhea or constipation. No melena.  GENITOURINARY: No dysuria, hematuria  NEUROLOGICAL: No stroke like symptoms  SKIN: No burning or lesions   ENDOCRINE: No heat or cold intolerance  MUSCULOSKELETAL: No joint pain or swelling  PSYCHIATRIC: No  anxiety, mood swings  HEME/LYMPH: No bleeding gums  ALLERGY AND IMMUNOLOGIC: No hives or eczema	    All other ROS negative    PHYSICAL EXAM:  T(C): 36.4 (06-23-24 @ 11:19), Max: 36.7 (06-22-24 @ 12:04)  HR: 92 (06-23-24 @ 11:32) (92 - 96)  BP: 103/62 (06-23-24 @ 11:32) (100/63 - 146/72)  RR: 20 (06-23-24 @ 11:19) (18 - 20)  SpO2: 96% (06-23-24 @ 11:19) (94% - 99%)  Wt(kg): --  I&O's Summary    22 Jun 2024 07:01  -  23 Jun 2024 07:00  --------------------------------------------------------  IN: 590 mL / OUT: 1000 mL / NET: -410 mL        Appearance: Normal	  HEENT:   Normal oral mucosa, EOMI	  Cardiovascular:  S1 S2, No JVD,    Respiratory: Lungs clear to auscultation	  Psychiatry: Alert  Gastrointestinal:  Soft, Non-tender, + BS	  Skin: No rashes   Neurologic: Non-focal  Extremities:  No edema  Vascular: Peripheral pulses palpable    	    	  	  CARDIAC MARKERS:  Labs personally reviewed by me                                  10.9   15.26 )-----------( 352      ( 23 Jun 2024 06:35 )             32.7     06-23    127<L>  |  89<L>  |  24<H>  ----------------------------<  98  3.9   |  20<L>  |  0.92    Ca    9.3      23 Jun 2024 06:36    TPro  7.4  /  Alb  3.0<L>  /  TBili  0.4  /  DBili  x   /  AST  28  /  ALT  48<H>  /  AlkPhos  89  06-22          EKG: Personally reviewed by me - AF with RVR  Radiology: Personally reviewed by me -   < from: Xray Chest 1 View- PORTABLE-Urgent (Xray Chest 1 View- PORTABLE-Urgent .) (06.22.24 @ 20:05) >  IMPRESSION:  Small moderate bilateral pleural effusions that are decreased in size   when compared to prior exam.  There is no pneumothorax.    < end of copied text >  < from: TTE W or WO Ultrasound Enhancing Agent (06.21.24 @ 13:13) >   1. Technically difficult image quality.   2. Left ventricular cavity is small. Left ventricular wall thickness is normal. Left ventricular systolic function is normal with an ejection fraction of 57 % by Wilson's method of disks. There are no regional wall motion abnormalities seen.   3. Normal left ventricular diastolic function, with normal filling pressure.   4. No significant valvular disease.   5. Mildly enlarged right ventricular cavity size, with normal wall thickness, and normal systolic function.   6. No pericardial effusion seen.   7. Right pleural effusion noted.    < end of copied text >  < from: CT Angio Chest PE Protocol w/ IV Cont (06.20.24 @ 21:24) >  No pulmonary embolus.    Large left pleural effusion with complete passive atelectasis of the left   lower lobe. Small, partially loculated right pleural effusion.    A new 8 mm right upper lobe pulmonary nodule concerning for metastasis.    Further thickening of bilateral adrenal glands, nonspecific.      Assessment /Plan:     89M with hx of Stage II bladder cancer s/p TURBT and chemoradiation, presenting with dyspnea and orthopnea. a/w Acute hypoxic respiratory failure also found to have large L pleural effusion. Found to have new onset AF with RVR.    1. New Onset Atrial Fibrillation with Rapid Ventricular Response  - ECG 6/21 with AF with RVR  - Spontaneously converted to SR 6/21  - TTE wnl  - TSH wnl  - [id number] of 2 (age and gender). Heparin gtt on hold given thoracentesis fluids were serosanguinous.   - If CBC stable x2, ok to resume hep gtt as per Pulm  - If CBC stable and no further invasive procedures planned, plan to transition to Eliquis 5mg PO BID.  - BP soft today but if remains stable or recovers further, recommend starting Toprol 25mg PO daily.     2. Shortness of Breath  - CTA neg for PE but found to have large left pleural effusion now s/p thora with 2.1L removed on 6/22  -   - TTE wnl, normal filling pressures, preserved EF  - c/w supplemental oxygen PRN-- currently on HFNC  - Possible plan for left thora pending clinical course    3. Hyponatremia  - Slowly improving, now 127  - Lasix on hold    Differential diagnosis and plan of care discussed with patient after the evaluation. Counseling on diet, nutritional counseling, weight management, exercise and medication compliance was done.   Advanced care planning/advanced directives discussed with patient/family. DNR status including forceful chest compressions to attempt to restart the heart, ventilator support/artificial breathing, electric shock, artificial nutrition, health care proxy, Molst form all discussed with pt. Pt wishes to consider. More than fifteen minutes spent on discussing advanced directives.     Jayda BARRETO-BC  Ander Cardoso DO [facility]  Cardiovascular Medicine  [address], [address]  Available for call or text via [employer]  Office [phone]   DATE OF SERVICE: 06-23-24 @ 11:49    CHIEF COMPLAINT:Patient is a 89y old  Male who presents with a chief complaint of Chart Reviewed, Events Noted  "Patient is a 89y old  Male who presents with a chief complaint of SOB"     (23 Jun 2024 11:32)      HISTORY OF PRESENT ILLNESS:  89 year old male with hx of stage II bladder cancer s/p TURBT and chemoradiation with gemcitabine (completed 5/2/24). Pt presenting with worsening dyspnea on exertion and orthopnea for the past 3 days. Since starting chemoradiation, patient has been feeling weaker and with poor appetite. He has urinary urgency since the TURBT; he takes tamsulosin in the evening. Per nephew, patient has been keeping his A/C low at home so it has been hot in side his home. He started developing worsening shortness of breath a few days ago. No flu like symptoms, no cough, no fever, no chest pain. Pt has been trying to drink lots of fluids and would also drink ensure. He lives alone and is independent in his ADLs.     In the ED, patient was noted to be hypoxic requiring nonrebreather. CXR showed B/L pleural effusions, L>R. CTA chest performed without evidence of PE. Large pleural effusion noted on L with passive atelectasis. New 8mm pulm nodule in RUL also noted. Pt also significantly hyponatremic to 120. Pt admitted for further workup and management.  (20 Jun 2024 21:49)      PAST MEDICAL & SURGICAL HISTORY:  Malignant neoplasm of bladder, unspecified      HLD (hyperlipidemia)      S/P bilateral inguinal hernia repair              MEDICATIONS:  heparin   Injectable 5500 Unit(s) IV Push every 6 hours PRN  heparin   Injectable 2500 Unit(s) IV Push every 6 hours PRN  heparin  Infusion. 1500 Unit(s)/Hr IV Continuous <Continuous>      albuterol/ipratropium for Nebulization 3 milliLiter(s) Nebulizer every 6 hours PRN    acetaminophen     Tablet .. 650 milliGRAM(s) Oral every 6 hours PRN    aluminum hydroxide/magnesium hydroxide/simethicone Suspension 30 milliLiter(s) Oral every 4 hours PRN  pantoprazole    Tablet 40 milliGRAM(s) Oral before breakfast      chlorhexidine 2% Cloths 1 Application(s) Topical daily  sodium chloride 0.9%. 500 milliLiter(s) IV Continuous <Continuous>  tamsulosin 0.4 milliGRAM(s) Oral at bedtime      FAMILY HISTORY:      Non-contributory    SOCIAL HISTORY:    Not an active smoker    Allergies    No Known Allergies    Intolerances    	    REVIEW OF SYSTEMS:  CONSTITUTIONAL: No fever  EYES: No eye pain, visual disturbances, or discharge  ENMT:  No difficulty hearing, tinnitus  NECK: No pain or stiffness  RESPIRATORY: No cough, wheezing,  CARDIOVASCULAR: No chest pain, palpitations, passing out, dizziness, or leg swelling  GASTROINTESTINAL:  No nausea, vomiting, diarrhea or constipation. No melena.  GENITOURINARY: No dysuria, hematuria  NEUROLOGICAL: No stroke like symptoms  SKIN: No burning or lesions   ENDOCRINE: No heat or cold intolerance  MUSCULOSKELETAL: No joint pain or swelling  PSYCHIATRIC: No  anxiety, mood swings  HEME/LYMPH: No bleeding gums  ALLERGY AND IMMUNOLOGIC: No hives or eczema	    All other ROS negative    PHYSICAL EXAM:  T(C): 36.4 (06-23-24 @ 11:19), Max: 36.7 (06-22-24 @ 12:04)  HR: 92 (06-23-24 @ 11:32) (92 - 96)  BP: 103/62 (06-23-24 @ 11:32) (100/63 - 146/72)  RR: 20 (06-23-24 @ 11:19) (18 - 20)  SpO2: 96% (06-23-24 @ 11:19) (94% - 99%)  Wt(kg): --  I&O's Summary    22 Jun 2024 07:01  -  23 Jun 2024 07:00  --------------------------------------------------------  IN: 590 mL / OUT: 1000 mL / NET: -410 mL        Appearance: Normal	  HEENT:   Normal oral mucosa, EOMI	  Cardiovascular:  S1 S2, No JVD,    Respiratory: Lungs clear to auscultation	  Psychiatry: Alert  Gastrointestinal:  Soft, Non-tender, + BS	  Skin: No rashes   Neurologic: Non-focal  Extremities:  No edema  Vascular: Peripheral pulses palpable    	    	  	  CARDIAC MARKERS:  Labs personally reviewed by me                                  10.9   15.26 )-----------( 352      ( 23 Jun 2024 06:35 )             32.7     06-23    127<L>  |  89<L>  |  24<H>  ----------------------------<  98  3.9   |  20<L>  |  0.92    Ca    9.3      23 Jun 2024 06:36    TPro  7.4  /  Alb  3.0<L>  /  TBili  0.4  /  DBili  x   /  AST  28  /  ALT  48<H>  /  AlkPhos  89  06-22          EKG: Personally reviewed by me - AF with RVR  Radiology: Personally reviewed by me -   < from: Xray Chest 1 View- PORTABLE-Urgent (Xray Chest 1 View- PORTABLE-Urgent .) (06.22.24 @ 20:05) >  IMPRESSION:  Small moderate bilateral pleural effusions that are decreased in size   when compared to prior exam.  There is no pneumothorax.    < end of copied text >  < from: TTE W or WO Ultrasound Enhancing Agent (06.21.24 @ 13:13) >   1. Technically difficult image quality.   2. Left ventricular cavity is small. Left ventricular wall thickness is normal. Left ventricular systolic function is normal with an ejection fraction of 57 % by Wilson's method of disks. There are no regional wall motion abnormalities seen.   3. Normal left ventricular diastolic function, with normal filling pressure.   4. No significant valvular disease.   5. Mildly enlarged right ventricular cavity size, with normal wall thickness, and normal systolic function.   6. No pericardial effusion seen.   7. Right pleural effusion noted.    < end of copied text >  < from: CT Angio Chest PE Protocol w/ IV Cont (06.20.24 @ 21:24) >  No pulmonary embolus.    Large left pleural effusion with complete passive atelectasis of the left   lower lobe. Small, partially loculated right pleural effusion.    A new 8 mm right upper lobe pulmonary nodule concerning for metastasis.    Further thickening of bilateral adrenal glands, nonspecific.      Assessment /Plan:     89M with hx of Stage II bladder cancer s/p TURBT and chemoradiation, presenting with dyspnea and orthopnea. a/w Acute hypoxic respiratory failure also found to have large L pleural effusion. Found to have new onset AF with RVR.    1. New Onset Atrial Fibrillation with Rapid Ventricular Response  - ECG 6/21 with AF with RVR  - Spontaneously converted to SR 6/21  - TTE wnl  - TSH wnl  - [id number] of 2 (age and gender). Heparin gtt on hold given thoracentesis fluids were serosanguinous.   - If CBC stable x2, ok to resume hep gtt as per Pulm  - If CBC stable and no further invasive procedures planned, plan to transition to Eliquis 5mg PO BID.  - BP soft today but if remains stable or recovers further, recommend starting Toprol 25mg PO daily.     2. Shortness of Breath  - CTA neg for PE but found to have large left pleural effusion now s/p thora with 2.1L removed on 6/22  -   - TTE wnl, normal filling pressures, preserved EF  - c/w supplemental oxygen PRN-- currently on HFNC  - Possible plan for left thora pending clinical course    3. Hyponatremia  - Slowly improving, now 127  - Lasix on hold    Differential diagnosis and plan of care discussed with patient after the evaluation. Counseling on diet, nutritional counseling, weight management, exercise and medication compliance was done.   Advanced care planning/advanced directives discussed with patient/family. DNR status including forceful chest compressions to attempt to restart the heart, ventilator support/artificial breathing, electric shock, artificial nutrition, health care proxy, Molst form all discussed with pt. Pt wishes to consider. More than fifteen minutes spent on discussing advanced directives.     Jayda BARRETO-BC  Ander Cardoso DO [facility]  Cardiovascular Medicine  [address], [address]  Available for call or text via [employer]  Office [phone]   DATE OF SERVICE: 06-23-24 @ 11:49    CHIEF COMPLAINT:Patient is a 89y old  Male who presents with a chief complaint of Chart Reviewed, Events Noted  "Patient is a 89y old  Male who presents with a chief complaint of SOB"     (23 Jun 2024 11:32)      HISTORY OF PRESENT ILLNESS:  89 year old male with hx of stage II bladder cancer s/p TURBT and chemoradiation with gemcitabine (completed 5/2/24). Pt presenting with worsening dyspnea on exertion and orthopnea for the past 3 days. Since starting chemoradiation, patient has been feeling weaker and with poor appetite. He has urinary urgency since the TURBT; he takes tamsulosin in the evening. Per nephew, patient has been keeping his A/C low at home so it has been hot in side his home. He started developing worsening shortness of breath a few days ago. No flu like symptoms, no cough, no fever, no chest pain. Pt has been trying to drink lots of fluids and would also drink ensure. He lives alone and is independent in his ADLs.     In the ED, patient was noted to be hypoxic requiring nonrebreather. CXR showed B/L pleural effusions, L>R. CTA chest performed without evidence of PE. Large pleural effusion noted on L with passive atelectasis. New 8mm pulm nodule in RUL also noted. Pt also significantly hyponatremic to 120. Pt admitted for further workup and management.  (20 Jun 2024 21:49)      PAST MEDICAL & SURGICAL HISTORY:  Malignant neoplasm of bladder, unspecified      HLD (hyperlipidemia)      S/P bilateral inguinal hernia repair              MEDICATIONS:  heparin   Injectable 5500 Unit(s) IV Push every 6 hours PRN  heparin   Injectable 2500 Unit(s) IV Push every 6 hours PRN  heparin  Infusion. 1500 Unit(s)/Hr IV Continuous <Continuous>      albuterol/ipratropium for Nebulization 3 milliLiter(s) Nebulizer every 6 hours PRN    acetaminophen     Tablet .. 650 milliGRAM(s) Oral every 6 hours PRN    aluminum hydroxide/magnesium hydroxide/simethicone Suspension 30 milliLiter(s) Oral every 4 hours PRN  pantoprazole    Tablet 40 milliGRAM(s) Oral before breakfast      chlorhexidine 2% Cloths 1 Application(s) Topical daily  sodium chloride 0.9%. 500 milliLiter(s) IV Continuous <Continuous>  tamsulosin 0.4 milliGRAM(s) Oral at bedtime      FAMILY HISTORY:      Non-contributory    SOCIAL HISTORY:    Not an active smoker    Allergies    No Known Allergies    Intolerances    	    REVIEW OF SYSTEMS:  CONSTITUTIONAL: No fever  EYES: No eye pain, visual disturbances, or discharge  ENMT:  No difficulty hearing, tinnitus  NECK: No pain or stiffness  RESPIRATORY: No cough, wheezing,  CARDIOVASCULAR: No chest pain, palpitations, passing out, dizziness, or leg swelling  GASTROINTESTINAL:  No nausea, vomiting, diarrhea or constipation. No melena.  GENITOURINARY: No dysuria, hematuria  NEUROLOGICAL: No stroke like symptoms  SKIN: No burning or lesions   ENDOCRINE: No heat or cold intolerance  MUSCULOSKELETAL: No joint pain or swelling  PSYCHIATRIC: No  anxiety, mood swings  HEME/LYMPH: No bleeding gums  ALLERGY AND IMMUNOLOGIC: No hives or eczema	    All other ROS negative    PHYSICAL EXAM:  T(C): 36.4 (06-23-24 @ 11:19), Max: 36.7 (06-22-24 @ 12:04)  HR: 92 (06-23-24 @ 11:32) (92 - 96)  BP: 103/62 (06-23-24 @ 11:32) (100/63 - 146/72)  RR: 20 (06-23-24 @ 11:19) (18 - 20)  SpO2: 96% (06-23-24 @ 11:19) (94% - 99%)  Wt(kg): --  I&O's Summary    22 Jun 2024 07:01  -  23 Jun 2024 07:00  --------------------------------------------------------  IN: 590 mL / OUT: 1000 mL / NET: -410 mL        Appearance: Normal	  HEENT:   Normal oral mucosa, EOMI	  Cardiovascular:  S1 S2, No JVD,    Respiratory: Lungs clear to auscultation	  Psychiatry: Alert  Gastrointestinal:  Soft, Non-tender, + BS	  Skin: No rashes   Neurologic: Non-focal  Extremities:  No edema  Vascular: Peripheral pulses palpable    	    	  	  CARDIAC MARKERS:  Labs personally reviewed by me                                  10.9   15.26 )-----------( 352      ( 23 Jun 2024 06:35 )             32.7     06-23    127<L>  |  89<L>  |  24<H>  ----------------------------<  98  3.9   |  20<L>  |  0.92    Ca    9.3      23 Jun 2024 06:36    TPro  7.4  /  Alb  3.0<L>  /  TBili  0.4  /  DBili  x   /  AST  28  /  ALT  48<H>  /  AlkPhos  89  06-22          EKG: Personally reviewed by me - AF with RVR  Radiology: Personally reviewed by me -   < from: Xray Chest 1 View- PORTABLE-Urgent (Xray Chest 1 View- PORTABLE-Urgent .) (06.22.24 @ 20:05) >  IMPRESSION:  Small moderate bilateral pleural effusions that are decreased in size   when compared to prior exam.  There is no pneumothorax.    < end of copied text >  < from: TTE W or WO Ultrasound Enhancing Agent (06.21.24 @ 13:13) >   1. Technically difficult image quality.   2. Left ventricular cavity is small. Left ventricular wall thickness is normal. Left ventricular systolic function is normal with an ejection fraction of 57 % by Wilson's method of disks. There are no regional wall motion abnormalities seen.   3. Normal left ventricular diastolic function, with normal filling pressure.   4. No significant valvular disease.   5. Mildly enlarged right ventricular cavity size, with normal wall thickness, and normal systolic function.   6. No pericardial effusion seen.   7. Right pleural effusion noted.    < end of copied text >  < from: CT Angio Chest PE Protocol w/ IV Cont (06.20.24 @ 21:24) >  No pulmonary embolus.    Large left pleural effusion with complete passive atelectasis of the left   lower lobe. Small, partially loculated right pleural effusion.    A new 8 mm right upper lobe pulmonary nodule concerning for metastasis.    Further thickening of bilateral adrenal glands, nonspecific.            Assessment /Plan:     89M with hx of Stage II bladder cancer s/p TURBT and chemoradiation, presenting with dyspnea and orthopnea. a/w Acute hypoxic respiratory failure also found to have large L pleural effusion. Found to have new onset AF with RVR.    1. New Onset Atrial Fibrillation with Rapid Ventricular Response  - ECG 6/21 with AF with RVR  - Spontaneously converted to SR 6/21  - TTE wnl  - TSH wnl  - [id number] of 2 (age and gender). Heparin gtt on hold given thoracentesis fluids were serosanguinous.   - If CBC stable x2, ok to resume hep gtt as per Pulm  - If CBC stable and no further invasive procedures planned, plan to transition to Eliquis 5mg PO BID.  - BP soft today but if remains stable or recovers further, recommend starting Toprol 25mg PO daily.     2. Shortness of Breath  - CTA neg for PE but found to have large left pleural effusion now s/p thora with 2.1L removed on 6/22  -   - TTE wnl, normal filling pressures, preserved EF  - c/w supplemental oxygen PRN-- currently on HFNC  - Possible plan for left thora pending clinical course    3. Hyponatremia  - Slowly improving, now 127  - Lasix on hold    I will be covered by Dr Rachid Mckinnon 6/24 - 6/27. He can be reached at [phone]        Differential diagnosis and plan of care discussed with patient after the evaluation. Counseling on diet, nutritional counseling, weight management, exercise and medication compliance was done.   Advanced care planning/advanced directives discussed with patient/family. DNR status including forceful chest compressions to attempt to restart the heart, ventilator support/artificial breathing, electric shock, artificial nutrition, health care proxy, Molst form all discussed with pt. Pt wishes to consider. More than fifteen minutes spent on discussing advanced directives.     Jayda Perdomo [facility]-BC  Ander Cardoso DO [facility]  Cardiovascular Medicine  [address], [address]  Available for call or text via [employer]  Office [phone]

## 2024-06-24 ENCOUNTER — APPOINTMENT (OUTPATIENT)
Dept: UROLOGY | Facility: CLINIC | Age: 89
End: 2024-06-24

## 2024-06-24 NOTE — PROGRESS NOTE ADULT - ATTENDING COMMENTS
89M with stage II bladder cancer s/p TURBT and chemoradiation, presenting with dyspnea and acute hypoxemic respiratory failure with new onset Afib on Hep gtt. Found to have b/l pleural effusions L > R s/p diagnostic/therapeutic thoracentesis yielding 2.1L of serosanguinous fluid.  Awaiting cytopathology results.  Wean O2 as tolerated.  Will reassess need for right-sided thoracentesis in AM.

## 2024-06-24 NOTE — PROGRESS NOTE ADULT - SUBJECTIVE AND OBJECTIVE BOX
Scarlet Aguilar MD  Division of Hospital Medicine  Please contact via MS Teams (prefer message first)  Office: [phone]    Patient is a 89y old  Male who presents with a chief complaint of SOB (23 Jun 2024 14:20)      SUBJECTIVE / OVERNIGHT EVENTS:  no acute events overnight, vss, afebrile  pt mildly confused this morning but able to answer questions appropriately  still on HFNC and with some labored breathing    ROS:  14 point ROS negative in detail except stated as above    MEDICATIONS  (STANDING):  ascorbic acid 500 milliGRAM(s) Oral daily  chlorhexidine 2% Cloths 1 Application(s) Topical daily  heparin  Infusion. 1500 Unit(s)/Hr (15 mL/Hr) IV Continuous <Continuous>  multivitamin/minerals 1 Tablet(s) Oral daily  pantoprazole    Tablet 40 milliGRAM(s) Oral before breakfast  sodium chloride 0.9%. 500 milliLiter(s) (100 mL/Hr) IV Continuous <Continuous>  tamsulosin 0.4 milliGRAM(s) Oral at bedtime    MEDICATIONS  (PRN):  acetaminophen     Tablet .. 650 milliGRAM(s) Oral every 6 hours PRN Temp greater or equal to 38C (100.4F), Mild Pain (1 - 3)  albuterol/ipratropium for Nebulization 3 milliLiter(s) Nebulizer every 6 hours PRN Shortness of Breath and/or Wheezing  aluminum hydroxide/magnesium hydroxide/simethicone Suspension 30 milliLiter(s) Oral every 4 hours PRN Dyspepsia  heparin   Injectable 5500 Unit(s) IV Push every 6 hours PRN For aPTT less than 40  heparin   Injectable 2500 Unit(s) IV Push every 6 hours PRN For aPTT between 40 - 57      CAPILLARY BLOOD GLUCOSE        I&O's Summary    23 Jun 2024 07:01  -  24 Jun 2024 07:00  --------------------------------------------------------  IN: 600 mL / OUT: 250 mL / NET: 350 mL        PHYSICAL EXAM:  Vital Signs Last 24 Hrs  T(C): 36.5 (24 Jun 2024 04:00), Max: 36.5 (24 Jun 2024 04:00)  T(F): 97.7 (24 Jun 2024 04:00), Max: 97.7 (24 Jun 2024 04:00)  HR: 95 (24 Jun 2024 09:08) (92 - 95)  BP: 101/62 (24 Jun 2024 04:00) (101/62 - 103/62)  BP(mean): --  RR: 18 (24 Jun 2024 09:08) (18 - 20)  SpO2: 92% (24 Jun 2024 09:08) (92% - 97%)    Parameters below as of 24 Jun 2024 09:08  Patient On (Oxygen Delivery Method): nasal cannula, high flow  O2 Flow (L/min): 50  O2 Concentration (%): 55  GENERAL: NAD, well-developed  HEAD:  Atraumatic, Normocephalic  EYES: EOMI, PERRLA, conjunctiva and sclera clear  NECK: Supple, No JVD  CHEST/LUNG: decreased BS bilaterally  HEART: Regular rate and rhythm; No murmurs, rubs, or gallops  ABDOMEN: Soft, Nontender, Nondistended; Bowel sounds present  EXTREMITIES:  2+ Peripheral Pulses, No clubbing, cyanosis, or edema  NEUROLOGY: AAOx3; non-focal  SKIN: No rashes or lesions    LABS:                        10.4   16.39 )-----------( 276      ( 24 Jun 2024 07:09 )             31.8     06-24    127<L>  |  89<L>  |  26<H>  ----------------------------<  104<H>  4.1   |  21<L>  |  0.96    Ca    9.4      24 Jun 2024 07:09    TPro  7.4  /  Alb  3.0<L>  /  TBili  0.4  /  DBili  x   /  AST  28  /  ALT  48<H>  /  AlkPhos  89  06-22    PT/INR - ( 22 Jun 2024 17:19 )   PT: 12.5 sec;   INR: 1.20 ratio         PTT - ( 24 Jun 2024 07:09 )  PTT:46.3 sec      Urinalysis Basic - ( 24 Jun 2024 07:09 )    Color: x / Appearance: x / SG: x / pH: x  Gluc: 104 mg/dL / Ketone: x  / Bili: x / Urobili: x   Blood: x / Protein: x / Nitrite: x   Leuk Esterase: x / RBC: x / WBC x   Sq Epi: x / Non Sq Epi: x / Bacteria: x        RADIOLOGY & ADDITIONAL TESTS:    Imaging Personally Reviewed:  < from: CT Angio Chest PE Protocol w/ IV Cont (06.20.24 @ 21:24) >  No pulmonary embolus.    Large left pleural effusion with complete passive atelectasis of the left   lower lobe. Small, partially loculated right pleural effusion.    A new 8 mm right upper lobe pulmonary nodule concerning for metastasis.    Further thickening of bilateral adrenal glands, nonspecific.    < end of copied text >      Consultant(s) Notes Reviewed:  pulm, cards    Care Discussed with Consultants/Other Providers: Dr. Cardoso (pulm)

## 2024-06-24 NOTE — PROGRESS NOTE ADULT - PROBLEM SELECTOR PLAN 4
Stage II; s/p TURBT and chemoradiation  - per outpatient records, plan for repeat cystoscopy after treatment; around August 2024  - CT imaging with new 8mm pulm nodule in RUL concerning for metastasis.  - fu cytology  - Continue tamsulosin  - Monitor I/Os

## 2024-06-24 NOTE — PROGRESS NOTE ADULT - PROBLEM SELECTOR PLAN 3
Significant hyponatremia to 120 on admission. Pt has previously been hyponatremic down to 130 however last month, Na was 136.  - Urine studies sent - low Eleanor and high Uosm suggesting low EABV;   - hold further lasix  - Na improving  - Goal 6-8 meq/24hrs

## 2024-06-24 NOTE — PROGRESS NOTE ADULT - ASSESSMENT
89 year old, Guyanese Speaking, male with hx of Stage II bladder cancer s/p TURBT and chemoradiation, presenting with dyspnea and AHRF. Found to have b/l pleural effusions with L > R    Broad differential, most concerning is malignancy    #AHRF  #Bilateral pleural effusion    Plan:  - s/p drainage of 2.1L of pleural fluid from left side, serosanguinous and exudative  - cytopath pending  - follow up remaining cultures  - POCUS tomorrow for assessment for right sided thoracentesis  - wean oxygen as tolerated  - out of bed with fall precautions  - incentive spirometer (please provide tubing for mouth)    Husam Henderson MD  Fellow, Pulmonary-Critical Care

## 2024-06-24 NOTE — PROGRESS NOTE ADULT - SUBJECTIVE AND OBJECTIVE BOX
Interval Events:    PHYSICAL EXAM:  General:   HEENT: MMM, PERRLA  Respiratory:   Cardiovascular: RRR, no MRG  Abdomen: NTND  Extremities: no LE edema  Neurological: AOx3, no gross deficits    REVIEW OF SYSTEMS:  All systems negative unless described below:    OBJECTIVE:  ICU Vital Signs Last 24 Hrs  T(C): 36.8 (24 Jun 2024 11:38), Max: 36.8 (24 Jun 2024 11:38)  T(F): 98.2 (24 Jun 2024 11:38), Max: 98.2 (24 Jun 2024 11:38)  HR: 104 (24 Jun 2024 11:38) (94 - 104)  BP: 94/58 (24 Jun 2024 11:38) (94/58 - 101/62)  BP(mean): --  ABP: --  ABP(mean): --  RR: 18 (24 Jun 2024 11:38) (18 - 20)  SpO2: 96% (24 Jun 2024 11:38) (92% - 97%)    O2 Parameters below as of 24 Jun 2024 11:38  Patient On (Oxygen Delivery Method): nasal cannula  O2 Flow (L/min): 50  O2 Concentration (%): 55          06-23 @ 07:01  -  06-24 @ 07:00  --------------------------------------------------------  IN: 600 mL / OUT: 250 mL / NET: 350 mL      CAPILLARY BLOOD GLUCOSE              HOSPITAL MEDICATIONS:  heparin  Infusion. 1500 Unit(s)/Hr IV Continuous <Continuous>          albuterol/ipratropium for Nebulization 3 milliLiter(s) Nebulizer every 6 hours PRN    acetaminophen     Tablet .. 650 milliGRAM(s) Oral every 6 hours PRN    aluminum hydroxide/magnesium hydroxide/simethicone Suspension 30 milliLiter(s) Oral every 4 hours PRN  pantoprazole    Tablet 40 milliGRAM(s) Oral before breakfast      tamsulosin 0.4 milliGRAM(s) Oral at bedtime    ascorbic acid 500 milliGRAM(s) Oral daily  multivitamin/minerals 1 Tablet(s) Oral daily  sodium chloride 0.9%. 500 milliLiter(s) IV Continuous <Continuous>      chlorhexidine 2% Cloths 1 Application(s) Topical daily        LABS:                        10.4   16.39 )-----------( 276      ( 24 Jun 2024 07:09 )             31.8     Hgb Trend: 10.4<--, 10.9<--, 11.0<--, 10.6<--, 10.6<--  06-24    127<L>  |  89<L>  |  26<H>  ----------------------------<  104<H>  4.1   |  21<L>  |  0.96    Ca    9.4      24 Jun 2024 07:09    TPro  7.4  /  Alb  3.0<L>  /  TBili  0.4  /  DBili  x   /  AST  28  /  ALT  48<H>  /  AlkPhos  89  06-22    Creatinine Trend: 0.96<--, 0.92<--, 0.92<--, 0.87<--, 0.89<--, 0.91<--  PT/INR - ( 22 Jun 2024 17:19 )   PT: 12.5 sec;   INR: 1.20 ratio         PTT - ( 24 Jun 2024 07:09 )  PTT:46.3 sec  Urinalysis Basic - ( 24 Jun 2024 07:09 )    Color: x / Appearance: x / SG: x / pH: x  Gluc: 104 mg/dL / Ketone: x  / Bili: x / Urobili: x   Blood: x / Protein: x / Nitrite: x   Leuk Esterase: x / RBC: x / WBC x   Sq Epi: x / Non Sq Epi: x / Bacteria: x            MICROBIOLOGY:     RADIOLOGY:  [x] Reviewed and interpreted by me   Interval Events: fluid exudative, NAEON    PHYSICAL EXAM:  General: NAD, seated  HEENT: MMM, PERRLA  Respiratory: decreased breath sounds on left   Cardiovascular: RRR, no MRG  Abdomen: NTND  Extremities: no LE edema  Neurological: AOx3, no gross deficits    REVIEW OF SYSTEMS:  All systems negative unless described below:    OBJECTIVE:  ICU Vital Signs Last 24 Hrs  T(C): 36.8 (24 Jun 2024 11:38), Max: 36.8 (24 Jun 2024 11:38)  T(F): 98.2 (24 Jun 2024 11:38), Max: 98.2 (24 Jun 2024 11:38)  HR: 104 (24 Jun 2024 11:38) (94 - 104)  BP: 94/58 (24 Jun 2024 11:38) (94/58 - 101/62)  BP(mean): --  ABP: --  ABP(mean): --  RR: 18 (24 Jun 2024 11:38) (18 - 20)  SpO2: 96% (24 Jun 2024 11:38) (92% - 97%)    O2 Parameters below as of 24 Jun 2024 11:38  Patient On (Oxygen Delivery Method): nasal cannula  O2 Flow (L/min): 50  O2 Concentration (%): 55          06-23 @ 07:01  -  06-24 @ 07:00  --------------------------------------------------------  IN: 600 mL / OUT: 250 mL / NET: 350 mL      CAPILLARY BLOOD GLUCOSE              HOSPITAL MEDICATIONS:  heparin  Infusion. 1500 Unit(s)/Hr IV Continuous <Continuous>          albuterol/ipratropium for Nebulization 3 milliLiter(s) Nebulizer every 6 hours PRN    acetaminophen     Tablet .. 650 milliGRAM(s) Oral every 6 hours PRN    aluminum hydroxide/magnesium hydroxide/simethicone Suspension 30 milliLiter(s) Oral every 4 hours PRN  pantoprazole    Tablet 40 milliGRAM(s) Oral before breakfast      tamsulosin 0.4 milliGRAM(s) Oral at bedtime    ascorbic acid 500 milliGRAM(s) Oral daily  multivitamin/minerals 1 Tablet(s) Oral daily  sodium chloride 0.9%. 500 milliLiter(s) IV Continuous <Continuous>      chlorhexidine 2% Cloths 1 Application(s) Topical daily        LABS:                        10.4   16.39 )-----------( 276      ( 24 Jun 2024 07:09 )             31.8     Hgb Trend: 10.4<--, 10.9<--, 11.0<--, 10.6<--, 10.6<--  06-24    127<L>  |  89<L>  |  26<H>  ----------------------------<  104<H>  4.1   |  21<L>  |  0.96    Ca    9.4      24 Jun 2024 07:09    TPro  7.4  /  Alb  3.0<L>  /  TBili  0.4  /  DBili  x   /  AST  28  /  ALT  48<H>  /  AlkPhos  89  06-22    Creatinine Trend: 0.96<--, 0.92<--, 0.92<--, 0.87<--, 0.89<--, 0.91<--  PT/INR - ( 22 Jun 2024 17:19 )   PT: 12.5 sec;   INR: 1.20 ratio         PTT - ( 24 Jun 2024 07:09 )  PTT:46.3 sec  Urinalysis Basic - ( 24 Jun 2024 07:09 )    Color: x / Appearance: x / SG: x / pH: x  Gluc: 104 mg/dL / Ketone: x  / Bili: x / Urobili: x   Blood: x / Protein: x / Nitrite: x   Leuk Esterase: x / RBC: x / WBC x   Sq Epi: x / Non Sq Epi: x / Bacteria: x            MICROBIOLOGY:     RADIOLOGY:  [x] Reviewed and interpreted by me

## 2024-06-24 NOTE — PROGRESS NOTE ADULT - PROBLEM SELECTOR PLAN 1
Acute hypoxic resp failure 2/2 bilateral pleural effusion  - CTA neg for PE.   - TTE - normal LV and RV fxn, no diastolic dysfunction  - status post thoracentesis L with 2.1L serosanguinous removed. pending studies and possible R side thoracentesis. In setting of bladder CA and new pulmonary nodule, concerning for malignancy  - still on HFNC and labored breathing  - appreciate pulm recs: may require R sided thora as well  - weaning o2 as tolerated

## 2024-06-24 NOTE — PROGRESS NOTE ADULT - PROBLEM SELECTOR PLAN 2
ECG 6/21 with AF with RVR, spontaneously converted to SR 6/21  - TTE wnl  - [id number] of 2 (age and gender). Heparin gtt resumed s/p thoracentesis  - If CBC stable and no further invasive procedures planned, plan to transition to Eliquis 5mg PO BID.  - BP improved, will start lopressor 12.5mg BID and monitor

## 2024-06-24 NOTE — PROGRESS NOTE ADULT - SUBJECTIVE AND OBJECTIVE BOX
DATE OF SERVICE: 06-24-24 @ 12:51    Patient is a 89y old  Male who presents with a chief complaint of SOB (24 Jun 2024 11:24)      INTERVAL HISTORY: Out of bed to chair, feels well     REVIEW OF SYSTEMS:  CONSTITUTIONAL: No weakness  EYES/ENT: No visual changes;  No throat pain   NECK: No pain or stiffness  RESPIRATORY: No cough, wheezing; No shortness of breath  CARDIOVASCULAR: No chest pain or palpitations  GASTROINTESTINAL: No abdominal  pain. No nausea, vomiting, or hematemesis  GENITOURINARY: No dysuria, frequency or hematuria  NEUROLOGICAL: No stroke like symptoms  SKIN: No rashes    TELEMETRY Personally reviewed: SR   	  MEDICATIONS:        PHYSICAL EXAM:  T(C): 36.8 (06-24-24 @ 11:38), Max: 36.8 (06-24-24 @ 11:38)  HR: 104 (06-24-24 @ 11:38) (94 - 104)  BP: 94/58 (06-24-24 @ 11:38) (94/58 - 101/62)  RR: 18 (06-24-24 @ 11:38) (18 - 20)  SpO2: 96% (06-24-24 @ 11:38) (92% - 97%)  Wt(kg): --  I&O's Summary    23 Jun 2024 07:01  -  24 Jun 2024 07:00  --------------------------------------------------------  IN: 600 mL / OUT: 250 mL / NET: 350 mL          Appearance: In no distress	  HEENT:    PERRL, EOMI	  Cardiovascular:  S1 S2, No JVD  Respiratory: Lungs clear to auscultation	  Gastrointestinal:  Soft, Non-tender, + BS	  Vascularature:  No edema of LE  Psychiatric: Appropriate affect   Neuro: no acute focal deficits                               10.4   16.39 )-----------( 276      ( 24 Jun 2024 07:09 )             31.8     06-24    127<L>  |  89<L>  |  26<H>  ----------------------------<  104<H>  4.1   |  21<L>  |  0.96    Ca    9.4      24 Jun 2024 07:09    TPro  7.4  /  Alb  3.0<L>  /  TBili  0.4  /  DBili  x   /  AST  28  /  ALT  48<H>  /  AlkPhos  89  06-22        Labs personally reviewed      ASSESSMENT/PLAN: 	  89M with hx of Stage II bladder cancer s/p TURBT and chemoradiation, presenting with dyspnea and orthopnea. a/w Acute hypoxic respiratory failure also found to have large L pleural effusion. Found to have new onset AF with RVR.    1. New Onset Atrial Fibrillation with Rapid Ventricular Response  - ECG 6/21 with AF with RVR  - Spontaneously converted to SR 6/21  - TTE wnl  - TSH wnl  - [id number] of 2 (age and gender). Heparin gtt on hold given thoracentesis fluids were serosanguinous.   - If CBC stable x2, ok to resume hep gtt as per Pulm  - If CBC stable and no further invasive procedures planned, plan to transition to Eliquis 5mg PO BID.  - BP soft today but if remains stable or recovers further, recommend starting Toprol 25mg PO daily.     2. Shortness of Breath  - CTA neg for PE but found to have large left pleural effusion now s/p thora with 2.1L removed on 6/22  -   - TTE wnl, normal filling pressures, preserved EF  - c/w supplemental oxygen PRN-- currently on HFNC  - Possible plan for left thora pending clinical course    3. Hyponatremia  - Slowly improving, now 127  - Lasix on hold    I will be covered by Dr Rachid Mckinnon 6/24 - 6/27. He can be reached at [phone]            KENRICK Acuna DO [facility]  Cardiovascular Medicine  [address], [address]  Available via call or text on [employer]  Office: [phone]

## 2024-06-25 ENCOUNTER — RESULT REVIEW (OUTPATIENT)
Age: 89
End: 2024-06-25

## 2024-06-25 ENCOUNTER — APPOINTMENT (OUTPATIENT)
Dept: HEMATOLOGY ONCOLOGY | Facility: CLINIC | Age: 89
End: 2024-06-25

## 2024-06-25 DIAGNOSIS — C67.8 MALIGNANT NEOPLASM OF OVERLAPPING SITES OF BLADDER: ICD-10-CM

## 2024-06-25 DIAGNOSIS — D64.9 ANEMIA, UNSPECIFIED: ICD-10-CM

## 2024-06-25 NOTE — PROGRESS NOTE ADULT - SUBJECTIVE AND OBJECTIVE BOX
Scarlet Aguilar MD  Division of Hospital Medicine  Please contact via MS Teams (prefer message first)  Office: [phone]    Patient is a 89y old  Male who presents with a chief complaint of SOB (24 Jun 2024 13:30)      SUBJECTIVE / OVERNIGHT EVENTS:  no acute events overnight, vss, afebrile  pt still on HFNC, now with some phlegm  pt feels overall okay but still with labored breathing  denies chest pain, palpitation, dizziness    ROS:  14 point ROS negative in detail except stated as above    MEDICATIONS  (STANDING):  ampicillin/sulbactam  IVPB      ampicillin/sulbactam  IVPB 3 Gram(s) IV Intermittent every 6 hours  ascorbic acid 500 milliGRAM(s) Oral daily  chlorhexidine 2% Cloths 1 Application(s) Topical daily  multivitamin/minerals 1 Tablet(s) Oral daily  pantoprazole    Tablet 40 milliGRAM(s) Oral before breakfast  sodium chloride 0.9%. 500 milliLiter(s) (100 mL/Hr) IV Continuous <Continuous>  tamsulosin 0.4 milliGRAM(s) Oral at bedtime    MEDICATIONS  (PRN):  acetaminophen     Tablet .. 650 milliGRAM(s) Oral every 6 hours PRN Temp greater or equal to 38C (100.4F), Mild Pain (1 - 3)  albuterol/ipratropium for Nebulization 3 milliLiter(s) Nebulizer every 6 hours PRN Shortness of Breath and/or Wheezing  aluminum hydroxide/magnesium hydroxide/simethicone Suspension 30 milliLiter(s) Oral every 4 hours PRN Dyspepsia      CAPILLARY BLOOD GLUCOSE        I&O's Summary    24 Jun 2024 07:01  -  25 Jun 2024 07:00  --------------------------------------------------------  IN: 0 mL / OUT: 900 mL / NET: -900 mL    25 Jun 2024 07:01  -  25 Jun 2024 10:39  --------------------------------------------------------  IN: 120 mL / OUT: 0 mL / NET: 120 mL        PHYSICAL EXAM:  Vital Signs Last 24 Hrs  T(C): 36.7 (25 Jun 2024 04:33), Max: 37.3 (24 Jun 2024 20:21)  T(F): 98 (25 Jun 2024 04:33), Max: 99.1 (24 Jun 2024 20:21)  HR: 103 (25 Jun 2024 09:49) (91 - 115)  BP: 104/68 (25 Jun 2024 04:33) (94/58 - 110/69)  BP(mean): --  RR: 18 (25 Jun 2024 09:49) (18 - 18)  SpO2: 94% (25 Jun 2024 09:49) (93% - 96%)    Parameters below as of 25 Jun 2024 09:49  Patient On (Oxygen Delivery Method): nasal cannula, high flow, Temp @31  O2 Flow (L/min): 50  O2 Concentration (%): 55  GENERAL: NAD, well-developed  HEAD:  Atraumatic, Normocephalic  EYES: EOMI, PERRLA, conjunctiva and sclera clear  NECK: Supple, No JVD  CHEST/LUNG: decreased BS   HEART: Regular rate and rhythm; No murmurs, rubs, or gallops  ABDOMEN: Soft, Nontender, Nondistended; Bowel sounds present  EXTREMITIES:  2+ Peripheral Pulses, No clubbing, cyanosis, or edema  NEUROLOGY: AAOx3; non-focal  SKIN: No rashes or lesions    LABS:                        11.3   17.72 )-----------( 317      ( 25 Jun 2024 06:35 )             33.2     06-25    127<L>  |  90<L>  |  26<H>  ----------------------------<  114<H>  4.2   |  22  |  0.95    Ca    9.7      25 Jun 2024 06:35      PTT - ( 25 Jun 2024 06:35 )  PTT:70.5 sec      Urinalysis Basic - ( 25 Jun 2024 06:35 )    Color: x / Appearance: x / SG: x / pH: x  Gluc: 114 mg/dL / Ketone: x  / Bili: x / Urobili: x   Blood: x / Protein: x / Nitrite: x   Leuk Esterase: x / RBC: x / WBC x   Sq Epi: x / Non Sq Epi: x / Bacteria: x        RADIOLOGY & ADDITIONAL TESTS:    Imaging Personally Reviewed:    Consultant(s) Notes Reviewed:  pulm, cards    Care Discussed with Consultants/Other Providers: Dr. Henderson (pulm)

## 2024-06-25 NOTE — PROGRESS NOTE ADULT - SUBJECTIVE AND OBJECTIVE BOX
DATE OF SERVICE: 06-25-24 @ 13:40    Patient is a 89y old  Male who presents with a chief complaint of SOB (25 Jun 2024 10:39)      INTERVAL HISTORY: Feels ok.     REVIEW OF SYSTEMS:  CONSTITUTIONAL: No weakness  EYES/ENT: No visual changes;  No throat pain   NECK: No pain or stiffness  RESPIRATORY: No cough, wheezing; No shortness of breath  CARDIOVASCULAR: No chest pain or palpitations  GASTROINTESTINAL: No abdominal  pain. No nausea, vomiting, or hematemesis  GENITOURINARY: No dysuria, frequency or hematuria  NEUROLOGICAL: No stroke like symptoms  SKIN: No rashes    TELEMETRY Personally reviewed: AF    	  MEDICATIONS:        PHYSICAL EXAM:  T(C): 36.6 (06-25-24 @ 11:07), Max: 37.3 (06-24-24 @ 20:21)  HR: 107 (06-25-24 @ 11:07) (91 - 115)  BP: 95/60 (06-25-24 @ 11:07) (95/60 - 110/69)  RR: 18 (06-25-24 @ 11:07) (18 - 18)  SpO2: 94% (06-25-24 @ 11:07) (93% - 96%)  Wt(kg): --  I&O's Summary    24 Jun 2024 07:01  -  25 Jun 2024 07:00  --------------------------------------------------------  IN: 0 mL / OUT: 900 mL / NET: -900 mL    25 Jun 2024 07:01  -  25 Jun 2024 13:40  --------------------------------------------------------  IN: 120 mL / OUT: 0 mL / NET: 120 mL          Appearance: In no distress	  HEENT:    PERRL, EOMI	  Cardiovascular:  S1 S2, No JVD  Respiratory: Lungs clear to auscultation	  Gastrointestinal:  Soft, Non-tender, + BS	  Vascularature:  No edema of LE  Psychiatric: Appropriate affect   Neuro: no acute focal deficits                               11.3   17.72 )-----------( 317      ( 25 Jun 2024 06:35 )             33.2     06-25    127<L>  |  90<L>  |  26<H>  ----------------------------<  114<H>  4.2   |  22  |  0.95    Ca    9.7      25 Jun 2024 06:35          Labs personally reviewed      ASSESSMENT/PLAN: 	    89M with hx of Stage II bladder cancer s/p TURBT and chemoradiation, presenting with dyspnea and orthopnea. a/w Acute hypoxic respiratory failure also found to have large L pleural effusion. Found to have new onset AF with RVR.    1. New Onset Atrial Fibrillation with Rapid Ventricular Response  - ECG 6/21 with AF with RVR  - Spontaneously converted to SR 6/21  - TTE wnl  - TSH wnl  - [id number] of 2 (age and gender). Heparin gtt on hold given thoracentesis fluids were serosanguinous.   - If CBC stable and no further invasive procedures planned, plan to transition to Eliquis 5mg PO BID.  - Hep on hold today for planned thora 6/25  - BP soft today but if remains stable or recovers further, recommend starting Toprol 25mg PO daily.     2. Shortness of Breath  - CTA neg for PE but found to have large left pleural effusion now s/p thora with 2.1L removed on 6/22  -   - TTE wnl, normal filling pressures, preserved EF  - c/w supplemental oxygen PRN-- currently on HFNC  - Plan for left thora today    3. Hyponatremia  - Slowly improving, now 127  - Lasix on hold    I will be covered by Dr Rachid Mckinnon 6/24 - 6/27. He can be reached at [phone]              CHINEDU Cortez-CHAIM Cardoso DO [facility]  Cardiovascular Medicine  [address], [address]  Available through call or text on [employer]  Office: [phone]

## 2024-06-25 NOTE — PROGRESS NOTE ADULT - PROBLEM SELECTOR PLAN 1
Acute hypoxic resp failure 2/2 bilateral pleural effusion  - CTA neg for PE.   - TTE - normal LV and RV fxn, no diastolic dysfunction  - status post thoracentesis L with 2.1L serosanguinous removed. pending studies and possible R side thoracentesis. In setting of bladder CA and new pulmonary nodule, concerning for malignancy  - still on HFNC and labored breathing  - given no improvement in his respiratory status as well as persistent leukocytosis, CT finding of loculated R pleural effusion, will start him on empiric abx with IV unasyn  - appreciate pulm recs: plan to repeat POCUS today  - weaning o2 as tolerated

## 2024-06-25 NOTE — PROGRESS NOTE ADULT - ASSESSMENT
89 year old, [place]yrgyz Speaking, male with hx of Stage II bladder cancer s/p TURBT and chemoradiation, presenting with dyspnea and AHRF. Found to have b/l pleural effusions with L > R    #AHRF  #Bilateral pleural effusions, s/p bilat thoras  #Hx bladder ca    - s/p drainage of 2.1L of pleural fluid from left side on 6/22, serosanguinous and exudative  - s/p drainage on 1.8L from right side on 6/25, serosang  - f/up cytopath bilat, and R sided chem and cultures  - post-op CXR notable for PTX; possible ex vacuo; currently HDS  - please repeat CXR tonight at 8pm and tomorrow morning  - increase HFNC FiO2 to 1.0 and decrease flow as tolerated  - if decompensates, stat MICU consult; will leave chest tube kit at bedside  - one ptx stabilizes and lung expands, please order CT chest noncon for lung parenchymal eval now that effusion is drained  - out of bed with fall precautions  - incentive spirometer    Husam Henderson MD  Fellow, Pulmonary-Critical Care

## 2024-06-25 NOTE — CHART NOTE - NSCHARTNOTEFT_GEN_A_CORE
: tracey    INDICATION: ahrf    PROCEDURE:  [ ] LIMITED ECHO  [x ] LIMITED CHEST  [ ] LIMITED RETROPERITONEAL  [ ] LIMITED ABDOMINAL  [ ] LIMITED DVT  [ ] NEEDLE GUIDANCE VASCULAR  [ ] NEEDLE GUIDANCE THORACENTESIS  [ ] NEEDLE GUIDANCE PARACENTESIS  [ ] NEEDLE GUIDANCE PERICARDIOCENTESIS  [ ] OTHER    FINDINGS:  Large right simple pleural effusion with flattening of diaphragm    INTERPRETATION:  Large right simple pleural effusion with flattening of diaphragm    Images stored on [employer]. : tracey    INDICATION: ahrf    PROCEDURE:  [ ] LIMITED ECHO  [x ] LIMITED CHEST  [ ] LIMITED RETROPERITONEAL  [ ] LIMITED ABDOMINAL  [ ] LIMITED DVT  [ ] NEEDLE GUIDANCE VASCULAR  [ ] NEEDLE GUIDANCE THORACENTESIS  [ ] NEEDLE GUIDANCE PARACENTESIS  [ ] NEEDLE GUIDANCE PERICARDIOCENTESIS  [ ] OTHER    FINDINGS:  Large right simple pleural effusion with flattening of diaphragm    INTERPRETATION:  Large right simple pleural effusion with flattening of diaphragm which improved post-thoracentesis.    Images stored on [employer].

## 2024-06-25 NOTE — PROGRESS NOTE ADULT - SUBJECTIVE AND OBJECTIVE BOX
Interval Events: s/p R thoracentesis today with 1.8L serosang fluid drained with post-op CXR showing ptx, possible ex vacuo; Pt currently stable with continued decreasing O2 requirements    PHYSICAL EXAM:  General: NAD, seated  HEENT: MMM, PERRLA  Respiratory: decreased breath sounds on left   Cardiovascular: RRR, no MRG  Abdomen: NTND  Extremities: no LE edema  Neurological: AOx3, no gross deficits    REVIEW OF SYSTEMS:  All systems negative unless described below:    OBJECTIVE:  ICU Vital Signs Last 24 Hrs  T(C): 36.6 (25 Jun 2024 11:07), Max: 37.3 (24 Jun 2024 20:21)  T(F): 97.8 (25 Jun 2024 11:07), Max: 99.1 (24 Jun 2024 20:21)  HR: 108 (25 Jun 2024 16:39) (91 - 113)  BP: 98/64 (25 Jun 2024 16:38) (91/53 - 110/69)  BP(mean): --  ABP: --  ABP(mean): --  RR: 18 (25 Jun 2024 16:39) (18 - 18)  SpO2: 96% (25 Jun 2024 16:39) (92% - 96%)    O2 Parameters below as of 25 Jun 2024 16:39  Patient On (Oxygen Delivery Method): nasal cannula, high flow  O2 Flow (L/min): 45  O2 Concentration (%): 50          06-24 @ 07:01 - 06-25 @ 07:00  --------------------------------------------------------  IN: 0 mL / OUT: 900 mL / NET: -900 mL    06-25 @ 07:01  -  06-25 @ 18:09  --------------------------------------------------------  IN: 80 mL / OUT: 0 mL / NET: 80 mL      CAPILLARY BLOOD GLUCOSE              HOSPITAL MEDICATIONS:    ampicillin/sulbactam  IVPB 3 Gram(s) IV Intermittent every 6 hours  ampicillin/sulbactam  IVPB            albuterol/ipratropium for Nebulization 3 milliLiter(s) Nebulizer every 6 hours PRN    acetaminophen     Tablet .. 650 milliGRAM(s) Oral every 6 hours PRN    aluminum hydroxide/magnesium hydroxide/simethicone Suspension 30 milliLiter(s) Oral every 4 hours PRN  pantoprazole    Tablet 40 milliGRAM(s) Oral before breakfast      tamsulosin 0.4 milliGRAM(s) Oral at bedtime    ascorbic acid 500 milliGRAM(s) Oral daily  multivitamin/minerals 1 Tablet(s) Oral daily  sodium chloride 0.9%. 500 milliLiter(s) IV Continuous <Continuous>      chlorhexidine 2% Cloths 1 Application(s) Topical daily        LABS:                        11.3   17.72 )-----------( 317      ( 25 Jun 2024 06:35 )             33.2     Hgb Trend: 11.3<--, 10.4<--, 10.9<--, 11.0<--, 10.6<--  06-25    127<L>  |  90<L>  |  26<H>  ----------------------------<  114<H>  4.2   |  22  |  0.95    Ca    9.7      25 Jun 2024 06:35      Creatinine Trend: 0.95<--, 0.96<--, 0.92<--, 0.92<--, 0.87<--, 0.89<--  PTT - ( 25 Jun 2024 06:35 )  PTT:70.5 sec  Urinalysis Basic - ( 25 Jun 2024 06:35 )    Color: x / Appearance: x / SG: x / pH: x  Gluc: 114 mg/dL / Ketone: x  / Bili: x / Urobili: x   Blood: x / Protein: x / Nitrite: x   Leuk Esterase: x / RBC: x / WBC x   Sq Epi: x / Non Sq Epi: x / Bacteria: x            MICROBIOLOGY:     RADIOLOGY:  [x] Reviewed and interpreted by me

## 2024-06-25 NOTE — PROGRESS NOTE ADULT - PROBLEM SELECTOR PLAN 2
ECG 6/21 with AF with RVR, spontaneously converted to SR 6/21  - TTE wnl  - [id number] of 2 (age and gender). Heparin gtt resumed s/p thoracentesis  - If CBC stable and no further invasive procedures planned, plan to transition to Eliquis 5mg PO BID.  - BP improved, will start lopressor 12.5mg BID and monitor

## 2024-06-25 NOTE — PROGRESS NOTE ADULT - SUBJECTIVE AND OBJECTIVE BOX
Oncology Follow-up    INTERVAL HPI/OVERNIGHT EVENTS:  Patient S&E at bedside. No o/n events, patient resting comfortably. Still requiring HFNC but able to slightly lower settings. Patient denies fever, chills, dizziness, weakness, CP, palpitations, cough, N/V/D/C, dysuria, changes in bowel movements, LE edema.    VITAL SIGNS:  T(F): 97.8 (06-25-24 @ 11:07)  HR: 107 (06-25-24 @ 11:07)  BP: 95/60 (06-25-24 @ 11:07)  RR: 18 (06-25-24 @ 11:07)  SpO2: 94% (06-25-24 @ 11:07)  Wt(kg): --    PHYSICAL EXAM:  Constitutional: AAOx3, NAD on HFNC 45 LPM, FiO2 55%  Eyes: PERRL, EOMI, sclera non-icteric  Respiratory: Decreased breath sounds in bilateral middle and lower posterior lung fields  Cardiovascular: Tachycardic, regular normal S1S2, no M/R/G  Gastrointestinal: soft, NTND, no masses palpable, BS normal in all four quadrants, no HSM  Extremities:  no c/c/e  Neurological: Grossly intact  Skin: Normal temperature    MEDICATIONS  (STANDING):  ampicillin/sulbactam  IVPB      ampicillin/sulbactam  IVPB 3 Gram(s) IV Intermittent every 6 hours  ascorbic acid 500 milliGRAM(s) Oral daily  chlorhexidine 2% Cloths 1 Application(s) Topical daily  multivitamin/minerals 1 Tablet(s) Oral daily  pantoprazole    Tablet 40 milliGRAM(s) Oral before breakfast  sodium chloride 0.9%. 500 milliLiter(s) (100 mL/Hr) IV Continuous <Continuous>  tamsulosin 0.4 milliGRAM(s) Oral at bedtime    MEDICATIONS  (PRN):  acetaminophen     Tablet .. 650 milliGRAM(s) Oral every 6 hours PRN Temp greater or equal to 38C (100.4F), Mild Pain (1 - 3)  albuterol/ipratropium for Nebulization 3 milliLiter(s) Nebulizer every 6 hours PRN Shortness of Breath and/or Wheezing  aluminum hydroxide/magnesium hydroxide/simethicone Suspension 30 milliLiter(s) Oral every 4 hours PRN Dyspepsia      No Known Allergies      LABS:                        11.3   17.72 )-----------( 317      ( 25 Jun 2024 06:35 )             33.2     06-25    127<L>  |  90<L>  |  26<H>  ----------------------------<  114<H>  4.2   |  22  |  0.95    Ca    9.7      25 Jun 2024 06:35      PTT - ( 25 Jun 2024 06:35 )  PTT:70.5 sec   Urinalysis Basic - ( 25 Jun 2024 06:35 )    Color: x / Appearance: x / SG: x / pH: x  Gluc: 114 mg/dL / Ketone: x  / Bili: x / Urobili: x   Blood: x / Protein: x / Nitrite: x   Leuk Esterase: x / RBC: x / WBC x   Sq Epi: x / Non Sq Epi: x / Bacteria: x        RADIOLOGY & ADDITIONAL TESTS:  Studies reviewed.

## 2024-06-25 NOTE — PROGRESS NOTE ADULT - ATTENDING COMMENTS
Continue management of respiratory failure and bilateral pleural effusions.  Follow-up cytology.    Octaviano Ruff MD

## 2024-06-25 NOTE — PROGRESS NOTE ADULT - ATTENDING COMMENTS
89M with stage II bladder cancer s/p TURBT and chemoradiation, presenting with dyspnea and acute hypoxemic respiratory failure with new onset Afib on Hep gtt. Found to have b/l pleural effusions L > R s/p diagnostic/therapeutic thoracentesis yielding 2.1L of serosanguinous fluid.  Awaiting cytopathology results. Remains with significant hypoxemia, though oxygen requirements have decreased. POCUS shows large right sided effusion with flattening of diaphragm. Plan for right-sided thoracentesis.

## 2024-06-25 NOTE — PROGRESS NOTE ADULT - ASSESSMENT
89M hx stage II bladder cancer s/p TURBT and CRT presented with worsening dyspnea on exertion and orthopnea for 3 days. Oncology consulted for further management of bladder cancer.    # Muscle-Invasive Bladder Cancer, Stage II  Follows with Dr. Peña at the [facility]. Pt had developed gross hematuria about in Dec 2023 (around the time of his wife passing). Prior tobacco, quit 18y ago, 50 pack years. Went to see  (Dr Perez). Underwent CTU that showed filling defects in bladder. Upper tract negative. Also, with? of pancreatitis based on imaging. Cyto suspicious. Underwent TURBT 2/27/24 with path IHC positive for GATA3, negative for P40, NKX3.1, and TTF1, indicating urothelial carcinoma. Muscle was present with invasion. From the TURBT on 2/27/24. "Inspection of the bladder showed large necrotic papillary tumor x2 along the posterior wall. There was significant trabeculation with cellules throughout. Bilateral ureteral orifices were visualized with clear efflux. Once surveillance was complete, resectoscope was used to carefully resect the posterior wall tumors. This showed evidence of significant bladder wall thickening, concerning for muscle invasion. Resection was taken down deep with care taken to avoid any significant injury, particularly given the patient's age and obtain appropriate pathology with resection."  - S/p chemoRT with gemcitabine (last dose 4/30/24). Gemcitabine is unlikely to be the cause of his pleural effusion  - CTA chest 6/20/24 does not show PE. Showed a large left pleural effusion with complete passive atelectasis of the left lower lobe. Small, partially loculated right pleural effusion. A new 8 mm right   upper lobe pulmonary nodule concerning for metastasis. Further thickening of bilateral adrenal glands, nonspecific.  - New subcentimeter RUL pulm nodule is too small to biopsy but is indeed suspicious for metastasis  - S/p dx/tx thoracentesis for large left pleural effusion on 6/22/24 with 2.1L removed. Follow up cytopathology.  - Previously small right pleural effusion is now larger and pulmonology planning to perform right dx/tx thoracentesis later today. Left pleural effusion is also reaccumulating      Vasyl Flores MD  Hematology/Oncology Fellow PGY-5  Pager: [facility] [phone] / SASHA [phone]  After 5pm and on weekends please page on-call fellow  89M hx stage II bladder cancer s/p TURBT and CRT presented with worsening dyspnea on exertion and orthopnea for 3 days. Oncology consulted for further management of bladder cancer.    # Muscle-Invasive Bladder Cancer, Stage II  Follows with Dr. Peña at the [facility]. Pt had developed gross hematuria about in Dec 2023 (around the time of his wife passing). Prior tobacco, quit 18y ago, 50 pack years. Went to see  (Dr Perez). Underwent CTU that showed filling defects in bladder. Upper tract negative. Also, with? of pancreatitis based on imaging. Cyto suspicious. Underwent TURBT 2/27/24 with path IHC positive for GATA3, negative for P40, NKX3.1, and TTF1, indicating urothelial carcinoma. Muscle was present with invasion. From the TURBT on 2/27/24. "Inspection of the bladder showed large necrotic papillary tumor x2 along the posterior wall. There was significant trabeculation with cellules throughout. Bilateral ureteral orifices were visualized with clear efflux. Once surveillance was complete, resectoscope was used to carefully resect the posterior wall tumors. This showed evidence of significant bladder wall thickening, concerning for muscle invasion. Resection was taken down deep with care taken to avoid any significant injury, particularly given the patient's age and obtain appropriate pathology with resection."  - S/p chemoRT with gemcitabine (last dose 4/30/24). Gemcitabine is unlikely to be the cause of his pleural effusion  - CTA chest 6/20/24 does not show PE. Showed a large left pleural effusion with complete passive atelectasis of the left lower lobe. Small, partially loculated right pleural effusion. A new 8 mm right   upper lobe pulmonary nodule concerning for metastasis. Further thickening of bilateral adrenal glands, nonspecific.  - New subcentimeter RUL pulm nodule is too small to biopsy but is indeed suspicious for metastasis  - S/p dx/tx thoracentesis for large left pleural effusion on 6/22/24 with 2.1L removed. Follow up cytopathology.  - Previously small right pleural effusion is now larger and s/p right dx/tx thoracentesis with pulm 6/25/24. Left pleural effusion has a slight reaccumulation  - Will need to monitor pt's respiratory status to determine if he will be able to tolerate further DMT    Vasyl Flores MD  Hematology/Oncology Fellow PGY-5  Pager: [facility] [phone] / PAULINAJ [phone]  After 5pm and on weekends please page on-call fellow

## 2024-06-25 NOTE — CHART NOTE - NSCHARTNOTEFT_GEN_A_CORE
Repeat CXR this evening at 2013 with stable/slightly improved R PTX.    C/w current mng as outlined in my note. Please continue monitoring vitals/O2 sat closely. Repeat CXR pending in AM.    Husam Henderson MD  Fellow, Pulmonary-Critical Care Repeat CXR this evening at 2013 with stable/slightly improved R PTX.    C/w current mng as outlined in my note. Please continue monitoring vitals/O2 sat closely. Repeat CXR pending in AM.    If pt decompensates, obtain STAT CXR and call MICU.    Husam Henderson MD  Fellow, Pulmonary-Critical Care

## 2024-06-26 NOTE — PROGRESS NOTE ADULT - SUBJECTIVE AND OBJECTIVE BOX
Interval Events: reports feeling well without issues, states he continues to feel better than he did prior to the thora. serial CXRs showing improvement in small R ptx    PHYSICAL EXAM:  General: NAD, seated  HEENT: MMM, PERRLA  Respiratory: decreased breath sounds on right  Cardiovascular: RRR, no MRG  Abdomen: NTND  Extremities: no LE edema  Neurological: AOx3, no gross deficits      REVIEW OF SYSTEMS:  All systems negative unless described below:    OBJECTIVE:  ICU Vital Signs Last 24 Hrs  T(C): 36.7 (26 Jun 2024 11:59), Max: 36.7 (26 Jun 2024 11:59)  T(F): 98 (26 Jun 2024 11:59), Max: 98 (26 Jun 2024 11:59)  HR: 91 (26 Jun 2024 16:16) (91 - 111)  BP: 102/55 (26 Jun 2024 16:16) (95/59 - 102/66)  BP(mean): --  ABP: --  ABP(mean): --  RR: 18 (26 Jun 2024 16:16) (16 - 18)  SpO2: 96% (26 Jun 2024 16:16) (93% - 100%)    O2 Parameters below as of 26 Jun 2024 16:16  Patient On (Oxygen Delivery Method): nasal cannula, high flow  O2 Flow (L/min): 45  O2 Concentration (%): 50          06-25 @ 07:01  -  06-26 @ 07:00  --------------------------------------------------------  IN: 140 mL / OUT: 100 mL / NET: 40 mL    06-26 @ 07:01  -  06-26 @ 16:48  --------------------------------------------------------  IN: 400 mL / OUT: 1 mL / NET: 399 mL      CAPILLARY BLOOD GLUCOSE              HOSPITAL MEDICATIONS:  heparin  Infusion. 1200 Unit(s)/Hr IV Continuous <Continuous>    ampicillin/sulbactam  IVPB      ampicillin/sulbactam  IVPB 3 Gram(s) IV Intermittent every 6 hours        albuterol/ipratropium for Nebulization 3 milliLiter(s) Nebulizer every 6 hours PRN    acetaminophen     Tablet .. 650 milliGRAM(s) Oral every 6 hours PRN    aluminum hydroxide/magnesium hydroxide/simethicone Suspension 30 milliLiter(s) Oral every 4 hours PRN  pantoprazole    Tablet 40 milliGRAM(s) Oral before breakfast      tamsulosin 0.4 milliGRAM(s) Oral at bedtime    ascorbic acid 500 milliGRAM(s) Oral daily  multivitamin/minerals 1 Tablet(s) Oral daily  sodium chloride 0.9%. 500 milliLiter(s) IV Continuous <Continuous>      chlorhexidine 2% Cloths 1 Application(s) Topical daily        LABS:                        10.6   15.89 )-----------( 336      ( 26 Jun 2024 06:45 )             30.7     Hgb Trend: 10.6<--, 11.3<--, 10.4<--, 10.9<--, 11.0<--  06-26    125<L>  |  89<L>  |  33<H>  ----------------------------<  112<H>  4.3   |  24  |  1.12    Ca    9.9      26 Jun 2024 06:45      Creatinine Trend: 1.12<--, 0.95<--, 0.96<--, 0.92<--, 0.92<--, 0.87<--  PTT - ( 26 Jun 2024 06:45 )  PTT:27.9 sec  Urinalysis Basic - ( 26 Jun 2024 06:45 )    Color: x / Appearance: x / SG: x / pH: x  Gluc: 112 mg/dL / Ketone: x  / Bili: x / Urobili: x   Blood: x / Protein: x / Nitrite: x   Leuk Esterase: x / RBC: x / WBC x   Sq Epi: x / Non Sq Epi: x / Bacteria: x            MICROBIOLOGY:     RADIOLOGY:  [x] Reviewed and interpreted by me

## 2024-06-26 NOTE — PROGRESS NOTE ADULT - SUBJECTIVE AND OBJECTIVE BOX
Scarlet Aguilar MD  Division of Hospital Medicine  Please contact via MS Teams (prefer message first)  Office: [phone]    Patient is a 89y old  Male who presents with a chief complaint of SOB (25 Jun 2024 14:20)      SUBJECTIVE / OVERNIGHT EVENTS:  s/p R sided thoracentesis, developed pneumothorax but remains stable  pt feels well this morning, thinks his breathing is better  he is trying to eat better so he can go home    ROS:  14 point ROS negative in detail except stated as above    MEDICATIONS  (STANDING):  ampicillin/sulbactam  IVPB      ampicillin/sulbactam  IVPB 3 Gram(s) IV Intermittent every 6 hours  ascorbic acid 500 milliGRAM(s) Oral daily  chlorhexidine 2% Cloths 1 Application(s) Topical daily  multivitamin/minerals 1 Tablet(s) Oral daily  pantoprazole    Tablet 40 milliGRAM(s) Oral before breakfast  sodium chloride 0.9%. 500 milliLiter(s) (100 mL/Hr) IV Continuous <Continuous>  tamsulosin 0.4 milliGRAM(s) Oral at bedtime    MEDICATIONS  (PRN):  acetaminophen     Tablet .. 650 milliGRAM(s) Oral every 6 hours PRN Temp greater or equal to 38C (100.4F), Mild Pain (1 - 3)  albuterol/ipratropium for Nebulization 3 milliLiter(s) Nebulizer every 6 hours PRN Shortness of Breath and/or Wheezing  aluminum hydroxide/magnesium hydroxide/simethicone Suspension 30 milliLiter(s) Oral every 4 hours PRN Dyspepsia      CAPILLARY BLOOD GLUCOSE        I&O's Summary    25 Jun 2024 07:01  -  26 Jun 2024 07:00  --------------------------------------------------------  IN: 140 mL / OUT: 100 mL / NET: 40 mL        PHYSICAL EXAM:  Vital Signs Last 24 Hrs  T(C): 36.6 (26 Jun 2024 04:20), Max: 36.6 (25 Jun 2024 11:07)  T(F): 97.8 (26 Jun 2024 04:20), Max: 97.8 (25 Jun 2024 11:07)  HR: 111 (26 Jun 2024 04:20) (96 - 113)  BP: 95/59 (26 Jun 2024 04:20) (91/53 - 98/64)  BP(mean): --  RR: 18 (26 Jun 2024 04:20) (18 - 18)  SpO2: 98% (26 Jun 2024 04:20) (92% - 99%)    Parameters below as of 26 Jun 2024 04:20  Patient On (Oxygen Delivery Method): nasal cannula, high flow  O2 Flow (L/min): 45  O2 Concentration (%): 50  GENERAL: NAD, well-developed  HEAD:  Atraumatic, Normocephalic  EYES: EOMI, PERRLA, conjunctiva and sclera clear  NECK: Supple, No JVD  CHEST/LUNG: Clear to auscultation bilaterally; No wheeze  HEART: Regular rate and rhythm; No murmurs, rubs, or gallops  ABDOMEN: Soft, Nontender, Nondistended; Bowel sounds present  EXTREMITIES:  2+ Peripheral Pulses, No clubbing, cyanosis, or edema  NEUROLOGY: AAOx3; non-focal  SKIN: No rashes or lesions    LABS:                        10.6   15.89 )-----------( 336      ( 26 Jun 2024 06:45 )             30.7     06-26    125<L>  |  89<L>  |  33<H>  ----------------------------<  112<H>  4.3   |  24  |  1.12    Ca    9.9      26 Jun 2024 06:45      PTT - ( 26 Jun 2024 06:45 )  PTT:27.9 sec      Urinalysis Basic - ( 26 Jun 2024 06:45 )    Color: x / Appearance: x / SG: x / pH: x  Gluc: 112 mg/dL / Ketone: x  / Bili: x / Urobili: x   Blood: x / Protein: x / Nitrite: x   Leuk Esterase: x / RBC: x / WBC x   Sq Epi: x / Non Sq Epi: x / Bacteria: x        RADIOLOGY & ADDITIONAL TESTS:    Imaging Personally Reviewed:  < from: Xray Chest 1 View- PORTABLE-Urgent (Xray Chest 1 View- PORTABLE-Urgent .) (06.25.24 @ 18:52) >  Moderate right pneumothorax. Increased moderate left pleural effusion.    < end of copied text >      Consultant(s) Notes Reviewed:  pulm, oncology, cards    Care Discussed with Consultants/Other Providers: Dr. Henderson (pulm)

## 2024-06-26 NOTE — PROGRESS NOTE ADULT - ASSESSMENT
89 year old, Bahamian Speaking, male with hx of Stage II bladder cancer s/p TURBT and chemoradiation, presenting with dyspnea and AHRF. Found to have b/l pleural effusions with L > R    #AHRF  #Bilateral pleural effusions, s/p bilat thoras  #Hx bladder ca    - s/p drainage of 2.1L of pleural fluid from left side on 6/22, serosanguinous and exudative  - s/p drainage on 1.8L from right side on 6/25, serosang  - f/up cytopath bilat, and R sided chem and cultures  - post-op CXR notable for PTX; possible ex vacuo; currently HDS  - serial CXRs showing improvement in small R ptx  - please continue daily CXRs  - c/w HFNC   - if decompensates, stat MICU consult; there is a chest tube kit at bedside  - one ptx stabilizes and lung expands, please order CT chest noncon for lung parenchymal eval now that effusion is drained  - out of bed with fall precautions  - incentive spirometer    Husam Henderson MD  Fellow, Pulmonary-Critical Care

## 2024-06-26 NOTE — PROGRESS NOTE ADULT - PROBLEM SELECTOR PLAN 1
Acute hypoxic resp failure 2/2 bilateral pleural effusion  - CTA neg for PE.   - TTE - normal LV and RV fxn, no diastolic dysfunction  - status post thoracentesis L with 2.1L serosanguinous removed. pending studies and possible R side thoracentesis. In setting of bladder CA and new pulmonary nodule, concerning for malignancy  - s/p R sided thoracentesis on 6/25  - still on HFNC  - given rapid reaccumulation of L sided pleural effusion, highly concerning for malignancy. Pending the course, may need pigtail placement before discharge  - given no improvement in his respiratory status as well as persistent leukocytosis, CT finding of loculated R pleural effusion, started him on empiric abx with IV unasyn  - appreciate pulm recs  - weaning o2 as tolerated

## 2024-06-26 NOTE — PROGRESS NOTE ADULT - PROBLEM SELECTOR PLAN 2
ECG 6/21 with AF with RVR, spontaneously converted to SR 6/21  - TTE wnl  - [id number] of 2 (age and gender). Heparin gtt resumed s/p thoracentesis  - If CBC stable and no further invasive procedures planned, plan to transition to Eliquis 5mg PO BID.  - once BP improves, will start lopressor 12.5mg BID and monitor

## 2024-06-26 NOTE — PROGRESS NOTE ADULT - ATTENDING COMMENTS
Patient is a 88 yo M with stage II bladder cancer s/p TURBT and chemoradiation, presenting with dyspnea and acute hypoxemic respiratory failure with new onset Afib on Hep gtt. Found to have b/l pleural effusions L > R s/p L diagnostic/therapeutic thoracentesis yielding 2.1L of serosanguinous fluid. Now s/p R thoracentesis removed 1.8L on 6/25 c/b PTX vs ex vacuo    #Pleural effusion  #PTX ex vacuo  #Acute hypoxemic respiratory failure  - Daily CXR, improving  - c/w supplemental O2, currently on HFNC, wean to 40L/40% and if tolerates can further deescalate to 6L nc  - If worsening hypoxemia or SOB or CP, get urgent CXR to monitor PTX  - f/u pleural effusion studies    Gopal Feldman MD  Pulmonary & Critical Care

## 2024-06-26 NOTE — PROGRESS NOTE ADULT - SUBJECTIVE AND OBJECTIVE BOX
DATE OF SERVICE: 06-26-24 @ 18:37    Patient is a 89y old  Male who presents with a chief complaint of SOB (26 Jun 2024 14:02)      INTERVAL HISTORY: feels okay, family at bedside    REVIEW OF SYSTEMS:  CONSTITUTIONAL: No weakness  EYES/ENT: No visual changes;  No throat pain   NECK: No pain or stiffness  RESPIRATORY: No cough, wheezing; No shortness of breath  CARDIOVASCULAR: No chest pain or palpitations  GASTROINTESTINAL: No abdominal  pain. No nausea, vomiting, or hematemesis  GENITOURINARY: No dysuria, frequency or hematuria  NEUROLOGICAL: No stroke like symptoms  SKIN: No rashes    TELEMETRY Personally reviewed:  80s-90s  	  MEDICATIONS:        PHYSICAL EXAM:  T(C): 36.7 (06-26-24 @ 11:59), Max: 36.7 (06-26-24 @ 11:59)  HR: 89 (06-26-24 @ 16:23) (89 - 111)  BP: 102/55 (06-26-24 @ 16:16) (95/59 - 102/66)  RR: 16 (06-26-24 @ 16:23) (16 - 18)  SpO2: 95% (06-26-24 @ 16:23) (93% - 100%)  Wt(kg): --  I&O's Summary    25 Jun 2024 07:01  -  26 Jun 2024 07:00  --------------------------------------------------------  IN: 140 mL / OUT: 100 mL / NET: 40 mL    26 Jun 2024 07:01  -  26 Jun 2024 18:37  --------------------------------------------------------  IN: 400 mL / OUT: 1 mL / NET: 399 mL          Appearance: In no distress	  HEENT:    PERRL, EOMI	  Cardiovascular:  S1 S2, No JVD  Respiratory: Lungs clear to auscultation	  Gastrointestinal:  Soft, Non-tender, + BS	  Vascularature:  No edema of LE  Psychiatric: Appropriate affect   Neuro: no acute focal deficits                               10.6   15.89 )-----------( 336      ( 26 Jun 2024 06:45 )             30.7     06-26    125<L>  |  89<L>  |  33<H>  ----------------------------<  112<H>  4.3   |  24  |  1.12    Ca    9.9      26 Jun 2024 06:45          Labs personally reviewed      ASSESSMENT/PLAN: 	  89M with hx of Stage II bladder cancer s/p TURBT and chemoradiation, presenting with dyspnea and orthopnea. a/w Acute hypoxic respiratory failure also found to have large L pleural effusion. Found to have new onset AF with RVR.    1. New Onset Atrial Fibrillation with Rapid Ventricular Response  - ECG 6/21 with AF with RVR  - Spontaneously converted to SR 6/21  - TTE wnl  - TSH wnl  - [id number] of 2 (age and gender). Heparin gtt on hold given thoracentesis fluids were serosanguinous.   - If CBC stable and no further invasive procedures planned, plan to transition to Eliquis 5mg PO BID.  - Hep on hold today for planned thora 6/25  - BP soft but if remains stable or recovers further, recommend starting Toprol 25mg PO daily.     2. Shortness of Breath  - CTA neg for PE but found to have large left pleural effusion now s/p thora with 2.1L removed on 6/22  -   - TTE wnl, normal filling pressures, preserved EF  - c/w supplemental oxygen PRN-- currently on HFNC  - s/p L thoracentesis 6/25 2.1L off  - for R thora 2/27    3. Hyponatremia  - Slowly improving, now 127  - Lasix on hold    I will be covered by Dr Rachid Mckinnon 6/24 - 6/27. He can be reached at [phone]              Iolani Behrbom, AG-CHAIM Cardoso DO [facility]  Cardiovascular Medicine  [address], [address]  Available through call or text on [employer] TEAMs  Office: [phone]   DATE OF SERVICE: 06-26-24 @ 18:37    Patient is a 89y old  Male who presents with a chief complaint of SOB (26 Jun 2024 14:02)      INTERVAL HISTORY: feels okay, family at bedside    REVIEW OF SYSTEMS:  CONSTITUTIONAL: No weakness  EYES/ENT: No visual changes;  No throat pain   NECK: No pain or stiffness  RESPIRATORY: No cough, wheezing; No shortness of breath  CARDIOVASCULAR: No chest pain or palpitations  GASTROINTESTINAL: No abdominal  pain. No nausea, vomiting, or hematemesis  GENITOURINARY: No dysuria, frequency or hematuria  NEUROLOGICAL: No stroke like symptoms  SKIN: No rashes    TELEMETRY Personally reviewed:  80s-90s  	  MEDICATIONS:        PHYSICAL EXAM:  T(C): 36.7 (06-26-24 @ 11:59), Max: 36.7 (06-26-24 @ 11:59)  HR: 89 (06-26-24 @ 16:23) (89 - 111)  BP: 102/55 (06-26-24 @ 16:16) (95/59 - 102/66)  RR: 16 (06-26-24 @ 16:23) (16 - 18)  SpO2: 95% (06-26-24 @ 16:23) (93% - 100%)  Wt(kg): --  I&O's Summary    25 Jun 2024 07:01  -  26 Jun 2024 07:00  --------------------------------------------------------  IN: 140 mL / OUT: 100 mL / NET: 40 mL    26 Jun 2024 07:01  -  26 Jun 2024 18:37  --------------------------------------------------------  IN: 400 mL / OUT: 1 mL / NET: 399 mL          Appearance: In no distress	  HEENT:    PERRL, EOMI	  Cardiovascular:  S1 S2, No JVD  Respiratory: Lungs clear to auscultation	  Gastrointestinal:  Soft, Non-tender, + BS	  Vascularature:  No edema of LE  Psychiatric: Appropriate affect   Neuro: no acute focal deficits                               10.6   15.89 )-----------( 336      ( 26 Jun 2024 06:45 )             30.7     06-26    125<L>  |  89<L>  |  33<H>  ----------------------------<  112<H>  4.3   |  24  |  1.12    Ca    9.9      26 Jun 2024 06:45          Labs personally reviewed      ASSESSMENT/PLAN: 	  89M with hx of Stage II bladder cancer s/p TURBT and chemoradiation, presenting with dyspnea and orthopnea. a/w Acute hypoxic respiratory failure also found to have large L pleural effusion. Found to have new onset AF with RVR.    1. New Onset Atrial Fibrillation with Rapid Ventricular Response  - ECG 6/21 with AF with RVR  - Spontaneously converted to SR 6/21  - TTE wnl  - TSH wnl  - [id number] of 2 (age and gender). Heparin held now restarted   - BP soft but if remains stable or recovers further, recommend starting Toprol 25mg PO daily.     2. Shortness of Breath  - CTA neg for PE but found to have large left pleural effusion now s/p thora with 2.1L removed on 6/22  -   - TTE wnl, normal filling pressures, preserved EF  - c/w supplemental oxygen PRN-- currently on HFNC  - s/p L thoracentesis 6/25 2.1L off  - for R thora 4/27    3. Hyponatremia  - Slowly improving, now 127  - Lasix on hold    I will be covered by Dr Rachid Mckinnon 6/24 - 6/27. He can be reached at [phone]              Iolani Behrbom, AG-NP Omid Javdan, DO [facility]  Cardiovascular Medicine  [address] [place] [address], [address]  Available through call or text on [employer] TEAMs  Office: [phone]

## 2024-06-27 NOTE — PROGRESS NOTE ADULT - SUBJECTIVE AND OBJECTIVE BOX
DATE OF SERVICE: 06-27-24 @ 12:20    Patient is a 89y old  Male who presents with a chief complaint of SOB (27 Jun 2024 10:42)      INTERVAL HISTORY: Feels ok.     REVIEW OF SYSTEMS:  CONSTITUTIONAL: No weakness  EYES/ENT: No visual changes;  No throat pain   NECK: No pain or stiffness  RESPIRATORY: No cough, wheezing; No shortness of breath  CARDIOVASCULAR: No chest pain or palpitations  GASTROINTESTINAL: No abdominal  pain. No nausea, vomiting, or hematemesis  GENITOURINARY: No dysuria, frequency or hematuria  NEUROLOGICAL: No stroke like symptoms  SKIN: No rashes    TELEMETRY Personally reviewed: AF converted to SR 80-90s  	  MEDICATIONS:        PHYSICAL EXAM:  T(C): 36.7 (06-27-24 @ 11:42), Max: 36.7 (06-27-24 @ 11:42)  HR: 95 (06-27-24 @ 11:42) (89 - 99)  BP: 123/56 (06-27-24 @ 11:42) (102/55 - 123/56)  RR: 18 (06-27-24 @ 11:42) (16 - 18)  SpO2: 96% (06-27-24 @ 11:42) (95% - 96%)  Wt(kg): --  I&O's Summary    26 Jun 2024 07:01  -  27 Jun 2024 07:00  --------------------------------------------------------  IN: 780 mL / OUT: 1 mL / NET: 779 mL    27 Jun 2024 07:01  -  27 Jun 2024 12:20  --------------------------------------------------------  IN: 400 mL / OUT: 500 mL / NET: -100 mL          Appearance: In no distress	  HEENT:    PERRL, EOMI	  Cardiovascular:  S1 S2, No JVD  Respiratory: Lungs clear to auscultation	  Gastrointestinal:  Soft, Non-tender, + BS	  Vascularature:  No edema of LE  Psychiatric: Appropriate affect   Neuro: no acute focal deficits                               10.5   19.28 )-----------( 338      ( 27 Jun 2024 04:41 )             31.3     06-27    129<L>  |  93<L>  |  35<H>  ----------------------------<  111<H>  4.2   |  20<L>  |  1.07    Ca    10.0      27 Jun 2024 03:54          Labs personally reviewed      ASSESSMENT/PLAN: 	  89M with hx of Stage II bladder cancer s/p TURBT and chemoradiation, presenting with dyspnea and orthopnea. a/w Acute hypoxic respiratory failure also found to have large L pleural effusion. Found to have new onset AF with RVR.    1. New Onset Atrial Fibrillation with Rapid Ventricular Response  - ECG 6/21 with AF with RVR  - Spontaneously converted to SR 6/21  - TTE wnl  - TSH wnl  - [id number] of 2 (age and gender). Heparin gtt in progress  - BP soft but if remains stable or recovers further, recommend starting Toprol 25mg PO daily.     2. Shortness of Breath  - CTA neg for PE but found to have large left pleural effusion now s/p thora with 2.1L removed on 6/22  -   - TTE wnl, normal filling pressures, preserved EF  - c/w supplemental oxygen PRN-- currently on HFNC with plans to trial weaning today to 6L NC  - s/p L thoracentesis 6/25 2.1L off    3. Hyponatremia  - Slowly improving, now 129  - Lasix on hold    I will be covered by Dr Rachid Mckinnon 6/24 - 6/27. He can be reached at [phone]                    Jayda Perdomo, AG-NP   Ander Cardoso DO Located within [facility]  Cardiovascular Medicine  [address], [address]  Available through call or text on [employer] TEAMs  Office: [phone]

## 2024-06-27 NOTE — PROGRESS NOTE ADULT - SUBJECTIVE AND OBJECTIVE BOX
Scarlet Aguilar MD  Division of Hospital Medicine  Please contact via MS Teams (prefer message first)  Office: [phone]    Patient is a 89y old  Male who presents with a chief complaint of SOB (27 Jun 2024 08:45)      SUBJECTIVE / OVERNIGHT EVENTS:  no acute events overnight, vss, afebrile  pt reports improvement in breathing and HFNC requirement coming down as well  pt has no other complaints    ROS:  14 point ROS negative in detail except stated as above    MEDICATIONS  (STANDING):  ampicillin/sulbactam  IVPB      ampicillin/sulbactam  IVPB 3 Gram(s) IV Intermittent every 6 hours  ascorbic acid 500 milliGRAM(s) Oral daily  chlorhexidine 2% Cloths 1 Application(s) Topical daily  heparin  Infusion. 1200 Unit(s)/Hr (12 mL/Hr) IV Continuous <Continuous>  multivitamin/minerals 1 Tablet(s) Oral daily  pantoprazole    Tablet 40 milliGRAM(s) Oral before breakfast  sodium chloride 0.9%. 500 milliLiter(s) (100 mL/Hr) IV Continuous <Continuous>  tamsulosin 0.4 milliGRAM(s) Oral at bedtime    MEDICATIONS  (PRN):  acetaminophen     Tablet .. 650 milliGRAM(s) Oral every 6 hours PRN Temp greater or equal to 38C (100.4F), Mild Pain (1 - 3)  albuterol/ipratropium for Nebulization 3 milliLiter(s) Nebulizer every 6 hours PRN Shortness of Breath and/or Wheezing  aluminum hydroxide/magnesium hydroxide/simethicone Suspension 30 milliLiter(s) Oral every 4 hours PRN Dyspepsia  sodium chloride 0.65% Nasal 1 Spray(s) Both Nostrils four times a day PRN Nasal Congestion      CAPILLARY BLOOD GLUCOSE        I&O's Summary    26 Jun 2024 07:01  -  27 Jun 2024 07:00  --------------------------------------------------------  IN: 780 mL / OUT: 1 mL / NET: 779 mL        PHYSICAL EXAM:  Vital Signs Last 24 Hrs  T(C): 36.5 (27 Jun 2024 04:24), Max: 36.7 (26 Jun 2024 11:59)  T(F): 97.7 (27 Jun 2024 04:24), Max: 98 (26 Jun 2024 11:59)  HR: 99 (27 Jun 2024 04:24) (89 - 111)  BP: 103/63 (27 Jun 2024 04:24) (102/55 - 105/65)  BP(mean): --  RR: 18 (27 Jun 2024 04:24) (16 - 18)  SpO2: 96% (27 Jun 2024 04:24) (93% - 96%)    Parameters below as of 27 Jun 2024 04:24  Patient On (Oxygen Delivery Method): nasal cannula, high flow  O2 Flow (L/min): 40  O2 Concentration (%): 50  GENERAL: NAD, well-developed  HEAD:  Atraumatic, Normocephalic  EYES: EOMI, PERRLA, conjunctiva and sclera clear  NECK: Supple, No JVD  CHEST/LUNG: decreased BS at the base  HEART: Regular rate and rhythm; No murmurs, rubs, or gallops  ABDOMEN: Soft, Nontender, Nondistended; Bowel sounds present  EXTREMITIES:  2+ Peripheral Pulses, No clubbing, cyanosis, or edema  NEUROLOGY: AAOx3; non-focal  SKIN: No rashes or lesions    LABS:                        10.5   19.28 )-----------( 338      ( 27 Jun 2024 04:41 )             31.3     06-27    129<L>  |  93<L>  |  35<H>  ----------------------------<  111<H>  4.2   |  20<L>  |  1.07    Ca    10.0      27 Jun 2024 03:54      PTT - ( 27 Jun 2024 04:41 )  PTT:35.3 sec      Urinalysis Basic - ( 27 Jun 2024 03:54 )    Color: x / Appearance: x / SG: x / pH: x  Gluc: 111 mg/dL / Ketone: x  / Bili: x / Urobili: x   Blood: x / Protein: x / Nitrite: x   Leuk Esterase: x / RBC: x / WBC x   Sq Epi: x / Non Sq Epi: x / Bacteria: x        RADIOLOGY & ADDITIONAL TESTS:    Imaging Personally Reviewed:    Consultant(s) Notes Reviewed:  pulm, cards    Care Discussed with Consultants/Other Providers: Dr. Henderson (pulm)

## 2024-06-27 NOTE — PROGRESS NOTE ADULT - PROBLEM SELECTOR PLAN 1
Acute hypoxic resp failure 2/2 bilateral pleural effusion  - CTA neg for PE.   - TTE - normal LV and RV fxn, no diastolic dysfunction  - status post thoracentesis L with 2.1L serosanguinous removed. pending studies and possible R side thoracentesis. In setting of bladder CA and new pulmonary nodule, concerning for malignancy  - s/p R sided thoracentesis on 6/25  - try to wean off of HFNC today  - given rapid reaccumulation of L sided pleural effusion, highly concerning for malignancy. Pending the course, may need pigtail placement before discharge  - continue IV unasyn for 5 days   - appreciate pulm recs  - weaning o2 as tolerated

## 2024-06-27 NOTE — PROGRESS NOTE ADULT - SUBJECTIVE AND OBJECTIVE BOX
Interval Events: feels well, serial CXRs showing improvement in ptx ex vacuo; AM CXR pending    PHYSICAL EXAM:  General: NAD, seated  HEENT: MMM, PERRLA  Respiratory: decreased breath sounds on right  Cardiovascular: RRR, no MRG  Abdomen: NTND  Extremities: no LE edema  Neurological: AOx3, no gross deficits    REVIEW OF SYSTEMS:  All systems negative unless described below:    OBJECTIVE:  ICU Vital Signs Last 24 Hrs  T(C): 36.5 (27 Jun 2024 04:24), Max: 36.7 (26 Jun 2024 11:59)  T(F): 97.7 (27 Jun 2024 04:24), Max: 98 (26 Jun 2024 11:59)  HR: 99 (27 Jun 2024 04:24) (89 - 111)  BP: 103/63 (27 Jun 2024 04:24) (102/55 - 105/65)  BP(mean): --  ABP: --  ABP(mean): --  RR: 18 (27 Jun 2024 04:24) (16 - 18)  SpO2: 96% (27 Jun 2024 04:24) (93% - 100%)    O2 Parameters below as of 27 Jun 2024 04:24  Patient On (Oxygen Delivery Method): nasal cannula, high flow  O2 Flow (L/min): 40  O2 Concentration (%): 50          06-26 @ 07:01  -  06-27 @ 07:00  --------------------------------------------------------  IN: 780 mL / OUT: 1 mL / NET: 779 mL      CAPILLARY BLOOD GLUCOSE              HOSPITAL MEDICATIONS:  heparin  Infusion. 1200 Unit(s)/Hr IV Continuous <Continuous>    ampicillin/sulbactam  IVPB 3 Gram(s) IV Intermittent every 6 hours  ampicillin/sulbactam  IVPB            albuterol/ipratropium for Nebulization 3 milliLiter(s) Nebulizer every 6 hours PRN    acetaminophen     Tablet .. 650 milliGRAM(s) Oral every 6 hours PRN    aluminum hydroxide/magnesium hydroxide/simethicone Suspension 30 milliLiter(s) Oral every 4 hours PRN  pantoprazole    Tablet 40 milliGRAM(s) Oral before breakfast      tamsulosin 0.4 milliGRAM(s) Oral at bedtime    ascorbic acid 500 milliGRAM(s) Oral daily  multivitamin/minerals 1 Tablet(s) Oral daily  sodium chloride 0.9%. 500 milliLiter(s) IV Continuous <Continuous>      chlorhexidine 2% Cloths 1 Application(s) Topical daily  sodium chloride 0.65% Nasal 1 Spray(s) Both Nostrils four times a day PRN        LABS:                        10.5   19.28 )-----------( 338      ( 27 Jun 2024 04:41 )             31.3     Hgb Trend: 10.5<--, 10.4<--, 10.6<--, 11.3<--, 10.4<--  06-27    129<L>  |  93<L>  |  35<H>  ----------------------------<  111<H>  4.2   |  20<L>  |  1.07    Ca    10.0      27 Jun 2024 03:54      Creatinine Trend: 1.07<--, 1.12<--, 0.95<--, 0.96<--, 0.92<--, 0.92<--  PTT - ( 27 Jun 2024 04:41 )  PTT:35.3 sec  Urinalysis Basic - ( 27 Jun 2024 03:54 )    Color: x / Appearance: x / SG: x / pH: x  Gluc: 111 mg/dL / Ketone: x  / Bili: x / Urobili: x   Blood: x / Protein: x / Nitrite: x   Leuk Esterase: x / RBC: x / WBC x   Sq Epi: x / Non Sq Epi: x / Bacteria: x            MICROBIOLOGY:     RADIOLOGY:  [x] Reviewed and interpreted by me

## 2024-06-27 NOTE — PROGRESS NOTE ADULT - ASSESSMENT
89 year old, [place] Speaking, male with hx of Stage II bladder cancer s/p TURBT and chemoradiation, presenting with dyspnea and AHRF. Found to have b/l pleural effusions with L > R    #AHRF  #Bilateral pleural effusions, s/p bilat thoras  #Hx bladder ca    - s/p drainage of 2.1L of pleural fluid from left side on 6/22, serosanguinous and exudative  - s/p drainage on 1.8L from right side on 6/25, serosang  - f/up cytopath bilat, and R sided chem and cultures  - post-op CXR notable for PTX; possible ex vacuo; currently HDS  - serial CXRs showing improvement in small R ptx   - please continue daily CXRs - pending today's CXR  - c/w HFNC   - if decompensates, stat MICU consult; there is a chest tube kit at bedside  - one ptx stabilizes and lung expands, please order CT chest noncon for lung parenchymal eval now that effusion is drained  - out of bed with fall precautions  - incentive spirometer    Husam Henderson MD  Fellow, Pulmonary-Critical Care       89 year old, [place]ian Speaking, male with hx of Stage II bladder cancer s/p TURBT and chemoradiation, presenting with dyspnea and AHRF. Found to have b/l pleural effusions with L > R    #AHRF  #Bilateral pleural effusions, s/p bilat thoras  #Hx bladder ca    - s/p drainage of 2.1L of pleural fluid from left side on 6/22, serosanguinous and exudative  - s/p drainage on 1.8L from right side on 6/25, serosang  - f/up cytopath bilat, and R sided chem and cultures  - post-op CXR notable for PTX; possible ex vacuo; currently HDS  - serial CXRs showing improvement in small R ptx   - please continue daily CXRs - pending today's CXR  - c/w HFNC; please wean to NC today if able  - if decompensates, stat MICU consult; there is a chest tube kit at bedside  - one ptx stabilizes and lung expands, please order CT chest noncon for lung parenchymal eval now that effusion is drained  - please order limited TTE with bubble  - out of bed with fall precautions  - incentive spirometer    Husam Henderson MD  Fellow, Pulmonary-Critical Care       89 year old, [place] Speaking, male with hx of Stage II bladder cancer s/p TURBT and chemoradiation, presenting with dyspnea and AHRF. Found to have b/l pleural effusions with L > R    #AHRF  #Bilateral pleural effusions, s/p bilat thoras  #Hx bladder ca    - s/p drainage of 2.1L of pleural fluid from left side on 6/22, serosanguinous and exudative  - s/p drainage on 1.8L from right side on 6/25, serosang  - f/up cytopath bilat, and R sided chem and cultures  - post-op CXR notable for PTX; possible ex vacuo; currently HDS  - serial CXRs showing improvement in small R ptx   - please continue daily CXRs  - c/w HFNC; please wean to NC today if able  - if decompensates, stat MICU consult; there is a chest tube kit at bedside  - one ptx stabilizes and lung expands, please order CT chest noncon for lung parenchymal eval now that effusion is drained  - please order limited TTE with bubble  - out of bed with fall precautions  - incentive spirometer    Husam Henderson MD  Fellow, Pulmonary-Critical Care

## 2024-06-27 NOTE — PROGRESS NOTE ADULT - PROBLEM SELECTOR PLAN 2
ECG 6/21 with AF with RVR, spontaneously converted to SR 6/21  - TTE wnl  - [id number] of 2 (age and gender). Heparin gtt resumed s/p thoracentesis  - given rapid accumulation of pleural effusion, pt may require chest tube. Will keep him on IV heparin for now  - If CBC stable and no further invasive procedures planned, plan to transition to Eliquis 5mg PO BID.  - once BP improves, will start lopressor 12.5mg BID and monitor

## 2024-06-27 NOTE — PROGRESS NOTE ADULT - ATTENDING COMMENTS
Patient is a 89 yo M with stage II bladder cancer s/p TURBT and chemoradiation, presenting with dyspnea and acute hypoxemic respiratory failure with new onset Afib on Hep gtt. Found to have b/l pleural effusions L > R s/p L diagnostic/therapeutic thoracentesis yielding 2.1L of serosanguinous fluid. Now s/p R thoracentesis removed 1.8L on 6/25 c/b PTX vs ex vacuo    #Pleural effusion  #PTX ex vacuo  #Acute hypoxemic respiratory failure  - Daily CXR, stable. Likely trapped lung  - c/w supplemental O2, currently on HFNC, weaned to 40L/40% and if tolerates can further deescalate to 6L nc  - Recommend CT chest for reevaluation of lung parenchyma post thoras  - If worsening hypoxemia or SOB or CP, get urgent CXR to monitor PTX  - f/u pleural effusion studies    Gopal Feldman MD  Pulmonary & Critical Care

## 2024-06-28 NOTE — PROGRESS NOTE ADULT - PROBLEM SELECTOR PLAN 1
Acute hypoxic resp failure 2/2 bilateral pleural effusion  - CTA neg for PE.   - TTE - normal LV and RV fxn, no diastolic dysfunction  - status post thoracentesis L with 2.1L serosanguinous removed. pending studies and possible R side thoracentesis. In setting of bladder CA and new pulmonary nodule, concerning for malignancy  - s/p R sided thoracentesis on 6/25  - now transitioned to NC  - given rapid reaccumulation of L sided pleural effusion, highly concerning for malignancy. Pending the course, may need pigtail placement before discharge  - continue IV unasyn for 5 days   - appreciate pulm recs  - weaning o2 as tolerated

## 2024-06-28 NOTE — PROGRESS NOTE ADULT - PROBLEM SELECTOR PLAN 2
ECG 6/21 with AF with RVR, spontaneously converted to SR 6/21  - TTE wnl  - [id number] of 2 (age and gender). Heparin gtt resumed s/p thoracentesis  - given rapid accumulation of pleural effusion, pt may require chest tube. Will keep him on IV heparin for now  - If CBC stable and no further invasive procedures planned, plan to transition to Eliquis 5mg PO BID.  - once BP improves, will start lopressor 12.5mg BID and monitor

## 2024-06-28 NOTE — PROGRESS NOTE ADULT - ASSESSMENT
89 year old, [place] Speaking, male with hx of Stage II bladder cancer s/p TURBT and chemoradiation, presenting with dyspnea and AHRF. Found to have b/l pleural effusions with L > R    #AHRF  #Bilateral pleural effusions, s/p bilat thoras  #Hx bladder ca    - s/p drainage of 2.1L of pleural fluid from left side on 6/22, serosanguinous and exudative  - s/p drainage on 1.8L from right side on 6/25, serosang and exudative  - Cytopath from L +atypical cell, please f/up R cytopath  - post-op CXR notable for PTX; possible ex vacuo; currently HDS  - serial CXRs showing improvement in small R ptx   - please continue daily CXRs until resolved  - cont on NC for goal O2 sat >92%  - if decompensates, stat MICU consult; there is a chest tube kit at bedside  - CT chest noncon for lung parenchymal eval now that effusion is drained  - please order limited TTE with bubble  - out of bed with fall precautions  - incentive spirometer    Husam Henderson MD  Fellow, Pulmonary-Critical Care

## 2024-06-28 NOTE — PROGRESS NOTE ADULT - ATTENDING COMMENTS
Patient is a 88 yo M with stage II bladder cancer s/p TURBT and chemoradiation, presenting with dyspnea and acute hypoxemic respiratory failure with new onset Afib on Hep gtt. Found to have b/l pleural effusions L > R s/p L diagnostic/therapeutic thoracentesis yielding 2.1L of serosanguinous fluid. Now s/p R thoracentesis removed 1.8L on 6/25 c/b PTX vs ex vacuo    #Pleural effusion  #PTX ex vacuo  #Acute hypoxemic respiratory failure  - Daily CXR, stable. Likely trapped lung  - c/w supplemental O2, now on nasal cannula, wean as tolerated  - Recommend CT chest for reevaluation of lung parenchyma post thoras  - If worsening hypoxemia or SOB or CP, get urgent CXR to monitor PTX  - f/u pleural effusion studies    Gopal Feldman MD  Pulmonary & Critical Care

## 2024-06-28 NOTE — PROGRESS NOTE ADULT - SUBJECTIVE AND OBJECTIVE BOX
DATE OF SERVICE: 06-28-24 @ 16:31    Patient is a 89y old  Male who presents with a chief complaint of SOB (28 Jun 2024 13:07)      INTERVAL HISTORY: Feels ok.     REVIEW OF SYSTEMS:  CONSTITUTIONAL: No weakness  EYES/ENT: No visual changes;  No throat pain   NECK: No pain or stiffness  RESPIRATORY: No cough, wheezing; No shortness of breath  CARDIOVASCULAR: No chest pain or palpitations  GASTROINTESTINAL: No abdominal  pain. No nausea, vomiting, or hematemesis  GENITOURINARY: No dysuria, frequency or hematuria  NEUROLOGICAL: No stroke like symptoms  SKIN: No rashes    TELEMETRY Personally reviewed: SR 90s with brief PAT  	  MEDICATIONS:        PHYSICAL EXAM:  T(C): 36.5 (06-28-24 @ 11:00), Max: 36.7 (06-27-24 @ 20:12)  HR: 99 (06-28-24 @ 11:00) (94 - 99)  BP: 100/61 (06-28-24 @ 11:00) (100/61 - 103/62)  RR: 18 (06-28-24 @ 11:00) (18 - 18)  SpO2: 94% (06-28-24 @ 11:00) (93% - 95%)  Wt(kg): --  I&O's Summary    27 Jun 2024 07:01  -  28 Jun 2024 07:00  --------------------------------------------------------  IN: 760 mL / OUT: 900 mL / NET: -140 mL    28 Jun 2024 07:01  -  28 Jun 2024 16:31  --------------------------------------------------------  IN: 200 mL / OUT: 0 mL / NET: 200 mL          Appearance: In no distress	  HEENT:    PERRL, EOMI	  Cardiovascular:  S1 S2, No JVD  Respiratory: Lungs clear to auscultation	  Gastrointestinal:  Soft, Non-tender, + BS	  Vascularature:  No edema of LE  Psychiatric: Appropriate affect   Neuro: no acute focal deficits                               10.7   18.99 )-----------( 326      ( 28 Jun 2024 03:48 )             32.8     06-28    131<L>  |  94<L>  |  36<H>  ----------------------------<  113<H>  4.0   |  24  |  1.16    Ca    10.3      28 Jun 2024 03:48    TPro  6.7  /  Alb  2.6<L>  /  TBili  0.4  /  DBili  x   /  AST  34  /  ALT  31  /  AlkPhos  89  06-28        Labs personally reviewed      ASSESSMENT/PLAN: 	      89M with hx of Stage II bladder cancer s/p TURBT and chemoradiation, presenting with dyspnea and orthopnea. a/w Acute hypoxic respiratory failure also found to have large L pleural effusion. Found to have new onset AF with RVR.    1. New Onset Atrial Fibrillation with Rapid Ventricular Response  - ECG 6/21 with AF with RVR  - Spontaneously converted to SR 6/21  - TTE wnl  - TSH wnl  - [id number] of 2 (age and gender). Heparin gtt in progress  - BP soft but if remains stable or recovers further, recommend starting Toprol 25mg PO daily.     2. Shortness of Breath  - CTA neg for PE but found to have large left pleural effusion now s/p thora with 2.1L removed on 6/22  -   - TTE wnl, normal filling pressures, preserved EF  - s/p L thoracentesis 6/25 2.1L off  - now on 6L NC  - Appreciate Pulm recs: CT Chest and TTE with bubble    3. Hyponatremia  - Slowly improving, now 131  - Lasix on hold      CHINEDU Cortez-NP   Ander Cardoso DO [facility]  Cardiovascular Medicine  [address], [address]  Available through call or text on [employer] TEAM[employer]  Office: [phone]

## 2024-06-28 NOTE — PROGRESS NOTE ADULT - SUBJECTIVE AND OBJECTIVE BOX
Interval Events: on NC, comfortable    PHYSICAL EXAM:  General: NAD, seated  HEENT: MMM, PERRLA  Respiratory: ctab  Cardiovascular: RRR, no MRG  Abdomen: NTND  Extremities: no LE edema  Neurological: AOx3, no gross deficits    REVIEW OF SYSTEMS:  All systems negative unless described below:    OBJECTIVE:  ICU Vital Signs Last 24 Hrs  T(C): 36.5 (28 Jun 2024 11:00), Max: 36.7 (27 Jun 2024 20:12)  T(F): 97.7 (28 Jun 2024 11:00), Max: 98 (27 Jun 2024 20:12)  HR: 99 (28 Jun 2024 11:00) (94 - 99)  BP: 100/61 (28 Jun 2024 11:00) (98/61 - 103/62)  BP(mean): --  ABP: --  ABP(mean): --  RR: 18 (28 Jun 2024 11:00) (18 - 18)  SpO2: 94% (28 Jun 2024 11:00) (93% - 97%)    O2 Parameters below as of 28 Jun 2024 11:00  Patient On (Oxygen Delivery Method): nasal cannula  O2 Flow (L/min): 6            06-27 @ 07:01  -  06-28 @ 07:00  --------------------------------------------------------  IN: 760 mL / OUT: 900 mL / NET: -140 mL      CAPILLARY BLOOD GLUCOSE              HOSPITAL MEDICATIONS:  heparin  Infusion. 1200 Unit(s)/Hr IV Continuous <Continuous>    ampicillin/sulbactam  IVPB 3 Gram(s) IV Intermittent every 6 hours  ampicillin/sulbactam  IVPB            albuterol/ipratropium for Nebulization 3 milliLiter(s) Nebulizer every 6 hours PRN    acetaminophen     Tablet .. 650 milliGRAM(s) Oral every 6 hours PRN    aluminum hydroxide/magnesium hydroxide/simethicone Suspension 30 milliLiter(s) Oral every 4 hours PRN  pantoprazole    Tablet 40 milliGRAM(s) Oral before breakfast      tamsulosin 0.4 milliGRAM(s) Oral at bedtime    ascorbic acid 500 milliGRAM(s) Oral daily  multivitamin/minerals 1 Tablet(s) Oral daily  sodium chloride 0.9%. 500 milliLiter(s) IV Continuous <Continuous>      chlorhexidine 2% Cloths 1 Application(s) Topical daily  sodium chloride 0.65% Nasal 1 Spray(s) Both Nostrils four times a day PRN        LABS:                        10.7   18.99 )-----------( 326      ( 28 Jun 2024 03:48 )             32.8     Hgb Trend: 10.7<--, 10.5<--, 10.4<--, 10.6<--, 11.3<--  06-28    131<L>  |  94<L>  |  36<H>  ----------------------------<  113<H>  4.0   |  24  |  1.16    Ca    10.3      28 Jun 2024 03:48    TPro  6.7  /  Alb  2.6<L>  /  TBili  0.4  /  DBili  x   /  AST  34  /  ALT  31  /  AlkPhos  89  06-28    Creatinine Trend: 1.16<--, 1.07<--, 1.12<--, 0.95<--, 0.96<--, 0.92<--  PTT - ( 28 Jun 2024 03:48 )  PTT:83.6 sec  Urinalysis Basic - ( 28 Jun 2024 03:48 )    Color: x / Appearance: x / SG: x / pH: x  Gluc: 113 mg/dL / Ketone: x  / Bili: x / Urobili: x   Blood: x / Protein: x / Nitrite: x   Leuk Esterase: x / RBC: x / WBC x   Sq Epi: x / Non Sq Epi: x / Bacteria: x            MICROBIOLOGY:     RADIOLOGY:  [x] Reviewed and interpreted by me

## 2024-06-29 NOTE — CONSULT NOTE ADULT - SUBJECTIVE AND OBJECTIVE BOX
THORACIC SURGERY CONSULT NOTE     HISTORY OF PRESENT ILLNESS:  HPI: 89 year old male with hx of stage II bladder cancer s/p TURBT and chemoradiation with gemcitabine (completed 5/2/24). Pt presenting with worsening dyspnea on exertion and orthopnea for the past 3 days. Since starting chemoradiation, patient has been feeling weaker and with poor appetite. He has urinary urgency since the TURBT; he takes tamsulosin in the evening. In the ED, patient was noted to be hypoxic requiring nonrebreather. CXR showed B/L pleural effusions, L>R. CTA chest performed without evidence of PE. Large pleural effusion noted on L with passive atelectasis. New 8mm pulm nodule in RUL also noted. Patient admitted to the medical service. During this admission, patient has required multiple thoracentesis, Left on 6/22 (2.1 L removed) and Right on 6/25 (1.8L removed). Since that time, patient remains on 6L NC, endorsing increased WOB. Cultures from prior thoracentesis are negative, cytopathology showing rare atypical cells. Patient is on Unasyn. Repeat CT on 6/28 showing large L sided loculated effusion and right moderate hydropneumothorax. Thoracic surgery consulted for surgical management of loculated effusion.        PAST MEDICAL HISTORY: Malignant neoplasm of bladder, unspecified    HLD (hyperlipidemia)    PAST SURGICAL HISTORY: S/P bilateral inguinal hernia repair    ALLERGIES: No Known Allergies    VITAL SIGNS:  T(C): 36.3 (29 Jun 2024 11:00), Max: 36.4 (28 Jun 2024 20:59)  T(F): 97.4 (29 Jun 2024 11:00), Max: 97.6 (28 Jun 2024 20:59)  HR: 106 (29 Jun 2024 11:00) (94 - 106)  BP: 101/64 (29 Jun 2024 11:00) (101/64 - 123/74)  BP(mean): --  ABP: --  ABP(mean): --  RR: 18 (29 Jun 2024 11:00) (18 - 18)  SpO2: 95% (29 Jun 2024 11:00) (93% - 95%)    O2 Parameters below as of 29 Jun 2024 11:00  Patient On (Oxygen Delivery Method): nasal cannula  O2 Flow (L/min): 6    PHYSICAL EXAM:  Gen: NAD  Neuro: A&Ox3  Resp: Increased WOB, currently on 6L NC   Cardiac: appears well perfused, extremities warm    LABS:                         10.2   22.06 )-----------( 317      ( 29 Jun 2024 07:03 )             30.7     06-29    131<L>  |  92<L>  |  35<H>  ----------------------------<  109<H>  4.3   |  23  |  1.04    Ca    10.8<H>      29 Jun 2024 07:03  Mg     2.3     06-29    TPro  6.7  /  Alb  2.6<L>  /  TBili  0.4  /  DBili  x   /  AST  34  /  ALT  31  /  AlkPhos  89  06-28    PTT - ( 29 Jun 2024 07:03 )  PTT:66.0 sec  CAPILLARY BLOOD GLUCOSE    IMAGING STUDIES:  < from: CT Chest No Cont (06.28.24 @ 22:15) >    CT scan of the chest was obtained without administration of intravenous   contrast.    Calcified lymph nodes are present in the pretracheal space and the right   hilum.    Heart is normal in size. Calcification of the aortic valve and coronary   arteries is noted. No pericardial effusion is noted.    No endobronchial lesions are noted. Compressive atelectasis is noted   involving most of the left upper and left lower lobe. This is secondary   to a very large loculated left pleural effusion. Compressive atelectasis   is also noted involving portion the right lower lobe. This is secondary   to small to moderate-sized loculated right hydropneumothorax.    Below the diaphragm, visualized portions of the abdomen demonstrate   thickening of both adrenal glands.    Degenerative changesof the spine are noted.    IMPRESSION: Very large loculated left pleural effusion and small to   moderate size loculated right hydropneumothorax.    < end of copied text >

## 2024-06-29 NOTE — PROGRESS NOTE ADULT - PROBLEM SELECTOR PLAN 2
ECG 6/21 with AF with RVR, spontaneously converted to SR 6/21  - TTE wnl  - [id number] of 2 (age and gender). Heparin gtt resumed s/p thoracentesis  - given rapid accumulation of pleural effusion, pt may require chest tube. Will keep him on IV heparin for now  - If CBC stable and no further invasive procedures planned, plan to transition to Eliquis 5mg PO BID.  - once BP improves, will start lopressor 12.5mg BID and monitor

## 2024-06-29 NOTE — PROGRESS NOTE ADULT - PROBLEM SELECTOR PLAN 1
Acute hypoxic resp failure 2/2 bilateral pleural effusion  - CTA neg for PE.   - TTE - normal LV and RV fxn, no diastolic dysfunction  - status post thoracentesis L with 2.1L serosanguinous removed. pending studies and possible R side thoracentesis. In setting of bladder CA and new pulmonary nodule, concerning for malignancy  - s/p R sided thoracentesis on 6/25  - now transitioned to NC  - given rapid reaccumulation of L sided pleural effusion, highly concerning for malignancy.   - IV unasyn for 5 days   - appreciate pulm recs  - weaning o2 as tolerated  - Repeat chest showed large left sided locular effusion. I discussed these findings with the pulmonary team and they were in agreement for cardiothoracic surgical evaluation for vats procedure with pleurodesis. I called the cardiothoracic team for a formal consult

## 2024-06-29 NOTE — PROGRESS NOTE ADULT - SUBJECTIVE AND OBJECTIVE BOX
DATE OF SERVICE: 06-29-24 @ 15:03    Patient is a 89y old  Male who presents with a chief complaint of SOB (29 Jun 2024 09:44)      INTERVAL HISTORY: no complaints     REVIEW OF SYSTEMS:  CONSTITUTIONAL: No weakness  EYES/ENT: No visual changes;  No throat pain   NECK: No pain or stiffness  RESPIRATORY: No cough, wheezing; No shortness of breath  CARDIOVASCULAR: No chest pain or palpitations  GASTROINTESTINAL: No abdominal  pain. No nausea, vomiting, or hematemesis  GENITOURINARY: No dysuria, frequency or hematuria  NEUROLOGICAL: No stroke like symptoms  SKIN: No rashes      	  MEDICATIONS:        PHYSICAL EXAM:  T(C): 36.3 (06-29-24 @ 11:00), Max: 36.4 (06-28-24 @ 20:59)  HR: 106 (06-29-24 @ 11:00) (94 - 106)  BP: 101/64 (06-29-24 @ 11:00) (101/64 - 123/74)  RR: 18 (06-29-24 @ 11:00) (18 - 18)  SpO2: 95% (06-29-24 @ 11:00) (93% - 95%)  Wt(kg): --  I&O's Summary    28 Jun 2024 07:01  -  29 Jun 2024 07:00  --------------------------------------------------------  IN: 360 mL / OUT: 0 mL / NET: 360 mL    29 Jun 2024 07:01  -  29 Jun 2024 15:03  --------------------------------------------------------  IN: 240 mL / OUT: 400 mL / NET: -160 mL          Appearance: In no distress	  HEENT:    PERRL, EOMI	  Cardiovascular:  S1 S2, No JVD  Respiratory: Lungs clear to auscultation	  Gastrointestinal:  Soft, Non-tender, + BS	  Vascularature:  No edema of LE  Psychiatric: Appropriate affect   Neuro: no acute focal deficits                               10.2   22.06 )-----------( 317      ( 29 Jun 2024 07:03 )             30.7     06-29    131<L>  |  92<L>  |  35<H>  ----------------------------<  109<H>  4.3   |  23  |  1.04    Ca    10.8<H>      29 Jun 2024 07:03  Mg     2.3     06-29    TPro  6.7  /  Alb  2.6<L>  /  TBili  0.4  /  DBili  x   /  AST  34  /  ALT  31  /  AlkPhos  89  06-28        Labs personally reviewed      ASSESSMENT/PLAN: 	    89M with hx of Stage II bladder cancer s/p TURBT and chemoradiation, presenting with dyspnea and orthopnea. a/w Acute hypoxic respiratory failure also found to have large L pleural effusion. Found to have new onset AF with RVR.    1. New Onset Atrial Fibrillation with Rapid Ventricular Response  - ECG 6/21 with AF with RVR  - Spontaneously converted to SR 6/21  - TTE wnl  - TSH wnl  - [id number] of 2 (age and gender). Heparin gtt in progress  - BP soft but if remains stable or recovers further, recommend starting Toprol 25mg PO daily.     2. Shortness of Breath  - CTA neg for PE but found to have large left pleural effusion now s/p thora with 2.1L removed on 6/22  -   - TTE wnl, normal filling pressures, preserved EF  - s/p L thoracentesis 6/25 2.1L off  - now on 6L NC  - Appreciate Pulm recs: CT Chest and TTE with bubble    3. Hyponatremia  - Slowly improving, now 131  - Lasix on hold        EAN Diaz DO [facility]  Cardiovascular Medicine  [address], [address]  Office: [phone]  Available via call/text on [employer] Teams

## 2024-06-29 NOTE — CONSULT NOTE ADULT - ASSESSMENT
ASSESSMENT: 89 year old male with hx of stage II bladder cancer s/p TURBT and chemoradiation with gemcitabine (completed 5/2/24). Pt presenting with worsening dyspnea on exertion and orthopnea. CTA chest performed without evidence of PE. Large pleural effusion noted on L with passive atelectasis. New 8mm pulm nodule in RUL also noted. Patient admitted to the medical service. During this admission, patient has required multiple thoracentesis, Left on 6/22 (2.1 L removed) and Right on 6/25 (1.8L removed). Since that time, patient remains on 6L NC, endorsing increased WOB. Cultures from prior thoracentesis are negative, cytopathology showing rare atypical cells. Patient is on Unasyn. Repeat CT on 6/28 showing large L sided loculated effusion and right moderate hydropneumothorax. Thoracic surgery consulted for surgical management of loculated effusion.      RECS:  -No emergent surgical intervention   -Patient will likely require VATS procedure, please document medical and cardiac optimization for OR   -Patient is currently on Heparin gtt., will need to be held prior to OR   -If patient with worsening clinical status or increasing respiratory requirements, please call   -Dr. Ly to review imaging     D/w Dr. Ly     Thoracic Surgery, [id number]

## 2024-06-29 NOTE — PROGRESS NOTE ADULT - SUBJECTIVE AND OBJECTIVE BOX
Andre Reyes, M.D.  Office: [phone]  Available thru [employer] Teams     Patient is a 89y old  Male who presents with a chief complaint of SOB (28 Jun 2024 16:30)          SUBJECTIVE / OVERNIGHT EVENTS:    No acute overnight events.    ROS: ( - ) Fever, ( - )Chills,  ( - )Nausea/Vomiting, ( - ) Cough, ( - )Shortness of breath, ( - )Chest Pain    MEDICATIONS  (STANDING):  ampicillin/sulbactam  IVPB 3 Gram(s) IV Intermittent every 6 hours  ampicillin/sulbactam  IVPB      ascorbic acid 500 milliGRAM(s) Oral daily  chlorhexidine 2% Cloths 1 Application(s) Topical daily  heparin  Infusion. 1200 Unit(s)/Hr (12 mL/Hr) IV Continuous <Continuous>  multivitamin/minerals 1 Tablet(s) Oral daily  pantoprazole    Tablet 40 milliGRAM(s) Oral before breakfast  sodium chloride 0.9%. 500 milliLiter(s) (100 mL/Hr) IV Continuous <Continuous>  tamsulosin 0.4 milliGRAM(s) Oral at bedtime    MEDICATIONS  (PRN):  acetaminophen     Tablet .. 650 milliGRAM(s) Oral every 6 hours PRN Temp greater or equal to 38C (100.4F), Mild Pain (1 - 3)  albuterol/ipratropium for Nebulization 3 milliLiter(s) Nebulizer every 6 hours PRN Shortness of Breath and/or Wheezing  aluminum hydroxide/magnesium hydroxide/simethicone Suspension 30 milliLiter(s) Oral every 4 hours PRN Dyspepsia  sodium chloride 0.65% Nasal 1 Spray(s) Both Nostrils four times a day PRN Nasal Congestion          T(C): 36.4 (06-29 @ 04:20), Max: 36.5 (06-28 @ 11:00)   HR: 99   BP: 107/64   RR: 18   SpO2: 93%    PHYSICAL EXAM:    CONSTITUTIONAL: NAD, well-developed, well-groomed  EYES: PERRLA; conjunctiva and sclera clear  ENMT: Moist oral mucosa, no pharyngeal injection or exudates; normal dentition  NECK: Supple, no palpable masses; no thyromegaly  RESPIRATORY: Normal respiratory effort; lungs are clear to auscultation bilaterally  CARDIOVASCULAR: Regular rate and rhythm, normal S1 and S2, no murmur/rub/gallop; No lower extremity edema; Peripheral pulses are 2+ bilaterally  ABDOMEN: Nontender to palpation, normoactive bowel sounds, no rebound/guarding; No hepatosplenomegaly  MUSCULOSKELETAL:  no clubbing or cyanosis of digits; no joint swelling or tenderness to palpation  PSYCH: A+O to person, place, and time; affect appropriate  NEUROLOGY: CN 2-12 are intact and symmetric; no gross sensory deficits   SKIN: No rashes; no palpable lesions      LABS:                        10.2   22.06 )-----------( 317      ( 29 Jun 2024 07:03 )             30.7      06-29    131<L>  |  92<L>  |  35<H>  ----------------------------<  109<H>  4.3   |  23  |  1.04    Ca    10.8<H>      29 Jun 2024 07:03  Mg     2.3     06-29    TPro  6.7  /  Alb  2.6<L>  /  TBili  0.4  /  DBili  x   /  AST  34  /  ALT  31  /  AlkPhos  89  06-28       CAPILLARY BLOOD GLUCOSE          RADIOLOGY & ADDITIONAL TESTS:    Imaging Personally Reviewed:  Consultant(s) Notes Reviewed:    Care Discussed with Consultants/Other Providers:   Andre Reyes, M.D.  Office: [phone]  Available thru Microsoft Teams     Patient is a 89y old  Male who presents with a chief complaint of SOB (28 Jun 2024 16:30)          SUBJECTIVE / OVERNIGHT EVENTS:    No acute overnight events.  has a sore throat  feels full therefore is not eating very much    ROS: ( - ) Fever, ( - )Chills,  ( - )Nausea/Vomiting, ( - ) Cough, ( - )Shortness of breath, ( - )Chest Pain    MEDICATIONS  (STANDING):  ampicillin/sulbactam  IVPB 3 Gram(s) IV Intermittent every 6 hours  ampicillin/sulbactam  IVPB      ascorbic acid 500 milliGRAM(s) Oral daily  chlorhexidine 2% Cloths 1 Application(s) Topical daily  heparin  Infusion. 1200 Unit(s)/Hr (12 mL/Hr) IV Continuous <Continuous>  multivitamin/minerals 1 Tablet(s) Oral daily  pantoprazole    Tablet 40 milliGRAM(s) Oral before breakfast  sodium chloride 0.9%. 500 milliLiter(s) (100 mL/Hr) IV Continuous <Continuous>  tamsulosin 0.4 milliGRAM(s) Oral at bedtime    MEDICATIONS  (PRN):  acetaminophen     Tablet .. 650 milliGRAM(s) Oral every 6 hours PRN Temp greater or equal to 38C (100.4F), Mild Pain (1 - 3)  albuterol/ipratropium for Nebulization 3 milliLiter(s) Nebulizer every 6 hours PRN Shortness of Breath and/or Wheezing  aluminum hydroxide/magnesium hydroxide/simethicone Suspension 30 milliLiter(s) Oral every 4 hours PRN Dyspepsia  sodium chloride 0.65% Nasal 1 Spray(s) Both Nostrils four times a day PRN Nasal Congestion          T(C): 36.4 (06-29 @ 04:20), Max: 36.5 (06-28 @ 11:00)   HR: 99   BP: 107/64   RR: 18   SpO2: 93%    PHYSICAL EXAM:    CONSTITUTIONAL: NAD, well-developed, well-groomed  EYES: PERRLA; conjunctiva and sclera clear  ENMT: Moist oral mucosa, no pharyngeal injection or exudates; normal dentition  NECK: Supple, no palpable masses; no thyromegaly  RESPIRATORY: increased respiratory effort; decreased left lung sounds  CARDIOVASCULAR: Regular rate and rhythm, normal S1 and S2, no murmur/rub/gallop; No lower extremity edema; Peripheral pulses are 2+ bilaterally  ABDOMEN: Nontender to palpation, normoactive bowel sounds, no rebound/guarding; No hepatosplenomegaly  MUSCULOSKELETAL:  no clubbing or cyanosis of digits; no joint swelling or tenderness to palpation  PSYCH: A+O to person, place, and time; affect appropriate  NEUROLOGY: CN 2-12 are intact and symmetric; no gross sensory deficits   SKIN: No rashes; no palpable lesions      LABS:                        10.2   22.06 )-----------( 317      ( 29 Jun 2024 07:03 )             30.7      06-29    131<L>  |  92<L>  |  35<H>  ----------------------------<  109<H>  4.3   |  23  |  1.04    Ca    10.8<H>      29 Jun 2024 07:03  Mg     2.3     06-29    TPro  6.7  /  Alb  2.6<L>  /  TBili  0.4  /  DBili  x   /  AST  34  /  ALT  31  /  AlkPhos  89  06-28       CAPILLARY BLOOD GLUCOSE          RADIOLOGY & ADDITIONAL TESTS:    Imaging Personally Reviewed:  Consultant(s) Notes Reviewed:    Care Discussed with Consultants/Other Providers:

## 2024-06-30 NOTE — CHART NOTE - NSCHARTNOTEFT_GEN_A_CORE
Notified by RN, Pt hypotensive to 80's systolic now in Afib 's-160's with worsening SOB. RRT called to bedside. Pt given 250cc bolus, IV lopressor 5mg with resultant improvement of HR-110's and 's systolic. MICU called due to new BIPAP placement and increased WOB.     Plan:  >VS HD stable post RRT  >Broadened abx to Zosyn and x1 vanco  >Labs reviewed - significant for worsening leukocytosis, ODIN, lactic acidosis  >F/u lactate in AM   >Continue BIPAP until AM  >MICU consulted and at bedside - f/u official recs  >CT Sx called - no plan for urgent intervention unless pt becomes HD unstable, c/w heparin drip with anticipation of OR possibly Mon.  >Above events and plan discussed with [facility]  >Will endorse to AM team, attending to follow    Bee Wallis PA-C  Department of Medicine

## 2024-06-30 NOTE — PROGRESS NOTE ADULT - PROBLEM SELECTOR PLAN 1
Acute hypoxic resp failure 2/2 bilateral pleural effusion  - CTA neg for PE.   - TTE - normal LV and RV fxn, no diastolic dysfunction  - status post thoracentesis L with 2.1L serosanguinous removed. pending studies and possible R side thoracentesis. In setting of bladder CA and new pulmonary nodule, concerning for malignancy  - s/p R sided thoracentesis on 6/25  - now transitioned to NC  - given rapid reaccumulation of L sided pleural effusion, highly concerning for malignancy.   - IV unasyn for 5 days   - appreciate pulm recs  - weaning o2 as tolerated  - Repeat chest showed large left sided locular effusion. I discussed these findings with the pulmonary team and they were in agreement for cardiothoracic surgical evaluation for vats procedure with pleurodesis. I called the cardiothoracic team for a formal consult  --> CT surgery team is planning on putting a pigtail catheter today for drainage  Giving patients increased work of breathing this morning I will restart him on the high flow nasal cannula at a rate of 40 liters per minute 60% FI O2

## 2024-06-30 NOTE — PROGRESS NOTE ADULT - SUBJECTIVE AND OBJECTIVE BOX
VITAL SIGN     Vital Signs Last 24 Hrs  T(C): 36.3 (06-29-24 @ 20:47), Max: 36.3 (06-29-24 @ 16:50)  T(F): 97.4 (06-29-24 @ 20:47), Max: 97.4 (06-29-24 @ 16:50)  HR: 136 (06-30-24 @ 09:00) (98 - 151)  BP: 97/63 (06-30-24 @ 02:48) (97/63 - 134/71)  RR: 23 (06-30-24 @ 02:48) (18 - 23)  SpO2: 95% (06-30-24 @ 09:00) (91% - 99%)                   Daily     Daily       Bilirubin Total: 0.3 mg/dL (06-30 @ 03:23)    CAPILLARY BLOOD GLUCOSE      POCT Blood Glucose.: 150 mg/dL (30 Jun 2024 03:00)                            PHYSICAL EXAM  s   + SOB  No CP'  Neurology: alert and oriented x 3, moves all extremities with no defecits  CV :  RRR  Lungs: bl  diminshed at bases  Abdomen: soft, nontender, nondistended, positive bowel sound

## 2024-06-30 NOTE — PROGRESS NOTE ADULT - SUBJECTIVE AND OBJECTIVE BOX
DATE OF SERVICE: 06-30-24 @ 13:19    Patient is a 89y old  Male who presents with a chief complaint of SOB   BL Pl eff (30 Jun 2024 11:18)      INTERVAL HISTORY: RRT this morning. WOB and hypotension improved after interventions.     REVIEW OF SYSTEMS:  CONSTITUTIONAL: No weakness  EYES/ENT: No visual changes;  No throat pain   NECK: No pain or stiffness  RESPIRATORY: No cough, wheezing; No shortness of breath  CARDIOVASCULAR: No chest pain or palpitations  GASTROINTESTINAL: No abdominal  pain. No nausea, vomiting, or hematemesis  GENITOURINARY: No dysuria, frequency or hematuria  NEUROLOGICAL: No stroke like symptoms  SKIN: No rashes    	  MEDICATIONS:        PHYSICAL EXAM:  T(C): 36.6 (06-30-24 @ 11:30), Max: 36.6 (06-30-24 @ 11:30)  HR: 140 (06-30-24 @ 11:30) (98 - 151)  BP: 116/74 (06-30-24 @ 11:30) (97/63 - 134/71)  RR: 20 (06-30-24 @ 11:30) (18 - 23)  SpO2: 92% (06-30-24 @ 11:30) (91% - 99%)  Wt(kg): --  I&O's Summary    29 Jun 2024 07:01  -  30 Jun 2024 07:00  --------------------------------------------------------  IN: 880 mL / OUT: 400 mL / NET: 480 mL          Appearance: In no distress	  HEENT:    PERRL, EOMI	  Cardiovascular:  S1 S2, No JVD  Respiratory: Lungs clear to auscultation	  Gastrointestinal:  Soft, Non-tender, + BS	  Vascularature:  No edema of LE  Psychiatric: Appropriate affect   Neuro: no acute focal deficits                               9.9    24.62 )-----------( 313      ( 30 Jun 2024 03:18 )             30.3     06-30    129<L>  |  91<L>  |  43<H>  ----------------------------<  157<H>  4.6   |  20<L>  |  1.36<H>    Ca    11.2<H>      30 Jun 2024 03:23  Phos  5.7     06-30  Mg     2.5     06-30    TPro  7.3  /  Alb  2.6<L>  /  TBili  0.3  /  DBili  x   /  AST  22  /  ALT  26  /  AlkPhos  94  06-30        Labs personally reviewed      ASSESSMENT/PLAN: 	      89M with hx of Stage II bladder cancer s/p TURBT and chemoradiation, presenting with dyspnea and orthopnea. a/w Acute hypoxic respiratory failure also found to have large L pleural effusion. Found to have new onset AF with RVR.    1. New Onset Atrial Fibrillation with Rapid Ventricular Response  - ECG 6/21 with AF with RVR  - Spontaneously converted to SR 6/21  - TTE wnl  - TSH wnl  - [id number] of 2 (age and gender). Heparin gtt in progress  - BP soft but if remains stable or recovers further, recommend starting Toprol 25mg PO daily.   - RRT 6/30 for increased WOB and Afib RVR. Improved after fluid resuscitation, BiPAP and IV Lopressor 5mg. Rec starting Toprol as above.     2. Shortness of Breath  - CTA neg for PE but found to have large left pleural effusion now s/p thora with 2.1L removed on 6/22  -   - TTE wnl, normal filling pressures, preserved EF  - s/p L thoracentesis 6/25 2.1L off  - now on 6L NC  - Appreciate Pulm recs: CT Chest and TTE with bubble    3. Hyponatremia  - Slowly improving, now 131  - Lasix on hold        EAN Diaz DO [facility]  Cardiovascular Medicine  [address] [place] [address], [address]  Office: [phone]  Available via call/text on [employer] Teams  DATE OF SERVICE: 06-30-24 @ 13:19    Patient is a 89y old  Male who presents with a chief complaint of SOB   BL Pl eff (30 Jun 2024 11:18)      INTERVAL HISTORY: RRT this morning. WOB and hypotension improved after interventions.     REVIEW OF SYSTEMS:  CONSTITUTIONAL: No weakness  EYES/ENT: No visual changes;  No throat pain   NECK: No pain or stiffness  RESPIRATORY: No cough, wheezing; No shortness of breath  CARDIOVASCULAR: No chest pain or palpitations  GASTROINTESTINAL: No abdominal  pain. No nausea, vomiting, or hematemesis  GENITOURINARY: No dysuria, frequency or hematuria  NEUROLOGICAL: No stroke like symptoms  SKIN: No rashes    	  MEDICATIONS:        PHYSICAL EXAM:  T(C): 36.6 (06-30-24 @ 11:30), Max: 36.6 (06-30-24 @ 11:30)  HR: 140 (06-30-24 @ 11:30) (98 - 151)  BP: 116/74 (06-30-24 @ 11:30) (97/63 - 134/71)  RR: 20 (06-30-24 @ 11:30) (18 - 23)  SpO2: 92% (06-30-24 @ 11:30) (91% - 99%)  Wt(kg): --  I&O's Summary    29 Jun 2024 07:01  -  30 Jun 2024 07:00  --------------------------------------------------------  IN: 880 mL / OUT: 400 mL / NET: 480 mL          Appearance: In no distress	  HEENT:    PERRL, EOMI	  Cardiovascular:  S1 S2, No JVD  Respiratory: Lungs clear to auscultation	  Gastrointestinal:  Soft, Non-tender, + BS	  Vascularature:  No edema of LE  Psychiatric: Appropriate affect   Neuro: no acute focal deficits                               9.9    24.62 )-----------( 313      ( 30 Jun 2024 03:18 )             30.3     06-30    129<L>  |  91<L>  |  43<H>  ----------------------------<  157<H>  4.6   |  20<L>  |  1.36<H>    Ca    11.2<H>      30 Jun 2024 03:23  Phos  5.7     06-30  Mg     2.5     06-30    TPro  7.3  /  Alb  2.6<L>  /  TBili  0.3  /  DBili  x   /  AST  22  /  ALT  26  /  AlkPhos  94  06-30        Labs personally reviewed      ASSESSMENT/PLAN: 	  89M with hx of Stage II bladder cancer s/p TURBT and chemoradiation, presenting with dyspnea and orthopnea. a/w Acute hypoxic respiratory failure also found to have large L pleural effusion. Found to have new onset AF with RVR.    1. New Onset Atrial Fibrillation with Rapid Ventricular Response  - ECG 6/21 with AF with RVR  - Spontaneously converted to SR 6/21  - TTE wnl  - TSH wnl  - [id number] of 2 (age and gender). Heparin gtt in progress  - BP soft but if remains stable or recovers further, recommend starting Toprol 25mg PO daily.   - RRT 6/30 for increased WOB and Afib RVR. Improved after fluid resuscitation, BiPAP and IV Lopressor 5mg. Rec starting Toprol as above.     2. Shortness of Breath  - CTA neg for PE but found to have large left pleural effusion now s/p thora with 2.1L removed on 6/22  -   - TTE wnl, normal filling pressures, preserved EF  - s/p L thoracentesis 6/25 2.1L off  - now on 6L NC  - 6/28 CT with Very large loculated left pleural effusion and small to moderate size loculated right hydropneumothorax.  - CT surgery team is planning on a pigtail catheter    3. Hyponatremia  - Slowly improving, now 131  - Lasix on hold        EAN Diaz DO [facility]  Cardiovascular Medicine  [address], [address]  Office: [phone]  Available via call/text on [employer] Teams

## 2024-06-30 NOTE — PROGRESS NOTE ADULT - ASSESSMENT
ASSESSMENT: 89 year old male with hx of stage II bladder cancer s/p TURBT and chemoradiation with gemcitabine (completed 5/2/24). Pt presenting with worsening dyspnea on exertion and orthopnea. CTA chest performed without evidence of PE. Large pleural effusion noted on L with passive atelectasis. New 8mm pulm nodule in RUL also noted. Patient admitted to the medical service. During this admission, patient has required multiple thoracentesis, Left on 6/22 (2.1 L removed) and Right on 6/25 (1.8L removed). Since that time, patient remains on 6L NC, endorsing increased WOB. Cultures from prior thoracentesis are negative, cytopathology showing rare atypical cells. Patient is on Unasyn. Repeat CT on 6/28 showing large L sided loculated effusion and right moderate hydropneumothorax. Thoracic surgery consulted for surgical management of loculated effusion.      6/30   5 L NC   OOB to chair  DC hep gtt

## 2024-06-30 NOTE — RAPID RESPONSE TEAM SUMMARY - NSSITUATIONBACKGROUNDRRT_GEN_ALL_CORE
89M with hx of Stage II bladder cancer s/p TURBT and chemoradiation, presenting with dyspnea and orthopnea. a/w Acute hypoxic respiratory failure also found to have large L pleural effusion status post L thoracentesis with pending cytology and ongoing oxygen requirements. Rapid called for hypotension and afib w/ RVR.    Initial vitals bp 89/61, hr 156, rr 23, 93% on 6L NC. Rhythm irregularly irregular on monitor c/f afib w/ RVR. Patient noted to have increased WOB which according to nursing was new from day prior. Patient given 250 cc LR w/ Lopressor 5 mg IVP with improvement of BPs to 100/80's and -120's. Patient placed on BIPAP for respiratory support. Labs drawn notable for no worsening anemia, blood gas un-concerning except for elevated lactate to 5.2, previous 1.8 8 days ago. Primary team called MICU out of concern for hypovolemia and new BIPAP/increased WOB.     Pt likely w/ increased WOB from existing pleural effusion which team is evaluating for VATs. Pt w/ new afib w/ RVR this admit w/ improvement on lopressor. Hypotension likely from pt being volume down, nurse noting tea colored urine from pt recently, elevated lactate as well. Recommended following up on MICU recommendations, considering standing lopressor, continuing pt on BIPAP until morning.  89M with hx of Stage II bladder cancer s/p TURBT and chemoradiation, presenting with dyspnea and orthopnea. a/w Acute hypoxic respiratory failure also found to have large L pleural effusion status post L thoracentesis with pending cytology and ongoing oxygen requirements. Rapid called for hypotension and afib w/ RVR.    Initial vitals bp 89/61, hr 156, rr 23, 93% on 6L NC. Rhythm irregularly irregular on monitor c/f afib w/ RVR. Patient noted to have increased WOB which according to nursing was new from day prior. Patient given 250 cc LR w/ Lopressor 5 mg IVP with improvement of BPs to 100/80's and -120's. Patient placed on BIPAP for respiratory support. Labs drawn notable for no worsening anemia, blood gas un-concerning except for elevated lactate to 5.2, previous 1.8 8 days ago. Primary team called MICU out of concern for hypovolemia and new BIPAP/increased WOB.     Pt likely w/ increased WOB from existing pleural effusion which team is evaluating for VATs. Pt w/ new afib w/ RVR this admit w/ improvement on lopressor. Hypotension likely from pt being volume down, nurse noting tea colored urine from pt recently, elevated lactate as well. Recommended following up on MICU recommendations, considering standing lopressor, continuing pt on BIPAP until morning, repeat lactate in AM.

## 2024-06-30 NOTE — CONSULT NOTE ADULT - ASSESSMENT
89M with hx of Stage II bladder cancer s/p TURBT and chemoradiation, presenting with dyspnea and orthopnea. RRT called ON and MICU consulted for new hypotension and WOB, likely in setting of new a-fib RVR vs loculated pleural effusion.    #Bilateral loculated pleural effusion  #A-fib RVR  #Increased WOB  #Hypotension  - Dyspnea on presentation likely 2/2 to b/l loculated pleural effusion s/p drainage, pleural fluid analysis pending.  - On heparin gtt for paroxysmal a-fib on presentation  - Hypotension ON likely 2/2 to new RVR; increase in SBP after administration of lopressor  - Pending possible VATS early next week vs 6/30, hold heparin gtt in case urgent need for drainage  - Agree with broadening of Unasyn to Zosyn  - Currently breathing comfortably on BIPAP; wean as tolerated  - Per nephew may have signed prior DNR; recommend clarifying GOC

## 2024-06-30 NOTE — CONSULT NOTE ADULT - ASSESSMENT
Interventional Radiology    Evaluate for Procedure:     HPI:  89 year old male with hx of stage II bladder cancer s/p TURBT and chemoradiation with gemcitabine (completed 5/2/24). Pt presenting with worsening dyspnea on exertion and orthopnea. CTA chest showed large b/l pleural effusion L>R. Patient went to OR with thoracic for L side. IR c/s for chest tube placement on R.    Allergies: No Known Allergies    Medications (Abx/Cardiac/Anticoagulation/Blood Products)  ampicillin/sulbactam  IVPB: 200 mL/Hr IV Intermittent (06-30 @ 01:00)  heparin  Infusion.: 1800 Unit(s)/Hr IV Continuous (06-30 @ 07:23)  metoprolol tartrate Injectable: 5 milliGRAM(s) IV Push (06-30 @ 03:56)  piperacillin/tazobactam IVPB.: 200 mL/Hr IV Intermittent (06-30 @ 04:00)  piperacillin/tazobactam IVPB.-: 25 mL/Hr IV Intermittent (06-30 @ 07:30)  piperacillin/tazobactam IVPB..: 25 mL/Hr IV Intermittent (06-30 @ 13:14)  vancomycin  IVPB: 250 mL/Hr IV Intermittent (06-30 @ 06:17)    Data:    T(C): 36.3  HR: 99  BP: 94/55  RR: 20  SpO2: 98%    -WBC 24.62 / HgB 9.9 / Hct 30.3 / Plt 313  -Na 129 / Cl 91 / BUN 43 / Glucose 157  -K 4.6 / CO2 20 / Cr 1.36  -ALT 26 / Alk Phos 94 / T.Bili 0.3  -INR 1.31 / .9      Radiology:     Assessment/Plan:   89 year old male with hx of stage II bladder cancer s/p TURBT and chemoradiation with gemcitabine (completed 5/2/24). Pt presenting with worsening dyspnea on exertion and orthopnea. CTA chest showed large b/l pleural effusion L>R. Patient went to OR with thoracic for L side. IR c/s for chest tube placement on R.    -- IR will plan to perform 6/30  -- Patient does not need to be NPO  -- Hold hep gtt 2 hours prior  -- Please place IR procedure order under Dr. Plummer.    Neil Austin M.D.  PGY5/R4, Interventional Radiology Fellow    -Available on [employer] TEAMS for all non-urgent questions  -Emergent issues: [facility]-p.[phone]; [facility]-p.[phone] ([phone])  -Non-emergent consults: Please place a [place] order "Consult-Interventional Radiology" with an appropriate callback number  -Scheduling questions: [facility]: [phone]; [facility]: [phone]  -Clinic/Outpatient booking: [facility]: [phone]; [facility]: [phone]     Interventional Radiology    Evaluate for Procedure:     HPI:  89 year old male with hx of stage II bladder cancer s/p TURBT and chemoradiation with gemcitabine (completed 5/2/24). Pt presenting with worsening dyspnea on exertion and orthopnea. CTA chest showed large b/l pleural effusion L>R. Patient went to OR with thoracic for L side. IR c/s for chest tube placement on R.    Allergies: No Known Allergies    Medications (Abx/Cardiac/Anticoagulation/Blood Products)  ampicillin/sulbactam  IVPB: 200 mL/Hr IV Intermittent (06-30 @ 01:00)  heparin  Infusion.: 1800 Unit(s)/Hr IV Continuous (06-30 @ 07:23)  metoprolol tartrate Injectable: 5 milliGRAM(s) IV Push (06-30 @ 03:56)  piperacillin/tazobactam IVPB.: 200 mL/Hr IV Intermittent (06-30 @ 04:00)  piperacillin/tazobactam IVPB.-: 25 mL/Hr IV Intermittent (06-30 @ 07:30)  piperacillin/tazobactam IVPB..: 25 mL/Hr IV Intermittent (06-30 @ 13:14)  vancomycin  IVPB: 250 mL/Hr IV Intermittent (06-30 @ 06:17)    Data:    T(C): 36.3  HR: 99  BP: 94/55  RR: 20  SpO2: 98%    -WBC 24.62 / HgB 9.9 / Hct 30.3 / Plt 313  -Na 129 / Cl 91 / BUN 43 / Glucose 157  -K 4.6 / CO2 20 / Cr 1.36  -ALT 26 / Alk Phos 94 / T.Bili 0.3  -INR 1.31 / .9      Radiology:     Assessment/Plan:   89 year old male with hx of stage II bladder cancer s/p TURBT and chemoradiation with gemcitabine (completed 5/2/24). Pt presenting with worsening dyspnea on exertion and orthopnea. CTA chest showed large b/l pleural effusion L>R. Patient went to OR with thoracic for L side. IR c/s for chest tube placement on R.    -- IR will plan to perform 6/30  -- Patient does not need to be NPO  -- Hold hep gtt 2 hours prior  -- AM BMP, CBC, Coags  -- Please place IR procedure order under Dr. Plummer.    Neil Austin M.D.  PGY5/R4, Interventional Radiology Fellow    -Available on [employer] TEAMS for all non-urgent questions  -Emergent issues: [facility]-p.[phone]; [facility]-p.[phone] ([phone])  -Non-emergent consults: Please place a [place] order "Consult-Interventional Radiology" with an appropriate callback number  -Scheduling questions: [facility]: [phone]; [facility]: [phone]  -Clinic/Outpatient booking: [facility]: [phone]; [facility]: [phone]     Interventional Radiology    Evaluate for Procedure:     HPI:  89 year old male with hx of stage II bladder cancer s/p TURBT and chemoradiation with gemcitabine (completed 5/2/24). Pt presenting with worsening dyspnea on exertion and orthopnea. CTA chest showed large b/l pleural effusion L>R. Patient went to OR with thoracic for L side. IR c/s for chest tube placement on R.    Allergies: No Known Allergies    Medications (Abx/Cardiac/Anticoagulation/Blood Products)  ampicillin/sulbactam  IVPB: 200 mL/Hr IV Intermittent (06-30 @ 01:00)  heparin  Infusion.: 1800 Unit(s)/Hr IV Continuous (06-30 @ 07:23)  metoprolol tartrate Injectable: 5 milliGRAM(s) IV Push (06-30 @ 03:56)  piperacillin/tazobactam IVPB.: 200 mL/Hr IV Intermittent (06-30 @ 04:00)  piperacillin/tazobactam IVPB.-: 25 mL/Hr IV Intermittent (06-30 @ 07:30)  piperacillin/tazobactam IVPB..: 25 mL/Hr IV Intermittent (06-30 @ 13:14)  vancomycin  IVPB: 250 mL/Hr IV Intermittent (06-30 @ 06:17)    Data:    T(C): 36.3  HR: 99  BP: 94/55  RR: 20  SpO2: 98%    -WBC 24.62 / HgB 9.9 / Hct 30.3 / Plt 313  -Na 129 / Cl 91 / BUN 43 / Glucose 157  -K 4.6 / CO2 20 / Cr 1.36  -ALT 26 / Alk Phos 94 / T.Bili 0.3  -INR 1.31 / .9      Radiology:     Assessment/Plan:   89 year old male with hx of stage II bladder cancer s/p TURBT and chemoradiation with gemcitabine (completed 5/2/24). Pt presenting with worsening dyspnea on exertion and orthopnea. CTA chest showed large b/l pleural effusion L>R. Patient went to OR with thoracic for L side. IR c/s for chest tube placement on R.    -- IR will plan to perform 6/30.  -- Patient does not need to be NPO.  -- Hold hep gtt 2 hours prior.  -- AM BMP, CBC, Coags.  -- Please place IR procedure order under Dr. Plummer.    Neil Austin M.D.  PGY5/R4, Interventional Radiology Fellow    -Available on [employer] TEAMS for all non-urgent questions  -Emergent issues: [facility]-p.[phone]; [facility]-p.[phone] ([phone])  -Non-emergent consults: Please place a [place] order "Consult-Interventional Radiology" with an appropriate callback number  -Scheduling questions: [facility]: [phone]; [facility]: [phone]  -Clinic/Outpatient booking: [facility]: [phone]; [facility]: [phone]

## 2024-06-30 NOTE — CONSULT NOTE ADULT - SUBJECTIVE AND OBJECTIVE BOX
Patient:  ILIA MONROE  [id number]    CHIEF COMPLAINT: SOB, hypotension    HPI: 89M with hx of Stage II bladder cancer s/p TURBT and chemoradiation, presenting with dyspnea and orthopnea.     Patient initially presenting for worsening dyspnea on exertion and orthopnea for 3 days. Denies cough, fever, CP prior to arrival. Upon arrival, patient noted to have large b/l pleural effusions. Concern for malignant pleural effusion given new lung nodule and rapid reaccumulation of pleural effusion. During hospital course, both sides tapped and sent for fluid analysis with results pending. Of note, patient diagnosed with stage II muscle-invasive bladder cancer and is s/p TURBT, although per nephew at bedside the procedure was without clear margins. Patient subsequently underwent chemo and radiation, with last session about 6-8 weeks ago. At baseline, patient lives alone, is active, and is independent in ADLs. Per nephew at bedside, he believes patient had signed prior DNR.    RRT called ON for hypotension in the setting of new a-fib RVR, with HR to the 150s. Tachycardia improved to 100-110s after 5mg of lopressor pushed. 250cc bolus was given, along with initiation of BIPAP with subsequent improvement in WOB. After lopressor push, patient hypotension improved to 90-100s systolic. MICU consulted due to concern for hypotension and increased WOB.      PAST MEDICAL & SURGICAL HISTORY:  Malignant neoplasm of bladder, unspecified  HLD (hyperlipidemia)  S/P bilateral inguinal hernia repair    FAMILY HISTORY: N/A    SOCIAL HISTORY: Lives alone, independent in ADLs, nephew and cousin assist in making decisions.    Allergies  No Known Allergies    Intolerances    HOME MEDICATIONS:    REVIEW OF SYSTEMS:  CONSTITUTIONAL: No fever, chills, night sweats, or fatigue  EYES: No eye pain, visual disturbances, or discharge  ENMT: No difficulty hearing, tinnitus, vertigo; No sinus or throat pain  NECK: No pain or stiffness  RESPIRATORY: SOB, increased WOB. No cough, wheezing, or hemoptysis  CARDIOVASCULAR: No chest pain, palpitations, dizziness, or leg swelling  GASTROINTESTINAL: No abdominal or epigastric pain. No nausea, vomiting, or hematemesis; No diarrhea or constipation. No melena or hematochezia.  GENITOURINARY: No dysuria, frequency, hematuria, or incontinence  NEUROLOGICAL: No headaches, memory loss, loss of strength, numbness, or tremors  SKIN: No itching, burning, rashes, or lesions  MUSCULOSKELETAL: No joint pain or swelling; No muscle, back, or extremity pain  PSYCHIATRIC: No depression, anxiety, mood swings, or difficulty sleeping  HEME/LYMPH: No easy bruising, or bleeding gums    OBJECTIVE:  T(F): 97.4 (06-29-24 @ 20:47), Max: 97.4 (06-29-24 @ 11:00)  HR: 98 (06-30-24 @ 03:32) (98 - 151)  BP: 97/63 (06-30-24 @ 02:48) (97/63 - 134/71)  BP(mean): --  ABP: --  ABP(mean): --  RR: 23 (06-30-24 @ 02:48) (18 - 23)  SpO2: 99% (06-30-24 @ 03:32) (91% - 99%)  CVP(mm Hg): --    I/O Summary 24H  IN: 360 mL / OUT: 0 mL / NET: 360 mL    CAPILLARY BLOOD GLUCOSE  POCT Blood Glucose.: 150 mg/dL (30 Jun 2024 03:00)    PHYSICAL EXAM:  GENERAL: On BIPAP with increased WOB, lying in bed  HEAD:  Atraumatic, Normocephalic  EYES: EOMI, PERRLA, conjunctiva and sclera clear  ENT: Moist mucous membranes  NECK: Supple, No JVD  CHEST/LUNG: Decreased breath sounds at bases b/l and up to midlung on L. No rales, rhonchi, wheezing, or rubs.  HEART: Rate to 100-110s, irregular rhythm.  ABDOMEN: Bowel sounds present; Soft, Nontender, Nondistended. No hepatomegaly  EXTREMITIES:  2+ Peripheral Pulses, brisk capillary refill. No clubbing, cyanosis, or edema  NERVOUS SYSTEM: Alert & Oriented X3, speech clear. No deficits   MSK: FROM all 4 extremities, full and equal strength    HOSPITAL MEDICATIONS:  MEDICATIONS  (STANDING):  ascorbic acid 500 milliGRAM(s) Oral daily  benzocaine/menthol Lozenge 1 Lozenge Oral three times a day  chlorhexidine 2% Cloths 1 Application(s) Topical daily  heparin  Infusion. 1200 Unit(s)/Hr (12 mL/Hr) IV Continuous <Continuous>  multivitamin/minerals 1 Tablet(s) Oral daily  pantoprazole    Tablet 40 milliGRAM(s) Oral before breakfast  piperacillin/tazobactam IVPB.- 3.375 Gram(s) IV Intermittent once  piperacillin/tazobactam IVPB.. 3.375 Gram(s) IV Intermittent every 8 hours  simethicone 80 milliGRAM(s) Chew four times a day  tamsulosin 0.4 milliGRAM(s) Oral at bedtime  vancomycin  IVPB 1000 milliGRAM(s) IV Intermittent once    MEDICATIONS  (PRN):  acetaminophen     Tablet .. 650 milliGRAM(s) Oral every 6 hours PRN Temp greater or equal to 38C (100.4F), Mild Pain (1 - 3)  albuterol/ipratropium for Nebulization 3 milliLiter(s) Nebulizer every 6 hours PRN Shortness of Breath and/or Wheezing  aluminum hydroxide/magnesium hydroxide/simethicone Suspension 30 milliLiter(s) Oral every 4 hours PRN Dyspepsia  sodium chloride 0.65% Nasal 1 Spray(s) Both Nostrils four times a day PRN Nasal Congestion    LABS:  CBC 06-30-24 @ 03:18                        9.9    24.62 )-----------( 313                   30.3     Hgb trend: 9.9 <-- , 10.2 <-- , 10.7 <--   WBC trend: 24.62 <-- , 22.06 <-- , 18.99 <--     CMP 06-30-24 @ 03:23    129<L>  |  91<L>  |  43<H>  ----------------------------<  157<H>  4.6   |  20<L>  |  1.36<H>    Ca    11.2<H>      06-30-24 @ 03:23  Phos  5.7     06-30  Mg     2.5     06-30    TPro  7.3  /  Alb  2.6<L>  /  TBili  0.3  /  DBili  x   /  AST  22  /  ALT  26  /  AlkPhos  94     06-30    Serum Cr (eGFR) trend: 1.36 (50) <-- , 1.04 (69) <-- , 1.16 (60) <--     PTT - ( 29 Jun 2024 07:03 ):66.0 sec    ABG Trend:     VBG Trend:   06-30-24 @ 03:15 - pH: 7.36  | pCO2: 40    | pO2: 65    | HCO3: 23    | Lactate: 5.2      RADIOLOGY:  [ ] Reviewed and interpreted by me    EKG

## 2024-06-30 NOTE — PROGRESS NOTE ADULT - PROBLEM SELECTOR PLAN 2
ECG 6/21 with AF with RVR, spontaneously converted to SR 6/21  - TTE wnl  - [id number] of 2 (age and gender). Heparin gtt resumed s/p thoracentesis  - given rapid accumulation of pleural effusion, pt may require chest tube. Will keep him on IV heparin for now  - heparin gtt for now., eventually Eliquis 5mg PO BID.  - once BP improves, will start lopressor 12.5mg BID and monitor

## 2024-06-30 NOTE — PROGRESS NOTE ADULT - PROBLEM SELECTOR PLAN 1
heparin Gtt   now off since 1030 am     hold prior to Thoracic  surgery placng   PTC's    later today  DW  Dr Ly heparin Gtt   now off since 1030 am     hold prior to Thoracic  surgery placing     left PTC's    later today  DW  Dr Ly  please obtain IR cslt for monday to drain rt  side pleural effusion

## 2024-06-30 NOTE — PROCEDURE NOTE - ADDITIONAL PROCEDURE DETAILS
14F pigtail thoracostomy tube, placed in sterile manner using Seldinger technique. Bloody sero sanginous fluid sent for cytology. 14F pigtail thoracostomy tube, placed in sterile manner using Seldinger technique. Bloody sero sanguinous fluid sent for cytology. Tube clamped after 1.5L to prevent re expansion phenomenon. 14F pigtail thoracostomy tube, placed in sterile manner using Seldinger technique. Bloody sero sanguinous fluid sent for cytology. Tube clamped after 1.5L to prevent re expansion phenomenon. Post procedure CXR confirmed tube in appropriate position.

## 2024-06-30 NOTE — PROGRESS NOTE ADULT - SUBJECTIVE AND OBJECTIVE BOX
Andre Reyes, M.D.  Office: [phone]  Available thru [employer] Teams     Patient is a 89y old  Male who presents with a chief complaint of SOB (30 Jun 2024 04:49)          SUBJECTIVE / OVERNIGHT EVENTS:    No acute overnight events.    ROS: ( - ) Fever, ( - )Chills,  ( - )Nausea/Vomiting, ( - ) Cough, ( - )Shortness of breath, ( - )Chest Pain    MEDICATIONS  (STANDING):  ascorbic acid 500 milliGRAM(s) Oral daily  benzocaine/menthol Lozenge 1 Lozenge Oral three times a day  chlorhexidine 2% Cloths 1 Application(s) Topical daily  multivitamin/minerals 1 Tablet(s) Oral daily  pantoprazole    Tablet 40 milliGRAM(s) Oral before breakfast  piperacillin/tazobactam IVPB.. 3.375 Gram(s) IV Intermittent every 8 hours  simethicone 80 milliGRAM(s) Chew four times a day  tamsulosin 0.4 milliGRAM(s) Oral at bedtime    MEDICATIONS  (PRN):  acetaminophen     Tablet .. 650 milliGRAM(s) Oral every 6 hours PRN Temp greater or equal to 38C (100.4F), Mild Pain (1 - 3)  albuterol/ipratropium for Nebulization 3 milliLiter(s) Nebulizer every 6 hours PRN Shortness of Breath and/or Wheezing  aluminum hydroxide/magnesium hydroxide/simethicone Suspension 30 milliLiter(s) Oral every 4 hours PRN Dyspepsia  sodium chloride 0.65% Nasal 1 Spray(s) Both Nostrils four times a day PRN Nasal Congestion          T(C): 36.3 (06-29 @ 20:47), Max: 36.3 (06-29 @ 16:50)   HR: 136   BP: 97/63   RR: 23   SpO2: 95%    PHYSICAL EXAM:    CONSTITUTIONAL: NAD, well-developed, well-groomed  EYES: PERRLA; conjunctiva and sclera clear  ENMT: Moist oral mucosa, no pharyngeal injection or exudates; normal dentition  NECK: Supple, no palpable masses; no thyromegaly  RESPIRATORY: Normal respiratory effort; lungs are clear to auscultation bilaterally  CARDIOVASCULAR: Regular rate and rhythm, normal S1 and S2, no murmur/rub/gallop; No lower extremity edema; Peripheral pulses are 2+ bilaterally  ABDOMEN: Nontender to palpation, normoactive bowel sounds, no rebound/guarding; No hepatosplenomegaly  MUSCULOSKELETAL:  no clubbing or cyanosis of digits; no joint swelling or tenderness to palpation  PSYCH: A+O to person, place, and time; affect appropriate  NEUROLOGY: CN 2-12 are intact and symmetric; no gross sensory deficits   SKIN: No rashes; no palpable lesions      LABS:                        9.9    24.62 )-----------( 313      ( 30 Jun 2024 03:18 )             30.3      06-30    129<L>  |  91<L>  |  43<H>  ----------------------------<  157<H>  4.6   |  20<L>  |  1.36<H>    Ca    11.2<H>      30 Jun 2024 03:23  Phos  5.7     06-30  Mg     2.5     06-30    TPro  7.3  /  Alb  2.6<L>  /  TBili  0.3  /  DBili  x   /  AST  22  /  ALT  26  /  AlkPhos  94  06-30       CAPILLARY BLOOD GLUCOSE      POCT Blood Glucose.: 150 mg/dL (30 Jun 2024 03:00)      RADIOLOGY & ADDITIONAL TESTS:    Imaging Personally Reviewed:  Consultant(s) Notes Reviewed:    Care Discussed with Consultants/Other Providers:   Andre Reyes, M.D.  Office: [phone]  Available thru [employer] Teams     Patient is a 89y old  Male who presents with a chief complaint of SOB (30 Jun 2024 04:49)     declined. Nephews at bedside provided translation at patients request      SUBJECTIVE / OVERNIGHT EVENTS:    No acute overnight events.  no complaints but looks tired from work of breathing    ROS: ( - ) Fever, ( - )Chills,  ( - )Nausea/Vomiting, ( - ) Cough, ( - )Shortness of breath, ( - )Chest Pain    MEDICATIONS  (STANDING):  ascorbic acid 500 milliGRAM(s) Oral daily  benzocaine/menthol Lozenge 1 Lozenge Oral three times a day  chlorhexidine 2% Cloths 1 Application(s) Topical daily  multivitamin/minerals 1 Tablet(s) Oral daily  pantoprazole    Tablet 40 milliGRAM(s) Oral before breakfast  piperacillin/tazobactam IVPB.. 3.375 Gram(s) IV Intermittent every 8 hours  simethicone 80 milliGRAM(s) Chew four times a day  tamsulosin 0.4 milliGRAM(s) Oral at bedtime    MEDICATIONS  (PRN):  acetaminophen     Tablet .. 650 milliGRAM(s) Oral every 6 hours PRN Temp greater or equal to 38C (100.4F), Mild Pain (1 - 3)  albuterol/ipratropium for Nebulization 3 milliLiter(s) Nebulizer every 6 hours PRN Shortness of Breath and/or Wheezing  aluminum hydroxide/magnesium hydroxide/simethicone Suspension 30 milliLiter(s) Oral every 4 hours PRN Dyspepsia  sodium chloride 0.65% Nasal 1 Spray(s) Both Nostrils four times a day PRN Nasal Congestion          T(C): 36.3 (06-29 @ 20:47), Max: 36.3 (06-29 @ 16:50)   HR: 136   BP: 97/63   RR: 23   SpO2: 95%    PHYSICAL EXAM:    CONSTITUTIONAL: NAD, well-developed, well-groomed  EYES: PERRLA; conjunctiva and sclera clear  ENMT: Moist oral mucosa, no pharyngeal injection or exudates; normal dentition  NECK: Supple, no palpable masses; no thyromegaly  RESPIRATORY: increased work of breathing, decrease breath sounds on the left lung; + rales b/l  CARDIOVASCULAR: Regular rate and rhythm, normal S1 and S2, no murmur/rub/gallop; No lower extremity edema; Peripheral pulses are 2+ bilaterally  ABDOMEN: Nontender to palpation, normoactive bowel sounds, no rebound/guarding; No hepatosplenomegaly  MUSCULOSKELETAL:  no clubbing or cyanosis of digits; no joint swelling or tenderness to palpation  PSYCH: A+O to person, place, and time; affect appropriate  NEUROLOGY: CN 2-12 are intact and symmetric; no gross sensory deficits   SKIN: No rashes; no palpable lesions      LABS:                        9.9    24.62 )-----------( 313      ( 30 Jun 2024 03:18 )             30.3      06-30    129<L>  |  91<L>  |  43<H>  ----------------------------<  157<H>  4.6   |  20<L>  |  1.36<H>    Ca    11.2<H>      30 Jun 2024 03:23  Phos  5.7     06-30  Mg     2.5     06-30    TPro  7.3  /  Alb  2.6<L>  /  TBili  0.3  /  DBili  x   /  AST  22  /  ALT  26  /  AlkPhos  94  06-30       CAPILLARY BLOOD GLUCOSE      POCT Blood Glucose.: 150 mg/dL (30 Jun 2024 03:00)      RADIOLOGY & ADDITIONAL TESTS:    Imaging Personally Reviewed:  Consultant(s) Notes Reviewed:    Care Discussed with Consultants/Other Providers:

## 2024-07-01 NOTE — PROGRESS NOTE ADULT - NSPROGADDITIONALINFOA_GEN_ALL_CORE
above plans discussed with medicine ACP Kamran kirkpatrick at bedside
above plans discussed with medicine ACP Antionette  updated nephew at bedside
above plans discussed with medicine ACP Jerilyn garcia nephew at bedside
Translation services was deferred patients niece was at bedside who served as  in addition I spoke with the patient's nephew who is also the healthcare proxy I updated him regarding the CT findings and the need for surgical evaluation for vats procedure
Case and plan discussed with ACP: DA Sanchez
above plans discussed with medicine ACP Jerilyn garcia nephew at bedside
above plans discussed with medicine ACP Basil garcia nephew at bedside

## 2024-07-01 NOTE — PROGRESS NOTE ADULT - ATTENDING COMMENTS
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  S/p dx/tx thoracentesis for large left pleural effusion on 6/22/24 with 2.1L removed.   Cytology inconclusive. Recommend repeat thora and sending sample out for cytology.  May need biopsy / pleurodesis for recurring effusion.  Continue supportive care.  Will f/u.

## 2024-07-01 NOTE — PROGRESS NOTE ADULT - PROBLEM SELECTOR PLAN 2
ECG 6/21 with AF with RVR, spontaneously converted to SR 6/21  - TTE wnl  - [id number] of 2 (age and gender).     - heparin gtt for now  (eventually Eliquis 5mg PO BID)  - once BP improves, consider lopressor 12.5mg BID Significant hyponatremia to 120 on admission. Pt has previously been hyponatremic down to 130 however last month, Na was 136  - HOLD lasix    # Elevated lactate: check stat blood gas and trend lactate.    #ODIN: worsening sCr. Check UA, renal US. Strict Is/Os, Monitor BMP Q12H. Consulted nephrology team, F/U on their eval, concern for hypotension, discuss with cardiology re starting pressors.

## 2024-07-01 NOTE — PROCEDURE NOTE - AIR OBTAINED
HISTORY OF PRESENT ILLNESS  Eduardo Espino is a 46 y.o. female. HPI  Asthma stable  S/p left foot fracture , no trauma  Review of Systems   Musculoskeletal: Positive for joint pain. All other systems reviewed and are negative. Past Medical History:   Diagnosis Date    Asthma     Other emphysema ([facility] [address] 6/25/2018     Current Outpatient Prescriptions on File Prior to Visit   Medication Sig Dispense Refill    montelukast (SINGULAIR) 10 mg tablet TAKE 1 TABLET BY MOUTH DAILY 30 Tab 0    omeprazole (PRILOSEC) 40 mg capsule TAKE 1 CAPSULE BY MOUTH EVERY EVENING 30 Cap 0    zolpidem (AMBIEN) 10 mg tablet TAKE 1 TABLET BY MOUTH EVERY DAY AT BEDTIME 30 Tab 0    VENTOLIN HFA 90 mcg/actuation inhaler INHALE 2 PUFFS BY MOUTH EVERY 2 HOURS AS NEEDED FOR WHEEZING 1 Inhaler 3    VENTOLIN HFA 90 mcg/actuation inhaler INHALE 2 PUFFS BY MOUTH EVERY 2 HOURS AS NEEDED FOR WHEEZING 1 Inhaler 3    ZOLPIDEM TARTRATE (AMBIEN PO) Take  by mouth.  fluticasone (FLONASE) 50 mcg/actuation nasal spray 2 Sprays by Both Nostrils route daily. 1 Bottle 3    mometasone-formoterol (DULERA) 200-5 mcg/actuation HFA inhaler Take 2 Puffs by inhalation two (2) times a day.  tiotropium bromide (SPIRIVA RESPIMAT) 1.25 mcg/actuation inhaler Take 2 Puffs by inhalation daily.  albuterol-ipratropium (DUO-NEB) 2.5 mg-0.5 mg/3 ml nebu 3 mL by Nebulization route every four (4) hours as needed. 100 Nebule 0     No current facility-administered medications on file prior to visit. Visit Vitals    /82 (BP 1 Location: Right arm, BP Patient Position: Sitting)    Pulse 80    Temp 96.9 °F (36.1 °C) (Oral)    Resp 18    Ht 5' 4\" (1.626 m)    Wt 169 lb (76.7 kg)    LMP 01/24/2011    SpO2 97%    BMI 29.01 kg/m2         Physical Exam   Constitutional: She appears well-developed and well-nourished. No distress. Skin: She is not diaphoretic. Vitals reviewed.   reviewed radiology reports    ASSESSMENT and PLAN  foot fracture raises concern for osteoporosis  Plan  Labs  Bone density No

## 2024-07-01 NOTE — PROGRESS NOTE ADULT - SUBJECTIVE AND OBJECTIVE BOX
Patient is a 89y old  Male who presents with a chief complaint of SOB (30 Jun 2024 18:40)      Vital Signs Last 24 Hrs  T(C): 36.7 (07-01-24 @ 04:30), Max: 36.9 (06-30-24 @ 21:09)  T(F): 98 (07-01-24 @ 04:30), Max: 98.5 (06-30-24 @ 21:09)  HR: 90 (07-01-24 @ 09:14) (90 - 140)  BP: 111/61 (07-01-24 @ 04:30) (94/55 - 116/74)  RR: 18 (07-01-24 @ 09:14) (18 - 22)  SpO2: 96% (07-01-24 @ 09:14) (92% - 99%)                06-30-24 @ 07:01  -  07-01-24 @ 07:00  --------------------------------------------------------  IN: 200 mL / OUT: 3050 mL / NET: -2850 mL                              8.7    21.21 )-----------( 283      ( 01 Jul 2024 10:30 )             26.0     07-01    132<L>  |  91<L>  |  54<H>  ----------------------------<  101<H>  4.1   |  25  |  1.48<H>    Ca    11.4<H>      01 Jul 2024 05:34  Phos  5.7     06-30  Mg     2.5     06-30    TPro  7.3  /  Alb  2.6<L>  /  TBili  0.3  /  DBili  x   /  AST  22  /  ALT  26  /  AlkPhos  94  06-30        Drain Left Pigtail to WS drained 50cc serosanguinous drainge, no ail leak noted  PHYSICAL EXAM  Neurology: A&Ox3, NAD, no gross deficits  CV : RRR+S1S2  Lungs: Respirations non-labored, B/L BS  Abdomen: Soft, NT/ND, +[id number]  Extremities: B/L LE edema, negative calf tenderness, +PP           MEDICATIONS  acetaminophen     Tablet .. 650 milliGRAM(s) Oral every 6 hours PRN  albuterol/ipratropium for Nebulization 3 milliLiter(s) Nebulizer every 6 hours PRN  aluminum hydroxide/magnesium hydroxide/simethicone Suspension 30 milliLiter(s) Oral every 4 hours PRN  ascorbic acid 500 milliGRAM(s) Oral daily  benzocaine/menthol Lozenge 1 Lozenge Oral three times a day  chlorhexidine 2% Cloths 1 Application(s) Topical daily  heparin  Infusion. 1800 Unit(s)/Hr IV Continuous <Continuous>  multivitamin/minerals 1 Tablet(s) Oral daily  pantoprazole    Tablet 40 milliGRAM(s) Oral before breakfast  piperacillin/tazobactam IVPB.. 3.375 Gram(s) IV Intermittent every 8 hours  simethicone 80 milliGRAM(s) Chew four times a day  sodium chloride 0.65% Nasal 1 Spray(s) Both Nostrils four times a day PRN  tamsulosin 0.4 milliGRAM(s) Oral at bedtime

## 2024-07-01 NOTE — PROGRESS NOTE ADULT - PROBLEM SELECTOR PLAN 5
Hypertensive likely due to respiratory distress  - Continue to monitor, if persistently elevated, can consider starting antihypertensive meds with CCB or ACEi/ARB Hypertensive likely due to respiratory distress  - Continue to monitor.

## 2024-07-01 NOTE — PROCEDURE NOTE - NSPROCDETAILS_GEN_ALL_CORE
Seldinger technique/dressing applied/secured in place/percutaneous/thoracostomy tube placed percutaneously
location identified, draped/prepped, sterile technique used, needle inserted/introduced/Seldinger technique/catheter inserted over needle
location identified, draped/prepped, sterile technique used, needle inserted/introduced

## 2024-07-01 NOTE — CHART NOTE - NSCHARTNOTEFT_GEN_A_CORE
Called for consult for ODIN. Attempted to evaluate patient but not in his room. Chart/labs reviewed. Pt with recent tachycardia and hypotension. ODIN may be due to ATN from hypotension v other etiology. Recommend kidney US. Anticipate comprehensive consult note on 7/2/24.    If you have any questions, please feel free to contact me  Kishan Zarco  Nephrology Fellow  Pager # [phone]  [employer] Teams  (After 5pm or on weekends please page the on-call fellow)

## 2024-07-01 NOTE — PROGRESS NOTE ADULT - ASSESSMENT
ASSESSMENT: 89 year old male with hx of stage II bladder cancer s/p TURBT and chemoradiation with gemcitabine (completed 5/2/24). Pt presenting with worsening dyspnea on exertion and orthopnea. CTA chest performed without evidence of PE. Large pleural effusion noted on L with passive atelectasis. New 8mm pulm nodule in RUL also noted. Patient admitted to the medical service. During this admission, patient has required multiple thoracentesis, Left on 6/22 (2.1 L removed) and Right on 6/25 (1.8L removed). Since that time, patient remains on 6L NC, endorsing increased WOB. Cultures from prior thoracentesis are negative, cytopathology showing rare atypical cells. Patient is on Unasyn. Repeat CT on 6/28 showing large L sided loculated effusion and right moderate hydropneumothorax. Thoracic surgery consulted for surgical management of loculated effusion.      6/30   5 L NC   OOB to chair  DC hep gtt  7/1     VSS, WBC down to 21, awaiting IR to place RCT, L Pigtail to WS draining 50 cc serosanguinous drainage this am no airleak

## 2024-07-01 NOTE — PROGRESS NOTE ADULT - PROBLEM SELECTOR PLAN 1
heparin Gtt  shut off 2 hours prior to IR procedure   LUCY Shetty   IR cslt to drain rt  side pleural effusion

## 2024-07-01 NOTE — PROGRESS NOTE ADULT - SUBJECTIVE AND OBJECTIVE BOX
PATIENT:  ILIA MONROE  [id number]    CHIEF COMPLAINT:  Patient is a 89y old  Male who presents with a chief complaint of SOB (2024 15:32)      INTERVAL HISTORY/OVERNIGHT EVENTS:  - no acute events    MEDICATIONS:  MEDICATIONS  (STANDING):  ascorbic acid 500 milliGRAM(s) Oral daily  benzocaine/menthol Lozenge 1 Lozenge Oral three times a day  chlorhexidine 2% Cloths 1 Application(s) Topical daily  heparin  Infusion. 1800 Unit(s)/Hr (18 mL/Hr) IV Continuous <Continuous>  multivitamin/minerals 1 Tablet(s) Oral daily  pantoprazole    Tablet 40 milliGRAM(s) Oral before breakfast  piperacillin/tazobactam IVPB.. 3.375 Gram(s) IV Intermittent every 8 hours  simethicone 80 milliGRAM(s) Chew four times a day  sodium chloride 0.9%. 500 milliLiter(s) (125 mL/Hr) IV Continuous <Continuous>  tamsulosin 0.4 milliGRAM(s) Oral at bedtime    MEDICATIONS  (PRN):  acetaminophen     Tablet .. 650 milliGRAM(s) Oral every 6 hours PRN Temp greater or equal to 38C (100.4F), Mild Pain (1 - 3)  albuterol/ipratropium for Nebulization 3 milliLiter(s) Nebulizer every 6 hours PRN Shortness of Breath and/or Wheezing  aluminum hydroxide/magnesium hydroxide/simethicone Suspension 30 milliLiter(s) Oral every 4 hours PRN Dyspepsia  sodium chloride 0.65% Nasal 1 Spray(s) Both Nostrils four times a day PRN Nasal Congestion      ALLERGIES:  Allergies    No Known Allergies    Intolerances        OBJECTIVE:  ICU Vital Signs Last 24 Hrs  T(C): 36.5 (2024 16:06), Max: 36.9 (2024 21:09)  T(F): 97.7 (2024 16:06), Max: 98.5 (2024 21:09)  HR: 97 (2024 16:06) (90 - 98)  BP: 103/55 (2024 16:06) (94/52 - 111/61)  RR: 18 (2024 16:06) (18 - 22)  SpO2: 100% (2024 16:06) (95% - 100%)    O2 Parameters below as of 2024 12:01  Patient On (Oxygen Delivery Method): nasal cannula, high flow  O2 Flow (L/min): 40  O2 Concentration (%): 50      CAPILLARY BLOOD GLUCOSE      POCT Blood Glucose.: 150 mg/dL (2024 03:00)    I&O's Summary    2024 07:01  -  2024 07:00  --------------------------------------------------------  IN: 200 mL / OUT: 3050 mL / NET: -2850 mL    2024 07:01  -  2024 17:53  --------------------------------------------------------  IN: 0 mL / OUT: 500 mL / NET: -500 mL      Daily     Daily     PHYSICAL EXAMINATION:  General: WN/WD NAD  HEENT: PERRL, EOMI, moist mucous membranes  Neurology: A&Ox3, nonfocal, ACKERMAN x 4  Respiratory: R and L diminishment  CV: RRR, S1S2, no murmurs, rubs or gallops  Abdominal: Soft, NT, ND +BS, Last BM  Extremities: No edema, + peripheral pulses    LABS:                          8.7    21.21 )-----------( 283      ( 2024 10:30 )             26.0     07-    132<L>  |  91<L>  |  54<H>  ----------------------------<  101<H>  4.1   |  25  |  1.48<H>    Ca    11.4<H>      2024 05:34  Phos  5.7     06-30  Mg     2.5     0630    TPro  7.3  /  Alb  2.6<L>  /  TBili  0.3  /  DBili  x   /  AST  22  /  ALT  26  /  AlkPhos  94  0630    LIVER FUNCTIONS - ( 2024 03:23 )  Alb: 2.6 g/dL / Pro: 7.3 g/dL / ALK PHOS: 94 U/L / ALT: 26 U/L / AST: 22 U/L / GGT: x           PT/INR - ( 2024 05:35 )   PT: 14.0 sec;   INR: 1.34 ratio         PTT - ( 2024 10:30 )  PTT:53.8 sec        Urinalysis Basic - ( 2024 14:44 )    Color: Yellow / Appearance: Cloudy / S.015 / pH: x  Gluc: x / Ketone: Negative mg/dL  / Bili: Negative / Urobili: 0.2 mg/dL   Blood: x / Protein: Negative mg/dL / Nitrite: Negative   Leuk Esterase: Negative / RBC: 2 /HPF / WBC 0 /HPF   Sq Epi: x / Non Sq Epi: 0 /HPF / Bacteria: Negative /HPF

## 2024-07-01 NOTE — PROGRESS NOTE ADULT - PROBLEM SELECTOR PLAN 4
Stage II; s/p TURBT and chemoradiation  - per outpatient records, plan for repeat cystoscopy after treatment; around August 2024  - CT imaging with new 8mm pulm nodule in RUL concerning for metastasis.    - F/U cytology  - Continue tamsulosin  - Monitor I/Os

## 2024-07-01 NOTE — PRE PROCEDURE NOTE - PRE PROCEDURE EVALUATION
Interventional Radiology    HPI: 89 year old male with hx of stage II bladder cancer s/p TURBT and chemoradiation with gemcitabine (completed 5/2/24). Pt presenting with worsening dyspnea on exertion and orthopnea. CTA chest showed large b/l pleural effusion L>R. Patient is s/o Left chest tube placement by Thoracic surgery. Now presents to IR for right chest tube placement.     Allergies: No Known Allergies    Medications (Abx/Cardiac/Anticoagulation/Blood Products)  ampicillin/sulbactam  IVPB: 200 mL/Hr IV Intermittent (06-30 @ 01:00)  heparin  Infusion.: 1800 Unit(s)/Hr IV Continuous (07-01 @ 04:23)  heparin  Infusion.: 1800 Unit(s)/Hr IV Continuous (06-30 @ 07:23)  metoprolol tartrate Injectable: 5 milliGRAM(s) IV Push (06-30 @ 03:56)  piperacillin/tazobactam IVPB.: 200 mL/Hr IV Intermittent (06-30 @ 04:00)  piperacillin/tazobactam IVPB.-: 25 mL/Hr IV Intermittent (06-30 @ 07:30)  piperacillin/tazobactam IVPB..: 25 mL/Hr IV Intermittent (07-01 @ 13:19)  vancomycin  IVPB: 250 mL/Hr IV Intermittent (06-30 @ 06:17)    Data:  T(C): 36.5  HR: 97  BP: 103/55  RR: 18  SpO2: 100%    Exam  General: No acute distress  Chest: Non labored breathing on NRB  Left chest tube c/d/i to H2O seal     -WBC 21.21 / HgB 8.7 / Hct 26.0 / Plt 283  -Na 132 / Cl 91 / BUN 54 / Glucose 101  -K 4.1 / CO2 25 / Cr 1.48  -ALT -- / Alk Phos -- / T.Bili --  -INR1.34    Imaging:   < from: Xray Chest 1 View- PORTABLE-Routine (Xray Chest 1 View- PORTABLE-Routine in AM.) (07.01.24 @ 10:34) >    FINDINGS:    6/29/2024 9:16 AM:  There is a moderate to large left pleural effusion. Small to moderate   right pleural effusion with atelectasis. Heart size is not well evaluated.  No pneumothorax.    6/30/2024 4:31 PM  There is a pigtail catheter along the left mid chest.  The heart is not well assessed on an AP film.  There is a moderate right pleural effusion and atelectasis.  There is decreased left pleural effusion with a probable loculated   component along the left mid chest wall.  There is no pneumothorax.    7/1/2024 9:53 AM:  There is a slight increase in pleural effusions and atelectasis at both   lung bases. There is mild pulmonary vascular congestion.    IMPRESSION:  Slight increase in pulmonary vascular congestion.    Decreased left pleural effusion status post pigtail catheter placement.   There is a loculated component along the left lateral chest wall.    Moderate right pleural effusion    < end of copied text >    Plan: 89y Male presents for right chest tube placement   -Risks/Benefits/alternatives explained with the patient and/or healthcare proxy and witnessed informed consent obtained.

## 2024-07-01 NOTE — PROGRESS NOTE ADULT - SUBJECTIVE AND OBJECTIVE BOX
KELECHIYUN ILIA 89y Male      Patient is a 89y old  Male who presents with a chief complaint of SOB (01 Jul 2024 13:27)        INTERVAL HPI/OVERNIGHT EVENTS: No acute events overnight. Patient was seen and evaluated at the bedside. The patient denies pain. Vitals stable. Patient denies fever/chills, chest pain, shortness of breath, abdominal pain, headaches, nausea/vomiting, and diarrhea/constipation.      PHYSICAL EXAM:  GENERAL: NAD  HEAD:  Normocephalic  EYES:  conjunctiva and sclera clear  ENMT: Moist mucous membranes  NECK: Supple   CHEST/LUNG: Good air exchange bilaterally, no wheeze  HEART: Regular rate and rhythm  ABD: Soft, nontender   EXT: No edema, cyanosis, or deformity  NERVOUS SYSTEM:  Alert, awake      Vital Signs Last 24 Hrs  T(C): 36.4 (01 Jul 2024 12:01), Max: 36.9 (30 Jun 2024 21:09)  T(F): 97.6 (01 Jul 2024 12:01), Max: 98.5 (30 Jun 2024 21:09)  HR: 93 (01 Jul 2024 12:01) (90 - 105)  BP: 94/52 (01 Jul 2024 12:01) (94/52 - 111/61)  BP(mean): --  RR: 18 (01 Jul 2024 12:01) (18 - 22)  SpO2: 96% (01 Jul 2024 12:01) (95% - 99%)    Parameters below as of 01 Jul 2024 12:01  Patient On (Oxygen Delivery Method): nasal cannula, high flow  O2 Flow (L/min): 40  O2 Concentration (%): 50      MEDICATIONS  (STANDING):  ascorbic acid 500 milliGRAM(s) Oral daily  benzocaine/menthol Lozenge 1 Lozenge Oral three times a day  chlorhexidine 2% Cloths 1 Application(s) Topical daily  heparin  Infusion. 1800 Unit(s)/Hr (18 mL/Hr) IV Continuous <Continuous>  multivitamin/minerals 1 Tablet(s) Oral daily  pantoprazole    Tablet 40 milliGRAM(s) Oral before breakfast  piperacillin/tazobactam IVPB.. 3.375 Gram(s) IV Intermittent every 8 hours  simethicone 80 milliGRAM(s) Chew four times a day  tamsulosin 0.4 milliGRAM(s) Oral at bedtime    MEDICATIONS  (PRN):  acetaminophen     Tablet .. 650 milliGRAM(s) Oral every 6 hours PRN Temp greater or equal to 38C (100.4F), Mild Pain (1 - 3)  albuterol/ipratropium for Nebulization 3 milliLiter(s) Nebulizer every 6 hours PRN Shortness of Breath and/or Wheezing  aluminum hydroxide/magnesium hydroxide/simethicone Suspension 30 milliLiter(s) Oral every 4 hours PRN Dyspepsia  sodium chloride 0.65% Nasal 1 Spray(s) Both Nostrils four times a day PRN Nasal Congestion      Consultant(s) Notes Reviewed:  [X] YES  [ ] NO  Care Discussed with Other Providers [X] YES  [ ] NO  Imaging Personally Reviewed:  [X] YES  [ ] NO      LABS:                        8.7    21.21 )-----------( 283      ( 01 Jul 2024 10:30 )             26.0     07-01    132<L>  |  91<L>  |  54<H>  ----------------------------<  101<H>  4.1   |  25  |  1.48<H>    Ca    11.4<H>      01 Jul 2024 05:34  Phos  5.7     06-30  Mg     2.5     06-30    TPro  7.3  /  Alb  2.6<L>  /  TBili  0.3  /  DBili  x   /  AST  22  /  ALT  26  /  AlkPhos  94  06-30    PT/INR - ( 01 Jul 2024 05:35 )   PT: 14.0 sec;   INR: 1.34 ratio         PTT - ( 01 Jul 2024 10:30 )  PTT:53.8 sec    Urinalysis Basic - ( 01 Jul 2024 05:34 )    Color: x / Appearance: x / SG: x / pH: x  Gluc: 101 mg/dL / Ketone: x  / Bili: x / Urobili: x   Blood: x / Protein: x / Nitrite: x   Leuk Esterase: x / RBC: x / WBC x   Sq Epi: x / Non Sq Epi: x / Bacteria: x          Culture - Fungal, Body Fluid (collected 06-30-24 @ 17:59)  Source: Pleural Fl Pleural Fluid  Preliminary Report (07-01-24 @ 10:49):    Testing in progress    Culture - Body Fluid with Gram Stain (collected 06-30-24 @ 17:59)  Source: Pleural Fl Pleural Fluid  Gram Stain (06-30-24 @ 23:50):    Rare polymorphonuclear leukocytes per low power field    No organisms seen per oil power field

## 2024-07-01 NOTE — PROGRESS NOTE ADULT - SUBJECTIVE AND OBJECTIVE BOX
DATE OF SERVICE: 07-01-24 @ 13:27    Patient is a 89y old  Male who presents with a chief complaint of SOB (01 Jul 2024 11:06)      INTERVAL HISTORY: Reports SOB and discomfort in CT site    REVIEW OF SYSTEMS:  CONSTITUTIONAL: No weakness  EYES/ENT: No visual changes;  No throat pain   NECK: No pain or stiffness  RESPIRATORY: No cough, wheezing; No shortness of breath  CARDIOVASCULAR: No chest pain or palpitations  GASTROINTESTINAL: No abdominal  pain. No nausea, vomiting, or hematemesis  GENITOURINARY: No dysuria, frequency or hematuria  NEUROLOGICAL: No stroke like symptoms  SKIN: No rashes    	  MEDICATIONS:        PHYSICAL EXAM:  T(C): 36.4 (07-01-24 @ 12:01), Max: 36.9 (06-30-24 @ 21:09)  HR: 93 (07-01-24 @ 12:01) (90 - 105)  BP: 94/52 (07-01-24 @ 12:01) (94/52 - 111/61)  RR: 18 (07-01-24 @ 12:01) (18 - 22)  SpO2: 96% (07-01-24 @ 12:01) (95% - 99%)  Wt(kg): --  I&O's Summary    30 Jun 2024 07:01  -  01 Jul 2024 07:00  --------------------------------------------------------  IN: 200 mL / OUT: 3050 mL / NET: -2850 mL          Appearance: In no distress	  HEENT:    PERRL, EOMI	  Cardiovascular:  S1 S2, No JVD  Respiratory: Lungs clear to auscultation	  Gastrointestinal:  Soft, Non-tender, + BS	  Vascularature:  No edema of LE  Psychiatric: Appropriate affect   Neuro: no acute focal deficits                               8.7    21.21 )-----------( 283      ( 01 Jul 2024 10:30 )             26.0     07-01    132<L>  |  91<L>  |  54<H>  ----------------------------<  101<H>  4.1   |  25  |  1.48<H>    Ca    11.4<H>      01 Jul 2024 05:34  Phos  5.7     06-30  Mg     2.5     06-30    TPro  7.3  /  Alb  2.6<L>  /  TBili  0.3  /  DBili  x   /  AST  22  /  ALT  26  /  AlkPhos  94  06-30        Labs personally reviewed      ASSESSMENT/PLAN: 	  89M with hx of Stage II bladder cancer s/p TURBT and chemoradiation, presenting with dyspnea and orthopnea. a/w Acute hypoxic respiratory failure also found to have large L pleural effusion. Found to have new onset AF with RVR.    1. New Onset Atrial Fibrillation with Rapid Ventricular Response  - ECG 6/21 with AF with RVR  - Spontaneously converted to SR 6/21  - TTE wnl  - TSH wnl  - [id number] of 2 (age and gender). Heparin gtt in progress  - BP soft but if remains stable or recovers further, recommend starting Toprol 25mg PO daily.   - RRT 6/30 for increased WOB and Afib RVR. Improved after fluid resuscitation, BiPAP and IV Lopressor 5mg. Rec starting Toprol as above.   - 6/30 converted to SR     2. Shortness of Breath  - CTA neg for PE but found to have large left pleural effusion now s/p thora with 2.1L removed on 6/22  -   - TTE wnl, normal filling pressures, preserved EF  - s/p L thoracentesis 6/25 2.1L off  - now on 6L NC  - 6/28 CT with Very large loculated left pleural effusion and small to moderate size loculated right hydropneumothorax.  - S/p L pigtail     3. Hyponatremia  - Slowly improving, now 131  - Lasix on hold            KENRICK Acuna DO [facility]  Cardiovascular Medicine  [address], [address]  Available via call or text on [employer] TEAMs  Office: [phone]

## 2024-07-01 NOTE — PROGRESS NOTE ADULT - ASSESSMENT
89 year old, Kyrgy[place] Speaking, male with hx of Stage II bladder cancer s/p TURBT and chemoradiation, presenting with dyspnea and AHRF. Found to have b/l pleural effusions with L > R    #AHRF  #Bilateral pleural effusions, s/p bilat thoras  #Hx bladder ca    overall feels better per pt  susepect malignant process    - s/p drainage of 2.1L of pleural fluid from left side on 6/22, serosanguinous and exudative  - s/p drainage on 1.8L from right side on 6/25, serosang and exudative  - Cytopath from L +atypical cell, please f/up R cytopath  - L chest tube per CT surg  - for R chest tube with IR - please send cyto, flow, micro studies (d/w IR)  - cont on NC for goal O2 sat >92%  - out of bed with fall precautions  - incentive spirometer

## 2024-07-01 NOTE — PROGRESS NOTE ADULT - PROBLEM SELECTOR PLAN 1
Acute hypoxic resp failure 2/2 bilateral pleural effusion  In setting of bladder CA and new pulmonary nodule, concerning for malignancy    - TTE - normal LV and RV fxn, no diastolic dysfunction    - s/p L thoracentesis on 6/22   - s/p R sided thoracentesis on 6/25  - s/p L chest tube 6/30  - now on Zosyn   - on HFNC  - Repeat chest showed large left sided locular effusion.   >    >  IR c/s for chest tube placement on R.  - F/U pulm, cardiothoracic and IR teams for further recs

## 2024-07-01 NOTE — PROGRESS NOTE ADULT - SUBJECTIVE AND OBJECTIVE BOX
[facility] Division of Hospital Medicine  Gary Saavedra MD M-F, 8A-5P: MS Teams  Other Times: [facility] Extension / [facility] Pager      Patient is a 89y old  Male who presents with a chief complaint of SOB (2024 15:08)      SUBJECTIVE / OVERNIGHT EVENTS:  Patient was examined today. His nephew is at bedside, translating [employer]. Patient is lying in bed with eyes close, but opens on prompt, he states he is feeling hungry wants to eat today, he feels bit tired as well, states he feels breathing is bit better today.       ADDITIONAL REVIEW OF SYSTEMS:    MEDICATIONS  (STANDING):  ascorbic acid 500 milliGRAM(s) Oral daily  benzocaine/menthol Lozenge 1 Lozenge Oral three times a day  chlorhexidine 2% Cloths 1 Application(s) Topical daily  heparin  Infusion. 1800 Unit(s)/Hr (18 mL/Hr) IV Continuous <Continuous>  multivitamin/minerals 1 Tablet(s) Oral daily  pantoprazole    Tablet 40 milliGRAM(s) Oral before breakfast  piperacillin/tazobactam IVPB.. 3.375 Gram(s) IV Intermittent every 8 hours  simethicone 80 milliGRAM(s) Chew four times a day  tamsulosin 0.4 milliGRAM(s) Oral at bedtime    MEDICATIONS  (PRN):  acetaminophen     Tablet .. 650 milliGRAM(s) Oral every 6 hours PRN Temp greater or equal to 38C (100.4F), Mild Pain (1 - 3)  albuterol/ipratropium for Nebulization 3 milliLiter(s) Nebulizer every 6 hours PRN Shortness of Breath and/or Wheezing  aluminum hydroxide/magnesium hydroxide/simethicone Suspension 30 milliLiter(s) Oral every 4 hours PRN Dyspepsia  sodium chloride 0.65% Nasal 1 Spray(s) Both Nostrils four times a day PRN Nasal Congestion      CAPILLARY BLOOD GLUCOSE          I&O's Summary    2024 07:01  -  2024 07:00  --------------------------------------------------------  IN: 200 mL / OUT: 3050 mL / NET: -2850 mL        Daily     Daily     PHYSICAL EXAM:  Vital Signs Last 24 Hrs  T(C): 36.4 (2024 12:01), Max: 36.9 (2024 21:09)  T(F): 97.6 (2024 12:01), Max: 98.5 (2024 21:09)  HR: 93 (2024 12:01) (90 - 99)  BP: 94/52 (2024 12:01) (94/52 - 111/61)  BP(mean): --  RR: 18 (2024 12:) (18 - 22)  SpO2: 96% (2024 12:) (95% - 99%)    Parameters below as of 2024 12:01  Patient On (Oxygen Delivery Method): nasal cannula, high flow  O2 Flow (L/min): 40  O2 Concentration (%): 50      CONSTITUTIONAL: NAD, elderly   EYES: PERRLA; conjunctiva and sclera clear  ENMT: dry oral mucosa,  RESPIRATORY: decreased breath sounds at bases , L pigtail   CARDIOVASCULAR: Regular rate and rhythm, normal S1 and S2, no murmur/rub/gallop;   No lower extremity edema; Peripheral pulses are 2+ bilaterally  ABDOMEN: Nontender to palpation, normoactive bowel sounds,   MUSCULOSKELETAL:  no clubbing or cyanosis of digits; moving all extremities   PSYCH/NEURO: A+O to person, situation; affect appropriate, cooperative  SKIN: No rashes on exposed surfaces       LABS:                        8.7    21.21 )-----------( 283      ( 2024 10:30 )             26.0     07-    132<L>  |  91<L>  |  54<H>  ----------------------------<  101<H>  4.1   |  25  |  1.48<H>    Ca    11.4<H>      2024 05:34  Phos  5.7     06-30  Mg     2.5     06-30    TPro  7.3  /  Alb  2.6<L>  /  TBili  0.3  /  DBili  x   /  AST  22  /  ALT  26  /  AlkPhos  94  06-30    LIVER FUNCTIONS - ( 2024 03:23 )  Alb: 2.6 g/dL / Pro: 7.3 g/dL / ALK PHOS: 94 U/L / ALT: 26 U/L / AST: 22 U/L / GGT: x           PT/INR - ( 2024 05:35 )   PT: 14.0 sec;   INR: 1.34 ratio         PTT - ( 2024 10:30 )  PTT:53.8 sec      Urinalysis Basic - ( 2024 14:44 )    Color: Yellow / Appearance: Cloudy / S.015 / pH: x  Gluc: x / Ketone: Negative mg/dL  / Bili: Negative / Urobili: 0.2 mg/dL   Blood: x / Protein: Negative mg/dL / Nitrite: Negative   Leuk Esterase: Negative / RBC: 2 /HPF / WBC 0 /HPF   Sq Epi: x / Non Sq Epi: 0 /HPF / Bacteria: Negative /HPF        Culture - Fungal, Body Fluid (collected 2024 17:59)  Source: Pleural Fl Pleural Fluid  Preliminary Report (2024 10:49):    Testing in progress    Culture - Body Fluid with Gram Stain (collected 2024 17:59)  Source: Pleural Fl Pleural Fluid  Gram Stain (2024 23:50):    Rare polymorphonuclear leukocytes per low power field    No organisms seen per oil power field          RADIOLOGY & ADDITIONAL TESTS:  Results Reviewed:   Imaging Personally Reviewed:  Electrocardiogram Personally Reviewed:    COORDINATION OF CARE:  Care Discussed with Consultants/Other Providers [Y/N]:  Prior or Outpatient Records Reviewed [Y/N]:

## 2024-07-01 NOTE — PROGRESS NOTE ADULT - PROBLEM SELECTOR PLAN 6
DVT ppx - heparin gtt  Diet - regular  Dispo - pending, likely [facility]    - 7/1: patient's nephew updated at bedside.

## 2024-07-01 NOTE — PROGRESS NOTE ADULT - ASSESSMENT
89M hx stage II bladder cancer s/p TURBT and CRT presented with worsening dyspnea on exertion and orthopnea for 3 days. Oncology consulted for further management of bladder cancer.    # Muscle-Invasive Bladder Cancer, Stage II  Follows with Dr. Peña at the [facility]. Pt had developed gross hematuria about in Dec 2023 (around the time of his wife passing). Prior tobacco, quit 18y ago, 50 pack years. Went to see  (Dr Perez). Underwent CTU that showed filling defects in bladder. Upper tract negative. Also, with? of pancreatitis based on imaging. Cyto suspicious. Underwent TURBT 2/27/24 with path IHC positive for GATA3, negative for P40, NKX3.1, and TTF1, indicating urothelial carcinoma. Muscle was present with invasion. From the TURBT on 2/27/24. "Inspection of the bladder showed large necrotic papillary tumor x2 along the posterior wall. There was significant trabeculation with cellules throughout. Bilateral ureteral orifices were visualized with clear efflux. Once surveillance was complete, resectoscope was used to carefully resect the posterior wall tumors. This showed evidence of significant bladder wall thickening, concerning for muscle invasion. Resection was taken down deep with care taken to avoid any significant injury, particularly given the patient's age and obtain appropriate pathology with resection."  - S/p chemoRT with gemcitabine (last dose 4/30/24). Gemcitabine is unlikely to be the cause of his pleural effusion  - CTA chest 6/20/24 does not show PE. Showed a large left pleural effusion with complete passive atelectasis of the left lower lobe. Small, partially loculated right pleural effusion. A new 8 mm right   upper lobe pulmonary nodule concerning for metastasis. Further thickening of bilateral adrenal glands, nonspecific.  - New subcentimeter RUL pulm nodule is too small to biopsy but is indeed suspicious for metastasis  - S/p dx/tx thoracentesis for large left pleural effusion on 6/22/24 with 2.1L removed.   - Previously small right pleural effusion is now larger and s/p right dx/tx thoracentesis with pulm 6/25/24. Left pleural effusion has a slight reaccumulation  - Will need to monitor pt's respiratory status to determine if he will be able to tolerate further DMT  - Cytopathology with "rare, atypical cells seen". Would get at least cytology x3 to make an informed decision about the malignant potential. Also with his extensive smoking history, lung cancer must also be a consideration.   - Please obtain CT A/P to assess for intra-abdominal disease status       Shakir Junior MD, PGY-6  Hematology/Medical Oncology Fellow  Pager: [phone]  Available on Microsoft Teams  After 5pm or on weekends please contact  to page on-call fellow

## 2024-07-01 NOTE — PROGRESS NOTE ADULT - ATTENDING COMMENTS
88 yo M w/ PMHx stage II bladder cancer s/p TURBT and chemoradiation, presenting with dyspnea and acute hypoxemic respiratory failure with new onset Afib on Hep gtt. Found to have b/l pleural effusions L > R s/p L diagnostic/therapeutic thoracentesis yielding 2.1L of serosanguinous fluid. Now s/p R thoracentesis removed 1.8L on 6/25 c/b PTX ex vacuo. CTSx placed left chest tube on 6/30.  IR consulted to place right chest tube today.  Currently 97% on NRB.  Increasing WBC noted in pleural fluid on left, now ~1400, neutrophil predominant.  Atypical cells noted on cytology from right pleural fluid.    #Pleural effusion  #PTX ex vacuo  #Acute hypoxemic respiratory failure  - c/w supplemental O2, now on nasal cannula, wean as tolerated  - Recommend CT chest for reevaluation of lung parenchyma s/p b/l chest tubes  - f/u pleural effusion studies, new studies to be sent with CT placement today  - agree with Zosyn given evolving pleural studies concerning for parapneumonic effusion i/s/o occult malignancy  - remains on Hep gtt, monitor Hb given bloody fluid and slowly dropping H/H    Tammy Arellano MD, MSCR  Pulmonary & Critical Care

## 2024-07-01 NOTE — PROGRESS NOTE ADULT - PROBLEM SELECTOR PLAN 3
Significant hyponatremia to 120 on admission. Pt has previously been hyponatremic down to 130 however last month, Na was 136    - HOLD lasix    # Elevated lactate: check stat blood gas and trend lactate.    #ODIN: worsening sCr. Check UA. Strict Is/Os, Monitor BMP Q12H. Consulted nephrology team, F/U on their eval. ECG 6/21 with AF with RVR, spontaneously converted to SR 6/21  - TTE wnl  - [id number] of 2 (age and gender).     - heparin gtt for now  (eventually Eliquis 5mg PO BID)  - once BP improves, consider lopressor 12.5mg BID

## 2024-07-02 ENCOUNTER — RESULT REVIEW (OUTPATIENT)
Age: 89
End: 2024-07-02

## 2024-07-02 NOTE — PROGRESS NOTE ADULT - ASSESSMENT
89 yo M w/ PMHx stage II bladder cancer s/p TURBT and chemoradiation, presenting with dyspnea and acute hypoxemic respiratory failure with new onset Afib on Hep gtt. Found to have b/l pleural effusions L > R s/p L diagnostic/therapeutic thoracentesis yielding 2.1L of serosanguinous fluid. Now s/p R thoracentesis removed 1.8L on 6/25 c/b PTX ex vacuo. CTSx placed left chest tube on 6/30.  IR consulted to place right chest tube today.  Currently 97% on NRB.  Increasing WBC noted in pleural fluid on left, now ~1400, neutrophil predominant.  Atypical cells noted on cytology from right pleural fluid.    #Pleural effusion  #PTX ex vacuo  #Acute hypoxemic respiratory failure  - c/w supplemental O2, now on nasal cannula, wean as tolerated  - Recommend CT chest for reevaluation of lung parenchyma s/p b/l chest tubes if stable  - CT A/P if stable  - f/u pleural effusion studies, will send further studies today as not sent by IR  - agree with Laceysyn given evolving pleural studies concerning for parapneumonic effusion i/s/o occult malignancy  - remains on Hep gtt, repeat Hb given dropping BP and consider holding heparin drip    - GOC discussion and please call pall care; patient has signed paperwork stating he is DNR, would have GOC discussions today (initiated by us at bedside, they are getting other family on the phone) about ventilator, pressors etc as he is declining

## 2024-07-02 NOTE — CONSULT NOTE ADULT - SUBJECTIVE AND OBJECTIVE BOX
[facility] DIVISION OF KIDNEY DISEASES AND HYPERTENSION -- [phone]  -- INITIAL CONSULT NOTE  --------------------------------------------------------------------------------  HPI: 88 yo M with a PMH of Bladder CA and BPH who presented to [facility] due to lethargy and weakness. SCr on admission of 0.9 (6/20) but trended up to 1.4 on 7/1. Nephrology consulted for ODIN.    Per [facility] review, pt has had ranging SCr from 0.9 to 1.4.     Pt seen and examined. Pt currently on HFNC and is s/p B/L chest tube placement for recurrent effusions Pt also found to have a RUL nodule. Of note, prior to rise in SCr, pt had a RRT for hypotension with BP in the 80s and HR in the 150s. Pt with 800cc of UOP overnight. Hx is limited due to current medical condition. Pt denied n/v/f/c/difficulty with urination.     PAST HISTORY  --------------------------------------------------------------------------------  PAST MEDICAL & SURGICAL HISTORY:  Malignant neoplasm of bladder, unspecified  HLD (hyperlipidemia)  S/P bilateral inguinal hernia repair    FAMILY HISTORY: None    PAST SOCIAL HISTORY: No illicit drugs     ALLERGIES & MEDICATIONS  --------------------------------------------------------------------------------  Allergies  No Known Allergies    Intolerances    Standing Inpatient Medications  ascorbic acid 500 milliGRAM(s) Oral daily  benzocaine/menthol Lozenge 1 Lozenge Oral three times a day  chlorhexidine 2% Cloths 1 Application(s) Topical daily  heparin  Infusion. 1800 Unit(s)/Hr IV Continuous <Continuous>  multivitamin/minerals 1 Tablet(s) Oral daily  pantoprazole    Tablet 40 milliGRAM(s) Oral before breakfast  piperacillin/tazobactam IVPB.. 3.375 Gram(s) IV Intermittent every 8 hours  simethicone 80 milliGRAM(s) Chew four times a day  sodium chloride 0.9%. 500 milliLiter(s) IV Continuous <Continuous>  tamsulosin 0.4 milliGRAM(s) Oral at bedtime    PRN Inpatient Medications  acetaminophen     Tablet .. 650 milliGRAM(s) Oral every 6 hours PRN  albuterol/ipratropium for Nebulization 3 milliLiter(s) Nebulizer every 6 hours PRN  aluminum hydroxide/magnesium hydroxide/simethicone Suspension 30 milliLiter(s) Oral every 4 hours PRN  sodium chloride 0.65% Nasal 1 Spray(s) Both Nostrils four times a day PRN    REVIEW OF SYSTEMS  --------------------------------------------------------------------------------  Limited ROS, see HPI    VITALS/PHYSICAL EXAM  --------------------------------------------------------------------------------  T(C): 36.6 (07-02-24 @ 10:57), Max: 36.7 (07-01-24 @ 23:10)  HR: 119 (07-02-24 @ 10:57) (90 - 119)  BP: 83/44 (07-02-24 @ 10:57) (83/44 - 103/55)  RR: 18 (07-02-24 @ 10:57) (18 - 19)  SpO2: 99% (07-02-24 @ 10:57) (94% - 100%)  Wt(kg): --    07-01-24 @ 07:01  -  07-02-24 @ 07:00  --------------------------------------------------------  IN: 330 mL / OUT: 1860 mL / NET: -1530 mL    07-02-24 @ 07:01  -  07-02-24 @ 11:10  --------------------------------------------------------  IN: 50 mL / OUT: 0 mL / NET: 50 mL    Physical Exam:  	Gen: Chronically ill appearing but in NAD  	HEENT: Anicteric  	Pulm: Diminished B/L. +HFNC. +B/L Chest tubes  	CV: S1S2+  	Abd: Soft, +BS    	Ext: No LE edema B/L  	Neuro: Awake  	Skin: Warm and dry    LABS/STUDIES  --------------------------------------------------------------------------------              9.0    20.81 >-----------<  245      [07-02-24 @ 06:35]              27.8     136  |  96  |  43  ----------------------------<  103      [07-02-24 @ 06:36]  3.7   |  26  |  1.15        Ca     11.5     [07-02-24 @ 06:36]    PT/INR: PT 13.8 , INR 1.26       [07-02-24 @ 06:36]  PTT: 56.0       [07-02-24 @ 06:36]    Creatinine Trend:  SCr 1.15 [07-02 @ 06:36]  SCr 1.48 [07-01 @ 05:34]  SCr 1.36 [06-30 @ 03:23]  SCr 1.04 [06-29 @ 07:03]  SCr 1.16 [06-28 @ 03:48]

## 2024-07-02 NOTE — PROGRESS NOTE ADULT - SUBJECTIVE AND OBJECTIVE BOX
Interventional Radiology Follow-Up Note    This is a 89y Male s/p right chest tube placement on  in Interventional Radiology with Dr. Winston.     S: Patient seen and examined @ bedside. Remains on HIFNC 40/50. Reports breathing is better today. Denies CP, SOB.     Medication:     digoxin  Injectable: ()  heparin  Infusion.: ()  heparin  Infusion.: ()  piperacillin/tazobactam IVPB..: ()    Vitals:  T(F): 99.1, Max: 99.1 (13:01)  HR: 116  BP: 103/62  RR: 18  SpO2: 99%    Physical Exam:  General: Nontoxic, in NAD,   Chest: right chest tube in place attached to water seal, left chest tube in place attached to water seal.     LABS:  WBC -- / Hgb -- / Hct -- / Plt --  Na -- / K -- / CO2 -- / Cl -- / BUN -- / Cr -- / Glucose --  ALT -- / AST -- / Alk Phos -- / Tbili --  Ptt 66.7 / Pt -- / INR --    Preliminary Report: No growth to date.    24hr Drain output: 1050cc right chest tube, Left chest tube w/ 10cc     Assessment/Plan:  89 year old male with hx of stage II bladder cancer s/p TURBT and chemoradiation with gemcitabine (completed 24). Pt presenting with worsening dyspnea on exertion and orthopnea. CTA chest showed large b/l pleural effusion L>R. Patient is s/o Left chest tube placement by Thoracic surgery. Now s/p right chest tube placement on  in IR with Dr. Winston.     -h/h stable  -Wean off HI miriam as tolerated.   -trend vs/labs  -flush drain with 5cc NS daily forward only; DO NOT aspirate  -change dressing q3 days or when dressing is saturated  -regarding outpatient follow up with IR, if the patient is d/c home with drainage catheter they can make an appointment with IR by calling the IR booking office at [phone]; recommend IR follow in _____wks/months for tube evaluation.  -they will benefit from VNS service to help with drainage catheter care; they should continue same drainage catheter care as an outpatient.  -continue global management per primary team       Any questions or concerns regarding above please reach out to IR:   -Available on [employer] teams  -During working hours (7a-5p): call [phone]  -Emergent issues after 5pm: page: [phone]  -Non-emergent consults: Please place a [facility] order "IR Consult" with an appropriate callback number  -Scheduling questions: [phone]  -Clinic/Outpatient [phone]      Quita Gunderson PA-C  Available on teams     Interventional Radiology Follow-Up Note    This is a 89y Male s/p right chest tube placement on  in Interventional Radiology with Dr. Winston.     S: Patient seen and examined @ bedside. Remains on HIFNC 40/50. Reports breathing is better today. Denies CP, SOB.     Medication:     digoxin  Injectable: ()  heparin  Infusion.: ()  heparin  Infusion.: ()  piperacillin/tazobactam IVPB..: ()    Vitals:  T(F): 99.1, Max: 99.1 (13:01)  HR: 116  BP: 103/62  RR: 18  SpO2: 99%    Physical Exam:  General: Nontoxic, in NAD,   Chest: right chest tube in place attached to water seal, left chest tube in place attached to water seal.     LABS:  WBC -- / Hgb -- / Hct -- / Plt --  Na -- / K -- / CO2 -- / Cl -- / BUN -- / Cr -- / Glucose --  ALT -- / AST -- / Alk Phos -- / Tbili --  Ptt 66.7 / Pt -- / INR --      24hr Drain output: 1050cc right chest tube, Left chest tube w/ 10cc     Assessment/Plan:  89 year old male with hx of stage II bladder cancer s/p TURBT and chemoradiation with gemcitabine (completed 24). Pt presenting with worsening dyspnea on exertion and orthopnea. CTA chest showed large b/l pleural effusion L>R. Patient is s/o Left chest tube placement by Thoracic surgery. Now s/p right chest tube placement on  in IR with Dr. Winston.     -h/h stable  -Wean off HI miriam as tolerated.  -Daily cxr while chest tube in place  -CXR improved right effusion  -Specimens delivered to lab after procedure. Ongoing discussion with lab regarding specimens.   -Discussed with pulm fellow, plan to draw fluid off chest tube and re-send specimens.   -trend vs/labs  -continue global management per primary team       Any questions or concerns regarding above please reach out to IR:   -Available on [employer] teams  -During working hours (7a-5p): call [phone]  -Emergent issues after 5pm: page: [phone]  -Non-emergent consults: Please place a [facility] order "IR Consult" with an appropriate callback number  -Scheduling questions: [phone]  -Clinic/Outpatient [phone]      Quita Gunderson PA-C  Available on teams

## 2024-07-02 NOTE — PROGRESS NOTE ADULT - ASSESSMENT
ASSESSMENT: 89 year old male with hx of stage II bladder cancer s/p TURBT and chemoradiation with gemcitabine (completed 5/2/24). Pt presenting with worsening dyspnea on exertion and orthopnea. CTA chest performed without evidence of PE. Large pleural effusion noted on L with passive atelectasis. New 8mm pulm nodule in RUL also noted. Patient admitted to the medical service. During this admission, patient has required multiple thoracentesis, Left on 6/22 (2.1 L removed) and Right on 6/25 (1.8L removed). Since that time, patient remains on 6L NC, endorsing increased WOB. Cultures from prior thoracentesis are negative, cytopathology showing rare atypical cells. Patient is on Unasyn. Repeat CT on 6/28 showing large L sided loculated effusion and right moderate hydropneumothorax. Thoracic surgery consulted for surgical management of loculated effusion.      6/30   5 L NC   OOB to chair  DC hep gtt  7/1     VSS, WBC down to 21, awaiting IR to place RCT, L Pigtail to WS draining 50 cc serosanguinous drainage this am no airleak,   rt PTC placed by IR  7/2     on high flow o2   chest xray  improving   BL  PTC  drainaing

## 2024-07-02 NOTE — PROGRESS NOTE ADULT - PROBLEM SELECTOR PLAN 3
ECG 6/21 with AF with RVR, spontaneously converted to SR 6/21  - TTE wnl  - [id number] of 2 (age and gender).     - heparin gtt for now  (eventually Eliquis 5mg PO BID)  - once BP improves, consider lopressor 12.5mg BID

## 2024-07-02 NOTE — CONSULT NOTE ADULT - ASSESSMENT
89 yo M w/ PMHx stage II bladder cancer s/p TURBT and chemoradiation, presenting with dyspnea and acute hypoxemic respiratory failure with new onset Afib on Hep gtt. Found to have b/l pleural effusions L > R s/p L diagnostic/therapeutic thoracentesis yielding 2.1L of serosanguinous fluid. Now s/p R thoracentesis removed 1.8L on 6/25 c/b PTX ex vacuo. CTSx placed left chest tube on 6/30.  IR consulted to place right chest tube. MICU consulted today for hypotension likely in the setting of hypovolemia.    #Hypotension  - IVC collapsable as per POCUS  - Etiology likely 2/2 hypovolemia (significant chest tube output, diarrhea, decreased PO intake)  -  cc bolus once given with Albumin 25% 100 mL. Can start maintenance LR as well  - GI PCR and C diff if diarrhea continues  - Obtain blood cultures  - Trend Hemoglobin, transfuse PRN  - Obtain CTAP and chest   - Appreciate cardiology recs for management of atrial fibrillation     #Elevated lactic acidosis  - Start fluids as above  - Obtain repeat ABG.     #GOC  - Continue ongoing goals of care with family  - Patient has no living children nor spouse. Is surrounded by his nephews. Nephews mentions a living will document. However if patient is able 88 yo M w/ PMHx stage II bladder cancer s/p TURBT and chemoradiation, presenting with dyspnea and acute hypoxemic respiratory failure with new onset Afib on Hep gtt. Found to have b/l pleural effusions L > R s/p L diagnostic/therapeutic thoracentesis yielding 2.1L of serosanguinous fluid. Now s/p R thoracentesis removed 1.8L on 6/25 c/b PTX ex vacuo. CTSx placed left chest tube on 6/30.  IR consulted to place right chest tube. MICU consulted today for hypotension likely in the setting of hypovolemia.    #Hypotension  - IVC collapsable as per POCUS  - Etiology likely 2/2 hypovolemia (significant chest tube output, diarrhea, decreased PO intake)  -  cc bolus once given with Albumin 25% 100 mL. Can start maintenance LR as well  - GI PCR and C diff if diarrhea continues  - Obtain blood cultures  - Trend Hemoglobin, transfuse PRN  - Obtain CTAP and chest   - Appreciate cardiology recs for management of atrial fibrillation   - If chest tube drainage appears sanguinous, consider holding heparin gtt     #Elevated lactic acidosis  - Start fluids as above  - Obtain repeat ABG.     #GOC  - Continue ongoing goals of care with family  - Patient has no living children nor spouse. Is surrounded by his nephews. Nephews mentions a living will document. However if patient demonstrates capacity he is able to participate in discussions without the need of this document.     Please reconsult if patient's clinical condition changes.     ********Notes are not finalized until attending attestation******* 88 yo M w/ PMHx stage II bladder cancer s/p TURBT and chemoradiation, presenting with dyspnea and acute hypoxemic respiratory failure with new onset Afib on Hep gtt. Found to have b/l pleural effusions L > R s/p L diagnostic/therapeutic thoracentesis yielding 2.1L of serosanguinous fluid. Now s/p R thoracentesis removed 1.8L on 6/25 c/b PTX ex vacuo. CTSx placed left chest tube on 6/30.  IR consulted to place right chest tube. MICU consulted today for hypotension likely in the setting of hypovolemia.    #Hypotension  - IVC collapsable as per POCUS; biventricular function intact  - Etiology likely 2/2 hypovolemia (significant chest tube output, diarrhea, decreased PO intake)  -  cc bolus once given with Albumin 25% 100 mL. Can start maintenance LR as well  - GI PCR and C diff if diarrhea continues  - Obtain blood cultures  - Trend Hemoglobin, transfuse PRN  - Obtain CTAP and chest   - Appreciate cardiology recs for management of atrial fibrillation   - If chest tube drainage appears more sanguinous, consider holding heparin gtt     #Elevated lactic acidosis  - Start fluids as above  - Obtain repeat ABG.     #GOC  - Continue ongoing goals of care with family  - Patient has no living children nor spouse. Is surrounded by his nephews. Nephews mentions a living will document. However if patient demonstrates capacity he is able to participate in discussions without the need of this document.     Please reconsult if patient's clinical condition changes.     ********Notes are not finalized until attending attestation*******

## 2024-07-02 NOTE — PROGRESS NOTE ADULT - PROBLEM SELECTOR PLAN 6
DVT ppx - heparin gtt  Diet - regular  Dispo - pending, likely REGINA    - 7/2: nephew niece and nephew in law- extended conversation about GOC, requiring mor oxygen or CPR. PT's family notes that pt was independent outside of hospital. They would like to discuss amongst selfs. ALDAIR foorm left in room.     7/2: MICU Consulted for hypotension elevated lactate. Bedside UDS showing volume down. Recs for  cc bolus followed by albumin followed by maintenance fluid.  CT abd pelv, blood cult, urine cx. EOAE (evoked otoacoustic emission)

## 2024-07-02 NOTE — PROGRESS NOTE ADULT - PROBLEM SELECTOR PLAN 1
heparin Gtt       IR- rt  side pleural effusion and placed   By   IR   7/1  Lt  PTC draining    med management per Primary team

## 2024-07-02 NOTE — PROGRESS NOTE ADULT - PROBLEM SELECTOR PLAN 2
Significant hyponatremia to 120 on admission. Pt has previously been hyponatremic down to 130 however last month, Na was 136  - HOLD lasix    # Elevated lactate: check stat blood gas and trend lactate.    #ODIN: worsening sCr. Check UA, renal US. Strict Is/Os, Monitor BMP Q12H. Consulted nephrology team, F/U on their eval, concern for hypotension, discuss with cardiology re starting pressors.

## 2024-07-02 NOTE — PROGRESS NOTE ADULT - PROBLEM SELECTOR PLAN 4
18 Stage II; s/p TURBT and chemoradiation  - per outpatient records, plan for repeat cystoscopy after treatment; around August 2024  - CT imaging with new 8mm pulm nodule in RUL concerning for metastasis.    - F/U cytology  - Continue tamsulosin  - Monitor I/Os

## 2024-07-02 NOTE — CHART NOTE - NSCHARTNOTEFT_GEN_A_CORE
Pt in afib w/rvr on tele with SBP 70's d/w cards s/p 500mcg of digoxin with improvent in HR (80's-115) but worsening BP. Lactate of 7, 250 bolus, BCx2, and repeat lactate. Pt remains hypotensive SBP 80's, MICU consulted and recs entered. Not a MICU candidiate. MICU team, Dr. Vega, and myself discussed GOC with nephews but they would like to discuss amongst the themselves. Palliative consult placed. Pt mentating well, s/o night ACP to f/u repeat lactate and re-consult MICU if pt decompensates.     Vital Signs Last 24 Hrs  T(C): 37.3 (02 Jul 2024 13:01), Max: 37.3 (02 Jul 2024 13:01)  T(F): 99.1 (02 Jul 2024 13:01), Max: 99.1 (02 Jul 2024 13:01)  HR: 106 (02 Jul 2024 16:40) (88 - 138)  BP: 89/52 (02 Jul 2024 16:40) (76/41 - 104/64)  BP(mean): --  RR: 18 (02 Jul 2024 16:40) (18 - 19)  SpO2: 99% (02 Jul 2024 16:40) (94% - 99%)  Parameters below as of 02 Jul 2024 16:40  Patient On (Oxygen Delivery Method): nasal cannula, high flow  O2 Flow (L/min): 40  O2 Concentration (%): 50    Bee Noriega NP  [id number]

## 2024-07-02 NOTE — PROGRESS NOTE ADULT - SUBJECTIVE AND OBJECTIVE BOX
PATIENT:  ILIA MONROE  [id number]    CHIEF COMPLAINT:  Patient is a 89y old  Male who presents with a chief complaint of SOB (02 Jul 2024 14:18)      INTERVAL HISTORY/OVERNIGHT EVENTS:  - BP a little soft    MEDICATIONS:  MEDICATIONS  (STANDING):  ascorbic acid 500 milliGRAM(s) Oral daily  benzocaine/menthol Lozenge 1 Lozenge Oral three times a day  chlorhexidine 2% Cloths 1 Application(s) Topical daily  heparin  Infusion. 1800 Unit(s)/Hr (18 mL/Hr) IV Continuous <Continuous>  multivitamin/minerals 1 Tablet(s) Oral daily  pantoprazole    Tablet 40 milliGRAM(s) Oral before breakfast  piperacillin/tazobactam IVPB.. 3.375 Gram(s) IV Intermittent every 8 hours  simethicone 80 milliGRAM(s) Chew four times a day  sodium chloride 0.9%. 500 milliLiter(s) (125 mL/Hr) IV Continuous <Continuous>  tamsulosin 0.4 milliGRAM(s) Oral at bedtime    MEDICATIONS  (PRN):  acetaminophen     Tablet .. 650 milliGRAM(s) Oral every 6 hours PRN Temp greater or equal to 38C (100.4F), Mild Pain (1 - 3)  albuterol/ipratropium for Nebulization 3 milliLiter(s) Nebulizer every 6 hours PRN Shortness of Breath and/or Wheezing  aluminum hydroxide/magnesium hydroxide/simethicone Suspension 30 milliLiter(s) Oral every 4 hours PRN Dyspepsia  sodium chloride 0.65% Nasal 1 Spray(s) Both Nostrils four times a day PRN Nasal Congestion      ALLERGIES:  Allergies    No Known Allergies    Intolerances        OBJECTIVE:  ICU Vital Signs Last 24 Hrs  T(C): 37.3 (02 Jul 2024 13:01), Max: 37.3 (02 Jul 2024 13:01)  T(F): 99.1 (02 Jul 2024 13:01), Max: 99.1 (02 Jul 2024 13:01)  HR: 109 (02 Jul 2024 14:48) (88 - 132)  BP: 76/41 (02 Jul 2024 14:34) (76/41 - 104/64)  BP(mean): --  ABP: --  ABP(mean): --  RR: 18 (02 Jul 2024 14:48) (18 - 19)  SpO2: 97% (02 Jul 2024 14:48) (94% - 100%)    O2 Parameters below as of 02 Jul 2024 14:48  Patient On (Oxygen Delivery Method): nasal cannula, high flow  O2 Flow (L/min): 40  O2 Concentration (%): 50        Adult Advanced Hemodynamics Last 24 Hrs  CVP(mm Hg): --  CVP(cm H2O): --  CO: --  CI: --  PA: --  PA(mean): --  PCWP: --  SVR: --  SVRI: --  PVR: --  PVRI: --  CAPILLARY BLOOD GLUCOSE        CAPILLARY BLOOD GLUCOSE        I&O's Summary    01 Jul 2024 07:01  -  02 Jul 2024 07:00  --------------------------------------------------------  IN: 330 mL / OUT: 1860 mL / NET: -1530 mL    02 Jul 2024 07:01  -  02 Jul 2024 15:13  --------------------------------------------------------  IN: 100 mL / OUT: 0 mL / NET: 100 mL      Daily     Daily     PHYSICAL EXAMINATION:  General: WN/WD NAD  HEENT: PERRL, EOMI, moist mucous membranes  Neurology: A&Ox3, nonfocal, ACKERMAN x 4  Respiratory: CTA B/L, normal respiratory effort, no wheezes, crackles, rales  CV: RRR, S1S2, no murmurs, rubs or gallops  Abdominal: Soft, NT, ND +BS, Last BM  Extremities: No edema, + peripheral pulses  Tubes: bilateral chest tubes    LABS:                          9.0    20.81 )-----------( 245      ( 02 Jul 2024 06:35 )             27.8     07-02    136  |  96  |  43<H>  ----------------------------<  103<H>  3.7   |  26  |  1.15    Ca    11.5<H>      02 Jul 2024 06:36        PT/INR - ( 02 Jul 2024 06:36 )   PT: 13.8 sec;   INR: 1.26 ratio         PTT - ( 02 Jul 2024 13:42 )  PTT:66.7 sec        Urinalysis Basic - ( 02 Jul 2024 06:36 )    Color: x / Appearance: x / SG: x / pH: x  Gluc: 103 mg/dL / Ketone: x  / Bili: x / Urobili: x   Blood: x / Protein: x / Nitrite: x   Leuk Esterase: x / RBC: x / WBC x   Sq Epi: x / Non Sq Epi: x / Bacteria: x        TELEMETRY:     EKG:     IMAGING:

## 2024-07-02 NOTE — PROGRESS NOTE ADULT - ATTENDING COMMENTS
89 yo M w/ PMHx stage II bladder cancer s/p TURBT and chemoradiation, presenting with dyspnea and acute hypoxemic respiratory failure with new onset Afib on Hep gtt. Found to have b/l pleural effusions L > R s/p L diagnostic/therapeutic thoracentesis yielding 2.1L of serosanguinous fluid. Now s/p R thoracentesis removed 1.8L on 6/25 c/b PTX ex vacuo. CTSx placed left chest tube on 6/30.  IR consulted to place right chest tube today.  Currently 97% on NRB.  Increasing WBC noted in pleural fluid on left, now ~1400, neutrophil predominant.  Atypical cells noted on cytology from right pleural fluid. S/p right chest tube placement by IR 7/1/24.  Long discussion with 1 of his nephews at bedside, another nephew over the phone.  Patient has prior advance directives delineating DNR, trying to locate papers to bring in.  Patient still Alert and oriented and understanding of current clinical situation.  Advised discussion between nephews and patient regarding GOC.  Most likely metastatic malignancy, urothelial primary for which possible patient not an ideal candidate for therapy.  B/L chest tubes both with continued output and remains on HFNC, unlikely to be leaving hospital.    #Pleural effusion  #PTX ex vacuo  #Acute hypoxemic respiratory failure  - c/w supplemental O2, on HFNC, wean as tolerated  - Recommend CT chest for reevaluation of lung parenchyma s/p b/l chest tubes  - f/u pleural effusion studies, new studies to be sent with CT placement today  - agree with Zosyn given evolving pleural studies concerning for parapneumonic effusion i/s/o occult malignancy  - remains on Hep gtt, monitor Hb given bloody fluid and slowly dropping H/H  - borderline BP, recommend repeat labs to monitor Hb, Lactate, may benefit from fluid resuscitation, MICU already consulted    DNR, need to clarify DNI.    Tammy Arellano MD, MSCR  Pulmonary & Critical Care 89 yo M w/ PMHx stage II bladder cancer s/p TURBT and chemoradiation, presenting with dyspnea and acute hypoxemic respiratory failure with new onset Afib on Hep gtt. Found to have b/l pleural effusions L > R s/p L diagnostic/therapeutic thoracentesis yielding 2.1L of serosanguinous fluid. Now s/p R thoracentesis removed 1.8L on 6/25 c/b PTX ex vacuo. CTSx placed left chest tube on 6/30.  IR consulted to place right chest tube today.  Currently 97% on NRB.  Increasing WBC noted in pleural fluid on left, now ~1400, neutrophil predominant.  Atypical cells noted on cytology from right pleural fluid. S/p right chest tube placement by IR 7/1/24.  Long discussion with 1 of his nephews at bedside, another nephew over the phone.  Patient has prior advance directives delineating DNR, trying to locate papers to bring in.  Patient still Alert and oriented and understanding of current clinical situation.  Advised discussion between nephews and patient regarding GOC.   Given hypercalcemia, rising lactate recommend checking PTHrP, peripheral flow cytometry - urothelial malignancy in differential, however MM, leukemia, lymphoma under consideration as well.    #Pleural effusion  #PTX ex vacuo  #Acute hypoxemic respiratory failure  - c/w supplemental O2, on HFNC, wean as tolerated  - Recommend CT chest for reevaluation of lung parenchyma s/p b/l chest tubes  - f/u pleural effusion studies, new studies to be sent with CT placement today  - agree with Zosyn given evolving pleural studies concerning for parapneumonic effusion i/s/o occult malignancy  - CT Abd/Pelvis as per Heme/Onc   - remains on Hep gtt, monitor Hb given bloody fluid and slowly dropping H/H  - borderline BP, recommend repeat labs to monitor Hb, Lactate, may benefit from fluid resuscitation, MICU already consulted    DNR, need to clarify DNI.    Tammy Arellano MD, MSCR  Pulmonary & Critical Care

## 2024-07-02 NOTE — PROGRESS NOTE ADULT - SUBJECTIVE AND OBJECTIVE BOX
[facility]  Radha Vega MD  Available via MS Teams      SUBJECTIVE / OVERNIGHT EVENTS: Overnight pt had soft BP lactate trending up.     ADDITIONAL REVIEW OF SYSTEMS: ROS different     MEDICATIONS  (STANDING):  ascorbic acid 500 milliGRAM(s) Oral daily  benzocaine/menthol Lozenge 1 Lozenge Oral three times a day  chlorhexidine 2% Cloths 1 Application(s) Topical daily  heparin  Infusion. 1800 Unit(s)/Hr (18 mL/Hr) IV Continuous <Continuous>  midodrine. 15 milliGRAM(s) Oral three times a day  multivitamin/minerals 1 Tablet(s) Oral daily  pantoprazole    Tablet 40 milliGRAM(s) Oral before breakfast  piperacillin/tazobactam IVPB.. 3.375 Gram(s) IV Intermittent every 8 hours  simethicone 80 milliGRAM(s) Chew four times a day  sodium chloride 0.9%. 500 milliLiter(s) (125 mL/Hr) IV Continuous <Continuous>  tamsulosin 0.4 milliGRAM(s) Oral at bedtime    MEDICATIONS  (PRN):  acetaminophen     Tablet .. 650 milliGRAM(s) Oral every 6 hours PRN Temp greater or equal to 38C (100.4F), Mild Pain (1 - 3)  albuterol/ipratropium for Nebulization 3 milliLiter(s) Nebulizer every 6 hours PRN Shortness of Breath and/or Wheezing  aluminum hydroxide/magnesium hydroxide/simethicone Suspension 30 milliLiter(s) Oral every 4 hours PRN Dyspepsia  sodium chloride 0.65% Nasal 1 Spray(s) Both Nostrils four times a day PRN Nasal Congestion      I&O's Summary    01 Jul 2024 07:01  -  02 Jul 2024 07:00  --------------------------------------------------------  IN: 330 mL / OUT: 1860 mL / NET: -1530 mL    02 Jul 2024 07:01  -  02 Jul 2024 17:58  --------------------------------------------------------  IN: 100 mL / OUT: 360 mL / NET: -260 mL        PHYSICAL EXAM:  Vital Signs Last 24 Hrs  T(C): 37.3 (02 Jul 2024 13:01), Max: 37.3 (02 Jul 2024 13:01)  T(F): 99.1 (02 Jul 2024 13:01), Max: 99.1 (02 Jul 2024 13:01)  HR: 106 (02 Jul 2024 16:40) (88 - 138)  BP: 89/52 (02 Jul 2024 16:40) (76/41 - 104/64)  BP(mean): --  RR: 18 (02 Jul 2024 16:40) (18 - 19)  SpO2: 99% (02 Jul 2024 16:40) (94% - 99%)    Parameters below as of 02 Jul 2024 16:40  Patient On (Oxygen Delivery Method): nasal cannula, high flow  O2 Flow (L/min): 40  O2 Concentration (%): 50  CONSTITUTIONAL: NAD, elderly, on high flow   EYES: PERRLA; conjunctiva and sclera clear  ENMT: dry oral mucosa,  RESPIRATORY: decreased breath sounds at bases , L pigtail   CARDIOVASCULAR: Regular rate and rhythm, normal S1 and S2, no murmur/rub/gallop;   No lower extremity edema; Peripheral pulses are 2+ bilaterally  ABDOMEN: Nontender to palpation, normoactive bowel sounds,   MUSCULOSKELETAL:  no clubbing or cyanosis of digits; moving all extremities   PSYCH/NEURO: A+O to person, situation; affect appropriate, cooperative  SKIN: No rashes on exposed surfaces     LABS:                        9.0    20.81 )-----------( 245      ( 02 Jul 2024 06:35 )             27.8     07-02    136  |  96  |  43<H>  ----------------------------<  103<H>  3.7   |  26  |  1.15    Ca    11.5<H>      02 Jul 2024 06:36      PT/INR - ( 02 Jul 2024 06:36 )   PT: 13.8 sec;   INR: 1.26 ratio         PTT - ( 02 Jul 2024 13:42 )  PTT:66.7 sec      Urinalysis Basic - ( 02 Jul 2024 06:36 )    Color: x / Appearance: x / SG: x / pH: x  Gluc: 103 mg/dL / Ketone: x  / Bili: x / Urobili: x   Blood: x / Protein: x / Nitrite: x   Leuk Esterase: x / RBC: x / WBC x   Sq Epi: x / Non Sq Epi: x / Bacteria: x        Culture - Acid Fast - Bronchial w/Smear (collected 01 Jul 2024 17:09)  Source: .Bronchial Bronchial Lavage    Culture - Fungal, Body Fluid (collected 30 Jun 2024 17:59)  Source: Pleural Fl Pleural Fluid  Preliminary Report (01 Jul 2024 10:49):    Testing in progress    Culture - Body Fluid with Gram Stain (collected 30 Jun 2024 17:59)  Source: Pleural Fl Pleural Fluid  Gram Stain (30 Jun 2024 23:50):    Rare polymorphonuclear leukocytes per low power field    No organisms seen per oil power field  Preliminary Report (01 Jul 2024 19:05):    No growth to date.    Culture - Acid Fast - Bronchial w/Smear (collected 30 Jun 2024 16:42)  Source: .Bronchial          RADIOLOGY & ADDITIONAL TESTS:  New Imaging Personally Reviewed Today:  New Electrocardiogram Personally Reviewed Today:  Other Results Reviewed Today:   Prior or Outpatient Records Reviewed Today with Summary:    COORDINATION OF CARE:  Consultant Communication and Details of Discussion (where applicable):

## 2024-07-02 NOTE — CONSULT NOTE ADULT - ASSESSMENT
89 yo M with a PMH of Bladder CA and BPH who presented to [facility] due to lethargy and weakness. SCr on admission of 0.9 (6/20) but trended up to 1.4 on 7/1. Nephrology consulted for ODIN.

## 2024-07-02 NOTE — PROGRESS NOTE ADULT - SUBJECTIVE AND OBJECTIVE BOX
VITAL SIGNS    Vital Signs Last 24 Hrs  T(C): 36.6 (07-02-24 @ 04:35), Max: 36.7 (07-01-24 @ 23:10)  T(F): 97.9 (07-02-24 @ 04:35), Max: 98.1 (07-01-24 @ 23:10)  HR: 119 (07-02-24 @ 08:37) (90 - 119)  BP: 90/45 (07-02-24 @ 04:35) (90/45 - 103/55)  RR: 18 (07-02-24 @ 08:37) (18 - 19)  SpO2: 94% (07-02-24 @ 08:37) (94% - 100%)                   Daily     Daily         CAPILLARY BLOOD GLUCOSE              Drains:        L Pleural  [  ]  Drainage:       200 cc         R Pleural  [  ]  Drainage:  1050                      PHYSICAL EXAM  s   "appears confortable on High flow"  Neurology: alert and oriented x 3, moves all extremities with no defecits  CV :  RRR    Lungs:   CTA B/L  Abdomen: soft, nontender, nondistended, positive bowel sounds,   Extremities:     no edema

## 2024-07-02 NOTE — PROGRESS NOTE ADULT - SUBJECTIVE AND OBJECTIVE BOX
DATE OF SERVICE: 07-02-24 @ 16:34    Patient is a 89y old  Male who presents with a chief complaint of SOB (02 Jul 2024 15:13)      INTERVAL HISTORY: remains SOB, family at bedside    REVIEW OF SYSTEMS:  CONSTITUTIONAL: No weakness  EYES/ENT: No visual changes;  No throat pain   NECK: No pain or stiffness  RESPIRATORY: No cough, wheezing; No shortness of breath  CARDIOVASCULAR: No chest pain or palpitations  GASTROINTESTINAL: No abdominal  pain. No nausea, vomiting, or hematemesis  GENITOURINARY: No dysuria, frequency or hematuria  NEUROLOGICAL: No stroke like symptoms  SKIN: No rashes    TELEMETRY Personally reviewed: AF 80s-130s  	  MEDICATIONS:  midodrine. 15 milliGRAM(s) Oral three times a day        PHYSICAL EXAM:  T(C): 37.3 (07-02-24 @ 13:01), Max: 37.3 (07-02-24 @ 13:01)  HR: 109 (07-02-24 @ 14:48) (88 - 138)  BP: 76/41 (07-02-24 @ 14:34) (76/41 - 104/64)  RR: 18 (07-02-24 @ 14:48) (18 - 19)  SpO2: 97% (07-02-24 @ 14:48) (94% - 99%)  Wt(kg): --  I&O's Summary    01 Jul 2024 07:01  -  02 Jul 2024 07:00  --------------------------------------------------------  IN: 330 mL / OUT: 1860 mL / NET: -1530 mL    02 Jul 2024 07:01  -  02 Jul 2024 16:34  --------------------------------------------------------  IN: 100 mL / OUT: 0 mL / NET: 100 mL          Appearance: In no distress	  HEENT:    PERRL, EOMI	  Cardiovascular:  S1 S2, No JVD  Respiratory: Lungs clear to auscultation	  Gastrointestinal:  Soft, Non-tender, + BS	  Vascularature:  No edema of LE  Psychiatric: Appropriate affect   Neuro: no acute focal deficits                               9.0    20.81 )-----------( 245      ( 02 Jul 2024 06:35 )             27.8     07-02    136  |  96  |  43<H>  ----------------------------<  103<H>  3.7   |  26  |  1.15    Ca    11.5<H>      02 Jul 2024 06:36          Labs personally reviewed      ASSESSMENT/PLAN: 	    89M with hx of Stage II bladder cancer s/p TURBT and chemoradiation, presenting with dyspnea and orthopnea. a/w Acute hypoxic respiratory failure also found to have large L pleural effusion. Found to have new onset AF with RVR.    1. New Onset Atrial Fibrillation with Rapid Ventricular Response  - ECG 6/21 with AF with RVR  - Spontaneously converted to SR 6/21  - TTE wnl  - TSH wnl  - [id number] of 2 (age and gender). Heparin gtt in progress  - BP soft but if remains stable or recovers further, recommend starting Toprol 25mg PO daily.   - RRT 6/30 for increased WOB and Afib RVR. Improved after fluid resuscitation, BiPAP and IV Lopressor 5mg. Rec starting Toprol as above.   - 6/30 converted to SR   - 7/2 AF RVR 130s-140s.  IV Dig x 1 with improvement in rate 80-100s but persistent hypotension    2. Shortness of Breath  - CTA neg for PE but found to have large left pleural effusion now s/p thora with 2.1L removed on 6/22  -   - TTE wnl, normal filling pressures, preserved EF  - s/p L thoracentesis 6/25 2.1L off  - now on 6L NC  - 6/28 CT with Very large loculated left pleural effusion and small to moderate size loculated right hydropneumothorax.  - S/p L pigtail     3. Hyponatremia  - Slowly improving, now 131  - Lasix on hold    4. Hypotension  - systolic 70s- 100  - midodrine 15mg TID started  - lactate 7.4  - NS bolus x 1 given                  Iolani Behrbom, AG-NP   Ander Cardoso DO [facility]  Cardiovascular Medicine  [address], [address]  Available through call or text on [employer] TEAMs  Office: [phone]   DATE OF SERVICE: 07-02-24 @ 16:34    Patient is a 89y old  Male who presents with a chief complaint of SOB (02 Jul 2024 15:13)      INTERVAL HISTORY: remains SOB, family at bedside    REVIEW OF SYSTEMS:  CONSTITUTIONAL: No weakness  EYES/ENT: No visual changes;  No throat pain   NECK: No pain or stiffness  RESPIRATORY: No cough, wheezing; No shortness of breath  CARDIOVASCULAR: No chest pain or palpitations  GASTROINTESTINAL: No abdominal  pain. No nausea, vomiting, or hematemesis  GENITOURINARY: No dysuria, frequency or hematuria  NEUROLOGICAL: No stroke like symptoms  SKIN: No rashes    TELEMETRY Personally reviewed: AF 80s-130s  	  MEDICATIONS:  midodrine. 15 milliGRAM(s) Oral three times a day        PHYSICAL EXAM:  T(C): 37.3 (07-02-24 @ 13:01), Max: 37.3 (07-02-24 @ 13:01)  HR: 109 (07-02-24 @ 14:48) (88 - 138)  BP: 76/41 (07-02-24 @ 14:34) (76/41 - 104/64)  RR: 18 (07-02-24 @ 14:48) (18 - 19)  SpO2: 97% (07-02-24 @ 14:48) (94% - 99%)  Wt(kg): --  I&O's Summary    01 Jul 2024 07:01  -  02 Jul 2024 07:00  --------------------------------------------------------  IN: 330 mL / OUT: 1860 mL / NET: -1530 mL    02 Jul 2024 07:01  -  02 Jul 2024 16:34  --------------------------------------------------------  IN: 100 mL / OUT: 0 mL / NET: 100 mL          Appearance: In no distress	  HEENT:    PERRL, EOMI	  Cardiovascular:  S1 S2, No JVD  Respiratory: Lungs clear to auscultation	  Gastrointestinal:  Soft, Non-tender, + BS	  Vascularature:  No edema of LE  Psychiatric: Appropriate affect   Neuro: no acute focal deficits                               9.0    20.81 )-----------( 245      ( 02 Jul 2024 06:35 )             27.8     07-02    136  |  96  |  43<H>  ----------------------------<  103<H>  3.7   |  26  |  1.15    Ca    11.5<H>      02 Jul 2024 06:36          Labs personally reviewed      ASSESSMENT/PLAN: 	    89M with hx of Stage II bladder cancer s/p TURBT and chemoradiation, presenting with dyspnea and orthopnea. a/w Acute hypoxic respiratory failure also found to have large L pleural effusion. Found to have new onset AF with RVR.    1. New Onset Atrial Fibrillation with Rapid Ventricular Response  - ECG 6/21 with AF with RVR  - Spontaneously converted to SR 6/21  - TTE wnl  - TSH wnl  - [id number] of 2 (age and gender). Heparin gtt in progress  - BP soft but if remains stable or recovers further, recommend starting Toprol 25mg PO daily.   - RRT 6/30 for increased WOB and Afib RVR. Improved after fluid resuscitation, BiPAP and IV Lopressor 5mg. Rec starting Toprol as above.   - 6/30 converted to SR   - 7/2 AF RVR 130s-140s.  IV Dig x 1 with improvement in rate 80-100s but persistent hypotension    2. Shortness of Breath  - CTA neg for PE but found to have large left pleural effusion now s/p thora with 2.1L removed on 6/22  -   - TTE wnl, normal filling pressures, preserved EF  - s/p L thoracentesis 6/25 2.1L off  - now on 6L NC  - 6/28 CT with Very large loculated left pleural effusion and small to moderate size loculated right hydropneumothorax.  - S/p L pigtail     3. Hyponatremia  - Slowly improving, now 131  - Lasix on hold    4. Hypotension  - systolic 70s- 100  - midodrine 15mg TID started  - lactate 7.4  - NS bolus x 1 given  - MICU consulted                  Iolani Behrbom, AG-NP   Ander Cardoso, DO FAC  Cardiovascular Medicine  [address], [address]  Available through call or text on [employer] TEAMs  Office: [phone]

## 2024-07-02 NOTE — CONSULT NOTE ADULT - PROBLEM SELECTOR RECOMMENDATION 9
Pt with resolving/resolved ODIN in the setting of hypotension, infectious etiology v other. SCr of 0.9 on admission, trended up to 1.4 on 7/1. SCr today improved to 1.1. UA reviewed, no proteinuria. Kidney US without hydronephrosis. Appears SCr may have improved with improved BP. Monitor labs and urine output. Avoid NSAIDs, ACEI/ARBS, RCA and nephrotoxins. Dose medications as per eGFR.    Will discontinue follow up. Reconsult as needed.  If you have any questions, please feel free to contact me  Kishan Zarco  Nephrology Fellow  Pager # [phone]  Microsoft Teams  (After 5pm or on weekends please page the on-call fellow) Pt with resolving/resolved ODIN in the setting of hypotension, infectious etiology, hypercalcemia v other. SCr of 0.9 on admission, trended up to 1.4 on 7/1. SCr today improved to 1.1 (pt has had SCr up to 1.4 in the recent past). UA reviewed, no proteinuria. Kidney US without hydronephrosis. Pt on IVFs and has B/L chest tubes. Appears SCr may have improved with improved BP. Monitor labs and urine output. Avoid NSAIDs, ACEI/ARBS, RCA and nephrotoxins. Dose medications as per eGFR.

## 2024-07-02 NOTE — CONSULT NOTE ADULT - SUBJECTIVE AND OBJECTIVE BOX
Patient:  ILIA MONROE  [id number]    CHIEF COMPLAINT: Admitted for shortness of breath in the setting of pleural effusions. MICU consulted for hypotension.     HPI:    89 year old male with PMH of stage 2 bladder cancer s/p TURBT, chemotherapy (last in 05/2024) initially presented on 06/20 due to dyspnea on exertion. Found to have large left sided pleural effusion s/p diagnostic thoracentesis. Repeat CT imaging with findings of loculated left sided pleural effusion as well as right sided hydropneumothorax. Patient now s/p bilateral chest tube placement with CTS and IR. RRT called earlier on 06/30 due to Afib with RVR and hypotension. At that time patient received Lopressor 5 mg IVP, 250 cc LR bolus, and placed on BiPAP with clinical improvement. Patient had episode of Afib with RVR earlier today which resolved with the use of Digoxin.  Patient today with BP in the 70's/40's. Afib with rates in the 80-100s. Patient himself denies fevers, abdominal pain, dizziness, chest pain, palpitations, new shortness of breath. Admits to 4-5 bowel movements a day for several days. Additionally reports decreased PO intake. Bilateral chest tubes in place draining serosanguinous fluid, approximately over 1L in both chest tubes. Of note VBG with lactate of 7.4.     PAST MEDICAL & SURGICAL HISTORY:  Malignant neoplasm of bladder, unspecified      HLD (hyperlipidemia)      S/P bilateral inguinal hernia repair          FAMILY HISTORY:      SOCIAL HISTORY:    Allergies    No Known Allergies    Intolerances        HOME MEDICATIONS:    REVIEW OF SYSTEMS:  [ ] Unable to assess ROS because ______  [X] Negative except as stated in HPI    OBJECTIVE:  T(F): 99.1 (07-02-24 @ 13:01), Max: 99.1 (07-02-24 @ 13:01)  HR: 106 (07-02-24 @ 16:40) (88 - 138)  BP: 89/52 (07-02-24 @ 16:40) (76/41 - 104/64)  BP(mean): --  ABP: --  ABP(mean): --  RR: 18 (07-02-24 @ 16:40) (18 - 19)  SpO2: 99% (07-02-24 @ 16:40) (94% - 99%)  CVP(mm Hg): --    I/O Summary 24H    IN: 330 mL / OUT: 1860 mL / NET: -1530 mL      CAPILLARY BLOOD GLUCOSE          PHYSICAL EXAM:  GENERAL: NAD, lying in bed comfortably  HEAD:  Atraumatic, Normocephalic  EYES: EOMI, PERRLA, conjunctiva and sclera clear  ENT: Moist mucous membranes  NECK: Supple, No JVD  CHEST/LUNG: Clear to auscultation bilaterally; No rales, rhonchi, wheezing, or rubs. Unlabored respirations  HEART: Regular rate and rhythm; No murmurs, rubs, or gallops  ABDOMEN: Bowel sounds present; Soft, Nontender, Nondistended. No hepatomegaly  EXTREMITIES:  2+ Peripheral Pulses, brisk capillary refill. No clubbing, cyanosis, or edema  NERVOUS SYSTEM:  Alert & Oriented X3, speech clear. No deficits   MSK: FROM all 4 extremities, full and equal strength  SKIN: No rashes or lesions    HOSPITAL MEDICATIONS:  MEDICATIONS  (STANDING):  ascorbic acid 500 milliGRAM(s) Oral daily  benzocaine/menthol Lozenge 1 Lozenge Oral three times a day  chlorhexidine 2% Cloths 1 Application(s) Topical daily  heparin  Infusion. 1800 Unit(s)/Hr (18 mL/Hr) IV Continuous <Continuous>  midodrine. 15 milliGRAM(s) Oral three times a day  multivitamin/minerals 1 Tablet(s) Oral daily  pantoprazole    Tablet 40 milliGRAM(s) Oral before breakfast  piperacillin/tazobactam IVPB.. 3.375 Gram(s) IV Intermittent every 8 hours  simethicone 80 milliGRAM(s) Chew four times a day  sodium chloride 0.9%. 500 milliLiter(s) (125 mL/Hr) IV Continuous <Continuous>  tamsulosin 0.4 milliGRAM(s) Oral at bedtime    MEDICATIONS  (PRN):  acetaminophen     Tablet .. 650 milliGRAM(s) Oral every 6 hours PRN Temp greater or equal to 38C (100.4F), Mild Pain (1 - 3)  albuterol/ipratropium for Nebulization 3 milliLiter(s) Nebulizer every 6 hours PRN Shortness of Breath and/or Wheezing  aluminum hydroxide/magnesium hydroxide/simethicone Suspension 30 milliLiter(s) Oral every 4 hours PRN Dyspepsia  sodium chloride 0.65% Nasal 1 Spray(s) Both Nostrils four times a day PRN Nasal Congestion      LABS:  CBC 07-02-24 @ 06:35                        9.0    20.81 )-----------( 245                   27.8     Hgb trend: 9.0 <-- , 8.7 <-- , 9.3 <-- , 9.9 <--   WBC trend: 20.81 <-- , 21.21 <-- , 20.26 <-- , 24.62 <--     CMP 07-02-24 @ 06:36    136  |  96  |  43<H>  ----------------------------<  103<H>  3.7   |  26  |  1.15    Ca    11.5<H>      07-02-24 @ 06:36      Serum Cr (eGFR) trend: 1.15 (61) <-- , 1.48 (45) <-- , 1.36 (50) <--     PT/INR - ( 02 Jul 2024 06:36 )   PT: 13.8 sec;   INR: 1.26 ratio    PTT - ( 02 Jul 2024 13:42 ):66.7 sec     Patient:  ILIA MONROE  [id number]    CHIEF COMPLAINT: Admitted for shortness of breath in the setting of pleural effusions. MICU consulted for hypotension.     HPI:    89 year old male with PMH of stage 2 bladder cancer s/p TURBT, chemotherapy (last in 2024) initially presented on  due to dyspnea on exertion. Found to have large left sided pleural effusion s/p diagnostic thoracentesis. Repeat CT imaging with findings of loculated left sided pleural effusion as well as right sided hydropneumothorax. Patient now s/p bilateral chest tube placement with CTS and IR. RRT called earlier on  due to Afib with RVR and hypotension. At that time patient received Lopressor 5 mg IVP, 250 cc LR bolus, and placed on BiPAP with clinical improvement. Patient had episode of Afib with RVR earlier today which resolved with the use of Digoxin.  Patient today with BP in the 70's/40's. Afib with rates in the 80-100s. Patient himself denies fevers, abdominal pain, dizziness, chest pain, palpitations, new shortness of breath. Admits to 4-5 bowel movements a day for several days. Additionally reports decreased PO intake. Bilateral chest tubes in place draining serosanguinous fluid, approximately over 1L in both chest tubes. Of note VBG with lactate of 7.4.     PAST MEDICAL & SURGICAL HISTORY:  Malignant neoplasm of bladder, unspecified      HLD (hyperlipidemia)      S/P bilateral inguinal hernia repair    FAMILY HISTORY: as per H&P      SOCIAL HISTORY:  lives at home  completes all ADL  no toxic habits  wife has , has 3 nephews      Allergies  No Known Allergies    REVIEW OF SYSTEMS:  [ ] Unable to assess ROS because ______  [X] Negative except as stated in HPI    OBJECTIVE:  T(F): 99.1 (24 @ 13:01), Max: 99.1 (24 @ 13:01)  HR: 106 (24 @ 16:40) (88 - 138)  BP: 89/52 (24 @ 16:40) (76/41 - 104/64)  RR: 18 (24 @ 16:40) (18 - 19)  SpO2: 99% (24 @ 16:40) (94% - 99%)    I/O Summary 24H  IN: 330 mL / OUT: 1860 mL / NET: -1530 mL    PHYSICAL EXAM:  GENERAL: NAD, lying in bed comfortably  HEAD:  Atraumatic, Normocephalic  EYES: EOMI, PERRLA, conjunctiva and sclera clear  ENT: Moist mucous membranes  NECK: Supple, No JVD  CHEST/LUNG: Clear to auscultation bilaterally; No rales, rhonchi, wheezing, or rubs. Unlabored respirations  HEART: Regular rate and rhythm; No murmurs, rubs, or gallops  ABDOMEN: Bowel sounds present; Soft, Nontender, Nondistended. No hepatomegaly  EXTREMITIES:  2+ Peripheral Pulses, brisk capillary refill. No clubbing, cyanosis, or edema  NERVOUS SYSTEM:  Alert & Oriented X3, speech clear. No deficits   MSK: FROM all 4 extremities, full and equal strength  SKIN: No rashes or lesions    HOSPITAL MEDICATIONS:  MEDICATIONS  (STANDING):  ascorbic acid 500 milliGRAM(s) Oral daily  benzocaine/menthol Lozenge 1 Lozenge Oral three times a day  chlorhexidine 2% Cloths 1 Application(s) Topical daily  heparin  Infusion. 1800 Unit(s)/Hr (18 mL/Hr) IV Continuous <Continuous>  midodrine. 15 milliGRAM(s) Oral three times a day  multivitamin/minerals 1 Tablet(s) Oral daily  pantoprazole    Tablet 40 milliGRAM(s) Oral before breakfast  piperacillin/tazobactam IVPB.. 3.375 Gram(s) IV Intermittent every 8 hours  simethicone 80 milliGRAM(s) Chew four times a day  sodium chloride 0.9%. 500 milliLiter(s) (125 mL/Hr) IV Continuous <Continuous>  tamsulosin 0.4 milliGRAM(s) Oral at bedtime    MEDICATIONS  (PRN):  acetaminophen     Tablet .. 650 milliGRAM(s) Oral every 6 hours PRN Temp greater or equal to 38C (100.4F), Mild Pain (1 - 3)  albuterol/ipratropium for Nebulization 3 milliLiter(s) Nebulizer every 6 hours PRN Shortness of Breath and/or Wheezing  aluminum hydroxide/magnesium hydroxide/simethicone Suspension 30 milliLiter(s) Oral every 4 hours PRN Dyspepsia  sodium chloride 0.65% Nasal 1 Spray(s) Both Nostrils four times a day PRN Nasal Congestion      LABS:  CBC 24 @ 06:35                        9.0    20.81 )-----------( 245                   27.8     Hgb trend: 9.0 <-- , 8.7 <-- , 9.3 <-- , 9.9 <--   WBC trend: 20.81 <-- , 21.21 <-- , 20.26 <-- , 24.62 <--     CMP 24 @ 06:36    136  |  96  |  43<H>  ----------------------------<  103<H>  3.7   |  26  |  1.15    Ca    11.5<H>      24 @ 06:36      Serum Cr (eGFR) trend: 1.15 (61) <-- , 1.48 (45) <-- , 1.36 (50) <--     PT/INR - ( 2024 06:36 )   PT: 13.8 sec;   INR: 1.26 ratio    PTT - ( 2024 13:42 ):66.7 sec    RADIOLOGY:  Personally reviewed.    < from: Xray Chest 1 View- PORTABLE-Routine (Xray Chest 1 View- PORTABLE-Routine .) (24 @ 09:32) >  FINDINGS:  Chest x-ray 2024 8:38 PM:  Interval placement of a new right chest pigtail catheter with decrease in   right pleural effusion. There is a new small right lateral pneumothorax.  Small loculated left pleural effusion, unchanged. A left chest pigtail   catheter is unchanged in position.  Stable heart size.  No acute osseous abnormality.    Chest x-ray 2024 9:00AM:  Loculated left pleural effusion is unchanged in size. Left chest pigtail   catheter in place.  Small right pleural effusion, minimally decreased in size. A right chest   pigtail catheter is present. Previously seen small right pneumothorax has   decreased in size.      IMPRESSION:  Decrease in right pleural effusion status post placement of right chest   pigtail catheter. A small right pneumothorax is present, also decreasing   in size.    < end of copied text >

## 2024-07-03 NOTE — PROGRESS NOTE ADULT - PROBLEM SELECTOR PLAN 1
remains on heparin Gtt   monitor respiratory status, remains on HFNC  s/p Left PTC 6/30 by Thoracic, draining  s/p Right PTC 7/1 by IR  daily CXR while chest tubes are in  Monitor chest tube outputs  Global care as per primary team   Plan of care d/w Thoracic Attending  ----  For any questions or concerns, please contact Thoracic Surgery @ [phone] or [phone]

## 2024-07-03 NOTE — PROGRESS NOTE ADULT - PROBLEM SELECTOR PLAN 2
Pt with hypercalcemia. Serum Ca remains elevated at 11.6 and iCa of 1.59. Recommend PTH, PTHrP, Phos, Vit D studies. Recommend free light chains, serum and urine immunofixation. Pt on IVFs as above. Monitor serum and iCa.

## 2024-07-03 NOTE — PROGRESS NOTE ADULT - SUBJECTIVE AND OBJECTIVE BOX
SUBJECTIVE: Denies acute chest pain, palpitations. On HFNC.    Vital Signs Last 24 Hrs  T(C): 36.2 (07-03-24 @ 04:20), Max: 37.3 (07-02-24 @ 13:01)  T(F): 97.1 (07-03-24 @ 04:20), Max: 99.1 (07-02-24 @ 13:01)  HR: 80 (07-03-24 @ 08:46) (79 - 138)  BP: 110/61 (07-03-24 @ 04:20) (76/41 - 110/61)  RR: 20 (07-03-24 @ 08:46) (18 - 20)  SpO2: 96% (07-03-24 @ 08:46) (96% - 100%)           INPUT/OUTPUT:  02 Jul 2024 07:01  -  03 Jul 2024 07:00  --------------------------------------------------------  IN:    Oral Fluid: 100 mL  Total IN: 100 mL  OUT:    Chest Tube (mL): 245 mL    Chest Tube (mL): 360 mL    Voided (mL): 500 mL  Total OUT: 1105 mL  Total NET: -1005 mL  03 Jul 2024 07:01  -  03 Jul 2024 10:29  --------------------------------------------------------  IN:    Oral Fluid: 50 mL  Total IN: 50 mL  OUT:  Total OUT: 0 mL  Total NET: 50 mL      LABS:  07-03  136  |  97  |  36<H>  ----------------------------<  96  3.6   |  25  |  1.03  Ca    11.6<H>      03 Jul 2024 05:28  Mg     2.2     07-03             7.7    18.40 )-----------( 200      ( 03 Jul 2024 05:28 )             23.9   PTT - ( 03 Jul 2024 05:28 )  PTT:73.0 sec      PHYSICAL EXAM:  NEURO: alert and oriented  HEART: s1, s2  LUNG: non-labored breathing with no use of accessory muscles    (+) Left PTC to waterseal (placed by Thoracic, 6/30), drained 225cc overnight/ 360 cc over 24 hours per bedside RN report    (+) Right PTC to waterseal (placed by IR, 7/1)  ABD: soft, (+) bowel sounds in all quadrants, nontender            EXT: peripheral pulses intact bilaterally      ACTIVE MEDICATIONS:  acetaminophen     Tablet .. 650 milliGRAM(s) Oral every 6 hours PRN  albuterol/ipratropium for Nebulization 3 milliLiter(s) Nebulizer every 6 hours PRN  aluminum hydroxide/magnesium hydroxide/simethicone Suspension 30 milliLiter(s) Oral every 4 hours PRN  ascorbic acid 500 milliGRAM(s) Oral daily  benzocaine/menthol Lozenge 1 Lozenge Oral three times a day  chlorhexidine 2% Cloths 1 Application(s) Topical daily  heparin  Infusion. 1800 Unit(s)/Hr IV Continuous <Continuous>  lactated ringers. 1000 milliLiter(s) IV Continuous <Continuous>  lactobacillus acidophilus 1 Tablet(s) Oral daily  midodrine. 15 milliGRAM(s) Oral three times a day  multivitamin/minerals 1 Tablet(s) Oral daily  pantoprazole    Tablet 40 milliGRAM(s) Oral before breakfast  piperacillin/tazobactam IVPB.. 3.375 Gram(s) IV Intermittent every 8 hours  simethicone 80 milliGRAM(s) Chew four times a day  sodium chloride 0.65% Nasal 1 Spray(s) Both Nostrils four times a day PRN  tamsulosin 0.4 milliGRAM(s) Oral at bedtime      Case discussed in detail with Thoracic Team and Attending. Plan below as per discussion.

## 2024-07-03 NOTE — PROGRESS NOTE ADULT - PROBLEM SELECTOR PLAN 1
Acute hypoxic resp failure 2/2 bilateral pleural effusion  In setting of bladder CA and new pulmonary nodule, concerning for malignancy    - TTE - normal LV and RV fxn, no diastolic dysfunction  - s/p L thoracentesis on 6/22   - s/p R sided thoracentesis on 6/25  - s/p L chest tube 6/30  - s/p R chest tube 6/1  - now on Zosyn   - on HFNC  - F/U pulm, cardiothoracic and IR teams for further recs    - Lactate elevated likely 2/2 hypoperfusion BP improved. On midodrine.

## 2024-07-03 NOTE — PROGRESS NOTE ADULT - SUBJECTIVE AND OBJECTIVE BOX
[facility] Division of Hospital Medicine  Radha Vega MD  Available via MS Teams      SUBJECTIVE / OVERNIGHT EVENTS: Pt denies current pain and shortness of breath     ADDITIONAL REVIEW OF SYSTEMS: ROS negative other than above    MEDICATIONS  (STANDING):  ascorbic acid 500 milliGRAM(s) Oral daily  benzocaine/menthol Lozenge 1 Lozenge Oral three times a day  chlorhexidine 2% Cloths 1 Application(s) Topical daily  heparin  Infusion. 1800 Unit(s)/Hr (18 mL/Hr) IV Continuous <Continuous>  lactated ringers. 1000 milliLiter(s) (60 mL/Hr) IV Continuous <Continuous>  lactobacillus acidophilus 1 Tablet(s) Oral daily  midodrine. 15 milliGRAM(s) Oral three times a day  multivitamin/minerals 1 Tablet(s) Oral daily  pantoprazole    Tablet 40 milliGRAM(s) Oral before breakfast  piperacillin/tazobactam IVPB.. 3.375 Gram(s) IV Intermittent every 8 hours  simethicone 80 milliGRAM(s) Chew four times a day  tamsulosin 0.4 milliGRAM(s) Oral at bedtime    MEDICATIONS  (PRN):  acetaminophen     Tablet .. 650 milliGRAM(s) Oral every 6 hours PRN Temp greater or equal to 38C (100.4F), Mild Pain (1 - 3)  albuterol/ipratropium for Nebulization 3 milliLiter(s) Nebulizer every 6 hours PRN Shortness of Breath and/or Wheezing  aluminum hydroxide/magnesium hydroxide/simethicone Suspension 30 milliLiter(s) Oral every 4 hours PRN Dyspepsia  sodium chloride 0.65% Nasal 1 Spray(s) Both Nostrils four times a day PRN Nasal Congestion      I&O's Summary    02 Jul 2024 07:01  -  03 Jul 2024 07:00  --------------------------------------------------------  IN: 100 mL / OUT: 1105 mL / NET: -1005 mL    03 Jul 2024 07:01  -  03 Jul 2024 16:02  --------------------------------------------------------  IN: 50 mL / OUT: 0 mL / NET: 50 mL        PHYSICAL EXAM:  Vital Signs Last 24 Hrs  T(C): 36.3 (03 Jul 2024 11:10), Max: 36.7 (03 Jul 2024 00:28)  T(F): 97.3 (03 Jul 2024 11:10), Max: 98 (03 Jul 2024 00:28)  HR: 84 (03 Jul 2024 15:59) (79 - 114)  BP: 91/36 (03 Jul 2024 11:10) (88/45 - 110/61)  BP(mean): --  RR: 18 (03 Jul 2024 15:59) (18 - 20)  SpO2: 99% (03 Jul 2024 15:59) (96% - 100%)    Parameters below as of 03 Jul 2024 15:59  Patient On (Oxygen Delivery Method): nasal cannula w/ humidification  O2 Flow (L/min): 5    CONSTITUTIONAL: NAD, elderly, on high flow   EYES:  conjunctiva and sclera clear  ENMT: dry oral mucosa,  RESPIRATORY: decreased breath sounds at bases , L and R pigtail   CARDIOVASCULAR: Regular rate and rhythm, normal S1 and S2  No lower extremity edema; Peripheral pulses are 2+ bilaterally  ABDOMEN: Nontender to palpation, normoactive bowel sounds,   MUSCULOSKELETAL:  no clubbing or cyanosis of digits; moving all extremities   PSYCH/NEURO: A+O to person, situation  SKIN: No rashes on exposed surfaces     LABS:                        7.7    18.40 )-----------( 200      ( 03 Jul 2024 05:28 )             23.9     07-03    136  |  97  |  36<H>  ----------------------------<  96  3.6   |  25  |  1.03    Ca    11.6<H>      03 Jul 2024 05:28  Mg     2.2     07-03      PT/INR - ( 02 Jul 2024 06:36 )   PT: 13.8 sec;   INR: 1.26 ratio         PTT - ( 03 Jul 2024 05:28 )  PTT:73.0 sec      Urinalysis Basic - ( 03 Jul 2024 05:28 )    Color: x / Appearance: x / SG: x / pH: x  Gluc: 96 mg/dL / Ketone: x  / Bili: x / Urobili: x   Blood: x / Protein: x / Nitrite: x   Leuk Esterase: x / RBC: x / WBC x   Sq Epi: x / Non Sq Epi: x / Bacteria: x        Culture - Fungal, Body Fluid (collected 02 Jul 2024 18:15)  Source: Pleural Fl Pleural Fluid  Preliminary Report (03 Jul 2024 06:55):    Testing in progress    Culture - Body Fluid with Gram Stain (collected 02 Jul 2024 18:15)  Source: Pleural Fl R Chest fluid  Gram Stain (02 Jul 2024 22:37):    Few polymorphonuclear leukocytes per low power field    Rare Gram Negative Rods per oil power field    Culture - Acid Fast - Bronchial w/Smear (collected 01 Jul 2024 17:09)  Source: .Bronchial Bronchial Lavage    Culture - Fungal, Body Fluid (collected 30 Jun 2024 17:59)  Source: Pleural Fl Pleural Fluid  Preliminary Report (01 Jul 2024 10:49):    Testing in progress    Culture - Body Fluid with Gram Stain (collected 30 Jun 2024 17:59)  Source: Pleural Fl Pleural Fluid  Gram Stain (30 Jun 2024 23:50):    Rare polymorphonuclear leukocytes per low power field    No organisms seen per oil power field  Preliminary Report (01 Jul 2024 19:05):    No growth to date.    Culture - Acid Fast - Bronchial w/Smear (collected 30 Jun 2024 16:42)  Source: .Bronchial          RADIOLOGY & ADDITIONAL TESTS:  New Imaging Personally Reviewed Today:  New Electrocardiogram Personally Reviewed Today:  Other Results Reviewed Today:   Prior or Outpatient Records Reviewed Today with Summary:    COORDINATION OF CARE:  Consultant Communication and Details of Discussion (where applicable):

## 2024-07-03 NOTE — PROGRESS NOTE ADULT - ASSESSMENT
ASSESSMENT: 89 year old male with hx of stage II bladder cancer s/p TURBT and chemoradiation with gemcitabine (completed 5/2/24). Pt presenting with worsening dyspnea on exertion and orthopnea. CTA chest performed without evidence of PE. Large pleural effusion noted on L with passive atelectasis. New 8mm pulm nodule in RUL also noted. Patient admitted to the medical service. During this admission, patient has required multiple thoracentesis, Left on 6/22 (2.1 L removed) and Right on 6/25 (1.8L removed). Since that time, patient remains on 6L NC, endorsing increased WOB. Cultures from prior thoracentesis are negative, cytopathology showing rare atypical cells. Patient is on Unasyn. Repeat CT on 6/28 showing large L sided loculated effusion and right moderate hydropneumothorax. Thoracic surgery consulted for surgical management of loculated effusion.      6/30   5 L NC   OOB to chair  DC hep gtt  7/1     VSS, WBC down to 21, awaiting IR to place RCT, L Pigtail to WS draining 50 cc serosanguinous drainage this am no airleak,   rt PTC placed by IR  7/2     on high flow o2   chest xray  improving   BL  PTC  drainaing  7/3     remains on HFNC. CXR daily while tubes are in. Monitor CT output, +draining

## 2024-07-03 NOTE — PROGRESS NOTE ADULT - SUBJECTIVE AND OBJECTIVE BOX
DATE OF SERVICE: 07-03-24 @ 16:36    Patient is a 89y old  Male who presents with a chief complaint of SOB (03 Jul 2024 16:02)      INTERVAL HISTORY: In no acute distress.    REVIEW OF SYSTEMS:  CONSTITUTIONAL: No weakness  EYES/ENT: No visual changes;  No throat pain   NECK: No pain or stiffness  RESPIRATORY: No cough, wheezing; No shortness of breath  CARDIOVASCULAR: No chest pain or palpitations  GASTROINTESTINAL: No abdominal  pain. No nausea, vomiting, or hematemesis  GENITOURINARY: No dysuria, frequency or hematuria  NEUROLOGICAL: No stroke like symptoms  SKIN: No rashes    TELEMETRY Personally reviewed: AF/Aflutter/SR   	  MEDICATIONS:  midodrine. 15 milliGRAM(s) Oral three times a day        PHYSICAL EXAM:  T(C): 36.3 (07-03-24 @ 11:10), Max: 36.7 (07-03-24 @ 00:28)  HR: 84 (07-03-24 @ 15:59) (79 - 114)  BP: 91/36 (07-03-24 @ 11:10) (88/45 - 110/61)  RR: 17 (07-03-24 @ 16:15) (17 - 20)  SpO2: 99% (07-03-24 @ 16:15) (96% - 100%)  Wt(kg): --  I&O's Summary    02 Jul 2024 07:01  -  03 Jul 2024 07:00  --------------------------------------------------------  IN: 100 mL / OUT: 1105 mL / NET: -1005 mL    03 Jul 2024 07:01  -  03 Jul 2024 16:36  --------------------------------------------------------  IN: 50 mL / OUT: 0 mL / NET: 50 mL          Appearance: In no distress	  HEENT:    PERRL, EOMI	  Cardiovascular:  S1 S2, No JVD  Respiratory: Lungs clear to auscultation	  Gastrointestinal:  Soft, Non-tender, + BS	  Vascularature:  No edema of LE  Psychiatric: Appropriate affect   Neuro: no acute focal deficits                               7.7    18.40 )-----------( 200      ( 03 Jul 2024 05:28 )             23.9     07-03    136  |  97  |  36<H>  ----------------------------<  96  3.6   |  25  |  1.03    Ca    11.6<H>      03 Jul 2024 05:28  Mg     2.2     07-03          Labs personally reviewed      ASSESSMENT/PLAN: 	    89M with hx of Stage II bladder cancer s/p TURBT and chemoradiation, presenting with dyspnea and orthopnea. a/w Acute hypoxic respiratory failure also found to have large L pleural effusion. Found to have new onset AF with RVR.    1. New Onset Atrial Fibrillation with Rapid Ventricular Response  - ECG 6/21 with AF with RVR  - Spontaneously converted to SR 6/21  - TTE wnl  - TSH wnl  - [id number] of 2 (age and gender). Heparin gtt in progress  - BP soft but if remains stable or recovers further, recommend starting Toprol 25mg PO daily.   - RRT 6/30 for increased WOB and Afib RVR. Improved after fluid resuscitation, BiPAP and IV Lopressor 5mg. Rec starting Toprol as above.   - 6/30 converted to SR   - 7/2 AF RVR 130s-140s.  IV Dig x 1 with improvement in rate 80-100s but persistent hypotension    2. Shortness of Breath  - CTA neg for PE but found to have large left pleural effusion now s/p thora with 2.1L removed on 6/22  -   - TTE wnl, normal filling pressures, preserved EF  - s/p L thoracentesis 6/25 2.1L off  - now on 6L NC  - 6/28 CT with Very large loculated left pleural effusion and small to moderate size loculated right hydropneumothorax.  - S/p L pigtail     3. Hyponatremia  - Slowly improving, now 131  - Lasix on hold    4. Hypotension  - systolic 70s- 100  - midodrine 15mg TID started  - lactate 7.4 now 4.8  - NS bolus x 1 given        Jayda Perdomo AG-CHAIM Cardoso DO [facility]  Cardiovascular Medicine  [address], [address]  Available through call or text on [employer]  Office: [phone]

## 2024-07-03 NOTE — PROGRESS NOTE ADULT - ASSESSMENT
88 yo M w/ PMHx stage II bladder cancer s/p TURBT and chemoradiation, presenting with dyspnea and acute hypoxemic respiratory failure with new onset Afib on Hep gtt. Found to have b/l pleural effusions L > R s/p L diagnostic/therapeutic thoracentesis yielding 2.1L of serosanguinous fluid. Now s/p R thoracentesis removed 1.8L on 6/25 c/b PTX ex vacuo. CTSx placed left chest tube on 6/30.  IR consulted to place right chest tube today.  Currently 97% on NRB.  Increasing WBC noted in pleural fluid on left, now ~1400, neutrophil predominant.  Atypical cells noted on cytology from right pleural fluid.    #Pleural effusion  #PTX ex vacuo  #Acute hypoxemic respiratory failure  - c/w supplemental O2, wean at tolerated  - CT C/A/P  - daily blood gas and CXR  - GNR on gram stain R pleural fluid may be contaminant - regardless covering with Zosyn  - agree with Zosyn given evolving pleural studies concerning for parapneumonic effusion i/s/o occult malignancy  - remains on Hep gtt, repeat Hb given dropping BP and consider holding heparin drip  - continue GOC, pall care f/u  - ?does he need peripheral flow cytometry

## 2024-07-03 NOTE — PROGRESS NOTE ADULT - SUBJECTIVE AND OBJECTIVE BOX
PATIENT:  ILIA MONROE  [id number]    CHIEF COMPLAINT:  Patient is a 89y old  Male who presents with a chief complaint of SOB (03 Jul 2024 09:58)      INTERVAL HISTORY/OVERNIGHT EVENTS:  - no acute events    MEDICATIONS:  MEDICATIONS  (STANDING):  ascorbic acid 500 milliGRAM(s) Oral daily  benzocaine/menthol Lozenge 1 Lozenge Oral three times a day  chlorhexidine 2% Cloths 1 Application(s) Topical daily  heparin  Infusion. 1800 Unit(s)/Hr (18 mL/Hr) IV Continuous <Continuous>  lactated ringers. 1000 milliLiter(s) (60 mL/Hr) IV Continuous <Continuous>  lactobacillus acidophilus 1 Tablet(s) Oral daily  midodrine. 15 milliGRAM(s) Oral three times a day  multivitamin/minerals 1 Tablet(s) Oral daily  pantoprazole    Tablet 40 milliGRAM(s) Oral before breakfast  piperacillin/tazobactam IVPB.. 3.375 Gram(s) IV Intermittent every 8 hours  simethicone 80 milliGRAM(s) Chew four times a day  tamsulosin 0.4 milliGRAM(s) Oral at bedtime    MEDICATIONS  (PRN):  acetaminophen     Tablet .. 650 milliGRAM(s) Oral every 6 hours PRN Temp greater or equal to 38C (100.4F), Mild Pain (1 - 3)  albuterol/ipratropium for Nebulization 3 milliLiter(s) Nebulizer every 6 hours PRN Shortness of Breath and/or Wheezing  aluminum hydroxide/magnesium hydroxide/simethicone Suspension 30 milliLiter(s) Oral every 4 hours PRN Dyspepsia  sodium chloride 0.65% Nasal 1 Spray(s) Both Nostrils four times a day PRN Nasal Congestion      ALLERGIES:  Allergies    No Known Allergies    Intolerances        OBJECTIVE:  ICU Vital Signs Last 24 Hrs  T(C): 36.3 (03 Jul 2024 11:10), Max: 36.7 (03 Jul 2024 00:28)  T(F): 97.3 (03 Jul 2024 11:10), Max: 98 (03 Jul 2024 00:28)  HR: 84 (03 Jul 2024 11:10) (79 - 114)  BP: 91/36 (03 Jul 2024 11:10) (76/41 - 110/61)  BP(mean): --  ABP: --  ABP(mean): --  RR: 20 (03 Jul 2024 11:10) (18 - 20)  SpO2: 97% (03 Jul 2024 11:10) (96% - 100%)    O2 Parameters below as of 03 Jul 2024 11:10  Patient On (Oxygen Delivery Method): nasal cannula, high flow  O2 Flow (L/min): 40  O2 Concentration (%): 50        Adult Advanced Hemodynamics Last 24 Hrs  CVP(mm Hg): --  CVP(cm H2O): --  CO: --  CI: --  PA: --  PA(mean): --  PCWP: --  SVR: --  SVRI: --  PVR: --  PVRI: --  CAPILLARY BLOOD GLUCOSE        CAPILLARY BLOOD GLUCOSE        I&O's Summary    02 Jul 2024 07:01  -  03 Jul 2024 07:00  --------------------------------------------------------  IN: 100 mL / OUT: 1105 mL / NET: -1005 mL    03 Jul 2024 07:01  -  03 Jul 2024 13:48  --------------------------------------------------------  IN: 50 mL / OUT: 0 mL / NET: 50 mL      Daily     Daily     PHYSICAL EXAMINATION:  General: WN/WD NAD  HEENT: PERRL, EOMI, moist mucous membranes  Neurology: A&Ox3, nonfocal, ACKERMAN x 4  Respiratory: diminishment bases; L and R chest tubes to water seal with tidaling  CV: irregular  Abdominal: Soft, NT, ND +BS  Extremities: No edema, + peripheral pulses    LABS:  ABG - ( 02 Jul 2024 18:25 )  pH, Arterial: 7.45  pH, Blood: x     /  pCO2: 37    /  pO2: 69    / HCO3: 26    / Base Excess: 1.7   /  SaO2: 96.2                                    7.7    18.40 )-----------( 200      ( 03 Jul 2024 05:28 )             23.9     07-03    136  |  97  |  36<H>  ----------------------------<  96  3.6   |  25  |  1.03    Ca    11.6<H>      03 Jul 2024 05:28  Mg     2.2     07-03        PT/INR - ( 02 Jul 2024 06:36 )   PT: 13.8 sec;   INR: 1.26 ratio         PTT - ( 03 Jul 2024 05:28 )  PTT:73.0 sec        Urinalysis Basic - ( 03 Jul 2024 05:28 )    Color: x / Appearance: x / SG: x / pH: x  Gluc: 96 mg/dL / Ketone: x  / Bili: x / Urobili: x   Blood: x / Protein: x / Nitrite: x   Leuk Esterase: x / RBC: x / WBC x   Sq Epi: x / Non Sq Epi: x / Bacteria: x

## 2024-07-03 NOTE — PROGRESS NOTE ADULT - PROBLEM SELECTOR PLAN 1
Pt with resolving/resolved ODIN in the setting of hypotension, infectious etiology, hypercalcemia v other. SCr of 0.9 on admission, trended up to 1.4 on 7/1. SCr today improved to 1 today (pt has had SCr up to 1.4 in the recent past). UA reviewed, no proteinuria. Kidney US without hydronephrosis. Pt with 500cc UOP in past 24 hours. Monitor labs and urine output. Avoid NSAIDs, ACEI/ARBS, RCA and nephrotoxins. Dose medications as per eGFR.

## 2024-07-03 NOTE — PROGRESS NOTE ADULT - ATTENDING COMMENTS
89 yo M w/ PMHx stage II bladder cancer s/p TURBT and chemoradiation, presenting with dyspnea and acute hypoxemic respiratory failure with new onset Afib on Hep gtt. Found to have b/l pleural effusions L > R s/p L diagnostic/therapeutic thoracentesis yielding 2.1L of serosanguinous fluid. Now s/p R thoracentesis removed 1.8L on 6/25 c/b PTX ex vacuo. CTSx placed left chest tube on 6/30.  IR consulted to place right chest tube today.  Currently 97% on NRB.  Increasing WBC noted in pleural fluid on left, now ~1400, neutrophil predominant.  Atypical cells noted on cytology from right pleural fluid. S/p right chest tube placement by IR 7/1/24.  Long discussion with 1 of his nephews at bedside, another nephew over the phone.  Patient has prior advance directives delineating DNR, trying to locate papers to bring in.  Patient still Alert and oriented and understanding of current clinical situation.  Advised discussion between nephews and patient regarding GOC.   Given hypercalcemia, rising lactate recommend checking PTHrP, peripheral flow cytometry - urothelial malignancy in differential, however MM, leukemia, lymphoma under consideration as well.  Decreased output from both R and L chest tubes over past 24hrs.    #Pleural effusion  #PTX ex vacuo  #Acute hypoxemic respiratory failure  - transition to NC from [facility]  - Recommend CT chest for reevaluation of lung parenchyma s/p b/l chest tubes  - f/u pleural effusion studies, initial WBC with ~1100, Neutrophil predom, gram stain with GNR but ?, follow-up imaging  - agree with Zosyn given evolving pleural studies concerning for parapneumonic effusion i/s/o occult malignancy  - CT Abd/Pelvis as per Heme/Onc   - remains on Hep gtt, monitor Hb given bloody fluid   - send peripheral flow cytometry    DNR, need to clarify DNI.    Tammy Arellano MD, MSCR  Pulmonary & Critical Care

## 2024-07-03 NOTE — PROGRESS NOTE ADULT - SUBJECTIVE AND OBJECTIVE BOX
Interventional Radiology Follow-Up Note    This is a 89y Male s/p right chest tube placement on 7/1 in Interventional Radiology with Dr. Winston.     S: Patient seen and examined @ bedside. Remains on HIFNC. Denies CP, cough, SOB.     Medication:  digoxin  Injectable: (07-02)  heparin  Infusion.: (07-03)  midodrine.: (07-03)  midodrine.: (07-02)  piperacillin/tazobactam IVPB..: (07-03)    Vitals:   T(F): 97.7, Max: 98 (00:28)  HR: 78  BP: 93/54  RR: 17  SpO2: 100%    Physical Exam:  General: Nontoxic, in NAD  Chest: right and left chest tubes in place attached to pleuravac to water seal.   Drain Device: right chest tube intact attached to waterseal with serosanguinous output. No air leaks noted. No crepitus. Chest tube line without kink or fracture. Dressing clean, dry, intact.     24hr Drain output: 245 cc right chest tube; 360cc left chest tube    LABS:  WBC 18.40 / Hgb 7.7 / Hct 23.9 / Plt 200  Na 136 / K 3.6 / CO2 25 / Cl 97 / BUN 36 / Cr 1.03 / Glucose 96  ALT -- / AST -- / Alk Phos -- / Tbili --  Ptt 73.0 / Pt -- / INR --    Preliminary Report:   No growth to date      89 year old male with hx of stage II bladder cancer s/p TURBT and chemoradiation with gemcitabine (completed 5/2/24). Pt presenting with worsening dyspnea on exertion and orthopnea. CTA chest showed large b/l pleural effusion L>R. Patient is s/o Left chest tube placement by Thoracic surgery. Now s/p right chest tube placement on 7/1 in IR with Dr. Winston.     -Daily cxr while chest tube in place  -official read from CXR today still pending; CT chest today 7/3 showing decreased bilateral pleural effusions,  -CXR 7/2 showing Decrease in right pleural effusion status post placement of right chest pigtail catheter. A small right pneumothorax is present, also decreasing in size  -pleural fluid cx NGTD  -New pleural fluid specimens sent to the lab yesterday, as per pulm "increasing WBC noted in pleural fluid on left, now ~1400, neutrophil predominant.  Atypical cells noted on cytology from right pleural fluid. GNR on gram stain R pleural fluid may be contaminant - regardless covering with Zosyn"  -trend vs/labs  -continue global management per primary team      Please call IR at extension [phone] with any questions, concerns, or issues regarding above.

## 2024-07-03 NOTE — PROGRESS NOTE ADULT - PROBLEM SELECTOR PLAN 3
ECG 6/21 with AF with RVR, spontaneously converted to SR 6/21  - TTE wnl  - [id number] of 2 (age and gender).     - heparin gtt held for drop in Hgb 9 to 7.7 on 7/3  - once BP improves, can consider lopressor 12.5mg BID

## 2024-07-03 NOTE — PROGRESS NOTE ADULT - PROBLEM SELECTOR PLAN 6
DVT ppx - heparin gtt  Diet - regular  Dispo - pending, likely REGINA    - 7/3: nephew at bedside updated. GOC conversation ongoing. DVT ppx - heparin gtt  Diet - regular  Dispo - pending, likely REGINA    - 7/3: nephew at bedside updated. Pending 3rd cytology sample. GOC conversation ongoing.

## 2024-07-03 NOTE — PROGRESS NOTE ADULT - ATTENDING COMMENTS
89 yo M with a PMH of Bladder CA and BPH who presented to [facility] due to lethargy and weakness. SCr on admission of 0.9 (6/20) but trended up to 1.4 on 7/1. Nephrology consulted for ODIN.  Per Luis/MELLY review, pt has had ranging SCr from 0.9 to 1.4.   Pt seen and examined with attending, team present.. Pt currently on HFNC and is s/p B/L chest tube placement for recurrent effusions Pt also found to have a RUL nodule. Of note, prior to rise in SCr, pt had a RRT for hypotension with BP in the 80s and HR in the 150s. Pt with 800cc of UOP overnight. Hx is limited due to current medical condition. Pt denied n/v/f/c/difficulty with urination.BP at present still low, mental status better  1.  ARF--likely volume, hemodynamic.  On high flow O2 and obtained MICU evaluation for 2 and 4 given potential worsening with volume.  NO HD indication at present AND IMPROVING Cr may be c/w overall improved perfusion  2.  Hypotension--now in high 70s greater but mentating.  Oral NOW, potentially IV pressor  3.  Hypercalcemia--not life threatening and likely mitigate 2.  W/U as outlined in progress  4.  Respiratory failure acute--appreciate pulmonary input    discussed with med team  Anand Correia MD  contact me on TEAMs

## 2024-07-03 NOTE — PROGRESS NOTE ADULT - ASSESSMENT
88 yo M with a PMH of Bladder CA and BPH who presented to [facility] due to lethargy and weakness. SCr on admission of 0.9 (6/20) but trended up to 1.4 on 7/1. Nephrology consulted for ODIN.

## 2024-07-03 NOTE — PROGRESS NOTE ADULT - ASSESSMENT
89M with hx of Stage II bladder cancer s/p TURBT and chemoradiation, presenting with dyspnea and orthopnea. a/w Acute hypoxic respiratory failure also found to have large L pleural effusion status post L thoracentesis with pending cytology and ongoing oxygen requirements.     7/2: MICU Consulted for hypotension elevated lactate. Bedside UDS showing volume down. Recs for  cc bolus followed by albumin followed by maintenance fluid.  CT abd pelv, blood cult, urine cx.

## 2024-07-03 NOTE — PROGRESS NOTE ADULT - SUBJECTIVE AND OBJECTIVE BOX
[facility] DIVISION OF KIDNEY DISEASES AND HYPERTENSION   FOLLOW UP NOTE  --------------------------------------------------------------------------------  Chief Complaint: Pt with non oliguric ODIN    24 hour events/subjective: Pt. was seen and examined, nephew at bedside. Pt is feeling "okay". Pt remains on HFNC. Pt with 500cc UOP in past 24 hours.    PAST HISTORY  --------------------------------------------------------------------------------  No significant changes to PMH, PSH, FHx, SHx, unless otherwise noted    ALLERGIES & MEDICATIONS  --------------------------------------------------------------------------------  Allergies  No Known Allergies    Intolerances    Standing Inpatient Medications  ascorbic acid 500 milliGRAM(s) Oral daily  benzocaine/menthol Lozenge 1 Lozenge Oral three times a day  chlorhexidine 2% Cloths 1 Application(s) Topical daily  heparin  Infusion. 1800 Unit(s)/Hr IV Continuous <Continuous>  lactated ringers. 1000 milliLiter(s) IV Continuous <Continuous>  lactobacillus acidophilus 1 Tablet(s) Oral daily  midodrine. 15 milliGRAM(s) Oral three times a day  multivitamin/minerals 1 Tablet(s) Oral daily  pantoprazole    Tablet 40 milliGRAM(s) Oral before breakfast  piperacillin/tazobactam IVPB.. 3.375 Gram(s) IV Intermittent every 8 hours  simethicone 80 milliGRAM(s) Chew four times a day  tamsulosin 0.4 milliGRAM(s) Oral at bedtime    PRN Inpatient Medications  acetaminophen     Tablet .. 650 milliGRAM(s) Oral every 6 hours PRN  albuterol/ipratropium for Nebulization 3 milliLiter(s) Nebulizer every 6 hours PRN  aluminum hydroxide/magnesium hydroxide/simethicone Suspension 30 milliLiter(s) Oral every 4 hours PRN  sodium chloride 0.65% Nasal 1 Spray(s) Both Nostrils four times a day PRN    REVIEW OF SYSTEMS  --------------------------------------------------------------------------------  Limited ROS     VITALS/PHYSICAL EXAM  --------------------------------------------------------------------------------  T(C): 36.2 (07-03-24 @ 04:20), Max: 37.3 (07-02-24 @ 13:01)  HR: 80 (07-03-24 @ 08:46) (79 - 138)  BP: 110/61 (07-03-24 @ 04:20) (76/41 - 110/61)  RR: 20 (07-03-24 @ 08:46) (18 - 20)  SpO2: 96% (07-03-24 @ 08:46) (96% - 100%)  Wt(kg): --      07-02-24 @ 07:01  -  07-03-24 @ 07:00  --------------------------------------------------------  IN: 100 mL / OUT: 1105 mL / NET: -1005 mL    07-03-24 @ 07:01  -  07-03-24 @ 09:58  --------------------------------------------------------  IN: 50 mL / OUT: 0 mL / NET: 50 mL    Physical Exam:  	Gen: Chronically ill appearing but in NAD  	HEENT: Anicteric  	Pulm: Diminished B/L. +HFNC. +B/L Chest tubes  	CV: S1S2+  	Abd: Soft, +BS    	Ext: No LE edema B/L  	Neuro: Awake  	Skin: Warm and dry    LABS/STUDIES  --------------------------------------------------------------------------------              7.7    18.40 >-----------<  200      [07-03-24 @ 05:28]              23.9     136  |  97  |  36  ----------------------------<  96      [07-03-24 @ 05:28]  3.6   |  25  |  1.03        Ca     11.6     [07-03-24 @ 05:28]      Mg     2.2     [07-03-24 @ 05:28]      PT/INR: PT 13.8 , INR 1.26       [07-02-24 @ 06:36]  PTT: 73.0       [07-03-24 @ 05:28]    Creatinine Trend:  SCr 1.03 [07-03 @ 05:28]  SCr 1.15 [07-02 @ 06:36]  SCr 1.48 [07-01 @ 05:34]  SCr 1.36 [06-30 @ 03:23]  SCr 1.04 [06-29 @ 07:03]

## 2024-07-03 NOTE — PROGRESS NOTE ADULT - PROBLEM SELECTOR PLAN 2
Significant hyponatremia to 120 on admission. Pt has previously been hyponatremic down to 130 however last month, Na was 136  - HOLD lasix    # Elevated lactate: check stat blood gas and trend lactate.    #ODIN: resolving, nephro following   # Hypercalcemia labs pending

## 2024-07-04 NOTE — PROGRESS NOTE ADULT - PROBLEM SELECTOR PLAN 1
Acute hypoxic resp failure 2/2 bilateral pleural effusion  In setting of bladder CA and new pulmonary nodule, concerning for malignancy    - TTE - normal LV and RV fxn, no diastolic dysfunction  - s/p L thoracentesis on 6/22   - s/p R sided thoracentesis on 6/25  - s/p L chest tube 6/30  - s/p R chest tube 6/1  - now on Zosyn   - No on NC   - F/U pulm, cardiothoracic and IR teams for further recs    - Lactate elevated likely 2/2 hypoperfusion BP improved. On midodrine.

## 2024-07-04 NOTE — PROGRESS NOTE ADULT - ASSESSMENT
89 yo M with a PMH of Bladder CA and BPH who presented to [facility] due to lethargy and weakness. SCr on admission of 0.9 (6/20) but trended up to 1.4 on 7/1. Nephrology consulted for ODIN.

## 2024-07-04 NOTE — PROGRESS NOTE ADULT - ATTENDING COMMENTS
88 yo M with a PMH of Bladder CA and BPH who presented to [facility] due to lethargy and weakness. SCr on admission of 0.9 (6/20) but trended up to 1.4 on 7/1. Nephrology consulted for ODIN.  Per Luis/MELLY review, pt has had ranging SCr from 0.9 to 1.4.   Pt seen and examined with attending, team present.. Pt currently on HFNC and is s/p B/L chest tube placement for recurrent effusions Pt also found to have a RUL nodule. Of note, prior to rise in SCr, pt had a RRT for hypotension with BP in the 80s and HR in the 150s. Pt with 800cc of UOP overnight. Hx is limited due to current medical condition. Pt denied n/v/f/c/difficulty with urination ,.BP improving>100, mental status better  1.  ARF--likely volume, hemodynamic.  On high flow O2 --> NC and obtained MICU evaluation for 2 and 4 given potential worsening with volume.  NO HD indication at present AND IMPROVING Cr c/w overall improved perfusion  2.  Hypotension--now in 100s and mentating.  Oral NOW pressor  3.  Hypercalcemia--not life threatening and likely mitigate 2.  W/U as outlined in progress pending.  NOT encephalopathic and with 1 improving may be bisphosphonate candidate  4.  Respiratory failure acute--improving and appreciate pulmonary input    discussed with med team  Anand Correia MD  contact me on TEAMs

## 2024-07-04 NOTE — PROGRESS NOTE ADULT - ASSESSMENT
ASSESSMENT: 89 year old male with hx of stage II bladder cancer s/p TURBT and chemoradiation with gemcitabine (completed 5/2/24). Pt presenting with worsening dyspnea on exertion and orthopnea. CTA chest performed without evidence of PE. Large pleural effusion noted on L with passive atelectasis. New 8mm pulm nodule in RUL also noted. Patient admitted to the medical service. During this admission, patient has required multiple thoracentesis, Left on 6/22 (2.1 L removed) and Right on 6/25 (1.8L removed). Since that time, patient remains on 6L NC, endorsing increased WOB. Cultures from prior thoracentesis are negative, cytopathology showing rare atypical cells. Patient is on Unasyn. Repeat CT on 6/28 showing large L sided loculated effusion and right moderate hydropneumothorax. Thoracic surgery consulted for surgical management of loculated effusion.      6/30   5 L NC   OOB to chair  DC hep gtt  7/1     VSS, WBC down to 21, awaiting IR to place RCT, L Pigtail to WS draining 50 cc serosanguinous drainage this am no airleak,   rt PTC placed by IR  7/2     on high flow o2   chest xray  improving   BL  PTC  drainaing  7/3     remains on HFNC. CXR daily while tubes are in. Monitor CT output, +draining  7/4 VSS, weaned off HFNC, currently on 2L NC SpO2 96%, Left pigtail output 330cc/24h, Right pigtail output 350cc/24h, Maintain B/L pigtails to LWS per Dr. Dobbins, Fluid cytology pending.

## 2024-07-04 NOTE — PROGRESS NOTE ADULT - SUBJECTIVE AND OBJECTIVE BOX
[facility] DIVISION OF KIDNEY DISEASES AND HYPERTENSION   FOLLOW UP NOTE  --------------------------------------------------------------------------------  Chief Complaint: Pt with ODIN    24 hour events/subjective: Pt. was seen and examined, family at bedside. Pt is feeling "sometimes good, sometimes bad". Pt now on NC.     PAST HISTORY  --------------------------------------------------------------------------------  No significant changes to PMH, PSH, FHx, SHx, unless otherwise noted    ALLERGIES & MEDICATIONS  --------------------------------------------------------------------------------  Allergies  No Known Allergies    Intolerances    Standing Inpatient Medications  ascorbic acid 500 milliGRAM(s) Oral daily  benzocaine/menthol Lozenge 1 Lozenge Oral three times a day  chlorhexidine 2% Cloths 1 Application(s) Topical daily  lactated ringers. 1000 milliLiter(s) IV Continuous <Continuous>  lactobacillus acidophilus 1 Tablet(s) Oral daily  midodrine. 15 milliGRAM(s) Oral every 8 hours  multivitamin/minerals 1 Tablet(s) Oral daily  pantoprazole    Tablet 40 milliGRAM(s) Oral before breakfast  piperacillin/tazobactam IVPB.. 3.375 Gram(s) IV Intermittent every 8 hours  simethicone 80 milliGRAM(s) Chew four times a day  tamsulosin 0.4 milliGRAM(s) Oral at bedtime    PRN Inpatient Medications  acetaminophen     Tablet .. 650 milliGRAM(s) Oral every 6 hours PRN  albuterol/ipratropium for Nebulization 3 milliLiter(s) Nebulizer every 6 hours PRN  aluminum hydroxide/magnesium hydroxide/simethicone Suspension 30 milliLiter(s) Oral every 4 hours PRN  sodium chloride 0.65% Nasal 1 Spray(s) Both Nostrils four times a day PRN    REVIEW OF SYSTEMS  --------------------------------------------------------------------------------  All other systems were reviewed and are negative, except as noted.    VITALS/PHYSICAL EXAM  --------------------------------------------------------------------------------  T(C): 36.9 (07-04-24 @ 04:31), Max: 36.9 (07-04-24 @ 04:31)  HR: 81 (07-04-24 @ 16:32) (75 - 90)  BP: 102/56 (07-04-24 @ 16:32) (91/41 - 104/51)  RR: 18 (07-04-24 @ 11:00) (17 - 18)  SpO2: 97% (07-04-24 @ 11:00) (95% - 97%)  Wt(kg): --    07-03-24 @ 07:01  -  07-04-24 @ 07:00  --------------------------------------------------------  IN: 50 mL / OUT: 1130 mL / NET: -1080 mL    07-04-24 @ 07:01  -  07-04-24 @ 17:25  --------------------------------------------------------  IN: 100 mL / OUT: 0 mL / NET: 100 mL    Physical Exam:  	Gen: Chronically ill appearing but in NAD  	HEENT: Anicteric  	Pulm: Diminished B/L. +NC. +B/L Chest tubes  	CV: S1S2+  	Abd: Soft, +BS    	Ext: No LE edema B/L  	Neuro: Awake  	Skin: Warm and dry    LABS/STUDIES  --------------------------------------------------------------------------------              8.2    18.77 >-----------<  205      [07-04-24 @ 06:43]              25.9     138  |  97  |  32  ----------------------------<  79      [07-04-24 @ 07:00]  3.4   |  27  |  0.91        Ca     12.0     [07-04-24 @ 07:00]      Mg     2.2     [07-03-24 @ 05:28]      Phos  2.5     [07-04-24 @ 07:00]    TPro  6.3  /  Alb  2.5  /  TBili  0.3  /  DBili  x   /  AST  16  /  ALT  21  /  AlkPhos  88  [07-04-24 @ 07:00]      PTT: 29.3       [07-04-24 @ 06:49]      Creatinine Trend:  SCr 0.91 [07-04 @ 07:00]  SCr 1.03 [07-03 @ 05:28]  SCr 1.15 [07-02 @ 06:36]  SCr 1.48 [07-01 @ 05:34]  SCr 1.36 [06-30 @ 03:23]    Urine Protein 27      [07-03-24 @ 19:24]

## 2024-07-04 NOTE — PROGRESS NOTE ADULT - SUBJECTIVE AND OBJECTIVE BOX
[facility] Division of Hospital Medicine  Radha Vega MD  Available via MS Teams      SUBJECTIVE / OVERNIGHT EVENTS: Pt denies current abdominal pain, chest pain. Now on NC    ADDITIONAL REVIEW OF SYSTEMS: ROS reviewed and found to be negative     MEDICATIONS  (STANDING):  ascorbic acid 500 milliGRAM(s) Oral daily  benzocaine/menthol Lozenge 1 Lozenge Oral three times a day  chlorhexidine 2% Cloths 1 Application(s) Topical daily  lactated ringers. 1000 milliLiter(s) (60 mL/Hr) IV Continuous <Continuous>  lactobacillus acidophilus 1 Tablet(s) Oral daily  midodrine. 15 milliGRAM(s) Oral every 8 hours  multivitamin/minerals 1 Tablet(s) Oral daily  pantoprazole    Tablet 40 milliGRAM(s) Oral before breakfast  piperacillin/tazobactam IVPB.. 3.375 Gram(s) IV Intermittent every 8 hours  simethicone 80 milliGRAM(s) Chew four times a day  tamsulosin 0.4 milliGRAM(s) Oral at bedtime    MEDICATIONS  (PRN):  acetaminophen     Tablet .. 650 milliGRAM(s) Oral every 6 hours PRN Temp greater or equal to 38C (100.4F), Mild Pain (1 - 3)  albuterol/ipratropium for Nebulization 3 milliLiter(s) Nebulizer every 6 hours PRN Shortness of Breath and/or Wheezing  aluminum hydroxide/magnesium hydroxide/simethicone Suspension 30 milliLiter(s) Oral every 4 hours PRN Dyspepsia  sodium chloride 0.65% Nasal 1 Spray(s) Both Nostrils four times a day PRN Nasal Congestion      I&O's Summary    03 Jul 2024 07:01  -  04 Jul 2024 07:00  --------------------------------------------------------  IN: 50 mL / OUT: 1130 mL / NET: -1080 mL    04 Jul 2024 07:01  -  04 Jul 2024 14:56  --------------------------------------------------------  IN: 100 mL / OUT: 0 mL / NET: 100 mL        PHYSICAL EXAM:  Vital Signs Last 24 Hrs  T(C): 36.9 (04 Jul 2024 04:31), Max: 36.9 (04 Jul 2024 04:31)  T(F): 98.5 (04 Jul 2024 04:31), Max: 98.5 (04 Jul 2024 04:31)  HR: 82 (04 Jul 2024 11:00) (75 - 90)  BP: 97/52 (04 Jul 2024 11:00) (91/41 - 104/51)  BP(mean): --  RR: 18 (04 Jul 2024 11:00) (17 - 18)  SpO2: 97% (04 Jul 2024 11:00) (95% - 100%)    Parameters below as of 04 Jul 2024 11:00  Patient On (Oxygen Delivery Method): nasal cannula  O2 Flow (L/min): 2      CONSTITUTIONAL: NAD, elderly, on NC  EYES:  conjunctiva and sclera clear  ENMT: dry oral mucosa,  RESPIRATORY: decreased breath sounds at bases , L and R pigtail   CARDIOVASCULAR: Regular rate and rhythm, normal S1 and S2  No lower extremity edema; Peripheral pulses are 2+ bilaterally  ABDOMEN: Nontender to palpation, normoactive bowel sounds,   MUSCULOSKELETAL:  no clubbing or cyanosis of digits; moving all extremities   PSYCH/NEURO: A+O to person, situation  SKIN: No rashes on exposed surfaces     LABS:                        8.2    18.77 )-----------( 205      ( 04 Jul 2024 06:43 )             25.9     07-04    138  |  97  |  32<H>  ----------------------------<  79  3.4<L>   |  27  |  0.91    Ca    12.0<H>      04 Jul 2024 07:00  Phos  2.5     07-04  Mg     2.2     07-03    TPro  6.3  /  Alb  2.5<L>  /  TBili  0.3  /  DBili  x   /  AST  16  /  ALT  21  /  AlkPhos  88  07-04    PTT - ( 04 Jul 2024 06:49 )  PTT:29.3 sec      Urinalysis Basic - ( 04 Jul 2024 07:00 )    Color: x / Appearance: x / SG: x / pH: x  Gluc: 79 mg/dL / Ketone: x  / Bili: x / Urobili: x   Blood: x / Protein: x / Nitrite: x   Leuk Esterase: x / RBC: x / WBC x   Sq Epi: x / Non Sq Epi: x / Bacteria: x        Culture - Body Fluid with Gram Stain (collected 02 Jul 2024 18:15)  Source: Pleural Fl R Chest fluid  Gram Stain (02 Jul 2024 22:37):    Few polymorphonuclear leukocytes per low power field    Rare Gram Negative Rods per oil power field  Preliminary Report (03 Jul 2024 16:28):    No growth to date    Culture - Fungal, Body Fluid (collected 02 Jul 2024 18:15)  Source: Pleural Fl Pleural Fluid  Preliminary Report (03 Jul 2024 23:03):    Culture is being performed. Fungal cultures are held for 4 weeks.    Culture - Blood (collected 02 Jul 2024 16:20)  Source: .Blood Blood-Peripheral  Preliminary Report (03 Jul 2024 20:01):    No growth at 24 hours    Culture - Blood (collected 02 Jul 2024 16:00)  Source: .Blood Blood-Peripheral  Preliminary Report (03 Jul 2024 20:01):    No growth at 24 hours    Culture - Acid Fast - Bronchial w/Smear (collected 01 Jul 2024 17:09)  Source: .Bronchial Bronchial Lavage  Preliminary Report (03 Jul 2024 23:09):    Culture is being performed.          RADIOLOGY & ADDITIONAL TESTS:  New Imaging Personally Reviewed Today:  New Electrocardiogram Personally Reviewed Today:  Other Results Reviewed Today:   Prior or Outpatient Records Reviewed Today with Summary:    COORDINATION OF CARE:  Consultant Communication and Details of Discussion (where applicable):

## 2024-07-04 NOTE — PROGRESS NOTE ADULT - PROBLEM SELECTOR PLAN 2
- As noted on CT scan, pt with soft stool, possibly related to diet  - Leukocytosis: stable, on empiric Zosyn 6/30   - Will complete 7 day course

## 2024-07-04 NOTE — PROGRESS NOTE ADULT - PROBLEM SELECTOR PLAN 2
Pt with hypercalcemia. Serum Ca remains elevated at 12 and iCa of 1.59 (7/2). PTH 2 and phos 2.5. PTHrP, Vit D studies pending. Recommend free light chains, serum and urine immunofixation. Monitor serum and iCa.

## 2024-07-04 NOTE — PROGRESS NOTE ADULT - SUBJECTIVE AND OBJECTIVE BOX
DATE OF SERVICE: 07-04-24 @ 11:16    Patient is a 89y old  Male who presents with a chief complaint of SOB (04 Jul 2024 11:04)      INTERVAL HISTORY: no complaints    REVIEW OF SYSTEMS:  CONSTITUTIONAL: No weakness  EYES/ENT: No visual changes;  No throat pain   NECK: No pain or stiffness  RESPIRATORY: No cough, wheezing; No shortness of breath  CARDIOVASCULAR: No chest pain or palpitations  GASTROINTESTINAL: No abdominal  pain. No nausea, vomiting, or hematemesis  GENITOURINARY: No dysuria, frequency or hematuria  NEUROLOGICAL: No stroke like symptoms  SKIN: No rashes      	  MEDICATIONS:  midodrine. 15 milliGRAM(s) Oral every 8 hours        PHYSICAL EXAM:  T(C): 36.9 (07-04-24 @ 04:31), Max: 36.9 (07-04-24 @ 04:31)  HR: 80 (07-04-24 @ 04:31) (75 - 90)  BP: 94/52 (07-04-24 @ 04:31) (91/41 - 104/51)  RR: 18 (07-04-24 @ 04:31) (17 - 18)  SpO2: 96% (07-04-24 @ 04:31) (95% - 100%)  Wt(kg): --  I&O's Summary    03 Jul 2024 07:01  -  04 Jul 2024 07:00  --------------------------------------------------------  IN: 50 mL / OUT: 1130 mL / NET: -1080 mL    04 Jul 2024 07:01  -  04 Jul 2024 11:16  --------------------------------------------------------  IN: 50 mL / OUT: 0 mL / NET: 50 mL          Appearance: In no distress	  HEENT:    PERRL, EOMI	  Cardiovascular:  S1 S2, No JVD  Respiratory: Lungs clear to auscultation	  Gastrointestinal:  Soft, Non-tender, + BS	  Vascularature:  No edema of LE  Psychiatric: Appropriate affect   Neuro: no acute focal deficits                               8.2    18.77 )-----------( 205      ( 04 Jul 2024 06:43 )             25.9     07-04    138  |  97  |  32<H>  ----------------------------<  79  3.4<L>   |  27  |  0.91    Ca    12.0<H>      04 Jul 2024 07:00  Phos  2.5     07-04  Mg     2.2     07-03    TPro  6.3  /  Alb  2.5<L>  /  TBili  0.3  /  DBili  x   /  AST  16  /  ALT  21  /  AlkPhos  88  07-04        Labs personally reviewed      ASSESSMENT/PLAN: 	    89M with hx of Stage II bladder cancer s/p TURBT and chemoradiation, presenting with dyspnea and orthopnea. a/w Acute hypoxic respiratory failure also found to have large L pleural effusion. Found to have new onset AF with RVR.    1. New Onset Atrial Fibrillation with Rapid Ventricular Response  - ECG 6/21 with AF with RVR  - Spontaneously converted to SR 6/21  - TTE wnl  - TSH wnl  - [id number] of 2 (age and gender). Heparin gtt in progress  - BP soft but if remains stable or recovers further, recommend starting Toprol 25mg PO daily.   - RRT 6/30 for increased WOB and Afib RVR. Improved after fluid resuscitation, BiPAP and IV Lopressor 5mg. Rec starting Toprol as above.   - 6/30 converted to SR   - 7/2 AF RVR 130s-140s.  IV Dig x 1 with improvement in rate 80-100s but persistent hypotension    2. Shortness of Breath  - CTA neg for PE but found to have large left pleural effusion now s/p thora with 2.1L removed on 6/22  -   - TTE wnl, normal filling pressures, preserved EF  - s/p L thoracentesis 6/25 2.1L off  - now on 6L NC  - 6/28 CT with Very large loculated left pleural effusion and small to moderate size loculated right hydropneumothorax.  - S/p L pigtail     3. Hyponatremia  - Slowly improving, now 131  - Lasix on hold    4. Hypotension  - systolic 70s- 100  - midodrine 15mg TID started  - lactate 7.4 now 4.8  - NS bolus x 1 given            EAN Diaz DO [facility]  Cardiovascular Medicine  [address] [place] [address], [address]  Office: [phone]  Available via call/text on Microsoft Teams

## 2024-07-04 NOTE — PROGRESS NOTE ADULT - SUBJECTIVE AND OBJECTIVE BOX
Patient is a 89y old  Male who presents with a chief complaint of SOB (04 Jul 2024 09:06)      Vital Signs Last 24 Hrs  T(C): 36.9 (07-04-24 @ 04:31), Max: 36.9 (07-04-24 @ 04:31)  T(F): 98.5 (07-04-24 @ 04:31), Max: 98.5 (07-04-24 @ 04:31)  HR: 80 (07-04-24 @ 04:31) (75 - 90)  BP: 94/52 (07-04-24 @ 04:31) (91/36 - 104/51)  RR: 18 (07-04-24 @ 04:31) (17 - 20)  SpO2: 96% (07-04-24 @ 04:31) (95% - 100%)              07-03-24 @ 07:01  -  07-04-24 @ 07:00  --------------------------------------------------------  IN: 50 mL / OUT: 1130 mL / NET: -1080 mL                          8.2    18.77 )-----------( 205      ( 04 Jul 2024 06:43 )             25.9     138  |  97  |  32<H>  ----------------------------<  79  3.4<L>   |  27  |  0.91    AST  16  /  ALT  21  /  AlkPhos  88  07-04          PHYSICAL EXAM  Neurology: A&Ox3, NAD  CV : RRR+S1S2  Lungs: Respirations non-labored, B/L BS clear, diminished at bases  +Right and Left pigtail to LWS with serosanguinous drainage, no air leak  Abdomen: Soft, NT/ND, +[id number]  Extremities: B/L LE warm, no edema, +PP             MEDICATIONS  acetaminophen     Tablet .. 650 milliGRAM(s) Oral every 6 hours PRN  albuterol/ipratropium for Nebulization 3 milliLiter(s) Nebulizer every 6 hours PRN  aluminum hydroxide/magnesium hydroxide/simethicone Suspension 30 milliLiter(s) Oral every 4 hours PRN  ascorbic acid 500 milliGRAM(s) Oral daily  benzocaine/menthol Lozenge 1 Lozenge Oral three times a day  chlorhexidine 2% Cloths 1 Application(s) Topical daily  lactated ringers. 1000 milliLiter(s) IV Continuous <Continuous>  lactobacillus acidophilus 1 Tablet(s) Oral daily  midodrine. 15 milliGRAM(s) Oral every 8 hours  multivitamin/minerals 1 Tablet(s) Oral daily  pantoprazole    Tablet 40 milliGRAM(s) Oral before breakfast  piperacillin/tazobactam IVPB.. 3.375 Gram(s) IV Intermittent every 8 hours  potassium chloride    Tablet ER 40 milliEquivalent(s) Oral once  simethicone 80 milliGRAM(s) Chew four times a day  sodium chloride 0.65% Nasal 1 Spray(s) Both Nostrils four times a day PRN  tamsulosin 0.4 milliGRAM(s) Oral at bedtime

## 2024-07-04 NOTE — PROGRESS NOTE ADULT - PROBLEM SELECTOR PLAN 1
s/p Left PTC 6/30 by surgical resident  s/p Right PTC 7/1 by IR  Maintain left and right pigtail to LWS  daily CXR while tubes are in  Monitor chest tube outputs, Strict I & Os  Follow up pleural fluid cytopathology sent 7/2  Continue care as per primary team

## 2024-07-04 NOTE — PROGRESS NOTE ADULT - SUBJECTIVE AND OBJECTIVE BOX
CHIEF COMPLAINT:    Interval Events:    REVIEW OF SYSTEMS:  Constitutional: No fevers or chills. No weight loss. No fatigue or generalised malaise.  Eyes: No itching or discharge from the eyes  ENT: No ear pain. No ear discharge. No nasal congestion. No post nasal drip. No epistaxis. No throat pain. No sore throat. No difficulty swallowing.   CV: No chest pain. No palpitations. No lightheadedness or dizziness.   Resp: No dyspnea at rest. No dyspnea on exertion. No orthopnea. No wheezing. No cough. No stridor. No sputum production. No chest pain with respiration.  GI: No nausea. No vomiting. No diarrhea.  MSK: No joint pain or pain in any extremities  Integumentary: No skin lesions. No pedal edema.  Neurological: No gross motor weakness. No sensory changes.  [ ] All other systems negative  [ ] Unable to assess ROS because ________    OBJECTIVE:  ICU Vital Signs Last 24 Hrs  T(C): 36.9 (04 Jul 2024 04:31), Max: 36.9 (04 Jul 2024 04:31)  T(F): 98.5 (04 Jul 2024 04:31), Max: 98.5 (04 Jul 2024 04:31)  HR: 80 (04 Jul 2024 04:31) (75 - 90)  BP: 94/52 (04 Jul 2024 04:31) (91/36 - 104/51)  BP(mean): --  ABP: --  ABP(mean): --  RR: 18 (04 Jul 2024 04:31) (17 - 20)  SpO2: 96% (04 Jul 2024 04:31) (95% - 100%)    O2 Parameters below as of 04 Jul 2024 04:31  Patient On (Oxygen Delivery Method): nasal cannula  O2 Flow (L/min): 2            07-03 @ 07:01  -  07-04 @ 07:00  --------------------------------------------------------  IN: 50 mL / OUT: 1130 mL / NET: -1080 mL    07-04 @ 07:01  -  07-04 @ 09:06  --------------------------------------------------------  IN: 50 mL / OUT: 0 mL / NET: 50 mL      CAPILLARY BLOOD GLUCOSE          PHYSICAL EXAM:  General: Awake, alert, oriented X 3.   HEENT: Atraumatic, normocephalic.                 Mallampatti Grade                 No nasal congestion.                No tonsillar or pharyngeal exudates.  Lymph Nodes: No palpable lymphadenopathy  Neck: No JVD. No carotid bruit.   Respiratory: Normal chest expansion                         Normal percussion                         Normal and equal air entry                         No wheeze, rhonchi or rales.  Cardiovascular: S1 S2 normal. No murmurs, rubs or gallops.   Abdomen: Soft, non-tender, non-distended. No organomegaly.  Extremities: Warm to touch. Peripheral pulse palpable. No pedal edema.   Skin: No rashes or skin lesions  Neurological: Motor and sensory examination equal and normal in all four extremities.  Psychiatry: Appropriate mood and affect.    HOSPITAL MEDICATIONS:  MEDICATIONS  (STANDING):  ascorbic acid 500 milliGRAM(s) Oral daily  benzocaine/menthol Lozenge 1 Lozenge Oral three times a day  chlorhexidine 2% Cloths 1 Application(s) Topical daily  lactated ringers. 1000 milliLiter(s) (60 mL/Hr) IV Continuous <Continuous>  lactobacillus acidophilus 1 Tablet(s) Oral daily  midodrine. 15 milliGRAM(s) Oral every 8 hours  multivitamin/minerals 1 Tablet(s) Oral daily  pantoprazole    Tablet 40 milliGRAM(s) Oral before breakfast  piperacillin/tazobactam IVPB.. 3.375 Gram(s) IV Intermittent every 8 hours  simethicone 80 milliGRAM(s) Chew four times a day  tamsulosin 0.4 milliGRAM(s) Oral at bedtime    MEDICATIONS  (PRN):  acetaminophen     Tablet .. 650 milliGRAM(s) Oral every 6 hours PRN Temp greater or equal to 38C (100.4F), Mild Pain (1 - 3)  albuterol/ipratropium for Nebulization 3 milliLiter(s) Nebulizer every 6 hours PRN Shortness of Breath and/or Wheezing  aluminum hydroxide/magnesium hydroxide/simethicone Suspension 30 milliLiter(s) Oral every 4 hours PRN Dyspepsia  sodium chloride 0.65% Nasal 1 Spray(s) Both Nostrils four times a day PRN Nasal Congestion      LABS:                        8.2    18.77 )-----------( 205      ( 04 Jul 2024 06:43 )             25.9     07-04    138  |  97  |  32<H>  ----------------------------<  79  3.4<L>   |  27  |  0.91    Ca    12.0<H>      04 Jul 2024 07:00  Phos  2.5     07-04  Mg     2.2     07-03    TPro  6.3  /  Alb  2.5<L>  /  TBili  0.3  /  DBili  x   /  AST  16  /  ALT  21  /  AlkPhos  88  07-04    PTT - ( 04 Jul 2024 06:49 )  PTT:29.3 sec  Urinalysis Basic - ( 04 Jul 2024 07:00 )    Color: x / Appearance: x / SG: x / pH: x  Gluc: 79 mg/dL / Ketone: x  / Bili: x / Urobili: x   Blood: x / Protein: x / Nitrite: x   Leuk Esterase: x / RBC: x / WBC x   Sq Epi: x / Non Sq Epi: x / Bacteria: x      Arterial Blood Gas:  07-02 @ 18:25  7.45/37/69/26/96.2/1.7  ABG lactate: --        MICROBIOLOGY:     RADIOLOGY:  [ ] Reviewed and interpreted by me    Point of Care Ultrasound Findings:    PFT:    EKG:     Interval Events: none    OBJECTIVE:  ICU Vital Signs Last 24 Hrs  T(C): 36.9 (04 Jul 2024 04:31), Max: 36.9 (04 Jul 2024 04:31)  T(F): 98.5 (04 Jul 2024 04:31), Max: 98.5 (04 Jul 2024 04:31)  HR: 80 (04 Jul 2024 04:31) (75 - 90)  BP: 94/52 (04 Jul 2024 04:31) (91/36 - 104/51)  BP(mean): --  ABP: --  ABP(mean): --  RR: 18 (04 Jul 2024 04:31) (17 - 20)  SpO2: 96% (04 Jul 2024 04:31) (95% - 100%)    O2 Parameters below as of 04 Jul 2024 04:31  Patient On (Oxygen Delivery Method): nasal cannula  O2 Flow (L/min): 2            07-03 @ 07:01 - 07-04 @ 07:00  --------------------------------------------------------  IN: 50 mL / OUT: 1130 mL / NET: -1080 mL    07-04 @ 07:01  -  07-04 @ 09:06  --------------------------------------------------------  IN: 50 mL / OUT: 0 mL / NET: 50 mL      CAPILLARY BLOOD GLUCOSE          PHYSICAL EXAM:  Breathing comfortably on nasal cannula  b[facility]l chest tubes, [facility]      HOSPITAL MEDICATIONS:  MEDICATIONS  (STANDING):  ascorbic acid 500 milliGRAM(s) Oral daily  benzocaine/menthol Lozenge 1 Lozenge Oral three times a day  chlorhexidine 2% Cloths 1 Application(s) Topical daily  lactated ringers. 1000 milliLiter(s) (60 mL/Hr) IV Continuous <Continuous>  lactobacillus acidophilus 1 Tablet(s) Oral daily  midodrine. 15 milliGRAM(s) Oral every 8 hours  multivitamin/minerals 1 Tablet(s) Oral daily  pantoprazole    Tablet 40 milliGRAM(s) Oral before breakfast  piperacillin/tazobactam IVPB.. 3.375 Gram(s) IV Intermittent every 8 hours  simethicone 80 milliGRAM(s) Chew four times a day  tamsulosin 0.4 milliGRAM(s) Oral at bedtime    MEDICATIONS  (PRN):  acetaminophen     Tablet .. 650 milliGRAM(s) Oral every 6 hours PRN Temp greater or equal to 38C (100.4F), Mild Pain (1 - 3)  albuterol/ipratropium for Nebulization 3 milliLiter(s) Nebulizer every 6 hours PRN Shortness of Breath and/or Wheezing  aluminum hydroxide/magnesium hydroxide/simethicone Suspension 30 milliLiter(s) Oral every 4 hours PRN Dyspepsia  sodium chloride 0.65% Nasal 1 Spray(s) Both Nostrils four times a day PRN Nasal Congestion      LABS:                        8.2    18.77 )-----------( 205      ( 04 Jul 2024 06:43 )             25.9     07-04    138  |  97  |  32<H>  ----------------------------<  79  3.4<L>   |  27  |  0.91    Ca    12.0<H>      04 Jul 2024 07:00  Phos  2.5     07-04  Mg     2.2     07-03    TPro  6.3  /  Alb  2.5<L>  /  TBili  0.3  /  DBili  x   /  AST  16  /  ALT  21  /  AlkPhos  88  07-04    PTT - ( 04 Jul 2024 06:49 )  PTT:29.3 sec  Urinalysis Basic - ( 04 Jul 2024 07:00 )    Color: x / Appearance: x / SG: x / pH: x  Gluc: 79 mg/dL / Ketone: x  / Bili: x / Urobili: x   Blood: x / Protein: x / Nitrite: x   Leuk Esterase: x / RBC: x / WBC x   Sq Epi: x / Non Sq Epi: x / Bacteria: x      Arterial Blood Gas:  07-02 @ 18:25  7.45/37/69/26/96.2/1.7  ABG lactate: --        MICROBIOLOGY:     RADIOLOGY:  [ ] Reviewed and interpreted by me    Point of Care Ultrasound Findings:    PFT:    EKG:

## 2024-07-04 NOTE — PROGRESS NOTE ADULT - PROBLEM SELECTOR PLAN 1
Pt with resolving/resolved ODIN in the setting of hypotension, infectious etiology, hypercalcemia v other. SCr of 0.9 on admission, trended up to 1.4 on 7/1. SCr today improved to 0.9 today (pt has had SCr up to 1.4 in the recent past). UA reviewed, no proteinuria. Kidney US without hydronephrosis. Pt with 450cc UOP in past 24 hours. Monitor labs and urine output. Avoid NSAIDs, ACEI/ARBS, RCA and nephrotoxins. Dose medications as per eGFR.

## 2024-07-04 NOTE — PROGRESS NOTE ADULT - PROBLEM SELECTOR PLAN 4
ECG 6/21 with AF with RVR, spontaneously converted to SR 6/21  - TTE wnl  - [id number] of 2 (age and gender).     - heparin gtt held for drop in Hgb 9 to 7.7 on 7/3  - once BP improves, can consider lopressor 12.5mg BID

## 2024-07-04 NOTE — CONSULT NOTE ADULT - SUBJECTIVE AND OBJECTIVE BOX
HPI:  89 year old male with hx of stage II bladder cancer s/p TURBT and chemoradiation with gemcitabine (completed 5/2/24). Pt presenting with worsening dyspnea on exertion and orthopnea for the past 3 days. Since starting chemoradiation, patient has been feeling weaker and with poor appetite. He has urinary urgency since the TURBT; he takes tamsulosin in the evening. Per nephew, patient has been keeping his A/C low at home so it has been hot in side his home. He started developing worsening shortness of breath a few days ago. No flu like symptoms, no cough, no fever, no chest pain. Pt has been trying to drink lots of fluids and would also drink ensure. He lives alone and is independent in his ADLs.     In the ED, patient was noted to be hypoxic requiring nonrebreather. CXR showed B/L pleural effusions, L>R. CTA chest performed without evidence of PE. Large pleural effusion noted on L with passive atelectasis. New 8mm pulm nodule in RUL also noted. Pt also significantly hyponatremic to 120. Pt admitted for further workup and management.  (20 Jun 2024 21:49)    PERTINENT PM/SXH:   Malignant neoplasm of bladder, unspecified    HLD (hyperlipidemia)      S/P bilateral inguinal hernia repair      FAMILY HISTORY: no fhx of bladder ca    Family Hx substance abuse [ ]yes [x ]no    ITEMS NOT CHECKED ARE NOT PRESENT    SOCIAL HISTORY:   Significant other/partner[x ]   Children[ ]  Yazidi/Spirituality:  Substance hx:  [ ]   Tobacco hx:  [ ]   Alcohol hx: [ ]   Home Opioid hx:  [ ] I-Stop Reference No:  Living Situation: [x ]Home  [ ]Long term care  [ ]Rehab [ ]Other    ADVANCE DIRECTIVES:    DNR/MOLST  [ ]  Living Will  [ ]   DECISION MAKER(s):  [ ] Health Care Proxy(s)  [x ] Surrogate(s)  [ ] Guardian           Name(s): Phone Number(s): 3 nephews Terrell Tony and  Bhaskar [phone]    BASELINE (I)ADL(s) (prior to admission):  [place]: [ ]Total  [ x] Moderate [ ]Dependent    Allergies    No Known Allergies    Intolerances    MEDICATIONS  (STANDING):  ascorbic acid 500 milliGRAM(s) Oral daily  benzocaine/menthol Lozenge 1 Lozenge Oral three times a day  chlorhexidine 2% Cloths 1 Application(s) Topical daily  lactated ringers. 1000 milliLiter(s) (60 mL/Hr) IV Continuous <Continuous>  lactobacillus acidophilus 1 Tablet(s) Oral daily  midodrine. 15 milliGRAM(s) Oral every 8 hours  multivitamin/minerals 1 Tablet(s) Oral daily  pantoprazole    Tablet 40 milliGRAM(s) Oral before breakfast  piperacillin/tazobactam IVPB.. 3.375 Gram(s) IV Intermittent every 8 hours  simethicone 80 milliGRAM(s) Chew four times a day  tamsulosin 0.4 milliGRAM(s) Oral at bedtime    MEDICATIONS  (PRN):  acetaminophen     Tablet .. 650 milliGRAM(s) Oral every 6 hours PRN Temp greater or equal to 38C (100.4F), Mild Pain (1 - 3)  albuterol/ipratropium for Nebulization 3 milliLiter(s) Nebulizer every 6 hours PRN Shortness of Breath and/or Wheezing  aluminum hydroxide/magnesium hydroxide/simethicone Suspension 30 milliLiter(s) Oral every 4 hours PRN Dyspepsia  sodium chloride 0.65% Nasal 1 Spray(s) Both Nostrils four times a day PRN Nasal Congestion    PRESENT SYMPTOMS: [ ]Unable to self-report  [ ] CPOT [ ] PAINADs [ ] RDOS  Source if other than patient:  [ ]Family   [ ]Team     Pain: [ ]yes [ x]no  QOL impact -   Location -                    Aggravating factors -  Quality -  Radiation -  Timing-  Severity (0-10 scale):  Minimal acceptable level (0-10 scale):     CPOT:    https://www.[facility].org/getattachment/tpp80c00-5t4x-4i4r-0t4n-[email or web address]/Critical-Care-Pain-Observation-Tool-(CPOT)    PAIN AD Score:   http://geriatrictoolkit.[place].[facility]/cog/painad.pdf (press ctrl +  left click to view)    Dyspnea:                           [ ]Mild [ ]Moderate [ ]Severe      RDOS:  0 to 2  minimal or no respiratory distress   3  mild distress  4 to 6 moderate distress  >7 severe distress  https://homecareinformation.net/handouts/hen/Respiratory_Distress_Observation_Scale.pdf (Ctrl +  left click to view)     Anxiety:                             [ ]Mild [ ]Moderate [ ]Severe  Fatigue:                             [x ]Mild [ ]Moderate [ ]Severe  Nausea:                            [ ]Mild [ ]Moderate [ ]Severe  Loss of appetite:               [x ]Mild [ ]Moderate [ ]Severe  Constipation:                    [ ]Mild [ ]Moderate [ ]Severe    PCSSQ[Palliative Care Spiritual Screening Question]   Severity (0-10):  Score of 4 or > indicate consideration of Chaplaincy referral.  Chaplaincy Referral: [ ] yes [ ] refused [ ] following [ x] Deferred     Caregiver [place]? : [ ] yes [ ] no [ x] Deferred [ ] Declined             Social work referral [ ] Patient & Family Centered Care Referral [ ]     Anticipatory Grief present?:  [ ] yes [ ] no  [ x] Deferred                  Social work referral [ ] Chaplaincy Referral[ ]      Other Symptoms:  [ ]All other review of systems negative     Palliative Performance Status Version 2:     30-40    %    http://[facility].org/files/news/palliative_performance_scale_ppsv2.pdf  PHYSICAL EXAM:  Vital Signs Last 24 Hrs  T(C): 36.9 (04 Jul 2024 04:31), Max: 36.9 (04 Jul 2024 04:31)  T(F): 98.5 (04 Jul 2024 04:31), Max: 98.5 (04 Jul 2024 04:31)  HR: 80 (04 Jul 2024 04:31) (75 - 90)  BP: 94/52 (04 Jul 2024 04:31) (91/41 - 104/51)  BP(mean): --  RR: 18 (04 Jul 2024 04:31) (17 - 18)  SpO2: 96% (04 Jul 2024 04:31) (95% - 100%)    Parameters below as of 04 Jul 2024 04:31  Patient On (Oxygen Delivery Method): nasal cannula  O2 Flow (L/min): 2   I&O's Summary    03 Jul 2024 07:01  -  04 Jul 2024 07:00  --------------------------------------------------------  IN: 50 mL / OUT: 1130 mL / NET: -1080 mL    04 Jul 2024 07:01  -  04 Jul 2024 12:02  --------------------------------------------------------  IN: 50 mL / OUT: 0 mL / NET: 50 mL      GENERAL: [x ]Cachexia  [place]  [x ]Alert  x[ ]Oriented x 3   [ ]Lethargic  [ ]Unarousable  [x ]Verbal  [ ]Non-Verbal  Behavioral:   [ ] Anxiety  [ ] Delirium [ ] Agitation [ ] Other  HEENT:  [ ]Normal   [x ]Dry mouth   [ ]ET Tube/Trach  [ ]Oral lesions  PULMONARY:  b/l ct in place draining serosang fluid   [x ]Clear [ ]Tachypnea  [ ]Audible excessive secretions   [ ]Rhonchi        [ ]Right [ ]Left [ ]Bilateral  [ ]Crackles        [ ]Right [ ]Left [ ]Bilateral  [ ]Wheezing     [ ]Right [ ]Left [ ]Bilateral  [ ]Diminished breath sounds [ ]right [ ]left [ ]bilateral  CARDIOVASCULAR:    [x ]Regular [ ]Irregular [ ]Tachy  [ ]Rich [ ]Murmur [ ]Other  GASTROINTESTINAL:  [x]Soft  [ ]Distended   [x ]+BS  [x ]Non tender [ ]Tender  [ ]Other [ ]PEG [ ]OGT/ NGT  Last BM:  GENITOURINARY:  [ ]Normal [ ] Incontinent   [ ]Oliguria/Anuria   [x ]Michael  MUSCULOSKELETAL:   [x ]Normal   [ ]Weakness  [ ]Bed/Wheelchair bound [ ]Edema  NEUROLOGIC:   [ ]No focal deficits  [ ]Cognitive impairment  [ ]Dysphagia [ ]Dysarthria [ ]Paresis [ ]Other   SKIN:   [x ]Normal  [ ]Rash  [ ]Other  [ ]Pressure ulcer(s)       Present on admission [ ]y [ ]n    CRITICAL CARE:  [ ] Shock Present  [ ]Septic [ ]Cardiogenic [ ]Neurologic [ ]Hypovolemic  [ ]  Vasopressors [ ]  Inotropes   [ ]Respiratory failure present [ ]Mechanical ventilation [ ]Non-invasive ventilatory support [ ]High flow    [ ]Acute  [ ]Chronic [ ]Hypoxic  [ ]Hypercarbic [ ]Other  [ ]Other organ failure     LABS:                        8.2    18.77 )-----------( 205 ( 04 Jul 2024 06:43 )             25.9   07-04    138  |  97  |  32<H>  ----------------------------<  79  3.4<L>   |  27  |  0.91    Ca    12.0<H>      04 Jul 2024 07:00  Phos  2.5     07-04  Mg     2.2     07-03    TPro  6.3  /  Alb  2.5<L>  /  TBili  0.3  /  DBili  x   /  AST  16  /  ALT  21  /  AlkPhos  88  07-04  PTT - ( 04 Jul 2024 06:49 )  PTT:29.3 sec    Urinalysis Basic - ( 04 Jul 2024 07:00 )    Color: x / Appearance: x / SG: x / pH: x  Gluc: 79 mg/dL / Ketone: x  / Bili: x / Urobili: x   Blood: x / Protein: x / Nitrite: x   Leuk Esterase: x / RBC: x / WBC x   Sq Epi: x / Non Sq Epi: x / Bacteria: x      RADIOLOGY & ADDITIONAL STUDIES:    < from: CT Chest w/ IV Cont (07.03.24 @ 15:26) >  ACC: [id number] EXAM:  CT CHEST IC   ORDERED BY: BELTRAN ANTONIO     ACC: [id number] EXAM:  CT ABDOMEN AND PELVIS IC   ORDERED BY: BELTRAN ANTONIO     PROCEDURE DATE:  07/03/2024          INTERPRETATION:  CLINICAL INFORMATION: History of bladder cancer with new   lung nodules and pleural effusions. Evaluate for abdominal disease   burden, infection.    COMPARISON: CT 6/28/2024, CT abdomen and pelvis 12/29/2023.    CONTRAST/COMPLICATIONS:  IV Contrast: Omnipaque 350  90 cc administered   10 cc discarded  Oral Contrast: NONE  Complications: None reported at time of study completion    PROCEDURE:  CT of the Chest, Abdomen and Pelvis was performed.  Sagittal and coronal reformats were performed.    FINDINGS:  CHEST:  LUNGS AND LARGE AIRWAYS: Secretions in the trachea. Multiple bilateral   lung nodules. For reference:  1.1 cm right upper lobe nodule (3, 79).  1.0 cm right lower lobe nodule (3, 100).    PLEURA: Small bilateral pleural effusions, decreased status post   bilateral chest tube placement. Small bilateral pneumothoraces. Nodular   bilateral pleural thickening.  VESSELS: Aortic calcification. Coronary artery calcification.  HEART: Heart size is normal. Trace pericardial fluid.  MEDIASTINUM AND GRANT: Calcified mediastinal and right hilar lymph nodes.  CHEST WALL AND LOWER NECK: Within normal limits.    ABDOMEN AND PELVIS:  LIVER: Multiple small bilobar hypodense lesions, new from prior CT. For   reference:  Segment 8 lesion (5, 128) 1.4 x 1.3 cm.  Segment 2 lesion (5, 133) 1.1 x 1.1 cm.    BILE DUCTS: Normal caliber.  GALLBLADDER: Within normal limits.  SPLEEN: Within normal limits.  PANCREAS: Pancreatic calcifications, likely the sequela chronic   pancreatitis.  ADRENALS: Within normal limits.  KIDNEYS/URETERS: Within normal limits.    BLADDER: Mild bladder wall thickening.  REPRODUCTIVE ORGANS: Prostate is enlarged.    BOWEL: Colonic diverticulosis. No bowel obstruction. Appendix is normal.   Wall thickening of the sigmoid colon.  PERITONEUM/RETROPERITONEUM: No ascites.  VESSELS: Atherosclerotic changes.  LYMPH NODES: Retroperitoneal lymphadenopathy. For reference: Left   para-aortic lymph node (5, 194) 1.8 x 1.5 cm.  ABDOMINAL WALL: Bilateral inguinal hernia plugs.  BONES: Degenerative changes.    IMPRESSION:    Pulmonary nodules, nodular pleural thickening, hepatic lesions, and   retroperitoneal lymphadenopathy, compatible with metastatic disease.    Small bilateral pleural effusions, decreased status post bilateral chest   tube placement. Small bilateral pneumothoraces.    Wall thickening of the sigmoid colon. Correlate for colitis.      --- End of Report ---          RADHA SCHWARZ DO; Resident Radiologist  This document has been electronically signed.  DEMARIO ACOSTA MD; Attending Radiologist  This document has been electronically signed. Jul  3 2024  4:36PM    < end of copied text >      PROTEIN CALORIE MALNUTRITION PRESENT: [ ]mild [ ]moderate [ ]severe [ ]underweight [ ]morbid obesity  https://www.andeal.org/vault/2440/web/files/ONC/Table_Clinical%20Characteristics%20to%20Document%20Malnutrition-White%20JV%20et%20al%202012.pdf    Height (cm): 170.2 (06-20-24 @ 14:44), 165.1 (04-23-24 @ 16:00), 165.1 (02-27-24 @ 08:06)  Weight (kg): 68 (06-20-24 @ 14:44), 74 (05-16-24 @ 11:00), 77.1 (02-27-24 @ 08:06)  BMI (kg/m2): 23.5 (06-20-24 @ 14:44), 27.1 (05-16-24 @ 11:00), 28.3 (04-23-24 @ 16:00)    [ ]PPSV2 < or = to 30% [ ]significant weight loss  [ ]poor nutritional intake  [ ]anasarca[ ]Artificial Nutrition      Other REFERRALS:  [ ]Hospice  [ ]Child Life  [ ]Social Work  [ ]Case management [ ]Holistic Therapy     Goals of Care Document:

## 2024-07-04 NOTE — CONSULT NOTE ADULT - PROBLEM SELECTOR RECOMMENDATION 2
as per thoracic surgery   weaned off HFNC, currently on 2L NC SpO2 96%, Left pigtail output 330cc/24h, Right pigtail output 350cc/24h, Maintain B/L pigtails to LWS per Dr. Dobbins, Fluid cytology pending.
Pt with hypercalcemia. Serum Ca 11.5 and iCa of 1.4. Recommend PTH, PTHrP, Phos, Vit D studies. Recommend free light chains, serum and urine immunofixation. Pt on IVFs as above. Monitor serum and iCa.

## 2024-07-04 NOTE — CONSULT NOTE ADULT - PROBLEM SELECTOR RECOMMENDATION 9
Stage II; s/p TURBT and chemoradiation  - per outpatient records, plan for repeat cystoscopy after treatment; around August 2024  - CT imaging with new 8mm pulm nodule in RUL concerning for metastasis    - CT  impression     Pulmonary nodules, nodular pleural thickening, hepatic lesions, and   retroperitoneal lymphadenopathy, compatible with metastatic disease.    Small bilateral pleural effusions, decreased status post bilateral chest   tube placement. Small bilateral pneumothoraces.    Wall thickening of the sigmoid colon. Correlate for colitis

## 2024-07-04 NOTE — CONSULT NOTE ADULT - TIME BILLING
Review of medical records, labs, imaging, coordination of care and did not include time spent teaching.
high complexity of this case
symptom assessment and management, supportive counseling, coordination of care, discussion and coordination with team.    I personally spent 25 minutes on advance care planning services with the patient. This time is separate and distinct from any other care management services provided on this date.    Mariah Vizcarra, ANASTASIA-BC  Please contact me via Teams  between 6am-2pm. If not answering, please call the palliative care pager [phone]    After 2pm and on weekends, please see the contact information below:    In the event of newly developing, evolving, or worsening symptoms between 2pm and 5pm please contact the Palliative Medicine via extension [phone] for assistance.  After 5pm please contact team via pager (if the patient is at [facility] #[phone] or if the patient is at [facility] #[phone]) The Geriatric and Palliative Medicine service has coverage 24 hours a day/ 7 days a week to provide medical recommendations regarding symptom management needs via telephone

## 2024-07-04 NOTE — PROGRESS NOTE ADULT - ASSESSMENT
88 y/o man, complex medical hx including bladder ca, and CT showing likely evidence met dz, AF, bl pleural effusions being tx as parapneumonic ?malig as well, hypercalcemia, post left ct by thoracic and now post right ct by IR. exudative and bloody. low glucose, high pH  Clinically he is improving on the zosyn and with drainage  He is afebrile, and 02 requirements have come down, now on nasal cannula  f/u cultues  cont goc discussion

## 2024-07-04 NOTE — PROGRESS NOTE ADULT - PROBLEM SELECTOR PLAN 7
DVT ppx - heparin gtt  Diet - regular  Dispo - pending, likely [facility]    - 7/4: Discussed plan of care with Nephew at bedside who requests onc follow up. Updated on current clinical work up. Onc reports that regular team will follow up tomorrow.

## 2024-07-04 NOTE — PROGRESS NOTE ADULT - PROBLEM SELECTOR PLAN 5
Stage II; s/p TURBT and chemoradiation  - per outpatient records, plan for repeat cystoscopy after treatment; around August 2024  - CT imaging with new 8mm pulm nodule in RUL concerning for metastasis.     - CT abd pel and chest with IV contrast: Pulmonary nodules, nodular pleural thickening, hepatic lesions, and   retroperitoneal lymphadenopathy, compatible with metastatic disease. Small bilateral pleural effusions, decreased status post bilateral chest tube placement. Small bilateral pneumothoraces. Wall thickening of the sigmoid colon. Correlate for colitis.    - F/U cytology pending from 7/2  - Continue tamsulosin  - Monitor I/Os

## 2024-07-04 NOTE — PROGRESS NOTE ADULT - PROBLEM SELECTOR PLAN 3
Significant hyponatremia to 120 on admission. Pt has previously been hyponatremic down to 130 however last month, Na was 136  - HOLD lasix    # Elevated lactate: check stat blood gas and trend lactate.    # Hypokalemia: replace K   #ODIN: resolving, nephro following   # Hypercalcemia labs pending

## 2024-07-04 NOTE — CONSULT NOTE ADULT - CONVERSATION DETAILS
Spoke with Terrell Muro and Chavo pt's nephews for greater then 16 mins discussing ACP and GOC.  They have a good understanding of pts medical conditions and hospital course.  Pt had completed a living will in the past that identifies his wishes.  All in agreement that he would not want to be kept alive on machines and have no quality of life.  We reviewed CT scan results in detail. Multiple questions answered and clarified regarding CPR, prolonged CPR vs "less then 8 minutes of CPR", intubation, PEG tube.  Nephews report that pts wife recently  and pt has not had an opportunity to mourn her with all his health issues going on.  They want to keep his hopes up and get him home.  Nephews planning on discussing MOLST form again and completing it in the beginning of the week.  Explained to Nephews that NP, PA and or MD can complete the form for them from the primary team or Pall care team. Their goal is to treat all reversible/treatable medical conditions and get the pt home. Palliative care will follow up.

## 2024-07-05 NOTE — PROGRESS NOTE ADULT - SUBJECTIVE AND OBJECTIVE BOX
Subjective ' hello"    Vital Signs Last 24 Hrs  T(C): 36.6 (07-05-24 @ 04:05), Max: 37 (07-04-24 @ 21:07)  T(F): 97.8 (07-05-24 @ 04:05), Max: 98.6 (07-04-24 @ 21:07)  HR: 77 (07-05-24 @ 04:05) (77 - 89)  BP: 100/57 (07-05-24 @ 04:05) (100/57 - 104/53)  RR: 18 (07-05-24 @ 04:05) (18 - 18)  SpO2: 97% (07-05-24 @ 04:05) (95% - 97%)           07-04 @ 07:01  -  07-05 @ 07:00  --------------------------------------------------------  IN: 820 mL / OUT: 1170 mL / NET: -350 mL    07-05 @ 07:01  -  07-05 @ 13:34  --------------------------------------------------------  IN: 80 mL / OUT: 0 mL / NET: 80 mL                              8.2    18.96 )-----------( 192 ( 05 Jul 2024 07:18 )             27.0       07-05    139  |  99  |  31<H>  ----------------------------<  88  3.7   |  27  |  0.88    Ca    12.5<H>      05 Jul 2024 07:20  Phos  2.5     07-04    TPro  6.3  /  Alb  2.5<L>  /  TBili  0.3  /  DBili  x   /  AST  16  /  ALT  21  /  AlkPhos  88  07-04          Collected Date/Time:                   6/25/2024 15:50 EDT  Received Date/Time:                    6/25/2024 21:26 EDT  Final Diagnosis  PLEURAL FLUID, RIGHT  ATYPICAL FINDINGS.    Cytology slides and cell block consists of rare  single atypical cells.      Clinical-radiological-pathological correlation is essential.    -Flow Cytometry Final Report  ________________________________________________________________________  Specimen: Pleural fluid  Date Collected: 07/02/2024  Date Received: 07/02/2024    DIAGNOSIS:  Pleural fluid:   No diagnostic immunophenotypic evidence of non-Hodgkin lymphoma.              CAPILLARY BLOOD GLUCOSE              Drains:     MS         [  ] Drainage:                 L Pleural  x[  ]  Drainage:     340           R Pleural   430                          PHYSICAL EXAM        Neurology: alert and oriented x 3, nonfocal, no gross deficits    CV :  S1    Lungs: B.l breath sounds on nasal cannula    Rt chest tube water seal  left chest tube water seal     Abdomen: soft, nontender, nondistended, positive bowel sounds, last bowel movement     :  voids         Extremities: warm well perfused equal strength                                         [  ]    Discussed with Cardiothoracic Team at AM rounds.

## 2024-07-05 NOTE — PROGRESS NOTE ADULT - ASSESSMENT
89 yo M with a PMH of Bladder CA and BPH who presented to [facility] due to lethargy and weakness. SCr on admission of 0.9 (6/20) but trended up to 1.4 on 7/1. Nephrology consulted for ODIN.

## 2024-07-05 NOTE — PROGRESS NOTE ADULT - PROBLEM SELECTOR PLAN 2
- As noted on CT scan, pt with soft stool, possibly related to diet  - Leukocytosis possibly : stable, on empiric Zosyn 6/30   - Blood culture 6/2: NGTD  - Urine Culture  - Will complete 7 day course

## 2024-07-05 NOTE — PROGRESS NOTE ADULT - SUBJECTIVE AND OBJECTIVE BOX
DATE OF SERVICE: 07-05-24 @ 19:05    Patient is a 89y old  Male who presents with a chief complaint of SOB (05 Jul 2024 18:40)      INTERVAL HISTORY: feels ok  	  MEDICATIONS:  midodrine. 15 milliGRAM(s) Oral every 8 hours        PHYSICAL EXAM:  T(C): 36.6 (07-05-24 @ 04:05), Max: 37 (07-04-24 @ 21:07)  HR: 84 (07-05-24 @ 16:49) (77 - 89)  BP: 107/57 (07-05-24 @ 16:49) (96/53 - 107/57)  RR: 18 (07-05-24 @ 16:49) (18 - 18)  SpO2: 93% (07-05-24 @ 16:49) (93% - 97%)  Wt(kg): --  I&O's Summary    04 Jul 2024 07:01  -  05 Jul 2024 07:00  --------------------------------------------------------  IN: 820 mL / OUT: 1170 mL / NET: -350 mL    05 Jul 2024 07:01  -  05 Jul 2024 19:05  --------------------------------------------------------  IN: 80 mL / OUT: 195 mL / NET: -115 mL          Appearance: In no distress	  HEENT:    PERRL, EOMI	  Cardiovascular:  S1 S2, No JVD  Respiratory: Lungs clear to auscultation	  Gastrointestinal:  Soft, Non-tender, + BS	  Vascularature:  No edema of LE  Psychiatric: Appropriate affect   Neuro: no acute focal deficits                               8.2    18.96 )-----------( 192      ( 05 Jul 2024 07:18 )             27.0     07-05    139  |  99  |  31<H>  ----------------------------<  88  3.7   |  27  |  0.88    Ca    12.5<H>      05 Jul 2024 07:20  Phos  2.5     07-04    TPro  6.3  /  Alb  2.5<L>  /  TBili  0.3  /  DBili  x   /  AST  16  /  ALT  21  /  AlkPhos  88  07-04        Labs personally reviewed      ASSESSMENT/PLAN: 	  89M with hx of Stage II bladder cancer s/p TURBT and chemoradiation, presenting with dyspnea and orthopnea. a/w Acute hypoxic respiratory failure also found to have large L pleural effusion. Found to have new onset AF with RVR.    1. New Onset Atrial Fibrillation with Rapid Ventricular Response  - ECG 6/21 with AF with RVR  - Spontaneously converted to SR 6/21  - TTE wnl  - TSH wnl  - [id number] of 2 (age and gender).  - Transition to Eliquis 2.5mg BID (lower off label dose as recent drop in H/H)    2. Shortness of Breath  - CTA neg for PE but found to have large left pleural effusion now s/p thora with 2.1L removed on 6/22  -   - TTE wnl, normal filling pressures, preserved EF  - s/p L thoracentesis 6/25 2.1L off  - now on 6L NC  - 6/28 CT with Very large loculated left pleural effusion and small to moderate size loculated right hydropneumothorax.  - S/p L pigtail     3. Hyponatremia  - Slowly improving, now 131  - Lasix on hold    4. Hypotension  - systolic 70s- 100  - midodrine 15mg TID started  - lactate 7.4 now 4.8  - NS bolus x 1 given        Ander Cardoso DO [facility]  Cardiovascular Medicine  [address], [address]  Office: [phone]  Available via Text/call on Microsoft Teams

## 2024-07-05 NOTE — PROGRESS NOTE ADULT - PROBLEM SELECTOR PLAN 2
Pt with hypercalcemia in setting of malignancy v other. Serum Ca remains elevated at 12.5 and iCa of 1.59 (7/2). PTH 2 and phos 2.5. Vit D 1,25 elevated at 165. Vit D 25 and PTHrP pending. K/L 0.8. Serum and urine immunofixation pending. Heme/Onc notes reviewed. Pt may need bisphosphonate therapy (will assess). Monitor serum and iCa. Pt with hypercalcemia in setting of malignancy v other. Serum Ca remains elevated at 12.5 and iCa of 1.59 (7/2). PTH 2 and phos 2.5. Vit D 1,25 elevated at 165. Vit D 25 and PTHrP pending. K/L 0.8. Serum and urine immunofixation pending. Heme/Onc notes reviewed. Pt may need bisphosphonate therapy (Pamidronate 60 x1). Pt may benefit from steroids but would rule out TB. Monitor serum and iCa.

## 2024-07-05 NOTE — PROGRESS NOTE ADULT - SUBJECTIVE AND OBJECTIVE BOX
[facility] DIVISION OF KIDNEY DISEASES AND HYPERTENSION   FOLLOW UP NOTE  --------------------------------------------------------------------------------  Chief Complaint: Pt with ODIN    24 hour events/subjective: Pt. was seen and examined. Pt is feeling "okay".      PAST HISTORY  --------------------------------------------------------------------------------  No significant changes to PMH, PSH, FHx, SHx, unless otherwise noted    ALLERGIES & MEDICATIONS  --------------------------------------------------------------------------------  Allergies  No Known Allergies    Intolerances    Standing Inpatient Medications  apixaban 2.5 milliGRAM(s) Oral every 12 hours  ascorbic acid 500 milliGRAM(s) Oral daily  benzocaine/menthol Lozenge 1 Lozenge Oral three times a day  chlorhexidine 2% Cloths 1 Application(s) Topical daily  lactated ringers. 1000 milliLiter(s) IV Continuous <Continuous>  lactobacillus acidophilus 1 Tablet(s) Oral daily  midodrine. 15 milliGRAM(s) Oral every 8 hours  multivitamin/minerals 1 Tablet(s) Oral daily  pantoprazole    Tablet 40 milliGRAM(s) Oral before breakfast  piperacillin/tazobactam IVPB.. 3.375 Gram(s) IV Intermittent every 8 hours  simethicone 80 milliGRAM(s) Chew four times a day  tamsulosin 0.4 milliGRAM(s) Oral at bedtime    PRN Inpatient Medications  acetaminophen     Tablet .. 650 milliGRAM(s) Oral every 6 hours PRN  albuterol/ipratropium for Nebulization 3 milliLiter(s) Nebulizer every 6 hours PRN  aluminum hydroxide/magnesium hydroxide/simethicone Suspension 30 milliLiter(s) Oral every 4 hours PRN  sodium chloride 0.65% Nasal 1 Spray(s) Both Nostrils four times a day PRN    REVIEW OF SYSTEMS  --------------------------------------------------------------------------------  All other systems were reviewed and are negative, except as noted.    VITALS/PHYSICAL EXAM  --------------------------------------------------------------------------------  T(C): 36.6 (07-05-24 @ 04:05), Max: 37 (07-04-24 @ 21:07)  HR: 77 (07-05-24 @ 04:05) (77 - 89)  BP: 100/57 (07-05-24 @ 04:05) (100/57 - 104/53)  RR: 18 (07-05-24 @ 04:05) (18 - 18)  SpO2: 97% (07-05-24 @ 04:05) (95% - 97%)  Wt(kg): --    07-04-24 @ 07:01  -  07-05-24 @ 07:00  --------------------------------------------------------  IN: 820 mL / OUT: 1170 mL / NET: -350 mL    07-05-24 @ 07:01  -  07-05-24 @ 11:03  --------------------------------------------------------  IN: 80 mL / OUT: 0 mL / NET: 80 mL    Physical Exam:  	Gen: Chronically ill appearing but in NAD  	HEENT: Anicteric  	Pulm: Diminished B/L. +NC. +B/L Chest tubes  	CV: S1S2+  	Abd: Soft, +BS    	Ext: No LE edema B/L  	Neuro: Awake  	Skin: Warm and dry    LABS/STUDIES  --------------------------------------------------------------------------------              8.2    18.96 >-----------<  192      [07-05-24 @ 07:18]              27.0     139  |  99  |  31  ----------------------------<  88      [07-05-24 @ 07:20]  3.7   |  27  |  0.88        Ca     12.5     [07-05-24 @ 07:20]      Phos  2.5     [07-04-24 @ 07:00]    TPro  6.3  /  Alb  2.5  /  TBili  0.3  /  DBili  x   /  AST  16  /  ALT  21  /  AlkPhos  88  [07-04-24 @ 07:00]    PTT: 29.3       [07-04-24 @ 06:49]    Creatinine Trend:  SCr 0.88 [07-05 @ 07:20]  SCr 0.91 [07-04 @ 07:00]  SCr 1.03 [07-03 @ 05:28]  SCr 1.15 [07-02 @ 06:36]  SCr 1.48 [07-01 @ 05:34]    Urine Protein 27      [07-03-24 @ 19:24]  Free Light Chains: kappa 6.69, lambda 7.57, ratio = 0.88      [07-04 @ 06:34]

## 2024-07-05 NOTE — PROGRESS NOTE ADULT - SUBJECTIVE AND OBJECTIVE BOX
[facility] Division of Hospital Medicine  Radha Vega MD  Available via MS Teams      SUBJECTIVE / OVERNIGHT EVENTS: No acute events overnight. Pt was able to be weaned down to 2L NC. Denies pain. Has mild cough.     ADDITIONAL REVIEW OF SYSTEMS: ROS reviewed and found to be negative     MEDICATIONS  (STANDING):  apixaban 2.5 milliGRAM(s) Oral every 12 hours  ascorbic acid 500 milliGRAM(s) Oral daily  benzocaine/menthol Lozenge 1 Lozenge Oral three times a day  chlorhexidine 2% Cloths 1 Application(s) Topical daily  lactated ringers. 1000 milliLiter(s) (60 mL/Hr) IV Continuous <Continuous>  lactobacillus acidophilus 1 Tablet(s) Oral daily  midodrine. 15 milliGRAM(s) Oral every 8 hours  multivitamin/minerals 1 Tablet(s) Oral daily  pantoprazole    Tablet 40 milliGRAM(s) Oral before breakfast  piperacillin/tazobactam IVPB.. 3.375 Gram(s) IV Intermittent every 8 hours  simethicone 80 milliGRAM(s) Chew four times a day  tamsulosin 0.4 milliGRAM(s) Oral at bedtime    MEDICATIONS  (PRN):  acetaminophen     Tablet .. 650 milliGRAM(s) Oral every 6 hours PRN Temp greater or equal to 38C (100.4F), Mild Pain (1 - 3)  albuterol/ipratropium for Nebulization 3 milliLiter(s) Nebulizer every 6 hours PRN Shortness of Breath and/or Wheezing  aluminum hydroxide/magnesium hydroxide/simethicone Suspension 30 milliLiter(s) Oral every 4 hours PRN Dyspepsia  sodium chloride 0.65% Nasal 1 Spray(s) Both Nostrils four times a day PRN Nasal Congestion      I&O's Summary    04 Jul 2024 07:01  -  05 Jul 2024 07:00  --------------------------------------------------------  IN: 820 mL / OUT: 1170 mL / NET: -350 mL    05 Jul 2024 07:01  -  05 Jul 2024 12:54  --------------------------------------------------------  IN: 80 mL / OUT: 0 mL / NET: 80 mL        PHYSICAL EXAM:  Vital Signs Last 24 Hrs  T(C): 36.6 (05 Jul 2024 04:05), Max: 37 (04 Jul 2024 21:07)  T(F): 97.8 (05 Jul 2024 04:05), Max: 98.6 (04 Jul 2024 21:07)  HR: 77 (05 Jul 2024 04:05) (77 - 89)  BP: 100/57 (05 Jul 2024 04:05) (100/57 - 104/53)  BP(mean): --  RR: 18 (05 Jul 2024 04:05) (18 - 18)  SpO2: 97% (05 Jul 2024 04:05) (95% - 97%)    Parameters below as of 05 Jul 2024 04:05  Patient On (Oxygen Delivery Method): nasal cannula  O2 Flow (L/min): 2    CONSTITUTIONAL: NAD, elderly, thin, on NC 2L  EYES:  conjunctiva and sclera clear  ENMT: dry oral mucosa,  RESPIRATORY: decreased breath sounds at bases , Left sided crackles and R pigtail   CARDIOVASCULAR: Regular rate and rhythm, normal S1 and S2  No lower extremity edema;  ABDOMEN: Nontender to palpation, normoactive bowel sounds,   MUSCULOSKELETAL:  no clubbing or cyanosis of digits; moving all extremities   PSYCH/NEURO: A+O to person, situation  SKIN: No rashes on exposed surfaces     LABS:                        8.2    18.96 )-----------( 192      ( 05 Jul 2024 07:18 )             27.0     07-05    139  |  99  |  31<H>  ----------------------------<  88  3.7   |  27  |  0.88    Ca    12.5<H>      05 Jul 2024 07:20  Phos  2.5     07-04    TPro  6.3  /  Alb  2.5<L>  /  TBili  0.3  /  DBili  x   /  AST  16  /  ALT  21  /  AlkPhos  88  07-04    PTT - ( 04 Jul 2024 06:49 )  PTT:29.3 sec      Urinalysis Basic - ( 05 Jul 2024 07:20 )    Color: x / Appearance: x / SG: x / pH: x  Gluc: 88 mg/dL / Ketone: x  / Bili: x / Urobili: x   Blood: x / Protein: x / Nitrite: x   Leuk Esterase: x / RBC: x / WBC x   Sq Epi: x / Non Sq Epi: x / Bacteria: x        Culture - Fungal, Body Fluid (collected 02 Jul 2024 18:15)  Source: Pleural Fl Pleural Fluid  Preliminary Report (03 Jul 2024 23:03):    Culture is being performed. Fungal cultures are held for 4 weeks.    Culture - Body Fluid with Gram Stain (collected 02 Jul 2024 18:15)  Source: Pleural Fl R Chest fluid  Gram Stain (02 Jul 2024 22:37):    Few polymorphonuclear leukocytes per low power field    Rare Gram Negative Rods per oil power field  Preliminary Report (03 Jul 2024 16:28):    No growth to date    Culture - Blood (collected 02 Jul 2024 16:20)  Source: .Blood Blood-Peripheral  Preliminary Report (04 Jul 2024 20:01):    No growth at 48 Hours    Culture - Blood (collected 02 Jul 2024 16:00)  Source: .Blood Blood-Peripheral  Preliminary Report (04 Jul 2024 20:01):    No growth at 48 Hours          RADIOLOGY & ADDITIONAL TESTS:  New Imaging Personally Reviewed Today:  New Electrocardiogram Personally Reviewed Today:  Other Results Reviewed Today:   Prior or Outpatient Records Reviewed Today with Summary:    COORDINATION OF CARE:  Consultant Communication and Details of Discussion (where applicable):

## 2024-07-05 NOTE — PROGRESS NOTE ADULT - PROBLEM SELECTOR PLAN 1
Acute hypoxic resp failure 2/2 bilateral pleural effusion  In setting of bladder CA and new pulmonary nodule, concerning for malignancy  CT scan Chest and pel with IV contrast showing: Pulmonary nodules, nodular pleural thickening, hepatic lesions, and   retroperitoneal lymphadenopathy, compatible with metastatic disease. Small bilateral pleural effusions, decreased status post bilateral chest tube placement. Small bilateral pneumothoraces.   - TTE - normal LV and RV fxn, no diastolic dysfunction  - s/p L thoracentesis on 6/22   - s/p R sided thoracentesis on 6/25  - s/p L chest tube 6/30  - s/p R chest tube 6/1  - now on Zosyn end date 7/6 for total of 7 days   - Required HiFlow now on NC 2L  - F/U pulm, cardiothoracic and IR teams for further recs  - Lactate elevated likely 2/2 hypoperfusion BP improved. On midodrine.

## 2024-07-05 NOTE — PROGRESS NOTE ADULT - ASSESSMENT
88 yo M w/ PMHx stage II bladder cancer s/p TURBT and chemoradiation, presenting with dyspnea and acute hypoxemic respiratory failure with new onset Afib on Hep gtt. Found to have b/l pleural effusions L > R s/p L diagnostic/therapeutic thoracentesis yielding 2.1L of serosanguinous fluid. Now s/p R thoracentesis removed 1.8L on 6/25 c/b PTX ex vacuo. CTSx placed left chest tube on 6/30.  IR consulted to place right chest tube today.  Currently 97% on NRB.  Increasing WBC noted in pleural fluid on left, now ~1400, neutrophil predominant.  Atypical cells noted on cytology from right pleural fluid.    Better today, 1L NC    #Pleural effusion  #PTX ex vacuo  #Acute hypoxemic respiratory failure  - c/w supplemental O2, wean at tolerated  - CT C/A/P revealed decreased effusions stable R ex vacuo, and bulky LAD in the abdomen  - daily blood gas and CXR  - GNR on gram stain R pleural fluid may be contaminant - regardless covering with Zosyn  - agree with Zosyn given evolving pleural studies concerning for parapneumonic effusion i/s/o occult malignancy  - continue GOC, pall care f/u  - will plan to clamp R chest tube when output < 200 ml daily  - L chest tube per thoracic    Please notify pulmonary prior to discharge and email [email or web address] to schedule an appointment; please provide appointment info to patient    Follow up at  [address] [place] [address], [address]  [place] [address]  tel: [phone]  fax: [phone]

## 2024-07-05 NOTE — PROGRESS NOTE ADULT - ATTENDING COMMENTS
89 yo M with a PMH of Bladder CA and BPH who presented to [facility] due to lethargy and weakness. SCr on admission of 0.9 (6/20) but trended up to 1.4 on 7/1. Nephrology consulted for ODIN.  Per Luis/MELLY review, pt has had ranging SCr from 0.9 to 1.4.   Pt seen and examined with attending, team present initially.. Pt currently on NC and is s/p B/L chest tube placement for recurrent effusions Pt also found to have a RUL nodule. Of note, prior to rise in SCr, pt had a RRT for hypotension with BP in the 80s and HR in the 150s. Pt with 800cc of UOP overnight. Hx is limited due to current medical condition. Pt denied n/v/f/c/difficulty with urination ,.BP improving>100, mental status better  1.  ARF--likely volume, hemodynamic.  On high flow O2 --> NC and obtained MICU evaluation for 2 and 4 given potential worsening with volume.  NO HD indication at present AND IMPROVING Cr c/w overall improved perfusion  2.  Hypotension--now in 100s and mentating.  Oral NOW pressor to keep MAP >65  3.  Hypercalcemia--not life threatening and likely mitigate 2.  W/U as outlined in progress revealing HIGH 1/25 VITAMIN D.  NOT encephalopathic and with 1 improving now a bisphosphonate candidate.  Check TB status if steroids contemplated by deana  4.  Respiratory failure acute--improving and not intubation risk in near future    discussed with med team  Anand Correia MD  contact me on TEAMs

## 2024-07-05 NOTE — PROGRESS NOTE ADULT - ATTENDING COMMENTS
88 yo M w/ PMHx stage II bladder cancer s/p TURBT and chemoradiation, presenting with dyspnea and acute hypoxemic respiratory failure with new onset Afib on Hep gtt. Found to have b/l pleural effusions L > R s/p L diagnostic/therapeutic thoracentesis yielding 2.1L of serosanguinous fluid. Now s/p R thoracentesis removed 1.8L on 6/25 c/b PTX ex vacuo. CTSx placed left chest tube on 6/30.  IR consulted to place right chest tube today.  Currently 97% on NRB.  Increasing WBC noted in pleural fluid on left, now ~1400, neutrophil predominant.  Atypical cells noted on cytology from right pleural fluid. S/p right chest tube placement by IR 7/1/24.  Long discussion with 1 of his nephews at bedside, another nephew over the phone.  Patient has prior advance directives delineating DNR, trying to locate papers to bring in.  Patient still Alert and oriented and understanding of current clinical situation.  Advised discussion between nephews and patient regarding GOC.   Given hypercalcemia, rising lactate recommend checking PTHrP, peripheral flow cytometry - urothelial malignancy in differential, however MM, leukemia, lymphoma under consideration as well.  Decreased output from both R and L chest tubes, ~300ml each.  Now on 1L NC.  CT C/A/P reviewed - pulmonary nodules, B/L pleural effusions much improved, RP JOSE, hepatic lesions all concerning for mets malignancy.    #Pleural effusion, b/l exudative/bloody, increasing WBC count on repeat pleural studies  #PTX ex vacuo  #Acute hypoxemic respiratory failure    Recommend:  - f/u pleural effusion studies, initial WBC with ~1100, Neutrophil predom, gram stain with GNR?  - agree with Zosyn given evolving pleural studies concerning for parapneumonic effusion i/s/o occult malignancy  - remains on Hep gtt, monitor Hb given bloody fluid   - f/u peripheral flow cytometry and flow from pleural fluid  - Pulmonary to continue mgmt of Right Chest tube (CTSx managing left CT)    GOC discussion ongoing.    Tammy Arellano MD, MSCR  Pulmonary & Critical Care

## 2024-07-05 NOTE — CHART NOTE - NSCHARTNOTEFT_GEN_A_CORE
Patient is a 89y old  Male who presents with a chief complaint of SOB (05 Jul 2024 19:05)    Notified by RN results of lactate 6.6.        Vital Signs Last 24 Hrs  T(C): 36.6 (05 Jul 2024 04:05), Max: 37 (04 Jul 2024 21:07)  T(F): 97.8 (05 Jul 2024 04:05), Max: 98.6 (04 Jul 2024 21:07)  HR: 84 (05 Jul 2024 16:49) (77 - 89)  BP: 107/57 (05 Jul 2024 16:49) (96/53 - 107/57)  BP(mean): --  RR: 18 (05 Jul 2024 16:49) (18 - 18)  SpO2: 93% (05 Jul 2024 16:49) (93% - 97%)    Parameters below as of 05 Jul 2024 16:49  Patient On (Oxygen Delivery Method): nasal cannula  O2 Flow (L/min): 1      Physical Exam:  General: WN/WD NAD  Neurology: A&Ox3, nonfocal, ACKERMAN x 4  Head:  Normocephalic, atraumatic  Respiratory: CTA B/L  CV: RRR, S1S2, no murmur  Abdominal: Soft, NT, ND no palpable mass  MSK: No edema, + peripheral pulses, FROM all 4 extremity  Labs:                          8.2    18.96 )-----------( 192      ( 05 Jul 2024 07:18 )             27.0     07-05    139  |  99  |  31<H>  ----------------------------<  88  3.7   |  27  |  0.88    Ca    12.5<H>      05 Jul 2024 07:20  Phos  2.5     07-04    TPro  6.3  /  Alb  2.5<L>  /  TBili  0.3  /  DBili  x   /  AST  16  /  ALT  21  /  AlkPhos  88  07-04            Radiology:    HPI:  89 year old male with hx of stage II bladder cancer s/p TURBT and chemoradiation with gemcitabine (completed 5/2/24). Pt presenting with worsening dyspnea on exertion and orthopnea for the past 3 days. Since starting chemoradiation, patient has been feeling weaker and with poor appetite. He has urinary urgency since the TURBT; he takes tamsulosin in the evening. Per nephew, patient has been keeping his A/C low at home so it has been hot in side his home. He started developing worsening shortness of breath a few days ago. No flu like symptoms, no cough, no fever, no chest pain. Pt has been trying to drink lots of fluids and would also drink ensure. He lives alone and is independent in his ADLs.     In the ED, patient was noted to be hypoxic requiring nonrebreather. CXR showed B/L pleural effusions, L>R. CTA chest performed without evidence of PE. Large pleural effusion noted on L with passive atelectasis. New 8mm pulm nodule in RUL also noted. Pt also significantly hyponatremic to 120. Pt admitted for further workup and management.  (20 Jun 2024 21:49)  Patient with Chest tube to Right chest wall placed by IR on 7/1/24 with 500 cc of fluid removed.  Bilateral chest tube to wall suction per CT surgery       I. Elevated Lactate of 6.6   > Start IV fluid at 75 cc/hr for 8 hours  > Blood culture x 2   > previous cultures negative   Will continue to monitor     Gentry Melvin

## 2024-07-05 NOTE — PROGRESS NOTE ADULT - PROBLEM SELECTOR PLAN 5
[Dear  ___] : Dear  [unfilled], [Courtesy Letter:] : I had the pleasure of seeing your patient, [unfilled], in my office today. [Please see my note below.] : Please see my note below. [Consult Closing:] : Thank you very much for allowing me to participate in the care of this patient.  If you have any questions, please do not hesitate to contact me. [Sincerely,] : Sincerely, [FreeTextEntry3] : Vilma Mahoney MD  [employer] [facility] Pulmonary, Critical Care, and Sleep Medicine Stage II; s/p TURBT and chemoradiation  - per outpatient records, plan for repeat cystoscopy after treatment; around August 2024     - CT abd pel and chest with IV contrast: Pulmonary nodules, nodular pleural thickening, hepatic lesions, and   retroperitoneal lymphadenopathy, compatible with metastatic disease. Small bilateral pleural effusions, decreased status post bilateral chest tube placement. Small bilateral pneumothoraces. Wall thickening of the sigmoid colon. Correlate for colitis.  - Discussed with Onc, family is requesting conversation with onc about prognosis and further steps based on cytology and CT scan  - Cytology plerual fluid done x3 "findings provide no diagnostic evidence of a malignant lymphoproliferative disorder" and " rare atypical cells"    - Continue tamsulosin  - Monitor I/Os

## 2024-07-05 NOTE — PROGRESS NOTE ADULT - PROBLEM SELECTOR PLAN 3
Significant hyponatremia to 120 on admission. Pt has previously been hyponatremic down to 130 however last month, Na was 136  - HOLD lasix    # Hypokalemia: replace K   # ODIN: resolving, nephro following   # Hypercalcemia in the setting of cancer, immunofixation labs pending, see nephrology note may need pamidronate

## 2024-07-05 NOTE — PROGRESS NOTE ADULT - ASSESSMENT
ASSESSMENT: 89 year old male with hx of stage II bladder cancer s/p TURBT and chemoradiation with gemcitabine (completed 5/2/24). Pt presenting with worsening dyspnea on exertion and orthopnea. CTA chest performed without evidence of PE. Large pleural effusion noted on L with passive atelectasis. New 8mm pulm nodule in RUL also noted. Patient admitted to the medical service. During this admission, patient has required multiple thoracentesis, Left on 6/22 (2.1 L removed) and Right on 6/25 (1.8L removed). Since that time, patient remains on 6L NC, endorsing increased WOB. Cultures from prior thoracentesis are negative, cytopathology showing rare atypical cells. Patient is on Unasyn. Repeat CT on 6/28 showing large L sided loculated effusion and right moderate hydropneumothorax. Thoracic surgery consulted for surgical management of loculated effusion.      6/30   5 L NC   OOB to chair  DC hep gtt  7/1     VSS, WBC down to 21, awaiting IR to place RCT, L Pigtail to WS draining 50 cc serosanguinous drainage this am no airleak,   rt PTC placed by IR  7/2     on high flow o2   chest xray  improving   BL  PTC  drainaing  7/3     remains on HFNC. CXR daily while tubes are in. Monitor CT output, +draining  7/4 VSS, weaned off HFNC, currently on 2L NC SpO2 96%, Left pigtail output 330cc/24h, Right pigtail output 350cc/24h, Maintain B/L pigtails to LWS per Dr. Dobbins, Fluid cytology pending.   7/5 VSS   chestubes on water lauro placed to LWS primary team aware

## 2024-07-05 NOTE — PROGRESS NOTE ADULT - PROBLEM SELECTOR PLAN 4
ECG 6/21 with AF with RVR, spontaneously converted to NSR 6/21  - TTE wnl    - heparin gtt held for drop in Hgb 9 to 7.7 on 7/3, hgb stable around 8, discussed with cards to start on low dose Eliquis 2.5 mg bid 7/5  - once BP improves, can consider lopressor 12.5mg BID

## 2024-07-05 NOTE — PROGRESS NOTE ADULT - SUBJECTIVE AND OBJECTIVE BOX
PATIENT:  ILIA MONROE  [id number]    CHIEF COMPLAINT:  Patient is a 89y old  Male who presents with a chief complaint of SOB (05 Jul 2024 13:34)      INTERVAL HISTORY/OVERNIGHT EVENTS:  - no acute events    MEDICATIONS:  MEDICATIONS  (STANDING):  apixaban 2.5 milliGRAM(s) Oral every 12 hours  ascorbic acid 500 milliGRAM(s) Oral daily  benzocaine/menthol Lozenge 1 Lozenge Oral three times a day  chlorhexidine 2% Cloths 1 Application(s) Topical daily  lactated ringers. 1000 milliLiter(s) (60 mL/Hr) IV Continuous <Continuous>  lactobacillus acidophilus 1 Tablet(s) Oral daily  midodrine. 15 milliGRAM(s) Oral every 8 hours  multivitamin/minerals 1 Tablet(s) Oral daily  pantoprazole    Tablet 40 milliGRAM(s) Oral before breakfast  piperacillin/tazobactam IVPB.. 3.375 Gram(s) IV Intermittent every 8 hours  simethicone 80 milliGRAM(s) Chew four times a day  sodium chloride 0.9%. 1000 milliLiter(s) (75 mL/Hr) IV Continuous <Continuous>  tamsulosin 0.4 milliGRAM(s) Oral at bedtime    MEDICATIONS  (PRN):  acetaminophen     Tablet .. 650 milliGRAM(s) Oral every 6 hours PRN Temp greater or equal to 38C (100.4F), Mild Pain (1 - 3)  albuterol/ipratropium for Nebulization 3 milliLiter(s) Nebulizer every 6 hours PRN Shortness of Breath and/or Wheezing  aluminum hydroxide/magnesium hydroxide/simethicone Suspension 30 milliLiter(s) Oral every 4 hours PRN Dyspepsia  sodium chloride 0.65% Nasal 1 Spray(s) Both Nostrils four times a day PRN Nasal Congestion      ALLERGIES:  Allergies    No Known Allergies    Intolerances        OBJECTIVE:  ICU Vital Signs Last 24 Hrs  T(C): 36.6 (05 Jul 2024 04:05), Max: 37 (04 Jul 2024 21:07)  T(F): 97.8 (05 Jul 2024 04:05), Max: 98.6 (04 Jul 2024 21:07)  HR: 84 (05 Jul 2024 16:49) (77 - 89)  BP: 107/57 (05 Jul 2024 16:49) (96/53 - 107/57)  RR: 18 (05 Jul 2024 16:49) (18 - 18)  SpO2: 93% (05 Jul 2024 16:49) (93% - 97%)    O2 Parameters below as of 05 Jul 2024 16:49  Patient On (Oxygen Delivery Method): nasal cannula  O2 Flow (L/min): 1    I&O's Summary    04 Jul 2024 07:01  -  05 Jul 2024 07:00  --------------------------------------------------------  IN: 820 mL / OUT: 1170 mL / NET: -350 mL    05 Jul 2024 07:01  -  05 Jul 2024 18:40  --------------------------------------------------------  IN: 80 mL / OUT: 195 mL / NET: -115 mL      Daily     Daily     PHYSICAL EXAMINATION:  General: WN/WD NAD  HEENT: PERRL, EOMI, moist mucous membranes  Neurology: A&Ox3, nonfocal, ACKERMAN x 4  Respiratory: basilar diminishment; chest tubes both tidaling on water seal  CV: RRR, S1S2, no murmurs, rubs or gallops  Abdominal: Soft, NT, ND +BS  Extremities: No edema, + peripheral pulses    LABS:                          8.2    18.96 )-----------( 192      ( 05 Jul 2024 07:18 )             27.0     07-05    139  |  99  |  31<H>  ----------------------------<  88  3.7   |  27  |  0.88    Ca    12.5<H>      05 Jul 2024 07:20  Phos  2.5     07-04    TPro  6.3  /  Alb  2.5<L>  /  TBili  0.3  /  DBili  x   /  AST  16  /  ALT  21  /  AlkPhos  88  07-04    LIVER FUNCTIONS - ( 04 Jul 2024 07:00 )  Alb: 2.5 g/dL / Pro: 6.3 g/dL / ALK PHOS: 88 U/L / ALT: 21 U/L / AST: 16 U/L / GGT: x           PTT - ( 04 Jul 2024 06:49 )  PTT:29.3 sec    Urinalysis Basic - ( 05 Jul 2024 07:20 )    Color: x / Appearance: x / SG: x / pH: x  Gluc: 88 mg/dL / Ketone: x  / Bili: x / Urobili: x   Blood: x / Protein: x / Nitrite: x   Leuk Esterase: x / RBC: x / WBC x   Sq Epi: x / Non Sq Epi: x / Bacteria: x

## 2024-07-05 NOTE — PROGRESS NOTE ADULT - PROBLEM SELECTOR PLAN 7
DVT ppx - Eliquis 2.5mg bid   Diet - pureed  Dispo - medically active pending     - 7/5: Discussed plan of care with Nephew at bedside, updates on restarting AC, holding fluids, decrease oxygen requirements

## 2024-07-05 NOTE — PROGRESS NOTE ADULT - ATTENDING COMMENTS
89 y.o male with bladder cancer admitted with effusion.  Scan shows metastatic disease. Recommend biopsy of the mets to confirm diagnosis.  Continue current supportive care.

## 2024-07-05 NOTE — PROGRESS NOTE ADULT - ASSESSMENT
89M hx stage II bladder cancer s/p TURBT and CRT presented with worsening dyspnea on exertion and orthopnea for 3 days. Oncology consulted for further management of bladder cancer.    # Muscle-Invasive Bladder Cancer, metastatic   Follows with Dr. Peña at the [facility]. Pt had developed gross hematuria about in Dec 2023 (around the time of his wife passing). Prior tobacco, quit 18y ago, 50 pack years. Went to see  (Dr Perez). Underwent CTU that showed filling defects in bladder. Upper tract negative. Also, with? of pancreatitis based on imaging. Cyto suspicious. Underwent TURBT 2/27/24 with path IHC positive for GATA3, negative for P40, NKX3.1, and TTF1, indicating urothelial carcinoma. Muscle was present with invasion. From the TURBT on 2/27/24. "Inspection of the bladder showed large necrotic papillary tumor x2 along the posterior wall. There was significant trabeculation with cellules throughout. Bilateral ureteral orifices were visualized with clear efflux. Once surveillance was complete, resectoscope was used to carefully resect the posterior wall tumors. This showed evidence of significant bladder wall thickening, concerning for muscle invasion. Resection was taken down deep with care taken to avoid any significant injury, particularly given the patient's age and obtain appropriate pathology with resection."  - S/p chemoRT with gemcitabine (last dose 4/30/24). Gemcitabine is unlikely to be the cause of his pleural effusion  - CTA chest 6/20/24 does not show PE. Showed a large left pleural effusion with complete passive atelectasis of the left lower lobe. Small, partially loculated right pleural effusion. A new 8 mm right   upper lobe pulmonary nodule concerning for metastasis. Further thickening of bilateral adrenal glands, nonspecific.  - New subcentimeter RUL pulm nodule is too small to biopsy but is indeed suspicious for metastasis  - S/p dx/tx thoracentesis for large left pleural effusion on 6/22/24 with 2.1L removed.   - Previously small right pleural effusion is now larger and s/p right dx/tx thoracentesis with pulm 6/25/24. Left pleural effusion has a slight reaccumulation  - Will need to monitor pt's respiratory status to determine if he will be able to tolerate further DMT  - Cytopathology with "rare, atypical cells seen" - follow-up additional cytology which was sent   - CT A/P with pulmonary nodules, nodular pleural thickening, liver masses, and RP LAD consistent with diffuse metastatic disease.   - Follow-up with  oncology as an outpatient to discuss treatment options.       Shakir Junior MD, PGY-6  Hematology/Medical Oncology Fellow  Pager: [phone]  Available on Microsoft Teams  After 5pm or on weekends please contact  to page on-call fellow  89M hx stage II bladder cancer s/p TURBT and CRT presented with worsening dyspnea on exertion and orthopnea for 3 days. Oncology consulted for further management of bladder cancer.    # Muscle-Invasive Bladder Cancer, metastatic   Follows with Dr. Peña at the [facility]. Pt had developed gross hematuria about in Dec 2023 (around the time of his wife passing). Prior tobacco, quit 18y ago, 50 pack years. Went to see  (Dr Perez). Underwent CTU that showed filling defects in bladder. Upper tract negative. Also, with? of pancreatitis based on imaging. Cyto suspicious. Underwent TURBT 2/27/24 with path IHC positive for GATA3, negative for P40, NKX3.1, and TTF1, indicating urothelial carcinoma. Muscle was present with invasion. From the TURBT on 2/27/24. "Inspection of the bladder showed large necrotic papillary tumor x2 along the posterior wall. There was significant trabeculation with cellules throughout. Bilateral ureteral orifices were visualized with clear efflux. Once surveillance was complete, resectoscope was used to carefully resect the posterior wall tumors. This showed evidence of significant bladder wall thickening, concerning for muscle invasion. Resection was taken down deep with care taken to avoid any significant injury, particularly given the patient's age and obtain appropriate pathology with resection."  - S/p chemoRT with gemcitabine (last dose 4/30/24). Gemcitabine is unlikely to be the cause of his pleural effusion  - CTA chest 6/20/24 does not show PE. Showed a large left pleural effusion with complete passive atelectasis of the left lower lobe. Small, partially loculated right pleural effusion. A new 8 mm right   upper lobe pulmonary nodule concerning for metastasis. Further thickening of bilateral adrenal glands, nonspecific.  - New subcentimeter RUL pulm nodule is too small to biopsy but is indeed suspicious for metastasis  - S/p dx/tx thoracentesis for large left pleural effusion on 6/22/24 with 2.1L removed.   - Previously small right pleural effusion is now larger and s/p right dx/tx thoracentesis with pulm 6/25/24. Left pleural effusion has a slight reaccumulation  - Will need to monitor pt's respiratory status to determine if he will be able to tolerate further DMT  - Cytopathology with "rare, atypical cells seen" - follow-up additional cytology which was sent   - CT A/P with pulmonary nodules, nodular pleural thickening, liver masses, and RP LAD consistent with diffuse metastatic disease.   - Can biopsy one of these sites to confirm metastasis if within patients GOC.   - Follow-up with  oncology as an outpatient to discuss treatment options if within GOC, otherwise, would make a palliative care/hospice referral.       Shakir Junior MD, PGY-6  Hematology/Medical Oncology Fellow  Pager: [phone]  Available on Microsoft Teams  After 5pm or on weekends please contact  to page on-call fellow

## 2024-07-05 NOTE — PROGRESS NOTE ADULT - PROBLEM SELECTOR PLAN 1
Pt with resolving/resolved ODIN in the setting of hypotension, infectious etiology, hypercalcemia v other. SCr of 0.9 on admission, trended up to 1.4 on 7/1. SCr today improved to 0.88 today (pt has had SCr up to 1.4 in the recent past). UA reviewed, no proteinuria. Kidney US without hydronephrosis. Pt with 400cc UOP in past 24 hours. Monitor labs and urine output. Avoid NSAIDs, ACEI/ARBS, RCA and nephrotoxins. Dose medications as per eGFR.

## 2024-07-05 NOTE — PROGRESS NOTE ADULT - SUBJECTIVE AND OBJECTIVE BOX
MABEL ILIA 89y Male      Patient is a 89y old  Male who presents with a chief complaint of SOB (05 Jul 2024 11:02)        INTERVAL HPI/OVERNIGHT EVENTS: No acute events overnight. Patient was seen and evaluated at the bedside. The patient denies pain. Vitals stable. Patient denies fever/chills, chest pain, shortness of breath, abdominal pain, headaches, nausea/vomiting, and diarrhea/constipation.      PHYSICAL EXAM:  GENERAL: NAD  HEAD:  Normocephalic  EYES:  conjunctiva and sclera clear  ENMT: Moist mucous membranes  NECK: Supple   CHEST/LUNG: Good air exchange bilaterally, no wheeze  HEART: Regular rate and rhythm  ABD: Soft, nontender   EXT: No edema, cyanosis, or deformity  NERVOUS SYSTEM:  Alert, awake      Vital Signs Last 24 Hrs  T(C): 36.6 (05 Jul 2024 04:05), Max: 37 (04 Jul 2024 21:07)  T(F): 97.8 (05 Jul 2024 04:05), Max: 98.6 (04 Jul 2024 21:07)  HR: 77 (05 Jul 2024 04:05) (77 - 89)  BP: 100/57 (05 Jul 2024 04:05) (100/57 - 104/53)  BP(mean): --  RR: 18 (05 Jul 2024 04:05) (18 - 18)  SpO2: 97% (05 Jul 2024 04:05) (95% - 97%)    Parameters below as of 05 Jul 2024 04:05  Patient On (Oxygen Delivery Method): nasal cannula  O2 Flow (L/min): 2        MEDICATIONS  (STANDING):  apixaban 2.5 milliGRAM(s) Oral every 12 hours  ascorbic acid 500 milliGRAM(s) Oral daily  benzocaine/menthol Lozenge 1 Lozenge Oral three times a day  chlorhexidine 2% Cloths 1 Application(s) Topical daily  lactated ringers. 1000 milliLiter(s) (60 mL/Hr) IV Continuous <Continuous>  lactobacillus acidophilus 1 Tablet(s) Oral daily  midodrine. 15 milliGRAM(s) Oral every 8 hours  multivitamin/minerals 1 Tablet(s) Oral daily  pantoprazole    Tablet 40 milliGRAM(s) Oral before breakfast  piperacillin/tazobactam IVPB.. 3.375 Gram(s) IV Intermittent every 8 hours  simethicone 80 milliGRAM(s) Chew four times a day  tamsulosin 0.4 milliGRAM(s) Oral at bedtime    MEDICATIONS  (PRN):  acetaminophen     Tablet .. 650 milliGRAM(s) Oral every 6 hours PRN Temp greater or equal to 38C (100.4F), Mild Pain (1 - 3)  albuterol/ipratropium for Nebulization 3 milliLiter(s) Nebulizer every 6 hours PRN Shortness of Breath and/or Wheezing  aluminum hydroxide/magnesium hydroxide/simethicone Suspension 30 milliLiter(s) Oral every 4 hours PRN Dyspepsia  sodium chloride 0.65% Nasal 1 Spray(s) Both Nostrils four times a day PRN Nasal Congestion      Consultant(s) Notes Reviewed:  [X] YES  [ ] NO  Care Discussed with Other Providers [X] YES  [ ] NO  Imaging Personally Reviewed:  [X] YES  [ ] NO      LABS:                        8.2    18.96 )-----------( 192      ( 05 Jul 2024 07:18 )             27.0     07-05    139  |  99  |  31<H>  ----------------------------<  88  3.7   |  27  |  0.88    Ca    12.5<H>      05 Jul 2024 07:20  Phos  2.5     07-04    TPro  6.3  /  Alb  2.5<L>  /  TBili  0.3  /  DBili  x   /  AST  16  /  ALT  21  /  AlkPhos  88  07-04    PTT - ( 04 Jul 2024 06:49 )  PTT:29.3 sec    Urinalysis Basic - ( 05 Jul 2024 07:20 )    Color: x / Appearance: x / SG: x / pH: x  Gluc: 88 mg/dL / Ketone: x  / Bili: x / Urobili: x   Blood: x / Protein: x / Nitrite: x   Leuk Esterase: x / RBC: x / WBC x   Sq Epi: x / Non Sq Epi: x / Bacteria: x               MABEL ILIA 89y Male      Patient is a 89y old  Male who presents with a chief complaint of SOB (05 Jul 2024 11:02)        INTERVAL HPI/OVERNIGHT EVENTS: No acute events overnight. Patient was seen and evaluated at the bedside. The patient denies pain. Vitals stable. Patient denies fever/chills, chest pain, shortness of breath, abdominal pain, headaches, nausea/vomiting, and diarrhea/constipation.      PHYSICAL EXAM:  GENERAL: NAD  HEAD:  Normocephalic  EYES:  conjunctiva and sclera clear  ENMT: Moist mucous membranes  NECK: Supple   CHEST/LUNG: Good air exchange bilaterally, b/l pigtails   HEART: Regular rate and rhythm  ABD: Soft, nontender   EXT: No edema, cyanosis, or deformity  NERVOUS SYSTEM:  Alert, awake      Vital Signs Last 24 Hrs  T(C): 36.6 (05 Jul 2024 04:05), Max: 37 (04 Jul 2024 21:07)  T(F): 97.8 (05 Jul 2024 04:05), Max: 98.6 (04 Jul 2024 21:07)  HR: 77 (05 Jul 2024 04:05) (77 - 89)  BP: 100/57 (05 Jul 2024 04:05) (100/57 - 104/53)  BP(mean): --  RR: 18 (05 Jul 2024 04:05) (18 - 18)  SpO2: 97% (05 Jul 2024 04:05) (95% - 97%)    Parameters below as of 05 Jul 2024 04:05  Patient On (Oxygen Delivery Method): nasal cannula  O2 Flow (L/min): 2        MEDICATIONS  (STANDING):  apixaban 2.5 milliGRAM(s) Oral every 12 hours  ascorbic acid 500 milliGRAM(s) Oral daily  benzocaine/menthol Lozenge 1 Lozenge Oral three times a day  chlorhexidine 2% Cloths 1 Application(s) Topical daily  lactated ringers. 1000 milliLiter(s) (60 mL/Hr) IV Continuous <Continuous>  lactobacillus acidophilus 1 Tablet(s) Oral daily  midodrine. 15 milliGRAM(s) Oral every 8 hours  multivitamin/minerals 1 Tablet(s) Oral daily  pantoprazole    Tablet 40 milliGRAM(s) Oral before breakfast  piperacillin/tazobactam IVPB.. 3.375 Gram(s) IV Intermittent every 8 hours  simethicone 80 milliGRAM(s) Chew four times a day  tamsulosin 0.4 milliGRAM(s) Oral at bedtime    MEDICATIONS  (PRN):  acetaminophen     Tablet .. 650 milliGRAM(s) Oral every 6 hours PRN Temp greater or equal to 38C (100.4F), Mild Pain (1 - 3)  albuterol/ipratropium for Nebulization 3 milliLiter(s) Nebulizer every 6 hours PRN Shortness of Breath and/or Wheezing  aluminum hydroxide/magnesium hydroxide/simethicone Suspension 30 milliLiter(s) Oral every 4 hours PRN Dyspepsia  sodium chloride 0.65% Nasal 1 Spray(s) Both Nostrils four times a day PRN Nasal Congestion      Consultant(s) Notes Reviewed:  [X] YES  [ ] NO  Care Discussed with Other Providers [X] YES  [ ] NO  Imaging Personally Reviewed:  [X] YES  [ ] NO      LABS:                        8.2    18.96 )-----------( 192      ( 05 Jul 2024 07:18 )             27.0     07-05    139  |  99  |  31<H>  ----------------------------<  88  3.7   |  27  |  0.88    Ca    12.5<H>      05 Jul 2024 07:20  Phos  2.5     07-04    TPro  6.3  /  Alb  2.5<L>  /  TBili  0.3  /  DBili  x   /  AST  16  /  ALT  21  /  AlkPhos  88  07-04    PTT - ( 04 Jul 2024 06:49 )  PTT:29.3 sec    Urinalysis Basic - ( 05 Jul 2024 07:20 )    Color: x / Appearance: x / SG: x / pH: x  Gluc: 88 mg/dL / Ketone: x  / Bili: x / Urobili: x   Blood: x / Protein: x / Nitrite: x   Leuk Esterase: x / RBC: x / WBC x   Sq Epi: x / Non Sq Epi: x / Bacteria: x

## 2024-07-06 ENCOUNTER — TRANSCRIPTION ENCOUNTER (OUTPATIENT)
Age: 89
End: 2024-07-06

## 2024-07-06 NOTE — PROGRESS NOTE ADULT - PROBLEM SELECTOR PLAN 2
- As noted on CT scan, pt with soft stool, possibly related to diet  - Leukocytosis possibly : stable, on empiric Zosyn 6/30   - Blood culture 6/2: NGTD, BC 7/5 pending   - Urine Culture  - on Zosyn   - ID following rec to continue with zosyn and get another pleural fluid culture

## 2024-07-06 NOTE — CONSULT NOTE ADULT - PROVIDER SPECIALTY LIST ADULT
Critical Care
Intervent Radiology
Thoracic Surgery
Wound Care
Cardiology
Heme/Onc
Nephrology
Infectious Disease
Pulmonology
MICU
Palliative Care

## 2024-07-06 NOTE — CONSULT NOTE ADULT - CONSULT REQUESTED DATE/TIME
23-Jun-2024 11:48
29-Jun-2024 15:39
02-Jul-2024 18:03
04-Jul-2024 12:02
30-Jun-2024 18:41
21-Jun-2024 13:13
30-Jun-2024 04:49
21-Jun-2024 15:31
22-Jun-2024 17:37
06-Jul-2024 08:57
02-Jul-2024 11:09

## 2024-07-06 NOTE — PROGRESS NOTE ADULT - ASSESSMENT
ASSESSMENT: 89 year old male with hx of stage II bladder cancer s/p TURBT and chemoradiation with gemcitabine (completed 5/2/24). Pt presenting with worsening dyspnea on exertion and orthopnea. CTA chest performed without evidence of PE. Large pleural effusion noted on L with passive atelectasis. New 8mm pulm nodule in RUL also noted. Patient admitted to the medical service. During this admission, patient has required multiple thoracentesis, Left on 6/22 (2.1 L removed) and Right on 6/25 (1.8L removed). Since that time, patient remains on 6L NC, endorsing increased WOB. Cultures from prior thoracentesis are negative, cytopathology showing rare atypical cells. Patient is on Unasyn. Repeat CT on 6/28 showing large L sided loculated effusion and right moderate hydropneumothorax. Thoracic surgery consulted for surgical management of loculated effusion.      6/30   5 L NC   OOB to chair  DC hep gtt  7/1     VSS, WBC down to 21, awaiting IR to place RCT, L Pigtail to WS draining 50 cc serosanguinous drainage this am no airleak,   rt PTC placed by IR  7/2     on high flow o2   chest xray  improving   BL  PTC  drainaing  7/3     remains on HFNC. CXR daily while tubes are in. Monitor CT output, +draining  7/4 VSS, weaned off HFNC, currently on 2L NC SpO2 96%, Left pigtail output 330cc/24h, Right pigtail output 350cc/24h, Maintain B/L pigtails to LWS per Dr. Dobbins, Fluid cytology pending.   7/5 VSS   chest tubes on water seal placed to LWS primary team aware  cytology pending  7/6 VSS family at bedside  chest tubes suction cytology pending

## 2024-07-06 NOTE — CONSULT NOTE ADULT - SUBJECTIVE AND OBJECTIVE BOX
Patient is a 89y old  Male who presents with a chief complaint of SOB (05 Jul 2024 19:05)    HPI:  89 year old male with hx of stage II bladder cancer s/p TURBT and chemoradiation with gemcitabine (completed 5/2/24). Admitted on 6/20 Pt presenting with worsening dyspnea on exertion and orthopnea. Found to have.  AHRF 2/2 pleural effusion; s/p HFNC, now on 2L NC, CTA neg for PE; CT C/A/P w/ metastatic disease, s/p L thoracentesis on 6/22 2.1 L removed, s/p R thoracentesis on 6/25 1.8L removed, s/p L chest tube 6/30, s/p R chest tube 7/1. Developed a fib RVR 7/2. ID consulted for Colitis.    No concern for infection at admission; was started on Unasyn Day 5 because of loculated effusion on CT scan 7/3.    Has been afebrile, persistent leucocytosis on antibiotics.  Pleural cx 6/22, 6/25, 6/30 NGTD  Bcx 7/2 NGTD  Bron cx 6/30, 7/1 NGTD    Pleural fluids cx 7/2 gram stain GNR    Unasyn 6/25-6/30  Zosyn 6/30-7/6    	  prior hospital charts reviewed [  ]  primary team notes reviewed [x  ]  other consultant notes reviewed [ x ]    PAST MEDICAL & SURGICAL HISTORY:  Malignant neoplasm of bladder, unspecified      HLD (hyperlipidemia)      S/P bilateral inguinal hernia repair          Allergies  No Known Allergies    ANTIMICROBIALS (past 90 days)  MEDICATIONS  (STANDING):    ampicillin/sulbactam  IVPB   200 mL/Hr IV Intermittent (06-25-24 @ 09:26)    ampicillin/sulbactam  IVPB   200 mL/Hr IV Intermittent (06-30-24 @ 01:00)   200 mL/Hr IV Intermittent (06-29-24 @ 17:11)   200 mL/Hr IV Intermittent (06-29-24 @ 11:27)   200 mL/Hr IV Intermittent (06-29-24 @ 05:29)   200 mL/Hr IV Intermittent (06-29-24 @ 00:09)   200 mL/Hr IV Intermittent (06-28-24 @ 17:15)   200 mL/Hr IV Intermittent (06-28-24 @ 11:07)   200 mL/Hr IV Intermittent (06-28-24 @ 05:04)   200 mL/Hr IV Intermittent (06-27-24 @ 23:56)   200 mL/Hr IV Intermittent (06-27-24 @ 17:44)   200 mL/Hr IV Intermittent (06-27-24 @ 12:14)   200 mL/Hr IV Intermittent (06-27-24 @ 05:20)   200 mL/Hr IV Intermittent (06-27-24 @ 00:04)   200 mL/Hr IV Intermittent (06-26-24 @ 18:20)   200 mL/Hr IV Intermittent (06-26-24 @ 12:22)   200 mL/Hr IV Intermittent (06-26-24 @ 05:54)   200 mL/Hr IV Intermittent (06-26-24 @ 00:26)   200 mL/Hr IV Intermittent (06-25-24 @ 17:12)    piperacillin/tazobactam IVPB.   200 mL/Hr IV Intermittent (06-30-24 @ 04:00)    piperacillin/tazobactam IVPB.-   25 mL/Hr IV Intermittent (06-30-24 @ 07:30)    piperacillin/tazobactam IVPB..   25 mL/Hr IV Intermittent (07-06-24 @ 05:11)   25 mL/Hr IV Intermittent (07-05-24 @ 21:45)   25 mL/Hr IV Intermittent (07-05-24 @ 13:42)   25 mL/Hr IV Intermittent (07-05-24 @ 05:49)   25 mL/Hr IV Intermittent (07-04-24 @ 21:42)   25 mL/Hr IV Intermittent (07-04-24 @ 13:28)   25 mL/Hr IV Intermittent (07-04-24 @ 05:15)   25 mL/Hr IV Intermittent (07-03-24 @ 21:25)   25 mL/Hr IV Intermittent (07-03-24 @ 13:35)   25 mL/Hr IV Intermittent (07-03-24 @ 06:20)   25 mL/Hr IV Intermittent (07-02-24 @ 21:45)   25 mL/Hr IV Intermittent (07-02-24 @ 13:05)   25 mL/Hr IV Intermittent (07-02-24 @ 05:40)   25 mL/Hr IV Intermittent (07-01-24 @ 22:14)   25 mL/Hr IV Intermittent (07-01-24 @ 13:19)   25 mL/Hr IV Intermittent (07-01-24 @ 06:28)   25 mL/Hr IV Intermittent (06-30-24 @ 22:31)   25 mL/Hr IV Intermittent (06-30-24 @ 13:14)    vancomycin  IVPB   250 mL/Hr IV Intermittent (06-30-24 @ 06:17)        piperacillin/tazobactam IVPB.. 3.375 every 8 hours    MEDICATIONS  (STANDING):  acetaminophen     Tablet .. 650 every 6 hours PRN  albuterol/ipratropium for Nebulization 3 every 6 hours PRN  aluminum hydroxide/magnesium hydroxide/simethicone Suspension 30 every 4 hours PRN  apixaban 2.5 every 12 hours  midodrine. 15 every 8 hours  pantoprazole    Tablet 40 before breakfast  simethicone 80 four times a day  tamsulosin 0.4 at bedtime    SOCIAL HISTORY:       FAMILY HISTORY:    REVIEW OF SYSTEMS  [  ] ROS unobtainable because:    [  ] All other systems negative except as noted below:	    Constitutional:  [ ] fever [ ] chills  [ ] weight loss  [ ] weakness  Skin:  [ ] rash [ ] phlebitis	  Eyes: [ ] icterus [ ] pain  [ ] discharge	  ENMT: [ ] sore throat  [ ] thrush [ ] ulcers [ ] exudates  Respiratory: [ ] dyspnea [ ] hemoptysis [ ] cough [ ] sputum	  Cardiovascular:  [ ] chest pain [ ] palpitations [ ] edema	  Gastrointestinal:  [ ] nausea [ ] vomiting [ ] diarrhea [ ] constipation [ ] pain	  Genitourinary:  [ ] dysuria [ ] frequency [ ] hematuria [ ] discharge [ ] flank pain  [ ] incontinence  Musculoskeletal:  [ ] myalgias [ ] arthralgias [ ] arthritis  [ ] back pain  Neurological:  [ ] headache [ ] seizures  [ ] confusion/altered mental status  Psychiatric:  [ ] anxiety [ ] depression	  Hematology/Lymphatics:  [ ] lymphadenopathy  Endocrine:  [ ] adrenal [ ] thyroid  Allergic/Immunologic:	 [ ] transplant [ ] seasonal    Vital Signs Last 24 Hrs  T(F): 97.8 (07-06-24 @ 04:08), Max: 99.1 (07-02-24 @ 13:01)  Vital Signs Last 24 Hrs  HR: 85 (07-06-24 @ 04:08) (84 - 89)  BP: 102/55 (07-06-24 @ 04:08) (96/53 - 108/56)  RR: 18 (07-06-24 @ 06:41)  SpO2: 95% (07-06-24 @ 06:41) (92% - 96%)  Wt(kg): --    PHYSICAL EXAM:                            8.7    20.04 )-----------( 192      ( 06 Jul 2024 06:11 )             28.7   07-06    139  |  100  |  31<H>  ----------------------------<  88  3.9   |  25  |  0.80    Ca    11.9<H>      06 Jul 2024 06:11    TPro  6.4  /  Alb  2.6<L>  /  TBili  0.3  /  DBili  x   /  AST  13  /  ALT  16  /  AlkPhos  82  07-06    Urinalysis Basic - ( 06 Jul 2024 06:11 )    Color: x / Appearance: x / SG: x / pH: x  Gluc: 88 mg/dL / Ketone: x  / Bili: x / Urobili: x   Blood: x / Protein: x / Nitrite: x   Leuk Esterase: x / RBC: x / WBC x   Sq Epi: x / Non Sq Epi: x / Bacteria: x    MICROBIOLOGY:  Culture - Fungal, Body Fluid (collected 02 Jul 2024 18:15)  Source: Pleural Fl Pleural Fluid  Preliminary Report (03 Jul 2024 23:03):    Culture is being performed. Fungal cultures are held for 4 weeks.    Culture - Body Fluid with Gram Stain (collected 02 Jul 2024 18:15)  Source: Pleural Fl R Chest fluid  Gram Stain (02 Jul 2024 22:37):    Few polymorphonuclear leukocytes per low power field    Rare Gram Negative Rods per oil power field  Preliminary Report (03 Jul 2024 16:28):    No growth to date    Culture - Blood (collected 02 Jul 2024 16:20)  Source: .Blood Blood-Peripheral  Preliminary Report (05 Jul 2024 20:01):    No growth at 72 Hours    Culture - Blood (collected 02 Jul 2024 16:00)  Source: .Blood Blood-Peripheral  Preliminary Report (05 Jul 2024 20:01):    No growth at 72 Hours    Culture - Acid Fast - Bronchial w/Smear (collected 01 Jul 2024 17:09)  Source: .Bronchial Bronchial Lavage  Preliminary Report (03 Jul 2024 23:09):    Culture is being performed.                  RADIOLOGY:  imaging below personally reviewed and agree with findings    < from: CT Abdomen and Pelvis w/ IV Cont (07.03.24 @ 15:26) >    ACC: [id number] EXAM:  CT CHEST IC   ORDERED BY: BELTRAN ANTONIO     ACC: [id number] EXAM:  CT ABDOMEN AND PELVIS IC   ORDERED BY: BELTRAN ANTONIO     PROCEDURE DATE:  07/03/2024          INTERPRETATION:  CLINICAL INFORMATION: History of bladder cancer with new   lung nodules and pleural effusions. Evaluate for abdominal disease   burden, infection.    COMPARISON: CT 6/28/2024, CT abdomen and pelvis 12/29/2023.    CONTRAST/COMPLICATIONS:  IV Contrast: Omnipaque 350  90 cc administered   10 cc discarded  Oral Contrast: NONE  Complications: None reported at time of study completion    PROCEDURE:  CT of the Chest, Abdomen and Pelvis was performed.  Sagittal and coronal reformats were performed.    FINDINGS:  CHEST:  LUNGS AND LARGE AIRWAYS: Secretions in the trachea. Multiple bilateral   lung nodules. For reference:  1.1 cm right upper lobe nodule (3, 79).  1.0 cm right lower lobe nodule (3, 100).    PLEURA: Small bilateral pleural effusions, decreased status post   bilateral chest tube placement. Small bilateral pneumothoraces. Nodular   bilateral pleural thickening.  VESSELS: Aortic calcification. Coronary artery calcification.  HEART: Heart size is normal. Trace pericardial fluid.  MEDIASTINUM AND GRANT: Calcified mediastinal and right hilar lymph nodes.  CHEST WALL AND LOWER NECK: Within normal limits.    ABDOMEN AND PELVIS:  LIVER: Multiple small bilobar hypodense lesions, new from prior CT. For   reference:  Segment 8 lesion (5, 128) 1.4 x 1.3 cm.  Segment 2 lesion (5, 133) 1.1 x 1.1 cm.    BILE DUCTS: Normal caliber.  GALLBLADDER: Within normal limits.  SPLEEN: Within normal limits.  PANCREAS: Pancreatic calcifications, likely the sequela chronic   pancreatitis.  ADRENALS: Within normal limits.  KIDNEYS/URETERS: Within normal limits.    BLADDER: Mild bladder wall thickening.  REPRODUCTIVE ORGANS: Prostate is enlarged.    BOWEL: Colonic diverticulosis. No bowel obstruction. Appendix is normal.   Wall thickening of the sigmoid colon.  PERITONEUM/RETROPERITONEUM: No ascites.  VESSELS: Atherosclerotic changes.  LYMPH NODES: Retroperitoneal lymphadenopathy. For reference: Left   para-aortic lymph node (5, 194) 1.8 x 1.5 cm.  ABDOMINAL WALL: Bilateral inguinal hernia plugs.  BONES: Degenerative changes.    IMPRESSION:    Pulmonary nodules, nodular pleural thickening, hepatic lesions, and   retroperitoneal lymphadenopathy, compatible with metastatic disease.    Small bilateral pleural effusions, decreased status post bilateral chest   tube placement. Small bilateral pneumothoraces.    Wall thickening of the sigmoid colon. Correlate for colitis.      --- End of Report ---     Patient is a 89y old  Male who presents with a chief complaint of SOB (05 Jul 2024 19:05)    HPI:  89 year old male with hx of stage II bladder cancer s/p TURBT and chemoradiation with gemcitabine (completed 5/2/24). Admitted on 6/20 Pt presenting with worsening dyspnea on exertion and orthopnea. Found to have.  AHRF 2/2 pleural effusion; s/p HFNC, now on 2L NC, CTA neg for PE; CT C/A/P w/ metastatic disease, s/p L thoracentesis on 6/22 2.1 L removed, s/p R thoracentesis on 6/25 1.8L removed, s/p L chest tube 6/30, s/p R chest tube 7/1. Developed a fib RVR 7/2. ID consulted for Colitis.    No concern for infection at admission; was started on Unasyn Day 5 because of loculated effusion on CT scan 7/3.    Has been afebrile, persistent leucocytosis on antibiotics.  Pleural cx 6/22, 6/25, 6/30 NGTD  Bcx 7/2 NGTD  Bron cx 6/30, 7/1 NGTD    Pleural fluids cx 7/2 gram stain GNR    Unasyn 6/25-6/30  Zosyn 6/30-7/6    	  prior hospital charts reviewed [  ]  primary team notes reviewed [x  ]  other consultant notes reviewed [ x ]    PAST MEDICAL & SURGICAL HISTORY:  Malignant neoplasm of bladder, unspecified      HLD (hyperlipidemia)      S/P bilateral inguinal hernia repair          Allergies  No Known Allergies    ANTIMICROBIALS (past 90 days)  MEDICATIONS  (STANDING):    ampicillin/sulbactam  IVPB   200 mL/Hr IV Intermittent (06-25-24 @ 09:26)    ampicillin/sulbactam  IVPB   200 mL/Hr IV Intermittent (06-30-24 @ 01:00)   200 mL/Hr IV Intermittent (06-29-24 @ 17:11)   200 mL/Hr IV Intermittent (06-29-24 @ 11:27)   200 mL/Hr IV Intermittent (06-29-24 @ 05:29)   200 mL/Hr IV Intermittent (06-29-24 @ 00:09)   200 mL/Hr IV Intermittent (06-28-24 @ 17:15)   200 mL/Hr IV Intermittent (06-28-24 @ 11:07)   200 mL/Hr IV Intermittent (06-28-24 @ 05:04)   200 mL/Hr IV Intermittent (06-27-24 @ 23:56)   200 mL/Hr IV Intermittent (06-27-24 @ 17:44)   200 mL/Hr IV Intermittent (06-27-24 @ 12:14)   200 mL/Hr IV Intermittent (06-27-24 @ 05:20)   200 mL/Hr IV Intermittent (06-27-24 @ 00:04)   200 mL/Hr IV Intermittent (06-26-24 @ 18:20)   200 mL/Hr IV Intermittent (06-26-24 @ 12:22)   200 mL/Hr IV Intermittent (06-26-24 @ 05:54)   200 mL/Hr IV Intermittent (06-26-24 @ 00:26)   200 mL/Hr IV Intermittent (06-25-24 @ 17:12)    piperacillin/tazobactam IVPB.   200 mL/Hr IV Intermittent (06-30-24 @ 04:00)    piperacillin/tazobactam IVPB.-   25 mL/Hr IV Intermittent (06-30-24 @ 07:30)    piperacillin/tazobactam IVPB..   25 mL/Hr IV Intermittent (07-06-24 @ 05:11)   25 mL/Hr IV Intermittent (07-05-24 @ 21:45)   25 mL/Hr IV Intermittent (07-05-24 @ 13:42)   25 mL/Hr IV Intermittent (07-05-24 @ 05:49)   25 mL/Hr IV Intermittent (07-04-24 @ 21:42)   25 mL/Hr IV Intermittent (07-04-24 @ 13:28)   25 mL/Hr IV Intermittent (07-04-24 @ 05:15)   25 mL/Hr IV Intermittent (07-03-24 @ 21:25)   25 mL/Hr IV Intermittent (07-03-24 @ 13:35)   25 mL/Hr IV Intermittent (07-03-24 @ 06:20)   25 mL/Hr IV Intermittent (07-02-24 @ 21:45)   25 mL/Hr IV Intermittent (07-02-24 @ 13:05)   25 mL/Hr IV Intermittent (07-02-24 @ 05:40)   25 mL/Hr IV Intermittent (07-01-24 @ 22:14)   25 mL/Hr IV Intermittent (07-01-24 @ 13:19)   25 mL/Hr IV Intermittent (07-01-24 @ 06:28)   25 mL/Hr IV Intermittent (06-30-24 @ 22:31)   25 mL/Hr IV Intermittent (06-30-24 @ 13:14)    vancomycin  IVPB   250 mL/Hr IV Intermittent (06-30-24 @ 06:17)        piperacillin/tazobactam IVPB.. 3.375 every 8 hours    MEDICATIONS  (STANDING):  acetaminophen     Tablet .. 650 every 6 hours PRN  albuterol/ipratropium for Nebulization 3 every 6 hours PRN  aluminum hydroxide/magnesium hydroxide/simethicone Suspension 30 every 4 hours PRN  apixaban 2.5 every 12 hours  midodrine. 15 every 8 hours  pantoprazole    Tablet 40 before breakfast  simethicone 80 four times a day  tamsulosin 0.4 at bedtime    SOCIAL HISTORY:  lives at home with son    FAMILY HISTORY: No h/o malignancy in first degree relatives    REVIEW OF SYSTEMS  [  ] ROS unobtainable because:    [x  ] All other systems negative except as noted below:	    Constitutional:  [ ] fever [ ] chills  [ ] weight loss  [x ] weakness  Skin:  [ ] rash [ ] phlebitis	  Eyes: [ ] icterus [ ] pain  [ ] discharge	  ENMT: [ ] sore throat  [ ] thrush [ ] ulcers [ ] exudates  Respiratory: [x ] dyspnea [ ] hemoptysis [ ] cough [ ] sputum	  Cardiovascular:  [ ] chest pain [ ] palpitations [ ] edema	  Gastrointestinal:  [ ] nausea [ ] vomiting [ ] diarrhea [ ] constipation [ ] pain	  Genitourinary:  [ ] dysuria [ ] frequency [ ] hematuria [ ] discharge [ ] flank pain  [ ] incontinence  Musculoskeletal:  [ ] myalgias [ ] arthralgias [ ] arthritis  [ ] back pain  Neurological:  [ ] headache [ ] seizures  [ ] confusion/altered mental status  Psychiatric:  [ ] anxiety [ ] depression	  Hematology/Lymphatics:  [ ] lymphadenopathy  Endocrine:  [ ] adrenal [ ] thyroid  Allergic/Immunologic:	 [ ] transplant [ ] seasonal    Vital Signs Last 24 Hrs  T(F): 97.8 (07-06-24 @ 04:08), Max: 99.1 (07-02-24 @ 13:01)  Vital Signs Last 24 Hrs  HR: 85 (07-06-24 @ 04:08) (84 - 89)  BP: 102/55 (07-06-24 @ 04:08) (96/53 - 108/56)  RR: 18 (07-06-24 @ 06:41)  SpO2: 95% (07-06-24 @ 06:41) (92% - 96%)  Wt(kg): --    PHYSICAL EXAM:    General: Patient in NAD  HEENT: NCAT, EOMI, PERRL, no oral lesions  CV: S1+S2, no m/r/g appreciated   Lungs: No respiratory distress, b/l chest tube in place  Abd: Soft, nontender, no guarding, no rebound tenderness, + bowel sounds   : No suprapubic tenderness  Neuro: Alert and oriented to time, place and person. Moves all extremities  Ext: No cyanosis, no edema  Skin: No rash, no phlebitis                            8.7    20.04 )-----------( 192      ( 06 Jul 2024 06:11 )             28.7   07-06    139  |  100  |  31<H>  ----------------------------<  88  3.9   |  25  |  0.80    Ca    11.9<H>      06 Jul 2024 06:11    TPro  6.4  /  Alb  2.6<L>  /  TBili  0.3  /  DBili  x   /  AST  13  /  ALT  16  /  AlkPhos  82  07-06    Urinalysis Basic - ( 06 Jul 2024 06:11 )    Color: x / Appearance: x / SG: x / pH: x  Gluc: 88 mg/dL / Ketone: x  / Bili: x / Urobili: x   Blood: x / Protein: x / Nitrite: x   Leuk Esterase: x / RBC: x / WBC x   Sq Epi: x / Non Sq Epi: x / Bacteria: x    MICROBIOLOGY:  Culture - Fungal, Body Fluid (collected 02 Jul 2024 18:15)  Source: Pleural Fl Pleural Fluid  Preliminary Report (03 Jul 2024 23:03):    Culture is being performed. Fungal cultures are held for 4 weeks.    Culture - Body Fluid with Gram Stain (collected 02 Jul 2024 18:15)  Source: Pleural Fl R Chest fluid  Gram Stain (02 Jul 2024 22:37):    Few polymorphonuclear leukocytes per low power field    Rare Gram Negative Rods per oil power field  Preliminary Report (03 Jul 2024 16:28):    No growth to date    Culture - Blood (collected 02 Jul 2024 16:20)  Source: .Blood Blood-Peripheral  Preliminary Report (05 Jul 2024 20:01):    No growth at 72 Hours    Culture - Blood (collected 02 Jul 2024 16:00)  Source: .Blood Blood-Peripheral  Preliminary Report (05 Jul 2024 20:01):    No growth at 72 Hours    Culture - Acid Fast - Bronchial w/Smear (collected 01 Jul 2024 17:09)  Source: .Bronchial Bronchial Lavage  Preliminary Report (03 Jul 2024 23:09):    Culture is being performed.                  RADIOLOGY:  imaging below personally reviewed and agree with findings    < from: CT Abdomen and Pelvis w/ IV Cont (07.03.24 @ 15:26) >    ACC: [id number] EXAM:  CT CHEST IC   ORDERED BY: BELTRAN ANTONIO     ACC: [id number] EXAM:  CT ABDOMEN AND PELVIS IC   ORDERED BY: BELTRAN ANTONIO     PROCEDURE DATE:  07/03/2024          INTERPRETATION:  CLINICAL INFORMATION: History of bladder cancer with new   lung nodules and pleural effusions. Evaluate for abdominal disease   burden, infection.    COMPARISON: CT 6/28/2024, CT abdomen and pelvis 12/29/2023.    CONTRAST/COMPLICATIONS:  IV Contrast: Omnipaque 350  90 cc administered   10 cc discarded  Oral Contrast: NONE  Complications: None reported at time of study completion    PROCEDURE:  CT of the Chest, Abdomen and Pelvis was performed.  Sagittal and coronal reformats were performed.    FINDINGS:  CHEST:  LUNGS AND LARGE AIRWAYS: Secretions in the trachea. Multiple bilateral   lung nodules. For reference:  1.1 cm right upper lobe nodule (3, 79).  1.0 cm right lower lobe nodule (3, 100).    PLEURA: Small bilateral pleural effusions, decreased status post   bilateral chest tube placement. Small bilateral pneumothoraces. Nodular   bilateral pleural thickening.  VESSELS: Aortic calcification. Coronary artery calcification.  HEART: Heart size is normal. Trace pericardial fluid.  MEDIASTINUM AND GRANT: Calcified mediastinal and right hilar lymph nodes.  CHEST WALL AND LOWER NECK: Within normal limits.    ABDOMEN AND PELVIS:  LIVER: Multiple small bilobar hypodense lesions, new from prior CT. For   reference:  Segment 8 lesion (5, 128) 1.4 x 1.3 cm.  Segment 2 lesion (5, 133) 1.1 x 1.1 cm.    BILE DUCTS: Normal caliber.  GALLBLADDER: Within normal limits.  SPLEEN: Within normal limits.  PANCREAS: Pancreatic calcifications, likely the sequela chronic   pancreatitis.  ADRENALS: Within normal limits.  KIDNEYS/URETERS: Within normal limits.    BLADDER: Mild bladder wall thickening.  REPRODUCTIVE ORGANS: Prostate is enlarged.    BOWEL: Colonic diverticulosis. No bowel obstruction. Appendix is normal.   Wall thickening of the sigmoid colon.  PERITONEUM/RETROPERITONEUM: No ascites.  VESSELS: Atherosclerotic changes.  LYMPH NODES: Retroperitoneal lymphadenopathy. For reference: Left   para-aortic lymph node (5, 194) 1.8 x 1.5 cm.  ABDOMINAL WALL: Bilateral inguinal hernia plugs.  BONES: Degenerative changes.    IMPRESSION:    Pulmonary nodules, nodular pleural thickening, hepatic lesions, and   retroperitoneal lymphadenopathy, compatible with metastatic disease.    Small bilateral pleural effusions, decreased status post bilateral chest   tube placement. Small bilateral pneumothoraces.    Wall thickening of the sigmoid colon. Correlate for colitis.      --- End of Report ---

## 2024-07-06 NOTE — PROGRESS NOTE ADULT - SUBJECTIVE AND OBJECTIVE BOX
[facility] Division of Hospital Medicine  Radha Vega MD  Available via MS Teams      SUBJECTIVE / OVERNIGHT EVENTS: No acute events over night pt still with 2L NC SPO2    ADDITIONAL REVIEW OF SYSTEMS: ROS reviewed and found to be negative     MEDICATIONS  (STANDING):  apixaban 2.5 milliGRAM(s) Oral every 12 hours  ascorbic acid 500 milliGRAM(s) Oral daily  benzocaine/menthol Lozenge 1 Lozenge Oral three times a day  chlorhexidine 2% Cloths 1 Application(s) Topical daily  lactated ringers. 1000 milliLiter(s) (60 mL/Hr) IV Continuous <Continuous>  lactobacillus acidophilus 1 Tablet(s) Oral daily  midodrine. 15 milliGRAM(s) Oral every 8 hours  multivitamin/minerals 1 Tablet(s) Oral daily  pantoprazole    Tablet 40 milliGRAM(s) Oral before breakfast  piperacillin/tazobactam IVPB.. 3.375 Gram(s) IV Intermittent every 8 hours  simethicone 80 milliGRAM(s) Chew four times a day  tamsulosin 0.4 milliGRAM(s) Oral at bedtime    MEDICATIONS  (PRN):  acetaminophen     Tablet .. 650 milliGRAM(s) Oral every 6 hours PRN Temp greater or equal to 38C (100.4F), Mild Pain (1 - 3)  albuterol/ipratropium for Nebulization 3 milliLiter(s) Nebulizer every 6 hours PRN Shortness of Breath and/or Wheezing  aluminum hydroxide/magnesium hydroxide/simethicone Suspension 30 milliLiter(s) Oral every 4 hours PRN Dyspepsia  sodium chloride 0.65% Nasal 1 Spray(s) Both Nostrils four times a day PRN Nasal Congestion      I&O's Summary    05 Jul 2024 07:01  -  06 Jul 2024 07:00  --------------------------------------------------------  IN: 140 mL / OUT: 1140 mL / NET: -1000 mL    06 Jul 2024 07:01  -  06 Jul 2024 13:32  --------------------------------------------------------  IN: 250 mL / OUT: 0 mL / NET: 250 mL        PHYSICAL EXAM:  Vital Signs Last 24 Hrs  T(C): 36.6 (06 Jul 2024 11:14), Max: 36.6 (05 Jul 2024 20:36)  T(F): 97.9 (06 Jul 2024 11:14), Max: 97.9 (06 Jul 2024 11:14)  HR: 87 (06 Jul 2024 11:14) (84 - 89)  BP: 113/66 (06 Jul 2024 11:14) (102/55 - 113/66)  BP(mean): --  RR: 18 (06 Jul 2024 11:14) (18 - 18)  SpO2: 94% (06 Jul 2024 11:14) (92% - 96%)    Parameters below as of 06 Jul 2024 11:14  Patient On (Oxygen Delivery Method): nasal cannula  O2 Flow (L/min): 2      CONSTITUTIONAL: NAD, elderly, thin, on NC 2L  EYES:  conjunctiva and sclera clear  ENMT: dry oral mucosa,  RESPIRATORY: decreased breath sounds at bases , Left sided crackles and R pigtail   CARDIOVASCULAR: Regular rate and rhythm, normal S1 and S2  No lower extremity edema;  ABDOMEN: Nontender to palpation, normoactive bowel sounds,   MUSCULOSKELETAL:  no clubbing or cyanosis of digits; moving all extremities   PSYCH/NEURO: A+O to person, situation  SKIN: No rashes on exposed surfaces     LABS:                        8.7    20.04 )-----------( 192      ( 06 Jul 2024 06:11 )             28.7     07-06    139  |  100  |  31<H>  ----------------------------<  88  3.9   |  25  |  0.80    Ca    11.9<H>      06 Jul 2024 06:11    TPro  6.4  /  Alb  2.6<L>  /  TBili  0.3  /  DBili  x   /  AST  13  /  ALT  16  /  AlkPhos  82  07-06          Urinalysis Basic - ( 06 Jul 2024 06:11 )    Color: x / Appearance: x / SG: x / pH: x  Gluc: 88 mg/dL / Ketone: x  / Bili: x / Urobili: x   Blood: x / Protein: x / Nitrite: x   Leuk Esterase: x / RBC: x / WBC x   Sq Epi: x / Non Sq Epi: x / Bacteria: x            RADIOLOGY & ADDITIONAL TESTS:  New Imaging Personally Reviewed Today:  New Electrocardiogram Personally Reviewed Today:  Other Results Reviewed Today:   Prior or Outpatient Records Reviewed Today with Summary:    COORDINATION OF CARE:  Consultant Communication and Details of Discussion (where applicable):

## 2024-07-06 NOTE — CONSULT NOTE ADULT - CONSULT REASON
sacral and bilateral buttocks
Muscle-Invasive Bladder Cancer
Colitis
New Onset AF with RVR, Shortness of Breath
Pleural Effusion
Hypotension
C/f VATS
Hypotension
R pigtail chest tube
ODIN
goc

## 2024-07-06 NOTE — PROGRESS NOTE ADULT - PROBLEM SELECTOR PLAN 3
Significant hyponatremia to 120 on admission. Pt has previously been hyponatremic down to 130 however last month, Na was 136  - HOLD lasix    # Hypokalemia: replaced K   # ODIN: resolving, nephro following   # Hypercalcemia in the setting of cancer, immunofixation labs pending, see nephrology note may need pamidronate

## 2024-07-06 NOTE — DISCHARGE NOTE PROVIDER - NSDCFUSCHEDAPPT_GEN_ALL_CORE_FT
Denzel Jenkins  [facility]  UROLOGY [address] [place] [address] R  Scheduled Appointment: 07/31/2024    [facility]  UROLOGY [address] [place] [address] R  Scheduled Appointment: 08/06/2024    Denzel Jenkins  [facility]  UROLOGY [address] [place] [address] R  Scheduled Appointment: 08/06/2024    Perry Cuellar  [facility]  RADMED [address] [place] [address] R  Scheduled Appointment: 08/09/2024

## 2024-07-06 NOTE — PROGRESS NOTE ADULT - PROBLEM SELECTOR PLAN 7
DVT ppx - Eliquis 2.5mg bid   Diet - pureed  Dispo - medically active pending     - 7/6: Discussed plan of care with Nephew at bedside, some frustration with slow results of cytology. Discussed even if treatment option is available if pt would want. Goal to try to get pt home. Discussed ID workup for ongoing elevated lactate and leukocytosis.

## 2024-07-06 NOTE — CONSULT NOTE ADULT - ATTENDING COMMENTS
This is an 89-year-old male past medical history of bladder cancer status post chemoradiation.  Patient is being followed by pulmonology as well as cardiothoracic surgery for bilateral pleural effusions.  Plan is for VATS.  Of note patient with new A-fib RVR this admission therefore initiated on heparin drip.  Per nephew at bedside patient urinated and then had to get cleaned up and became tachycardic and short of breath.  Rapid response was called.  Patient noted to be hypotensive in a fib RVR.  Patient given 250 cc LR bolus as well as patient received 5 mg IV metoprolol.  Patient noted to have improvement of blood pressure as well as heart rate.  Patient was placed on BiPAP for elevated respiratory rate.  Lactic acid noted to be 5.2.  MICU consulted.    On examination patient is on BiPAP he appears comfortable and is asking to have BiPAP removed at this time.  Patient can have trial of BiPAP removal.  Patient is not overtly hypoxic.  Patient is well-perfused.  Can consider stopping heparin drip in case patient requires procedure sooner.  Would follow-up with daytime consulting services.  Would repeat lactic to ensure clearance.    Patient not a MICU candidate at this time.  Please reconsult as necessary.    Plan discussed with covering team at bedside and nephew.
Patient with a bladder cancer status post TURBT followed by treatment with chemo/RT with low-dose gemcitabine admitted with worsening RAMIREZ.  CT scan reveals large left pleural effusion and new RUL nodule along with bilateral adrenal thickening.  Patient needs therapeutic and diagnostic thoracentesis; follow-up cytology.    Octaviano Ruff MD
89-year-old male with a past medical history of stage II bladder cancer status post TURBT, chemoradiation with gemcitabine, completed May 2024 who was admitted to the hospital due to shortness of breath.    Workup obtained after admission revealed a pleural effusion with imaging including CT scans showing metastatic disease.  Patient undergoing a left-sided thoracentesis on 6/22/2024 and then a right-sided thoracentesis on 6/25/2024 with chest tube placement on 6/30/2024 to the left side and chest tube to the right side on 7/1/2024.    Since admission, patient has been afebrile.  Labs show persistent leukocytosis.  Multiple pleural cultures obtained from thoracentesis and chest tube insertions have been negative.  Latest blood cultures on 7/2/2024 negative to date.  Bronchial cultures on 6/30 and 7/1/2024 negative to date.  Additional pleural fluid culture obtained on 7/2/2024 with Gram stain showing gram-negative gelacio, growth pending.    #Abnormal imaging of the chest  #Bilateral pleural effusions status postthoracentesis and chest tube insertion  #Right-sided exudative infusion with GNR on Gram stain  #Leukocytosis  #Colitis  #Metastatic bladder cancer on chemotherapy, immunocompromise status    Recommendations  Concern for right-sided empyema, worsening leukocytosis with organism on Gram stain  Would continue piperacillin/tazobactam for now  Follow right-sided pleural effusion culture for speciation and sensitivity  Monitor stool output  Follow fever curve and WBC count    Ghulam Gray MD  Division of Infectious Diseases
Patient seen and examined.  Agree with resident note as above.  Patient with hx as noted including stage 2 bladder cancer and this admit for acute hypoxic resp failure due to bilateral (presumed malignant) pleural effusions c/b pneumothorax, persistent infiltrates and hypoxia thought due to malignancy vs pneumonia vs other.  Today MICU is called for hypoTN with occasional RVR.  Patient with ~4L chest tube output in the last 48 hours.  +diarrhea today.    On exam, patient's BP is high 80s, alert and responsive, responding to nephew in [place].  On POCUS, patient has bilateral B lines (likely due to mets vs PNA vs other), good biventricular function, RV<LV, IVC<1cm and collapsible with quiet breathing.  Patient has overt hypovolemia.  Chest tube output appears to be slowing.  Family reports output is LESS bloody than yesterday (continues on heparin gtt).    Recs as above and I have edited as appropriate- infectious workup including cx, stool studies, CT A/P.  Please volume resuscitate with LR and albumin.  Trend lactate.  If hgb continues to drop or chest tube output becomes more bloody, would favor stopping heparin gtt.  Continue goals of care discussions.  Family state that patient's ultimate goal is to go home and that if this is not achievable then he would favor palliation.    No need for MICU at this time.
patient was consulted 6/29/24 and seen on 6/30/24.
89 yo Italian-speaking, M w/ PMhx of Stage II bladder cancer s/p TURBT and chemoradiation, presenting with dyspnea and AHRF with new onset Afib on Hep gtt. Found to have b/l pleural effusions L > R, loculated appearing on right. Currently on HFNC 75%/60Lpm. CTA with RUL 8mm nodule and dilated right main/left main PA but no PE. Echo with EF 57%, normal diastolic fcn and mildly enlarged RV. Hep gtt held this AM. Thoracentesis performed today of left sided effusion with drainage of 2.1L of serosanguinous fluid.  Appearance of fluid concerning for malignancy.    Plan:  - f/u pleural studies, CXR and CBC  - resume Heparin gtt once confirmed Hb stable   - will determine need for right sided thoracentesis improvement in resp status/O2 reqs    Tammy Arellano MD, MSCR
89 yo M with a PMH of Bladder CA and BPH who presented to [facility] due to lethargy and weakness. SCr on admission of 0.9 (6/20) but trended up to 1.4 on 7/1. Nephrology consulted for ODIN.  Per Luis/MELLY review, pt has had ranging SCr from 0.9 to 1.4.   Pt seen and examined with attending, team present.. Pt currently on HFNC and is s/p B/L chest tube placement for recurrent effusions Pt also found to have a RUL nodule. Of note, prior to rise in SCr, pt had a RRT for hypotension with BP in the 80s and HR in the 150s. Pt with 800cc of UOP overnight. Hx is limited due to current medical condition. Pt denied n/v/f/c/difficulty with urination. Relative at bedside.  BP at present low.  1.  ARF--likely volume, hemodynamic.  On high flow O2 and would get obtain MICU evaluation for 2 and 4 given potential worsening with volume.  NO HD indication at present.  2.  Hypotension--now in high 70s but mentating.  Oral, potentially IV pressor  3.  Hypercalcemia--not life threatening and likely mitigate 2.  W/U as outlined  4.  Respiratory failure acute--appreciate pulmonary input    discussed with med team  Anand Correia MD  contact me on TEAMs

## 2024-07-06 NOTE — PROGRESS NOTE ADULT - PROBLEM SELECTOR PLAN 4
ECG 6/21 with AF with RVR, spontaneously converted to NSR 6/21  - TTE wnl    - heparin gtt held for drop in Hgb 9 to 7.7 on 7/3, hgb stable around 8, discussed with cards to start on low dose Eliquis 2.5 mg bid 7/5, hgb has been stable  - once BP improves, can consider lopressor 12.5mg BID

## 2024-07-06 NOTE — PROGRESS NOTE ADULT - PROBLEM SELECTOR PLAN 1
Acute hypoxic resp failure 2/2 bilateral pleural effusion  In setting of bladder CA and new pulmonary nodule, concerning for malignancy  CT scan Chest and pel with IV contrast showing: Pulmonary nodules, nodular pleural thickening, hepatic lesions, and   retroperitoneal lymphadenopathy, compatible with metastatic disease. Small bilateral pleural effusions, decreased status post bilateral chest tube placement. Small bilateral pneumothoraces.   - TTE - normal LV and RV fxn, no diastolic dysfunction  - s/p L thoracentesis on 6/22   - s/p R sided thoracentesis on 6/25  - s/p L chest tube 6/30, per throacic surgery   - s/p R chest tube 6/1, wait till putting out <200 cc fluid   - now on Zosyn end date 7/6 for total of 7 days   - Required HiFlow now on NC 2L  - F/U pulm, cardiothoracic and IR teams for further recs  - CXr: 7/6 showing some light increase in left pleural effusion, holding IVF

## 2024-07-06 NOTE — PROGRESS NOTE ADULT - SUBJECTIVE AND OBJECTIVE BOX
Subjective " hello "    y:      Vital Signs Last 24 Hrs  T(C): 36.6 (07-06-24 @ 04:08), Max: 36.6 (07-05-24 @ 20:36)  T(F): 97.8 (07-06-24 @ 04:08), Max: 97.8 (07-05-24 @ 20:36)  HR: 85 (07-06-24 @ 04:08) (84 - 89)  BP: 102/55 (07-06-24 @ 04:08) (96/53 - 108/56)  RR: 18 (07-06-24 @ 06:41) (18 - 18)  SpO2: 95% (07-06-24 @ 06:41) (92% - 96%)             07-05 @ 07:01  -  07-06 @ 07:00  --------------------------------------------------------  IN: 140 mL / OUT: 1140 mL / NET: -1000 mL                              8.7    20.04 )-----------( 192      ( 06 Jul 2024 06:11 )             28.7       07-06    139  |  100  |  31<H>  ----------------------------<  88  3.9   |  25  |  0.80    Ca    11.9<H>      06 Jul 2024 06:11    TPro  6.4  /  Alb  2.6<L>  /  TBili  0.3  /  DBili  x   /  AST  13  /  ALT  16  /  AlkPhos  82  07-06        MEDICATIONS  (STANDING):  apixaban 2.5 milliGRAM(s) Oral every 12 hours  ascorbic acid 500 milliGRAM(s) Oral daily  benzocaine/menthol Lozenge 1 Lozenge Oral three times a day  chlorhexidine 2% Cloths 1 Application(s) Topical daily  lactated ringers. 1000 milliLiter(s) (60 mL/Hr) IV Continuous <Continuous>  lactobacillus acidophilus 1 Tablet(s) Oral daily  midodrine. 15 milliGRAM(s) Oral every 8 hours  multivitamin/minerals 1 Tablet(s) Oral daily  pantoprazole    Tablet 40 milliGRAM(s) Oral before breakfast  piperacillin/tazobactam IVPB.. 3.375 Gram(s) IV Intermittent every 8 hours  simethicone 80 milliGRAM(s) Chew four times a day  sodium chloride 0.9%. 1000 milliLiter(s) (75 mL/Hr) IV Continuous <Continuous>  tamsulosin 0.4 milliGRAM(s) Oral at bedtime    MEDICATIONS  (PRN):  acetaminophen     Tablet .. 650 milliGRAM(s) Oral every 6 hours PRN Temp greater or equal to 38C (100.4F), Mild Pain (1 - 3)  albuterol/ipratropium for Nebulization 3 milliLiter(s) Nebulizer every 6 hours PRN Shortness of Breath and/or Wheezing  aluminum hydroxide/magnesium hydroxide/simethicone Suspension 30 milliLiter(s) Oral every 4 hours PRN Dyspepsia  sodium chloride 0.65% Nasal 1 Spray(s) Both Nostrils four times a day PRN Nasal Congestion        CAPILLARY BLOOD GLUCOSE              Drains:     MS         [  ] Drainage:                 L Pleural  [  ]  Drainage:                R Pleural  [  ]  Drainage:    Pacing Wires        [  ]   Settings:                                  Isolated  [  ]    Coumadin    [ ] YES          [  ]      NO         Reason:                               PHYSICAL EXAM        Neurology: alert and oriented x 3, nonfocal, no gross deficits    CV :    Sternal Wound :  CDI , Stable    Lungs:    Abdomen: soft, nontender, nondistended, positive bowel sounds, last bowel movement     :               Extremities:                                           Physical Therapy Rec:   Home  [  ]   Home w/ PT  [  ]  Rehab  [  ]    Discussed with Cardiothoracic Team at AM rounds. Subjective " hello "    Vital Signs Last 24 Hrs  T(C): 36.6 (07-06-24 @ 04:08), Max: 36.6 (07-05-24 @ 20:36)  T(F): 97.8 (07-06-24 @ 04:08), Max: 97.8 (07-05-24 @ 20:36)  HR: 85 (07-06-24 @ 04:08) (84 - 89)  BP: 102/55 (07-06-24 @ 04:08) (96/53 - 108/56)  RR: 18 (07-06-24 @ 06:41) (18 - 18)  SpO2: 95% (07-06-24 @ 06:41) (92% - 96%)             07-05 @ 07:01  -  07-06 @ 07:00  --------------------------------------------------------  IN: 140 mL / OUT: 1140 mL / NET: -1000 mL                              8.7    20.04 )-----------( 192      ( 06 Jul 2024 06:11 )             28.7       07-06    139  |  100  |  31<H>  ----------------------------<  88  3.9   |  25  |  0.80    Ca    11.9<H>      06 Jul 2024 06:11    TPro  6.4  /  Alb  2.6<L>  /  TBili  0.3  /  DBili  x   /  AST  13  /  ALT  16  /  AlkPhos  82  07-06        MEDICATIONS  (STANDING):  apixaban 2.5 milliGRAM(s) Oral every 12 hours  ascorbic acid 500 milliGRAM(s) Oral daily  benzocaine/menthol Lozenge 1 Lozenge Oral three times a day  chlorhexidine 2% Cloths 1 Application(s) Topical daily  lactated ringers. 1000 milliLiter(s) (60 mL/Hr) IV Continuous <Continuous>  lactobacillus acidophilus 1 Tablet(s) Oral daily  midodrine. 15 milliGRAM(s) Oral every 8 hours  multivitamin/minerals 1 Tablet(s) Oral daily  pantoprazole    Tablet 40 milliGRAM(s) Oral before breakfast  piperacillin/tazobactam IVPB.. 3.375 Gram(s) IV Intermittent every 8 hours  simethicone 80 milliGRAM(s) Chew four times a day  sodium chloride 0.9%. 1000 milliLiter(s) (75 mL/Hr) IV Continuous <Continuous>  tamsulosin 0.4 milliGRAM(s) Oral at bedtime    MEDICATIONS  (PRN):  acetaminophen     Tablet .. 650 milliGRAM(s) Oral every 6 hours PRN Temp greater or equal to 38C (100.4F), Mild Pain (1 - 3)  albuterol/ipratropium for Nebulization 3 milliLiter(s) Nebulizer every 6 hours PRN Shortness of Breath and/or Wheezing  aluminum hydroxide/magnesium hydroxide/simethicone Suspension 30 milliLiter(s) Oral every 4 hours PRN Dyspepsia  sodium chloride 0.65% Nasal 1 Spray(s) Both Nostrils four times a day PRN Nasal Congestion        CAPILLARY BLOOD GLUCOSE              Drains:      Rt chest       [ x ] Drainage:  145/360               L Pleural  [  ]  Drainage:   250/380             R Pleural  [  ]  Drainage:                                     PHYSICAL EXAM        Neurology: alert and oriented x 3, nonfocal, no gross deficits    CV : S1  Lungs:  B/l breaths sound son 4 lnc  Rt pigtail lws  left chest tube lws     Abdomen: soft, nontender, nondistended, positive bowel sounds, last bowel movement   :  nelson           Extremities: warm well perfused equal strength throughout    b/lle + dp no edema                                                 Discussed with Cardiothoracic Team at AM rounds.

## 2024-07-06 NOTE — CONSULT NOTE ADULT - ASSESSMENT
89 year old male with hx of stage II bladder cancer s/p TURBT and chemoradiation with gemcitabine (completed 5/2/24). Admitted on 6/20 Pt presenting with worsening dyspnea on exertion and orthopnea. Found to have.  AHRF 2/2 pleural effusion; s/p HFNC, now on 2L NC, CTA neg for PE; CT C/A/P w/ metastatic disease, s/p L thoracentesis on 6/22 2.1 L removed, s/p R thoracentesis on 6/25 1.8L removed, s/p L chest tube 6/30, s/p R chest tube 7/1. Developed a fib RVR 7/2. ID consulted for Colitis.    No concern for infection at admission; was started on Unasyn Day 5 because of loculated effusion on CT scan.    Has been afebrile, persistent leucocytosis on antibiotics.  Pleural cx 6/22, 6/25, 6/30 NGTD  Bcx 7/2 NGTD  Bron cx 6/30, 7/1 NGTD    Pleural fluids cx 7/2 gram stain GNR    Unasyn 6/25-6/30  Zosyn 6/30-7/6    Pleural effusion, b/l exudative/bloody, increasing WBC count on repeat pleural studies    # Rt sided exudative effusion with concern for infection  # Leucocytosis  # Colitis  # Bladder cancer on Chemo    - concern for rt sided empyema with increasing WBC and GNR on gram stain  - Continue zosyn  pending final cultures  - If loose stools send for C diff and GI PCR  - consider sending rt sided pleural fluid for repeat analysis and cultures    incomplete note        All recommendations are tentative pending Attending Attestation.    Rafa Arreola MD, PGY-4  ID Fellow  [employer] Teams Preferred  After 5pm/weekends call [phone]     89 year old male with hx of stage II bladder cancer s/p TURBT and chemoradiation with gemcitabine (completed 5/2/24). Admitted on 6/20 Pt presenting with worsening dyspnea on exertion and orthopnea. Found to have.  AHRF 2/2 pleural effusion; s/p HFNC, now on 2L NC, CTA neg for PE; CT C/A/P w/ metastatic disease, s/p L thoracentesis on 6/22 2.1 L removed, s/p R thoracentesis on 6/25 1.8L removed, s/p L chest tube 6/30, s/p R chest tube 7/1. Developed a fib RVR 7/2. ID consulted for Colitis.    No concern for infection at admission; was started on Unasyn Day 5 because of loculated effusion on CT scan.    Has been afebrile, persistent leucocytosis on antibiotics.  Pleural cx 6/22, 6/25, 6/30 NGTD  Bcx 7/2 NGTD  Bron cx 6/30, 7/1 NGTD    Pleural fluids cx 7/2 gram stain GNR    Unasyn 6/25-6/30  Zosyn 6/30-7/6      # Rt sided exudative effusion with concern for infection  # Leucocytosis  # Colitis  # Bladder cancer on Chemo    - concern for rt sided empyema with increasing WBC and GNR on gram stain  - Continue zosyn  pending final cultures  - If loose stools send for C diff and GI PCR  - will likely need interval CT chest if leucocytosis continued to worse, fevers or clinically worse      Discussed with attending.    Rafa Arreola MD, PGY-5  ID Fellow  [employer] Teams Preferred  After 5pm/weekends call [phone]

## 2024-07-06 NOTE — PROGRESS NOTE ADULT - ASSESSMENT
Nephew at bedside    88 y/o male, h/o bladder ca tx with rt and chemo  hypoxic resp failure, af, b/l effusions, parapneumonic vs malig or combination  CT cap with pulm nodules , hepatic and abd lymph c/w metastatic disease  pleural fluid - one with atypical cells, the most recent from 7/2 pending  and being tx with zosyn, one with gnr but may have been a contamin  he is afebrile and feeling better  right chest tube reported 145 cc, left 250+, increased. serosang  CXR with trace right effusion, small left  continue to suction for now  f/u cyto

## 2024-07-06 NOTE — PROGRESS NOTE ADULT - PROBLEM SELECTOR PLAN 5
Stage II; s/p TURBT and chemoradiation  - per outpatient records, plan for repeat cystoscopy after treatment; around August 2024     - CT abd pel and chest with IV contrast: Pulmonary nodules, nodular pleural thickening, hepatic lesions, and   retroperitoneal lymphadenopathy, compatible with metastatic disease. Small bilateral pleural effusions, decreased status post bilateral chest tube placement. Small bilateral pneumothoraces. Wall thickening of the sigmoid colon. Correlate for colitis.  - Discussed with Onc, family is requesting conversation with onc about prognosis and further steps based on cytology and CT scan  - Cytology plerual fluid done x2 "findings provide no diagnostic evidence of a malignant lymphoproliferative disorder" and " rare atypical cells", pending one     - Continue tamsulosin  - Monitor I/Os

## 2024-07-06 NOTE — CONSULT NOTE ADULT - CONSULT REQUESTED BY NAME
Dr Vega
Primary team
Medicine
Bee Wallis
Dr. Radha Vega
Medicine
RN
Dr. Reyes
Dr. Skinny Edwards
Dr. Barber
6 tower

## 2024-07-06 NOTE — DISCHARGE NOTE PROVIDER - NSDCMRMEDTOKEN_GEN_ALL_CORE_FT
Eliquis Starter Pack for Treatment of DVT and PE 5 mg oral tablet: 1 tab(s) orally 2 times a day  pantoprazole 40 mg oral delayed release tablet: 1 tab(s) orally once a day  tamsulosin 0.4 mg oral capsule: 1 cap(s) orally once a day (at bedtime)

## 2024-07-06 NOTE — PROGRESS NOTE ADULT - SUBJECTIVE AND OBJECTIVE BOX
DATE OF SERVICE: 07-06-24 @ 14:45    Patient is a 89y old  Male who presents with a chief complaint of SOB (06 Jul 2024 13:32)      INTERVAL HISTORY: no complaints     REVIEW OF SYSTEMS:  CONSTITUTIONAL: No weakness  EYES/ENT: No visual changes;  No throat pain   NECK: No pain or stiffness  RESPIRATORY: No cough, wheezing; No shortness of breath  CARDIOVASCULAR: No chest pain or palpitations  GASTROINTESTINAL: No abdominal  pain. No nausea, vomiting, or hematemesis  GENITOURINARY: No dysuria, frequency or hematuria  NEUROLOGICAL: No stroke like symptoms  SKIN: No rashes      	  MEDICATIONS:  midodrine. 15 milliGRAM(s) Oral every 8 hours        PHYSICAL EXAM:  T(C): 36.6 (07-06-24 @ 11:14), Max: 36.6 (07-05-24 @ 20:36)  HR: 87 (07-06-24 @ 11:14) (84 - 89)  BP: 113/66 (07-06-24 @ 11:14) (102/55 - 113/66)  RR: 18 (07-06-24 @ 11:14) (18 - 18)  SpO2: 94% (07-06-24 @ 11:14) (92% - 96%)  Wt(kg): --  I&O's Summary    05 Jul 2024 07:01  -  06 Jul 2024 07:00  --------------------------------------------------------  IN: 140 mL / OUT: 1140 mL / NET: -1000 mL    06 Jul 2024 07:01  -  06 Jul 2024 14:45  --------------------------------------------------------  IN: 250 mL / OUT: 0 mL / NET: 250 mL          Appearance: In no distress	  HEENT:    PERRL, EOMI	  Cardiovascular:  S1 S2, No JVD  Respiratory: Lungs clear to auscultation	  Gastrointestinal:  Soft, Non-tender, + BS	  Vascularature:  No edema of LE  Psychiatric: Appropriate affect   Neuro: no acute focal deficits                               8.7    20.04 )-----------( 192      ( 06 Jul 2024 06:11 )             28.7     07-06    139  |  100  |  31<H>  ----------------------------<  88  3.9   |  25  |  0.80    Ca    11.9<H>      06 Jul 2024 06:11    TPro  6.4  /  Alb  2.6<L>  /  TBili  0.3  /  DBili  x   /  AST  13  /  ALT  16  /  AlkPhos  82  07-06        Labs personally reviewed      ASSESSMENT/PLAN: 	    89M with hx of Stage II bladder cancer s/p TURBT and chemoradiation, presenting with dyspnea and orthopnea. a/w Acute hypoxic respiratory failure also found to have large L pleural effusion. Found to have new onset AF with RVR.    1. New Onset Atrial Fibrillation with Rapid Ventricular Response  - ECG 6/21 with AF with RVR  - Spontaneously converted to SR 6/21  - TTE wnl  - TSH wnl  - [id number] of 2 (age and gender).  - Transition to Eliquis 2.5mg BID (lower off label dose as recent drop in H/H)    2. Shortness of Breath  - CTA neg for PE but found to have large left pleural effusion now s/p thora with 2.1L removed on 6/22  -   - TTE wnl, normal filling pressures, preserved EF  - s/p L thoracentesis 6/25 2.1L off  - now on 6L NC  - 6/28 CT with Very large loculated left pleural effusion and small to moderate size loculated right hydropneumothorax.  - S/p L pigtail     3. Hyponatremia  - Slowly improving, now 131  - Lasix on hold    4. Hypotension  - systolic 70s- 100  - midodrine 15mg TID started  - lactate 7.4 now 4.8  - NS bolus x 1 given            EAN Diaz DO [facility]  Cardiovascular Medicine  [address], [address]  Office: [phone]  Available via call/text on Microsoft Teams

## 2024-07-06 NOTE — PROGRESS NOTE ADULT - SUBJECTIVE AND OBJECTIVE BOX
REVIEW OF SYSTEMS:  dry throat. denies sob or cp  x[ ] All other systems negative  [ ] Unable to assess ROS because ________    OBJECTIVE:  ICU Vital Signs Last 24 Hrs  T(C): 36.6 (06 Jul 2024 04:08), Max: 36.6 (05 Jul 2024 20:36)  T(F): 97.8 (06 Jul 2024 04:08), Max: 97.8 (05 Jul 2024 20:36)  HR: 85 (06 Jul 2024 04:08) (84 - 89)  BP: 102/55 (06 Jul 2024 04:08) (96/53 - 108/56)  BP(mean): --  ABP: --  ABP(mean): --  RR: 18 (06 Jul 2024 06:41) (18 - 18)  SpO2: 95% (06 Jul 2024 06:41) (92% - 96%)    O2 Parameters below as of 06 Jul 2024 06:41  Patient On (Oxygen Delivery Method): nasal cannula  O2 Flow (L/min): 2            07-05 @ 07:01  -  07-06 @ 07:00  --------------------------------------------------------  IN: 140 mL / OUT: 1140 mL / NET: -1000 mL      CAPILLARY BLOOD GLUCOSE          PHYSICAL EXAM:  awake and alert  no distress  b/l pigtail catheters  clear lungs  abd neg  no edema    HOSPITAL MEDICATIONS:  MEDICATIONS  (STANDING):  apixaban 2.5 milliGRAM(s) Oral every 12 hours  ascorbic acid 500 milliGRAM(s) Oral daily  benzocaine/menthol Lozenge 1 Lozenge Oral three times a day  chlorhexidine 2% Cloths 1 Application(s) Topical daily  lactated ringers. 1000 milliLiter(s) (60 mL/Hr) IV Continuous <Continuous>  lactobacillus acidophilus 1 Tablet(s) Oral daily  midodrine. 15 milliGRAM(s) Oral every 8 hours  multivitamin/minerals 1 Tablet(s) Oral daily  pantoprazole    Tablet 40 milliGRAM(s) Oral before breakfast  piperacillin/tazobactam IVPB.. 3.375 Gram(s) IV Intermittent every 8 hours  simethicone 80 milliGRAM(s) Chew four times a day  sodium chloride 0.9%. 1000 milliLiter(s) (75 mL/Hr) IV Continuous <Continuous>  tamsulosin 0.4 milliGRAM(s) Oral at bedtime    MEDICATIONS  (PRN):  acetaminophen     Tablet .. 650 milliGRAM(s) Oral every 6 hours PRN Temp greater or equal to 38C (100.4F), Mild Pain (1 - 3)  albuterol/ipratropium for Nebulization 3 milliLiter(s) Nebulizer every 6 hours PRN Shortness of Breath and/or Wheezing  aluminum hydroxide/magnesium hydroxide/simethicone Suspension 30 milliLiter(s) Oral every 4 hours PRN Dyspepsia  sodium chloride 0.65% Nasal 1 Spray(s) Both Nostrils four times a day PRN Nasal Congestion      LABS:                        8.7    20.04 )-----------( 192      ( 06 Jul 2024 06:11 )             28.7     07-06    139  |  100  |  31<H>  ----------------------------<  88  3.9   |  25  |  0.80    Ca    11.9<H>      06 Jul 2024 06:11    TPro  6.4  /  Alb  2.6<L>  /  TBili  0.3  /  DBili  x   /  AST  13  /  ALT  16  /  AlkPhos  82  07-06      Urinalysis Basic - ( 06 Jul 2024 06:11 )    Color: x / Appearance: x / SG: x / pH: x  Gluc: 88 mg/dL / Ketone: x  / Bili: x / Urobili: x   Blood: x / Protein: x / Nitrite: x   Leuk Esterase: x / RBC: x / WBC x   Sq Epi: x / Non Sq Epi: x / Bacteria: x            MICROBIOLOGY:     RADIOLOGY:  [ ] Reviewed and interpreted by me    Point of Care Ultrasound Findings:    PFT:    EKG:

## 2024-07-06 NOTE — DISCHARGE NOTE PROVIDER - HOSPITAL COURSE
HPI:  89 year old male with hx of stage II bladder cancer s/p TURBT and chemoradiation with gemcitabine (completed 5/2/24). Pt presenting with worsening dyspnea on exertion and orthopnea for the past 3 days. Since starting chemoradiation, patient has been feeling weaker and with poor appetite. He has urinary urgency since the TURBT; he takes tamsulosin in the evening. Per nephew, patient has been keeping his A/C low at home so it has been hot in side his home. He started developing worsening shortness of breath a few days ago. No flu like symptoms, no cough, no fever, no chest pain. Pt has been trying to drink lots of fluids and would also drink ensure. He lives alone and is independent in his ADLs.     In the ED, patient was noted to be hypoxic requiring nonrebreather. CXR showed B/L pleural effusions, L>R. CTA chest performed without evidence of PE. Large pleural effusion noted on L with passive atelectasis. New 8mm pulm nodule in RUL also noted. Pt also significantly hyponatremic to 120. Pt admitted for further workup and management.  (20 Jun 2024 21:49)    Hospital Course:    Patient admitted for Acute hypoxic respiratory failure 2/2 bilateral pleural effusion in setting of bladder CA and new pulmonary nodule, concerning for malignancy requiring Hiflo then transitioned to 2LNC. Pulmonary, Cardiothoracic and IR, Heme/Oncology and Nephrology consulted for further evaluation and management. CT scan of Chest and pelvic with IV contrast showing: Pulmonary nodules, nodular pleural thickening, hepatic lesions, and retroperitoneal lymphadenopathy, compatible with metastatic disease. Small bilateral pleural effusions, decreased status post bilateral chest tube placement. Small bilateral pneumothoraces. Patient s/p multiple thoracentesis - R sided thoracentesis on 6/25; L chest tube 6/30; R chest tube 6/1. Patient s/p Zosyn for colitis with end date 7/6 for total of 7 days. Patient found to have lactate elevated likely 2/2 hypoperfusion BP improved. On midodrine. Patient treated for Colitis. Patient treated for Significant hyponatremia to 120 on admission. Pt has previously been hyponatremic down to 130 however last month, Na was 136  Patient also with Hypokalemia and ODIN resolved. Patient with hypercalcemia in the setting of cancer, no acute interventions recommended. Patient with paroxysmal atrial fibrillation on ECG 6/21 with AF with RVR, spontaneously converted to NSR 6/21 with TTE wnl.    Patient is in stable condition.  Discharge/Dispo/Med rec discussed with attending   . Patient medically cleared for discharge home with outpatient follow up.      Important Medication Changes and Reason:      Active or Pending Issues Requiring Follow-up:  Follow up with PCP  Follow up with    Advanced Directives:   [ ] Full code  [ ] DNR  [ ] Hospice      Discharge Diagnoses:

## 2024-07-07 NOTE — PROGRESS NOTE ADULT - PROBLEM SELECTOR PLAN 1
s/p Left PTC 6/30 by surgical resident  s/p Right PTC 7/1 by IR  Maintain left and right pigtail to LWS  daily CXR while tubes are in  Monitor chest tube outputs, Strict I & Os  Follow up pleural fluid cytopathology sent 7/2  Pleurx placement for comfort care- plan for next week, date TBD  Continue care as per primary team

## 2024-07-07 NOTE — CHART NOTE - NSCHARTNOTEFT_GEN_A_CORE
Case d/w Hospitalist yesterday evening - pt's family now considering comfort care, starting to come to the understanding of the metastatic disease and that pt's goal is to go home  Pleural culture is negative, but still with output of 390 and 315 recorded. Yesterday's CXR still with sig effusion on the left  Consider changing left tube to a pleurx  If right catheter output decreases, can try clamp

## 2024-07-07 NOTE — PROGRESS NOTE ADULT - PROBLEM SELECTOR PLAN 5
Stage II; s/p TURBT and chemoradiation  - per outpatient records, plan for repeat cystoscopy after treatment; around August 2024     - CT abd pel and chest with IV contrast: Pulmonary nodules, nodular pleural thickening, hepatic lesions, and   retroperitoneal lymphadenopathy, compatible with metastatic disease. Small bilateral pleural effusions, decreased status post bilateral chest tube placement. Small bilateral pneumothoraces. Wall thickening of the sigmoid colon. Correlate for colitis.  - Cytology plerual fluid done x2 "findings provide no diagnostic evidence of a malignant lymphoproliferative disorder" and " rare atypical cells", pending one - though family not in agreement for further treatment  - Family's goal is to get pt home for home hospice, consult made with SW       - Continue tamsulosin  - Monitor I/Os

## 2024-07-07 NOTE — PROGRESS NOTE ADULT - PROBLEM SELECTOR PLAN 1
Acute hypoxic resp failure 2/2 bilateral pleural effusion  In setting of bladder CA and new pulmonary nodule, concerning for malignancy  CT scan Chest and pel with IV contrast showing: Pulmonary nodules, nodular pleural thickening, hepatic lesions, and   retroperitoneal lymphadenopathy, compatible with metastatic disease. Small bilateral pleural effusions, decreased status post bilateral chest tube placement. Small bilateral pneumothoraces.   - TTE - normal LV and RV fxn, no diastolic dysfunction  - s/p L thoracentesis on 6/22   - s/p R sided thoracentesis on 6/25  - s/p L chest tube 6/30, per throacic surgery   - s/p R chest tube 6/1, wait till putting out <200 cc fluid   - now on Zosyn end date 7/6 for total of 7 days   - Required HiFlow now on NC 2L  - Plan for Left PleurX with CTS per note, Right side may consider clamp depending on output per pulm  - CXr: 7/7 stable pleural effusion with atelectasis, holding IVF

## 2024-07-07 NOTE — PROGRESS NOTE ADULT - PROBLEM SELECTOR PLAN 3
Significant hyponatremia to 120 on admission. Pt has previously been hyponatremic down to 130 however last month, Na was 136  - HOLD lasix    # Hypokalemia: replaced K   # ODIN: resolved, nephro following   # Hypercalcemia in the setting of cancer, pamidronate

## 2024-07-07 NOTE — PROGRESS NOTE ADULT - PROBLEM SELECTOR PLAN 2
- As noted on CT scan, pt with soft stool, possibly related to diet though no abdominal pain noted  - Leukocytosis possibly : stable, on empiric Zosyn 6/30   - Blood culture 6/2: NGTD, BC 7/5 NGTD  - Unclear is this is an infectious process vs cancer vs perfusion  - on Zosyn   - ID following rec to continue with zosyn and get another pleural fluid culture, possible empyema? - fluid seems serosanguinous - As noted on CT scan, pt with soft stool, possibly related to diet though no abdominal pain noted  - Leukocytosis possibly : stable, on empiric Zosyn 6/30   - Blood culture 6/2: NGTD, BC 7/5 NGTD  - Lactate elevated as well, holding fluids d/t slight increase in pleural effusion yesterday, unclear is this is an infectious process vs cancer vs perfusion  - on Zosyn   - ID following rec to continue with zosyn and get another pleural fluid culture, possible empyema? - fluid seems serosanguinous

## 2024-07-07 NOTE — PROGRESS NOTE ADULT - PROBLEM SELECTOR PLAN 7
DVT ppx - Eliquis 2.5mg bid   Diet - pureed, discussed with family they can bring food pt likes   Dispo - Pending home hospice    - 7/7: Discussed plan of care with Keesha Peterson at bedside and on the phone last night. They do not believe the pt is strong enough to continue with further cancer treatment even if eligible. They request home hospice.. Consult with SW made. Discussed PleurX for comfort. CTS to evaluate.   ALDAIR signed with Nicholas on 7/7 DNR/ DNI with trial of NIV

## 2024-07-07 NOTE — PROGRESS NOTE ADULT - SUBJECTIVE AND OBJECTIVE BOX
Patient is a 89y old  Male who presents with a chief complaint of SOB (06 Jul 2024 14:45)      Vital Signs Last 24 Hrs  T(C): 36.6 (07-07-24 @ 12:31), Max: 36.7 (07-07-24 @ 04:37)  T(F): 97.8 (07-07-24 @ 12:31), Max: 98 (07-07-24 @ 04:37)  HR: 97 (07-07-24 @ 12:31) (84 - 97)  BP: 110/60 (07-07-24 @ 12:31) (104/55 - 110/60)  RR: 18 (07-07-24 @ 12:31) (17 - 18)  SpO2: 97% (07-07-24 @ 12:31) (95% - 97%)            07-06-24 @ 07:01  -  07-07-24 @ 07:00  --------------------------------------------------------  IN: 620 mL / OUT: 1305 mL / NET: -685 mL                          8.8    19.19 )-----------( 215      ( 07 Jul 2024 06:48 )             29.4     138  |  101  |  30<H>  ----------------------------<  90  3.9   |  26  |  0.71    AST  13  /  ALT  16  /  AlkPhos  82  07-06          PHYSICAL EXAM  Neurology: A&Ox3, NAD  CV : RRR+S1S2  Lungs: Respirations non-labored, B/L BS clear, diminished at bases  +Right and Left pigtail to LWS with serosanguinous drainage, no air leak  Abdomen: Soft, NT/ND, +[id number]  Extremities: B/L LE warm, no edema, +PP        MEDICATIONS  acetaminophen     Tablet .. 650 milliGRAM(s) Oral every 6 hours PRN  albuterol/ipratropium for Nebulization 3 milliLiter(s) Nebulizer every 6 hours PRN  aluminum hydroxide/magnesium hydroxide/simethicone Suspension 30 milliLiter(s) Oral every 4 hours PRN  apixaban 2.5 milliGRAM(s) Oral every 12 hours  ascorbic acid 500 milliGRAM(s) Oral daily  benzocaine/menthol Lozenge 1 Lozenge Oral three times a day  chlorhexidine 2% Cloths 1 Application(s) Topical daily  lactated ringers. 1000 milliLiter(s) IV Continuous <Continuous>  lactobacillus acidophilus 1 Tablet(s) Oral daily  midodrine. 15 milliGRAM(s) Oral every 8 hours  multivitamin/minerals 1 Tablet(s) Oral daily  pantoprazole    Tablet 40 milliGRAM(s) Oral before breakfast  simethicone 80 milliGRAM(s) Chew four times a day  sodium chloride 0.65% Nasal 1 Spray(s) Both Nostrils four times a day PRN  tamsulosin 0.4 milliGRAM(s) Oral at bedtime

## 2024-07-07 NOTE — PROGRESS NOTE ADULT - SUBJECTIVE AND OBJECTIVE BOX
[facility] Division of Hospital Medicine  Radha Vega MD  Available via MS Teams      SUBJECTIVE / OVERNIGHT EVENTS: No acute events overnight. No reported pain    ADDITIONAL REVIEW OF SYSTEMS: Pt declined participation    MEDICATIONS  (STANDING):  apixaban 2.5 milliGRAM(s) Oral every 12 hours  ascorbic acid 500 milliGRAM(s) Oral daily  benzocaine/menthol Lozenge 1 Lozenge Oral three times a day  chlorhexidine 2% Cloths 1 Application(s) Topical daily  lactated ringers. 1000 milliLiter(s) (60 mL/Hr) IV Continuous <Continuous>  lactobacillus acidophilus 1 Tablet(s) Oral daily  midodrine. 15 milliGRAM(s) Oral every 8 hours  multivitamin/minerals 1 Tablet(s) Oral daily  pamidronate IVPB 60 milliGRAM(s) IV Intermittent once  pantoprazole    Tablet 40 milliGRAM(s) Oral before breakfast  simethicone 80 milliGRAM(s) Chew four times a day  tamsulosin 0.4 milliGRAM(s) Oral at bedtime    MEDICATIONS  (PRN):  acetaminophen     Tablet .. 650 milliGRAM(s) Oral every 6 hours PRN Temp greater or equal to 38C (100.4F), Mild Pain (1 - 3)  albuterol/ipratropium for Nebulization 3 milliLiter(s) Nebulizer every 6 hours PRN Shortness of Breath and/or Wheezing  aluminum hydroxide/magnesium hydroxide/simethicone Suspension 30 milliLiter(s) Oral every 4 hours PRN Dyspepsia  sodium chloride 0.65% Nasal 1 Spray(s) Both Nostrils four times a day PRN Nasal Congestion      I&O's Summary    06 Jul 2024 07:01  -  07 Jul 2024 07:00  --------------------------------------------------------  IN: 620 mL / OUT: 1305 mL / NET: -685 mL    07 Jul 2024 07:01  -  07 Jul 2024 14:58  --------------------------------------------------------  IN: 220 mL / OUT: 780 mL / NET: -560 mL        PHYSICAL EXAM:  Vital Signs Last 24 Hrs  T(C): 36.6 (07 Jul 2024 12:31), Max: 36.7 (07 Jul 2024 04:37)  T(F): 97.8 (07 Jul 2024 12:31), Max: 98 (07 Jul 2024 04:37)  HR: 93 (07 Jul 2024 13:39) (84 - 97)  BP: 113/61 (07 Jul 2024 13:39) (104/55 - 113/61)  BP(mean): --  RR: 18 (07 Jul 2024 12:31) (17 - 18)  SpO2: 97% (07 Jul 2024 12:31) (95% - 97%)    Parameters below as of 07 Jul 2024 12:31  Patient On (Oxygen Delivery Method): nasal cannula    O2 Concentration (%): 2  CONSTITUTIONAL: NAD, elderly, thin, on NC 2L  EYES:  conjunctiva and sclera clear  ENMT: dry oral mucosa,  RESPIRATORY: decreased breath sounds at bases , Left sided crackles and R pigtail   CARDIOVASCULAR: Regular rate and rhythm, normal S1 and S2  No lower extremity edema;  ABDOMEN: Nontender to palpation, normoactive bowel sounds,   MUSCULOSKELETAL:  no clubbing or cyanosis of digits; moving all extremities   PSYCH/NEURO: A+O to person, situation  SKIN: No rashes on exposed surfaces     LABS:                        8.8    19.19 )-----------( 215      ( 07 Jul 2024 06:48 )             29.4     07-07    138  |  101  |  30<H>  ----------------------------<  90  3.9   |  26  |  0.71    Ca    12.5<H>      07 Jul 2024 06:48  Phos  1.8     07-07  Mg     2.2     07-07    TPro  6.4  /  Alb  2.6<L>  /  TBili  0.3  /  DBili  x   /  AST  13  /  ALT  16  /  AlkPhos  82  07-06          Urinalysis Basic - ( 07 Jul 2024 06:48 )    Color: x / Appearance: x / SG: x / pH: x  Gluc: 90 mg/dL / Ketone: x  / Bili: x / Urobili: x   Blood: x / Protein: x / Nitrite: x   Leuk Esterase: x / RBC: x / WBC x   Sq Epi: x / Non Sq Epi: x / Bacteria: x        Culture - Blood (collected 05 Jul 2024 16:02)  Source: .Blood Blood-Peripheral  Preliminary Report (06 Jul 2024 20:01):    No growth at 24 hours    Culture - Blood (collected 05 Jul 2024 15:58)  Source: .Blood Blood-Peripheral  Preliminary Report (06 Jul 2024 20:01):    No growth at 24 hours          RADIOLOGY & ADDITIONAL TESTS:  New Imaging Personally Reviewed Today:  New Electrocardiogram Personally Reviewed Today:  Other Results Reviewed Today:   Prior or Outpatient Records Reviewed Today with Summary:    COORDINATION OF CARE:  Consultant Communication and Details of Discussion (where applicable):     [facility] Division of Hospital Medicine  Radha Vega MD  Available via MS Teams      SUBJECTIVE / OVERNIGHT EVENTS: No acute events overnight. No reported pain    ADDITIONAL REVIEW OF SYSTEMS: Pt declined participation    MEDICATIONS  (STANDING):  apixaban 2.5 milliGRAM(s) Oral every 12 hours  ascorbic acid 500 milliGRAM(s) Oral daily  benzocaine/menthol Lozenge 1 Lozenge Oral three times a day  chlorhexidine 2% Cloths 1 Application(s) Topical daily  lactated ringers. 1000 milliLiter(s) (60 mL/Hr) IV Continuous <Continuous>  lactobacillus acidophilus 1 Tablet(s) Oral daily  midodrine. 15 milliGRAM(s) Oral every 8 hours  multivitamin/minerals 1 Tablet(s) Oral daily  pamidronate IVPB 60 milliGRAM(s) IV Intermittent once  pantoprazole    Tablet 40 milliGRAM(s) Oral before breakfast  simethicone 80 milliGRAM(s) Chew four times a day  tamsulosin 0.4 milliGRAM(s) Oral at bedtime    MEDICATIONS  (PRN):  acetaminophen     Tablet .. 650 milliGRAM(s) Oral every 6 hours PRN Temp greater or equal to 38C (100.4F), Mild Pain (1 - 3)  albuterol/ipratropium for Nebulization 3 milliLiter(s) Nebulizer every 6 hours PRN Shortness of Breath and/or Wheezing  aluminum hydroxide/magnesium hydroxide/simethicone Suspension 30 milliLiter(s) Oral every 4 hours PRN Dyspepsia  sodium chloride 0.65% Nasal 1 Spray(s) Both Nostrils four times a day PRN Nasal Congestion      I&O's Summary    06 Jul 2024 07:01  -  07 Jul 2024 07:00  --------------------------------------------------------  IN: 620 mL / OUT: 1305 mL / NET: -685 mL    07 Jul 2024 07:01  -  07 Jul 2024 14:58  --------------------------------------------------------  IN: 220 mL / OUT: 780 mL / NET: -560 mL        PHYSICAL EXAM:  Vital Signs Last 24 Hrs  T(C): 36.6 (07 Jul 2024 12:31), Max: 36.7 (07 Jul 2024 04:37)  T(F): 97.8 (07 Jul 2024 12:31), Max: 98 (07 Jul 2024 04:37)  HR: 93 (07 Jul 2024 13:39) (84 - 97)  BP: 113/61 (07 Jul 2024 13:39) (104/55 - 113/61)  BP(mean): --  RR: 18 (07 Jul 2024 12:31) (17 - 18)  SpO2: 97% (07 Jul 2024 12:31) (95% - 97%)    Parameters below as of 07 Jul 2024 12:31  Patient On (Oxygen Delivery Method): nasal cannula    O2 Concentration (%): 2  CONSTITUTIONAL: NAD, elderly, thin, on NC 2L  EYES:  conjunctiva and sclera clear  ENMT: dry oral mucosa,  RESPIRATORY: decreased breath sounds at bases , Left sided crackles and L / R pigtail draining to gravity - serosanguinous fluid   CARDIOVASCULAR: Regular rate and rhythm, normal S1 and S2  No lower extremity edema;  ABDOMEN: Nontender to palpation, normoactive bowel sounds,   MUSCULOSKELETAL:  no clubbing or cyanosis of digits; moving all extremities   PSYCH/NEURO: A+O to person, situation  SKIN: No rashes on exposed surfaces     LABS:                        8.8    19.19 )-----------( 215      ( 07 Jul 2024 06:48 )             29.4     07-07    138  |  101  |  30<H>  ----------------------------<  90  3.9   |  26  |  0.71    Ca    12.5<H>      07 Jul 2024 06:48  Phos  1.8     07-07  Mg     2.2     07-07    TPro  6.4  /  Alb  2.6<L>  /  TBili  0.3  /  DBili  x   /  AST  13  /  ALT  16  /  AlkPhos  82  07-06          Urinalysis Basic - ( 07 Jul 2024 06:48 )    Color: x / Appearance: x / SG: x / pH: x  Gluc: 90 mg/dL / Ketone: x  / Bili: x / Urobili: x   Blood: x / Protein: x / Nitrite: x   Leuk Esterase: x / RBC: x / WBC x   Sq Epi: x / Non Sq Epi: x / Bacteria: x        Culture - Blood (collected 05 Jul 2024 16:02)  Source: .Blood Blood-Peripheral  Preliminary Report (06 Jul 2024 20:01):    No growth at 24 hours    Culture - Blood (collected 05 Jul 2024 15:58)  Source: .Blood Blood-Peripheral  Preliminary Report (06 Jul 2024 20:01):    No growth at 24 hours          RADIOLOGY & ADDITIONAL TESTS:  New Imaging Personally Reviewed Today:  New Electrocardiogram Personally Reviewed Today:  Other Results Reviewed Today:   Prior or Outpatient Records Reviewed Today with Summary:    COORDINATION OF CARE:  Consultant Communication and Details of Discussion (where applicable):

## 2024-07-07 NOTE — PROGRESS NOTE ADULT - ASSESSMENT
ASSESSMENT: 89 year old male with hx of stage II bladder cancer s/p TURBT and chemoradiation with gemcitabine (completed 5/2/24). Pt presenting with worsening dyspnea on exertion and orthopnea. CTA chest performed without evidence of PE. Large pleural effusion noted on L with passive atelectasis. New 8mm pulm nodule in RUL also noted. Patient admitted to the medical service. During this admission, patient has required multiple thoracentesis, Left on 6/22 (2.1 L removed) and Right on 6/25 (1.8L removed). Since that time, patient remains on 6L NC, endorsing increased WOB. Cultures from prior thoracentesis are negative, cytopathology showing rare atypical cells. Patient is on Unasyn. Repeat CT on 6/28 showing large L sided loculated effusion and right moderate hydropneumothorax. Thoracic surgery consulted for surgical management of loculated effusion.      6/30   5 L NC   OOB to chair  DC hep gtt  7/1     VSS, WBC down to 21, awaiting IR to place RCT, L Pigtail to WS draining 50 cc serosanguinous drainage this am no airleak,   rt PTC placed by IR  7/2     on high flow o2   chest xray  improving   BL  PTC  drainaing  7/3     remains on HFNC. CXR daily while tubes are in. Monitor CT output, +draining  7/4 VSS, weaned off HFNC, currently on 2L NC SpO2 96%, Left pigtail output 330cc/24h, Right pigtail output 350cc/24h, Maintain B/L pigtails to LWS per Dr. Dobbins, Fluid cytology pending.   7/5 VSS   chest tubes on water seal placed to LWS primary team aware  cytology pending  7/6 VSS family at bedside  chest tubes suction cytology pending   7/7 VSS, Left pigtail out /24h, Right pigtail output /24h. Maintain b/l pigtails to LWS. CXR with persistent left pleural effusion.

## 2024-07-07 NOTE — PROGRESS NOTE ADULT - SUBJECTIVE AND OBJECTIVE BOX
DATE OF SERVICE: 07-07-24 @ 16:28    Patient is a 89y old  Male who presents with a chief complaint of SOB (07 Jul 2024 14:58)      INTERVAL HISTORY: no complaints     REVIEW OF SYSTEMS:  CONSTITUTIONAL: No weakness  EYES/ENT: No visual changes;  No throat pain   NECK: No pain or stiffness  RESPIRATORY: No cough, wheezing; No shortness of breath  CARDIOVASCULAR: No chest pain or palpitations  GASTROINTESTINAL: No abdominal  pain. No nausea, vomiting, or hematemesis  GENITOURINARY: No dysuria, frequency or hematuria  NEUROLOGICAL: No stroke like symptoms  SKIN: No rashes      	  MEDICATIONS:  midodrine. 15 milliGRAM(s) Oral every 8 hours        PHYSICAL EXAM:  T(C): 36.6 (07-07-24 @ 12:31), Max: 36.7 (07-07-24 @ 04:37)  HR: 93 (07-07-24 @ 13:39) (84 - 97)  BP: 113/61 (07-07-24 @ 13:39) (104/55 - 113/61)  RR: 18 (07-07-24 @ 12:31) (17 - 18)  SpO2: 97% (07-07-24 @ 12:31) (95% - 97%)  Wt(kg): --  I&O's Summary    06 Jul 2024 07:01  -  07 Jul 2024 07:00  --------------------------------------------------------  IN: 620 mL / OUT: 1305 mL / NET: -685 mL    07 Jul 2024 07:01  -  07 Jul 2024 16:28  --------------------------------------------------------  IN: 220 mL / OUT: 780 mL / NET: -560 mL          Appearance: In no distress	  HEENT:    PERRL, EOMI	  Cardiovascular:  S1 S2, No JVD  Respiratory: Lungs clear to auscultation	  Gastrointestinal:  Soft, Non-tender, + BS	  Vascularature:  No edema of LE  Psychiatric: Appropriate affect   Neuro: no acute focal deficits                               8.8    19.19 )-----------( 215      ( 07 Jul 2024 06:48 )             29.4     07-07    138  |  101  |  30<H>  ----------------------------<  90  3.9   |  26  |  0.71    Ca    12.5<H>      07 Jul 2024 06:48  Phos  1.8     07-07  Mg     2.2     07-07    TPro  6.4  /  Alb  2.6<L>  /  TBili  0.3  /  DBili  x   /  AST  13  /  ALT  16  /  AlkPhos  82  07-06        Labs personally reviewed    ASSESSMENT/PLAN: 	    89M with hx of Stage II bladder cancer s/p TURBT and chemoradiation, presenting with dyspnea and orthopnea. a/w Acute hypoxic respiratory failure also found to have large L pleural effusion. Found to have new onset AF with RVR.    1. New Onset Atrial Fibrillation with Rapid Ventricular Response  - ECG 6/21 with AF with RVR  - Spontaneously converted to SR 6/21  - TTE wnl  - TSH wnl  - [id number] of 2 (age and gender).  - Transition to Eliquis 2.5mg BID (lower off label dose as recent drop in H/H)    2. Shortness of Breath  - CTA neg for PE but found to have large left pleural effusion now s/p thora with 2.1L removed on 6/22  -   - TTE wnl, normal filling pressures, preserved EF  - s/p L thoracentesis 6/25 2.1L off  - now on 6L NC  - 6/28 CT with Very large loculated left pleural effusion and small to moderate size loculated right hydropneumothorax.  - S/p L pigtail     3. Hyponatremia  - Slowly improving, now 131  - Lasix on hold    4. Hypotension  - systolic 70s- 100  - midodrine 15mg TID started  - lactate 7.4 now 4.8  - NS bolus x 1 given          EAN Diaz DO [facility]  Cardiovascular Medicine  [address], [address]  Office: [phone]  Available via call/text on [employer] Teams  DATE OF SERVICE: 07-07-24 @ 16:28    Patient is a 89y old  Male who presents with a chief complaint of SOB (07 Jul 2024 14:58)      INTERVAL HISTORY: no complaints     REVIEW OF SYSTEMS:  CONSTITUTIONAL: No weakness  EYES/ENT: No visual changes;  No throat pain   NECK: No pain or stiffness  RESPIRATORY: No cough, wheezing; No shortness of breath  CARDIOVASCULAR: No chest pain or palpitations  GASTROINTESTINAL: No abdominal  pain. No nausea, vomiting, or hematemesis  GENITOURINARY: No dysuria, frequency or hematuria  NEUROLOGICAL: No stroke like symptoms  SKIN: No rashes      	  MEDICATIONS:  midodrine. 15 milliGRAM(s) Oral every 8 hours        PHYSICAL EXAM:  T(C): 36.6 (07-07-24 @ 12:31), Max: 36.7 (07-07-24 @ 04:37)  HR: 93 (07-07-24 @ 13:39) (84 - 97)  BP: 113/61 (07-07-24 @ 13:39) (104/55 - 113/61)  RR: 18 (07-07-24 @ 12:31) (17 - 18)  SpO2: 97% (07-07-24 @ 12:31) (95% - 97%)  Wt(kg): --  I&O's Summary    06 Jul 2024 07:01  -  07 Jul 2024 07:00  --------------------------------------------------------  IN: 620 mL / OUT: 1305 mL / NET: -685 mL    07 Jul 2024 07:01  -  07 Jul 2024 16:28  --------------------------------------------------------  IN: 220 mL / OUT: 780 mL / NET: -560 mL          Appearance: In no distress	  HEENT:    PERRL, EOMI	  Cardiovascular:  S1 S2, No JVD  Respiratory: Lungs clear to auscultation	  Gastrointestinal:  Soft, Non-tender, + BS	  Vascularature:  No edema of LE  Psychiatric: Appropriate affect   Neuro: no acute focal deficits                               8.8    19.19 )-----------( 215      ( 07 Jul 2024 06:48 )             29.4     07-07    138  |  101  |  30<H>  ----------------------------<  90  3.9   |  26  |  0.71    Ca    12.5<H>      07 Jul 2024 06:48  Phos  1.8     07-07  Mg     2.2     07-07    TPro  6.4  /  Alb  2.6<L>  /  TBili  0.3  /  DBili  x   /  AST  13  /  ALT  16  /  AlkPhos  82  07-06        Labs personally reviewed    ASSESSMENT/PLAN: 	    89M with hx of Stage II bladder cancer s/p TURBT and chemoradiation, presenting with dyspnea and orthopnea. a/w Acute hypoxic respiratory failure also found to have large L pleural effusion. Found to have new onset AF with RVR.    1. New Onset Atrial Fibrillation with Rapid Ventricular Response  - ECG 6/21 with AF with RVR  - Spontaneously converted to SR 6/21  - TTE wnl  - TSH wnl  - [id number] of 2 (age and gender).  - Transition to Eliquis 2.5mg BID (lower off label dose as recent drop in H/H)    2. Shortness of Breath  - CTA neg for PE but found to have large left pleural effusion now s/p thora with 2.1L removed on 6/22  -   - TTE wnl, normal filling pressures, preserved EF  - s/p L thoracentesis 6/25 2.1L off  - now on 6L NC  - 6/28 CT with Very large loculated left pleural effusion and small to moderate size loculated right hydropneumothorax.  - S/p L pigtail     3. Hyponatremia  - Slowly improving, now 131  - Lasix on hold    4. Hypotension  - systolic 70s- 100  - midodrine 15mg TID started  - lactate 7.4 now 4.8  - NS bolus x 1 given              EAN Diaz DO [facility]  Cardiovascular Medicine  [address], [address]  Office: [phone]  Available via call/text on Microsoft Teams

## 2024-07-08 ENCOUNTER — NON-APPOINTMENT (OUTPATIENT)
Age: 89
End: 2024-07-08

## 2024-07-08 NOTE — PROGRESS NOTE ADULT - PROBLEM SELECTOR PLAN 1
Acute hypoxic resp failure 2/2 bilateral pleural effusion  In setting of bladder CA and new pulmonary nodule, concerning for malignancy  CT scan Chest and pel with IV contrast showing: Pulmonary nodules, nodular pleural thickening, hepatic lesions, and   retroperitoneal lymphadenopathy, compatible with metastatic disease. Small bilateral pleural effusions, decreased status post bilateral chest tube placement. Small bilateral pneumothoraces.   - TTE - normal LV and RV fxn, no diastolic dysfunction  - s/p L thoracentesis on 6/22   - s/p R sided thoracentesis on 6/25  - s/p L chest tube 6/30, per throacic surgery   - s/p R chest tube 6/1, wait till putting out <200 cc fluid   - now on Zosyn end date 7/6 for total of 7 days   - Required HiFlow now on NC 2L  - Plan for Left PleurX with CTS per note, Right side may consider clamp depending on output per pulm  - CXr: 7/7 stable pleural effusion with atelectasis, holding IVF Acute hypoxic resp failure 2/2 bilateral pleural effusion   - In setting of bladder CA and new pulmonary nodule, concerning for malignancy   - CT  CAP with IV contrast : Pulmonary nodules, nodular pleural thickening, hepatic lesions,    retroperitoneal lymphadenopathy, compatible with metastatic disease. Small bilateral pleural effusions, decreased status post bilateral chest tube placement. Small bilateral pneumothoraces.  - TTE - normal LV and RV fxn, no diastolic dysfunction   - s/p L thoracentesis on 6/22    - s/p R sided thoracentesis on 6/25   - s/p L chest tube 6/30, per thoracic surgery    - s/p R chest tube 6/1, wait till putting out <200 cc fluid    - on Zosyn pending end date , s/p  7 days Tx-  lower suspicion for parapneumonic process and suspect malignant driving picture here with LAD on CT A/P  - transitioned  HiFlow --> NC 2L   - CXr: 7/7 stable pleural effusion with atelectasis   - f/u peripheral flow cytometry and flow from pleural fluid  - f/u peripheral flow cytometry and flow from pleural fluid   - now DNR/DNI with goal for hospice    - f/u pulmonology- - keep R chest tube to water seal/suction PRN still with high output; L chest tube per CTS   - further management of chest tubes pending further GOC discussions.

## 2024-07-08 NOTE — PROGRESS NOTE ADULT - SUBJECTIVE AND OBJECTIVE BOX
DATE OF SERVICE: 07-08-24 @ 15:41    Patient is a 89y old  Male who presents with a chief complaint of SOB   BL Pl effusions (08 Jul 2024 15:30)      INTERVAL HISTORY: No acute events    REVIEW OF SYSTEMS:  CONSTITUTIONAL: No weakness  EYES/ENT: No visual changes;  No throat pain   NECK: No pain or stiffness  RESPIRATORY: No cough, wheezing; No shortness of breath  CARDIOVASCULAR: No chest pain or palpitations  GASTROINTESTINAL: No abdominal  pain. No nausea, vomiting, or hematemesis  GENITOURINARY: No dysuria, frequency or hematuria  NEUROLOGICAL: No stroke like symptoms  SKIN: No rashes    	  MEDICATIONS:  metoprolol tartrate 12.5 milliGRAM(s) Oral two times a day  midodrine. 15 milliGRAM(s) Oral every 8 hours        PHYSICAL EXAM:  T(C): 36.6 (07-08-24 @ 10:50), Max: 37.1 (07-08-24 @ 04:29)  HR: 126 (07-08-24 @ 14:58) (92 - 141)  BP: 102/64 (07-08-24 @ 14:58) (102/64 - 122/66)  RR: 20 (07-08-24 @ 13:42) (18 - 20)  SpO2: 94% (07-08-24 @ 13:42) (94% - 96%)  Wt(kg): --  I&O's Summary    07 Jul 2024 07:01  -  08 Jul 2024 07:00  --------------------------------------------------------  IN: 320 mL / OUT: 1890 mL / NET: -1570 mL    08 Jul 2024 07:01  -  08 Jul 2024 15:41  --------------------------------------------------------  IN: 480 mL / OUT: 300 mL / NET: 180 mL          Appearance: In no distress	  HEENT:    PERRL, EOMI	  Cardiovascular:  S1 S2, No JVD  Respiratory: Lungs clear to auscultation	  Gastrointestinal:  Soft, Non-tender, + BS	  Vascularature:  No edema of LE  Psychiatric: Appropriate affect   Neuro: no acute focal deficits                               9.2    20.05 )-----------( 233      ( 08 Jul 2024 06:52 )             29.1     07-08    139  |  100  |  34<H>  ----------------------------<  97  4.0   |  25  |  0.77    Ca    13.1<HH>      08 Jul 2024 06:52  Phos  1.8     07-07  Mg     2.2     07-07    TPro  6.2  /  Alb  2.7<L>  /  TBili  0.4  /  DBili  x   /  AST  11  /  ALT  13  /  AlkPhos  97  07-08        Labs personally reviewed      ASSESSMENT/PLAN: 	  89M with hx of Stage II bladder cancer s/p TURBT and chemoradiation, presenting with dyspnea and orthopnea. a/w Acute hypoxic respiratory failure also found to have large L pleural effusion. Found to have new onset AF with RVR.    1. New Onset Atrial Fibrillation with Rapid Ventricular Response  - ECG 6/21 with AF with RVR  - Spontaneously converted to SR 6/21  - TTE wnl  - TSH wnl  - [id number] of 2 (age and gender).  - TranEliquis 2.5mg BID (lower off label dose as recent drop in H/H)    2. Shortness of Breath  - CTA neg for PE but found to have large left pleural effusion now s/p thora with 2.1L removed on 6/22  -   - TTE wnl, normal filling pressures, preserved EF  - s/p L thoracentesis 6/25 2.1L off  - now on 6L NC  - 6/28 CT with Very large loculated left pleural effusion and small to moderate size loculated right hydropneumothorax.  - S/p L pigtail     3. Hyponatremia  - Slowly improving, now 131  - Lasix on hold    4. Hypotension  - systolic 70s- 100  - midodrine 15mg TID started  - lactate 7.4 now 4.8  - NS bolus x 1 given            KENRICK Acuna DO [facility]  Cardiovascular Medicine  [address], [address]  Available via call or text on [employer] TEAM[employer]  Office: [phone]

## 2024-07-08 NOTE — PROGRESS NOTE ADULT - SUBJECTIVE AND OBJECTIVE BOX
[facility] DIVISION OF KIDNEY DISEASES AND HYPERTENSION   FOLLOW UP NOTE  --------------------------------------------------------------------------------  Chief Complaint: Pt with ODIN and hypercalcemai    24 hour events/subjective: Pt. was seen and examined. Pt with chest tubes in place and remains on NC. Pt did not overt shortness of breath but appeared to have conversational dyspnea. Pt non oliguric with 1.1L UOP in past 24 hours.    PAST HISTORY  --------------------------------------------------------------------------------  No significant changes to PMH, PSH, FHx, SHx, unless otherwise noted    ALLERGIES & MEDICATIONS  --------------------------------------------------------------------------------  Allergies  No Known Allergies    Intolerances    Standing Inpatient Medications  apixaban 2.5 milliGRAM(s) Oral every 12 hours  ascorbic acid 500 milliGRAM(s) Oral daily  benzocaine/menthol Lozenge 1 Lozenge Oral three times a day  chlorhexidine 2% Cloths 1 Application(s) Topical daily  lactated ringers. 1000 milliLiter(s) IV Continuous <Continuous>  lactobacillus acidophilus 1 Tablet(s) Oral daily  midodrine. 15 milliGRAM(s) Oral every 8 hours  multivitamin/minerals 1 Tablet(s) Oral daily  pantoprazole    Tablet 40 milliGRAM(s) Oral before breakfast  simethicone 80 milliGRAM(s) Chew four times a day  tamsulosin 0.4 milliGRAM(s) Oral at bedtime    PRN Inpatient Medications  acetaminophen     Tablet .. 650 milliGRAM(s) Oral every 6 hours PRN  albuterol/ipratropium for Nebulization 3 milliLiter(s) Nebulizer every 6 hours PRN  aluminum hydroxide/magnesium hydroxide/simethicone Suspension 30 milliLiter(s) Oral every 4 hours PRN  sodium chloride 0.65% Nasal 1 Spray(s) Both Nostrils four times a day PRN    REVIEW OF SYSTEMS  --------------------------------------------------------------------------------  All other systems were reviewed and are negative, except as noted.    VITALS/PHYSICAL EXAM  --------------------------------------------------------------------------------  T(C): 36.6 (07-08-24 @ 07:58), Max: 37.1 (07-08-24 @ 04:29)  HR: 95 (07-08-24 @ 07:58) (92 - 97)  BP: 104/68 (07-08-24 @ 07:58) (104/68 - 122/66)  RR: 18 (07-08-24 @ 07:58) (18 - 18)  SpO2: 95% (07-08-24 @ 07:58) (94% - 97%)  Wt(kg): --    07-07-24 @ 07:01  -  07-08-24 @ 07:00  --------------------------------------------------------  IN: 320 mL / OUT: 1890 mL / NET: -1570 mL    Physical Exam:  	Gen: Chronically ill appearing but in NAD  	HEENT: Anicteric  	Pulm: Diminished B/L. +NC. +B/L Chest tubes  	CV: S1S2+  	Abd: Soft, +BS    	Ext: No LE edema B/L  	Neuro: Awake  	Skin: Warm and dry    LABS/STUDIES  --------------------------------------------------------------------------------              9.2    20.05 >-----------<  233      [07-08-24 @ 06:52]              29.1     139  |  100  |  34  ----------------------------<  97      [07-08-24 @ 06:52]  4.0   |  25  |  0.77        Ca     13.1     [07-08-24 @ 06:52]      Mg     2.2     [07-07-24 @ 06:48]      Phos  1.8     [07-07-24 @ 06:48]    TPro  6.2  /  Alb  2.7  /  TBili  0.4  /  DBili  x   /  AST  11  /  ALT  13  /  AlkPhos  97  [07-08-24 @ 06:52]    Creatinine Trend:  SCr 0.77 [07-08 @ 06:52]  SCr 0.71 [07-07 @ 06:48]  SCr 0.80 [07-06 @ 06:11]  SCr 0.88 [07-05 @ 07:20]  SCr 0.91 [07-04 @ 07:00]    Urine Protein 27      [07-03-24 @ 19:24]    Free Light Chains: kappa 6.69, lambda 7.57, ratio = 0.88      [07-04 @ 06:34]  Immunofixation Urine: No Monoclonal Band Identified  Reference Range: None Detected      [07-03-24 @ 19:24]

## 2024-07-08 NOTE — PROGRESS NOTE ADULT - PROBLEM SELECTOR PLAN 5
Stage II; s/p TURBT and chemoradiation  - per outpatient records, plan for repeat cystoscopy after treatment; around August 2024     - CT abd pel and chest with IV contrast: Pulmonary nodules, nodular pleural thickening, hepatic lesions, and   retroperitoneal lymphadenopathy, compatible with metastatic disease. Small bilateral pleural effusions, decreased status post bilateral chest tube placement. Small bilateral pneumothoraces. Wall thickening of the sigmoid colon. Correlate for colitis.  - Cytology plerual fluid done x2 "findings provide no diagnostic evidence of a malignant lymphoproliferative disorder" and " rare atypical cells", pending one - though family not in agreement for further treatment  - Family's goal is to get pt home for home hospice, consult made with SW       - Continue tamsulosin  - Monitor I/Os - Stage II; s/p TURBT and chemoradiation   - per outpatient records, plan for repeat cystoscopy after treatment; around August 2024     - CT abd pel and chest with IV contrast: Pulmonary nodules, nodular pleural thickening, hepatic lesions, and    retroperitoneal lymphadenopathy, compatible with metastatic disease. Small bilateral pleural effusions, decreased status post bilateral chest tube placement. Small bilateral pneumothoraces. Wall thickening of the sigmoid colon.    - Cytology pleural fluid done x2 "findings provide no diagnostic evidence of a malignant lymphoproliferative disorder" and " rare atypical cells"   - CM following, referral sent for hospice care network   - Continue tamsulosin   - Monitor I/Os   - palliative following- pt doesn't meet criteria for inpatient hospice- he is awake, AO, eating and talking. He needs to be on IV meds that can't be given PO to qualify.

## 2024-07-08 NOTE — PROGRESS NOTE ADULT - PROBLEM SELECTOR PLAN 3
Significant hyponatremia to 120 on admission. Pt has previously been hyponatremic down to 130 however last month, Na was 136  - HOLD lasix    # Hypokalemia: replaced K   # ODIN: resolved, nephro following   # Hypercalcemia in the setting of cancer, pamidronate -hyponatremia resolved   -hypokalemia resolved   -ODIN resolved    - nephro following- likely iso hypotension, Cr currently improved, UA without proteinuria, US renal without hydronephrosis, monitor UOP   -Hypercalcemia of malignancy- oncology and nephrology following, recommend c/w pamidronate, consider calcitonin, steroids (r/o TB, f/u Zackary, f/u plans for hospice care).

## 2024-07-08 NOTE — PROGRESS NOTE ADULT - PROBLEM SELECTOR PLAN 6
Hypertensive likely due to respiratory distress  - Now hypotensive hold antihypertensives -normotensive now   -will monitor.

## 2024-07-08 NOTE — PROGRESS NOTE ADULT - ATTENDING COMMENTS
89 yo M with a PMH of Bladder CA and BPH who presented to [facility] due to lethargy and weakness. SCr on admission of 0.9 (6/20) but trended up to 1.4 on 7/1. Nephrology consulted for ODIN.  Per Luis/MELLY review, pt has had ranging SCr from 0.9 to 1.4.   Pt seen and examined with attending, team present initially.. Pt currently on NC and is s/p B/L chest tube placement for recurrent effusions Pt also found to have a RUL nodule. Of note, prior to rise in SCr, pt had a RRT for hypotension with BP in the 80s and HR in the 150s. Pt with 800cc of UOP overnight. Hx is limited due to current medical condition. Pt denied n/v/f/c/difficulty with urination ,.BP improving>100, mental status better.  Chest tubes in place, some SOB  1.  ARF--likely volume, hemodynamic.    NO HD indication at present AND IMPROVING Cr c/w overall improved perfusion  2.  Hypotension--now in 100s and mentating.  Oral NOW pressor to keep MAP >65  3.  Hypercalcemia--not life threatening and likely mitigate 2.  W/U as outlined in progress revealing HIGH 1/25 VITAMIN D.  NOT encephalopathic and with 1 improving now a bisphosphonate candidate.  USUALLY REQUIRES SEVERAL DAYS TO SEE EFFECT.  No volume expansion, calcitonin needed.  Check TB status if steroids contemplated by deana  4.  Respiratory failure acute--improving and not intubation risk in near future.  Given disease process mechanical vent not desirable    discussed with med team  Anand Correia MD  contact me on TEAMs

## 2024-07-08 NOTE — PROGRESS NOTE ADULT - ASSESSMENT
89-year-old male with a past medical history of stage II bladder cancer status post TURBT, chemoradiation with gemcitabine, completed May 2024 who was admitted to the hospital due to shortness of breath.    Workup obtained after admission revealed a pleural effusion with imaging including CT scans showing metastatic disease.  Patient undergoing a left-sided thoracentesis on 6/22/2024 and then a right-sided thoracentesis on 6/25/2024 with chest tube placement on 6/30/2024 to the left side and chest tube to the right side on 7/1/2024.    Since admission, patient has been afebrile.  Labs show persistent leukocytosis.  Multiple pleural cultures obtained from thoracentesis and chest tube insertions have been negative.  Latest blood cultures on 7/2/2024 negative to date.  Bronchial cultures on 6/30 and 7/1/2024 negative to date.  Additional pleural fluid culture obtained on 7/2/2024 with Gram stain showing gram-negative gelacio, growth pending.    #Abnormal imaging of the chest  #Bilateral pleural effusions status postthoracentesis and chest tube insertion  #Right-sided exudative infusion with GNR on Gram stain  #Leukocytosis  #Colitis  #Metastatic bladder cancer on chemotherapy, immunocompromise status    Recommendations  Concern for right-sided empyema, worsening leukocytosis with organism on Gram stain  Continue piperacillin/tazobactam for now  Follow right-sided pleural effusion culture for speciation and sensitivity  If WBC continues to increase then evaluate chest tube for proper placement, consider repeat CT scan and repeat blood cultures  Monitor stool output  Follow fever curve and WBC count    Ghulam Gray MD  Division of Infectious Diseases

## 2024-07-08 NOTE — PROGRESS NOTE ADULT - PROBLEM SELECTOR PLAN 7
DVT ppx - Eliquis 2.5mg bid   Diet - pureed, discussed with family they can bring food pt likes   Dispo - Pending home hospice    - 7/7: Discussed plan of care with Keesha Peterson at bedside and on the phone last night. They do not believe the pt is strong enough to continue with further cancer treatment even if eligible. They request home hospice.. Consult with SW made. Discussed PleurX for comfort. CTS to evaluate.   ALDAIR signed with Nicholas on 7/7 DNR/ DNI with trial of NIV DVT ppx - Eliquis 2.5mg bid    Diet - pureed, discussed with family they can bring food pt likes    Dispo - Pending home hospice       - 7/7: Discussed plan of care with Keesha Peterson at bedside and on the phone last night. They do not believe the pt is strong enough to continue with further cancer treatment even if eligible. They request home hospice.. Consult with SW made. Discussed PleurX for comfort. CTS to evaluate.      ALDAIR signed with Nicholas on 7/7 DNR/ DNI with trial of NIV.

## 2024-07-08 NOTE — PROGRESS NOTE ADULT - PROBLEM SELECTOR PLAN 1
s/p Left PTC 6/30 by surgical resident  s/p Right PTC 7/1 by IR  Maintain left and right pigtail to LWS  daily CXR while tubes are in  Monitor chest tube outputs, Strict I & Os  Follow up pleural fluid    from 7/2  Pleurx placement for comfort care-  vs med management only  Continue care as per primary team

## 2024-07-08 NOTE — PROGRESS NOTE ADULT - PROBLEM SELECTOR PLAN 2
Preventive Care Visit  [facility] MIKA San PA-C, Family Medicine  Mar 14, 2023    Assessment & Plan   11 year old 8 month old, here for preventive care.      ICD-10-CM    1. Encounter for routine child health examination with abnormal findings  Z00.121       2. Failed vision screen  Z01.01 Peds Eye  Referral          Referral to optometry    Growth      Normal height and weight    Immunizations   Appropriate vaccinations were ordered.  Child is due for additional immunizations, scheduled to return in 1 month  Immunizations Administered     Name Date Dose VIS Date Route    MENINGOCOCCAL ACWY (MENQUADFI ) 3/14/23 11:19 AM 0.5 mL 08/15/2019, Given Today Intramuscular    MMR/V 3/14/23 11:18 AM 0.5 mL 08/06/2021, Given Today Subcutaneous    Tdap (Adacel,Boostrix) 3/14/23 11:19 AM 0.5 mL 08/06/2021, Given Today Intramuscular        Anticipatory Guidance    Reviewed age appropriate anticipatory guidance. This includes body changes with puberty and sexuality, including STIs as appropriate.    Reviewed Anticipatory Guidance in patient instructions    Referrals/Ongoing Specialty Care  Ongoing care with oncology  Verbal Dental Referral: Verbal dental referral was given  Dental Fluoride Varnish:   No, above age.      Follow Up      Return in about 1 month (around 4/14/2023) for vaccines on ancillary schedule.      Subjective     Additional Questions 3/14/2023   Accompanied by mother, sister   Questions for today's visit Yes   Questions 1. blurry vision   Surgery, major illness, or injury since last physical Yes     Social 3/14/2023   Lives with Parent(s)   Recent potential stressors None   History of trauma No   Family Hx of mental health challenges (!) YES   Lack of transportation has limited access to appts/meds No   Difficulty paying mortgage/rent on time Yes   Lack of steady place to sleep/has slept in a shelter No   (!) HOUSING CONCERN PRESENT  Health Risks/Safety 3/14/2023    Where does your child sit in the car?  Back seat   Does your child always wear a seat belt? Yes        TB Screening: Consider immunosuppression as a risk factor for TB 3/14/2023   Recent TB infection or positive TB test in family/close contacts No   Recent travel outside [place] (child/family/close contacts) No   Recent residence in high-risk group setting (correctional facility/health care facility/homeless shelter/refugee camp) No      Dyslipidemia 3/14/2023   FH: premature cardiovascular disease (!) GRANDPARENT   FH: hyperlipidemia No   Personal risk factors for heart disease NO diabetes, high blood pressure, obesity, smokes cigarettes, kidney problems, heart or kidney transplant, history of Kawasaki disease with an aneurysm, lupus, rheumatoid arthritis, or HIV         Dental Screening 3/14/2023   Has your child seen a dentist? Yes   When was the last visit? 6 months to 1 year ago   Has your child had cavities in the last 3 years? No   Have parents/caregivers/siblings had cavities in the last 2 years? (!) YES, IN THE LAST 7-23 MONTHS- MODERATE RISK     Diet 3/14/2023   Questions about child's height or weight No   What does your child regularly drink? Water, Cow's milk, (!) JUICE, (!) POP   What type of milk? (!) 2%   What type of water? Tap, (!) WELL, (!) BOTTLED   How often does your family eat meals together? Every day   Servings of fruits/vegetables per day (!) 3-4   At least 3 servings of food or beverages that have calcium each day? Yes   In past 12 months, concerned food might run out Never true   In past 12 months, food has run out/couldn't afford more Never true     Elimination 3/14/2023   Bowel or bladder concerns? No concerns     Activity 3/14/2023   Days per week of moderate/strenuous exercise (!) 5 DAYS   On average, how many minutes does your child engage in exercise at this level? (!) 40 MINUTES   What does your child do for exercise?  walk jog pe   What activities is your child involved with?  club  "    Media Use 3/14/2023   Hours per day of screen time (for entertainment) 4   Screen in bedroom (!) YES     Sleep 3/14/2023   Do you have any concerns about your child's sleep?  No concerns, sleeps well through the night     School 3/14/2023   School concerns (!) MATH   Grade in school 5th Grade   Current school [employer]   School absences (>2 days/mo) No   Concerns about friendships/relationships? No     Vision/Hearing 3/14/2023   Vision or hearing concerns (!) HEARING CONCERNS, (!) VISION CONCERNS     Development / Social-Emotional Screen 3/14/2023   Developmental concerns (!) INDIVIDUAL EDUCATIONAL PROGRAM (IEP)     Psycho-Social/Depression - PSC-17 required for C&TC through age 18  General screening:  Electronic PSC   PSC SCORES 3/14/2023   Inattentive / Hyperactive Symptoms Subtotal 6   Externalizing Symptoms Subtotal 3   Internalizing Symptoms Subtotal 3   PSC - 17 Total Score 12       Follow up:  no follow up necessary          Objective     Exam  /75   Pulse (!) 122   Temp 98.2  F (36.8  C) (Tympanic)   Resp 20   Ht 1.481 m (4' 10.31\")   Wt 44.5 kg (98 lb 3.2 oz)   SpO2 97%   BMI 20.31 kg/m    55 %ile (Z= 0.12) based on CDC (Boys, 2-20 Years) Stature-for-age data based on Stature recorded on 3/14/2023.  74 %ile (Z= 0.64) based on CDC (Boys, 2-20 Years) weight-for-age data using vitals from 3/14/2023.  82 %ile (Z= 0.93) based on CDC (Boys, 2-20 Years) BMI-for-age based on BMI available as of 3/14/2023.  Blood pressure percentiles are 92 % systolic and 90 % diastolic based on the 2017 AAP Clinical Practice Guideline. This reading is in the elevated blood pressure range (BP >= 90th percentile).    Vision Screen  Vision Screen Details  Does the patient have corrective lenses (glasses/contacts)?: No  Vision Acuity Screen  Vision Acuity Tool: Collins  RIGHT EYE: (!) 10/80 (20/160)  LEFT EYE: (!) 10/50 (20/100)  Is there a two line difference?: (!) YES  Vision Screen Results: (!) REFER    Hearing " Screen  RIGHT EAR  1000 Hz on Level 40 dB (Conditioning sound): Pass  1000 Hz on Level 20 dB: Pass  2000 Hz on Level 20 dB: Pass  4000 Hz on Level 20 dB: Pass  6000 Hz on Level 20 dB: Pass  8000 Hz on Level 20 dB: Pass  LEFT EAR  8000 Hz on Level 20 dB: Pass  6000 Hz on Level 20 dB: Pass  4000 Hz on Level 20 dB: Pass  2000 Hz on Level 20 dB: Pass  1000 Hz on Level 20 dB: Pass  500 Hz on Level 25 dB: Pass  RIGHT EAR  500 Hz on Level 25 dB: Pass  Results  Hearing Screen Results: Pass      Physical Exam  GENERAL: Active, alert, in no acute distress.  SKIN: Clear. No significant rash, abnormal pigmentation or lesions  HEAD: Normocephalic  EYES: Pupils equal, round, reactive, Extraocular muscles intact. Normal conjunctivae.  EARS: Normal canals. Tympanic membranes are normal; gray and translucent.  NOSE: Normal without discharge.  MOUTH/THROAT: Clear. No oral lesions. Teeth without obvious abnormalities.  NECK: Supple, no masses.  No thyromegaly.  LYMPH NODES: No adenopathy  LUNGS: Clear. No rales, rhonchi, wheezing or retractions  HEART: Regular rhythm. Normal S1/S2. No murmurs. Normal pulses.  ABDOMEN: Soft, non-tender, not distended, no masses or hepatosplenomegaly. Bowel sounds normal.   NEUROLOGIC: No focal findings. Cranial nerves grossly intact: DTR's normal. Normal gait, strength and tone  BACK: Spine is straight, no scoliosis.  EXTREMITIES: Full range of motion, no deformities    Screening Questionnaire for Pediatric Immunization    1. Is the child sick today?  No  2. Does the child have allergies to medications, food, a vaccine component, or latex? Yes  3. Has the child had a serious reaction to a vaccine in the past? No  4. Has the child had a health problem with lung, heart, kidney or metabolic disease (e.g., diabetes), asthma, a blood disorder, no spleen, complement component deficiency, a cochlear implant, or a spinal fluid leak?  Is he/she on long-term aspirin therapy? No  5. If the child to be vaccinated  is 2 through 4 years of age, has a healthcare provider told you that the child had wheezing or asthma in the  past 12 months? No  6. If your child is a baby, have you ever been told he or she has had intussusception?  No  7. Has the child, sibling or parent had a seizure; has the child had brain or other nervous system problems?  No  8. Does the child or a family member have cancer, leukemia, HIV/AIDS, or any other immune system problem?  No  9. In the past 3 months, has the child taken medications that affect the immune system such as prednisone, other steroids, or anticancer drugs; drugs for the treatment of rheumatoid arthritis, Crohn's disease, or psoriasis; or had radiation treatments?  No  10. In the past year, has the child received a transfusion of blood or blood products, or been given immune (gamma) globulin or an antiviral drug?  No  11. Is the child/teen pregnant or is there a chance that she could become  pregnant during the next month?  No  12. Has the child received any vaccinations in the past 4 weeks?  No     Immunization questionnaire was positive for at least one answer.  Notified Marcia San PA-C.    [facility] eligibility self-screening form given to patient.      Screening performed by RAFAELA East PA-C  [facility]   - As noted on CT scan, pt with soft stool, possibly related to diet though no abdominal pain noted  - Leukocytosis possibly : stable, on empiric Zosyn 6/30   - Blood culture 6/2: NGTD, BC 7/5 NGTD  - Lactate elevated as well, holding fluids d/t slight increase in pleural effusion yesterday, unclear is this is an infectious process vs cancer vs perfusion  - on Zosyn   - ID following rec to continue with zosyn and get another pleural fluid culture, possible empyema? - fluid seems serosanguinous -  CT scan with sigmoid wall thickening, pt with soft stool   - uptrending leukocytosis and LA, afebrile, no left shift, benign abd exam   - on empiric Zosyn 6/30    - Blood culture 6/2: NGTD, BC 7/5 NGTD   - Lactate elevated could be due to infectious process vs cancer vs perfusion   - consider  GI PCR  if within GOC although stools have been normal   - ID following rec to continue with zosyn and get another pleural fluid culture to r/o empyema - fluid is currently   - wall thickening is more likely due to edematous effect of low protein state in pt w/ albumin 2.7 vs low blood pressures and hypoperfusion   - less likely infectious etiology such as bacterial colitis given hx, PE and lab findings vs ischemic colitis  vs metastatic disease w/tumor infiltration.

## 2024-07-08 NOTE — PROGRESS NOTE ADULT - ASSESSMENT
ASSESSMENT: 89 year old male with hx of stage II bladder cancer s/p TURBT and chemoradiation with gemcitabine (completed 5/2/24). Pt presenting with worsening dyspnea on exertion and orthopnea. CTA chest performed without evidence of PE. Large pleural effusion noted on L with passive atelectasis. New 8mm pulm nodule in RUL also noted. Patient admitted to the medical service. During this admission, patient has required multiple thoracentesis, Left on 6/22 (2.1 L removed) and Right on 6/25 (1.8L removed). Since that time, patient remains on 6L NC, endorsing increased WOB. Cultures from prior thoracentesis are negative, cytopathology showing rare atypical cells. Patient is on Unasyn. Repeat CT on 6/28 showing large L sided loculated effusion and right moderate hydropneumothorax. Thoracic surgery consulted for surgical management of loculated effusion.      6/30   5 L NC   OOB to chair  DC hep gtt  7/1     VSS, WBC down to 21, awaiting IR to place RCT, L Pigtail to WS draining 50 cc serosanguinous drainage this am no airleak,   rt PTC placed by IR  7/2     on high flow o2   chest xray  improving   BL  PTC  drainaing  7/3     remains on HFNC. CXR daily while tubes are in. Monitor CT output, +draining  7/4 VSS, weaned off HFNC, currently on 2L NC SpO2 96%, Left pigtail output 330cc/24h, Right pigtail output 350cc/24h, Maintain B/L pigtails to LWS per Dr. Dobbins, Fluid cytology pending.   7/5 VSS   chest tubes on water seal placed to LWS primary team aware  cytology pending  7/6 VSS family at bedside  chest tubes suction cytology pending   7/7 VSS, Left pigtail out /24h, Right pigtail output /24h. Maintain b/l pigtails to LWS. CXR with persistent left pleural effusion.  7/8    BL   PTC  draining

## 2024-07-08 NOTE — PROGRESS NOTE ADULT - PROBLEM SELECTOR PLAN 2
Pt with hypercalcemia in setting of malignancy v other. Serum Ca remains elevated at 12.5 and iCa of 1.59 (7/2). PTH 2 and phos 2.5. Vit D 1,25 elevated at 165. Vit D 25 and PTHrP pending. K/L 0.8. Serum and urine immunofixation pending. Heme/Onc notes reviewed. Pt given Pamidronate 60mg x1 on 7/7. SCa remains elevated at 13. Check ionized Ca. Pt may benefit from steroids but would rule out TB. Monitor serum and iCa.

## 2024-07-08 NOTE — PROGRESS NOTE ADULT - ASSESSMENT
89M with hx of Stage II bladder cancer s/p TURBT and chemoradiation, presenting with dyspnea and orthopnea. a/w Acute hypoxic respiratory failure also found to have large L pleural effusion status post L thoracentesis with pending cytology and ongoing oxygen requirements.     7/2: MICU Consulted for hypotension elevated lactate. Bedside UDS showing volume down. Recs for  cc bolus followed by albumin followed by maintenance fluid.  CT abd pelv, blood cult, urine cx. 89M PMH A-fib on Eliquis, HTN, Stage II bladder cancer s/p TURBT and chemoradiation, presented with dyspnea and orthopnea. Pt was admitted for Acute hypoxic respiratory failure due to large L pleural effusion requiring L thoracentesis with pending cytology and ongoing oxygen requirements. Pt was evaluted by MICU for hypotension and elevated lactate. POCUS showed signs of hypovolemia and pt was given 500 cc fluid bolus. CTCAP with new nodules and likely malignant pleural effusion currently requiring chest tube with water seal / suction. Family completed GOC discussion and would like to pursue hospice care.

## 2024-07-08 NOTE — PROGRESS NOTE ADULT - ASSESSMENT
89 yo M with a PMH of Bladder CA and BPH who presented to [facility] due to lethargy and weakness. SCr on admission of 0.9 (6/20) but trended up to 1.4 on 7/1. Nephrology consulted for ODIN.

## 2024-07-08 NOTE — PROGRESS NOTE ADULT - ATTENDING COMMENTS
89M with hx of Stage II bladder cancer s/p TURBT and chemoradiation, presenting with dyspnea and orthopnea. a/w Acute hypoxic respiratory failure also found to have large L pleural effusion status post L thoracentesis with pending cytology and ongoing oxygen requirements.     7/2: MICU Consulted for hypotension elevated lactate. Bedside UDS showing volume down. Recs for  cc bolus followed by albumin followed by maintenance fluid.  CT abd pelv, blood cult, urine cx.      Seen and examined at bedside this morning with nephew.  Tachypneic and tachycardic, metoprolol given IVP and stat  Pt is severely ill with persistent lactic acidosis, hypercalcemia, hyponatremia, hypoxemia and GOC not aligned with VATS.  Referral to Home hospice sent but discussed that pt may meet criteria for inpatient hospice. Palliative follow up today.   AF with RVR - Metoprolol 2.5mg IVP given once and start Metoprolol 12.5mg PO BID  rest as above  d/w pt and nephew in detail, resident, ACP and RN

## 2024-07-08 NOTE — PROGRESS NOTE ADULT - SUBJECTIVE AND OBJECTIVE BOX
Follow Up:  effusion    Interval History/ROS:  Overnight: No acute events.  Patient remains afebrile.  Otherwise hemodynamically stable.  Latest labs show leukocytosis of 20, anemia 9.2/29.1, BMP with renal function within normal limits, hypercalcemia noted to 13.  Hepatic function within normal limits.  Blood cultures from 7/5/2024 remain negative to date.  Pleural fluid culture from 7/2/2024 with gram-negative rods, requires further growth and reculturing for speciation.  Latest chest x-ray on 7/7/2024 shows stable findings.    Patient seen examined at bedside.  Appears somnolent however no distress.  Denies any new pain or discomfort.  Allergies  No Known Allergies        ANTIMICROBIALS:  piperacillin/tazobactam IVPB.. 3.375 every 8 hours      OTHER MEDS:  MEDICATIONS  (STANDING):  acetaminophen     Tablet .. 650 every 6 hours PRN  albuterol/ipratropium for Nebulization 3 every 6 hours PRN  aluminum hydroxide/magnesium hydroxide/simethicone Suspension 30 every 4 hours PRN  apixaban 2.5 every 12 hours  calcitonin Injectable 270 every 12 hours  metoprolol tartrate 12.5 two times a day  midodrine. 15 every 8 hours  pantoprazole    Tablet 40 before breakfast  simethicone 80 four times a day  tamsulosin 0.4 at bedtime      Vital Signs Last 24 Hrs  T(C): 36.7 (08 Jul 2024 20:36), Max: 37.4 (08 Jul 2024 16:55)  T(F): 98.1 (08 Jul 2024 20:36), Max: 99.3 (08 Jul 2024 16:55)  HR: 95 (08 Jul 2024 20:36) (92 - 141)  BP: 108/65 (08 Jul 2024 20:36) (94/62 - 112/65)  BP(mean): --  RR: 20 (08 Jul 2024 20:36) (18 - 20)  SpO2: 94% (08 Jul 2024 20:36) (94% - 96%)    Parameters below as of 08 Jul 2024 20:36  Patient On (Oxygen Delivery Method): nasal cannula  O2 Flow (L/min): 2      PHYSICAL EXAM:  General: Patient in NAD  HEENT: NCAT, EOMI, PERRL, no oral lesions  CV: S1+S2, no m/r/g appreciated   Lungs: No respiratory distress, b/l chest tube in place  Abd: Soft, nontender, no guarding, no rebound tenderness, + bowel sounds   : No suprapubic tenderness  Neuro: Alert and oriented to time, place and person. Moves all extremities  Ext: No cyanosis, no edema  Skin: No rash, no phlebitis                            9.2    20.05 )-----------( 233      ( 08 Jul 2024 06:52 )             29.1       07-08    139  |  100  |  34<H>  ----------------------------<  97  4.0   |  25  |  0.77    Ca    13.1<HH>      08 Jul 2024 06:52  Phos  1.8     07-07  Mg     2.2     07-07    TPro  6.2  /  Alb  2.7<L>  /  TBili  0.4  /  DBili  x   /  AST  11  /  ALT  13  /  AlkPhos  97  07-08      Urinalysis Basic - ( 08 Jul 2024 06:52 )    Color: x / Appearance: x / SG: x / pH: x  Gluc: 97 mg/dL / Ketone: x  / Bili: x / Urobili: x   Blood: x / Protein: x / Nitrite: x   Leuk Esterase: x / RBC: x / WBC x   Sq Epi: x / Non Sq Epi: x / Bacteria: x        MICROBIOLOGY:  v    Culture - Blood (collected 05 Jul 2024 16:02)  Source: .Blood Blood-Peripheral  Preliminary Report (08 Jul 2024 20:01):    No growth at 72 Hours    Culture - Blood (collected 05 Jul 2024 15:58)  Source: .Blood Blood-Peripheral  Preliminary Report (08 Jul 2024 20:01):    No growth at 72 Hours                    RADIOLOGY:  Imaging reviewed

## 2024-07-08 NOTE — PROGRESS NOTE ADULT - SUBJECTIVE AND OBJECTIVE BOX
VITAL SIGNS    Vital Signs Last 24 Hrs  T(C): 36.6 (07-08-24 @ 10:50), Max: 37.1 (07-08-24 @ 04:29)  T(F): 97.8 (07-08-24 @ 10:50), Max: 98.7 (07-08-24 @ 04:29)  HR: 126 (07-08-24 @ 14:58) (92 - 141)  BP: 102/64 (07-08-24 @ 14:58) (102/64 - 122/66)  RR: 20 (07-08-24 @ 13:42) (18 - 20)  SpO2: 94% (07-08-24 @ 13:42) (94% - 96%)                   Daily     Daily       Bilirubin Total: 0.4 mg/dL (07-08 @ 06:52)    CAPILLARY BLOOD GLUCOSE              Drains:             L Pleural  [  ]  Drainage:   /             R Pleural  [  ]  Drainage                        PHYSICAL EXAM  s   SOB  No CP'  Neurology: alert and oriented x 3, moves all extremities with no defecits  CV :  RRR    Lungs:   CTA B/L  Abdomen: soft, nontender, nondistended, positive bowel sounds,  Extremities:       no edema

## 2024-07-08 NOTE — PROGRESS NOTE ADULT - PROBLEM SELECTOR PLAN 1
Pt with resolving/resolved ODIN in the setting of hypotension, infectious etiology, hypercalcemia v other. SCr of 0.9 on admission, trended up to 1.4 on 7/1. SCr today improved to 0.77 today (pt has had SCr up to 1.4 in the recent past). UA reviewed, no proteinuria. Kidney US without hydronephrosis. Pt with 1.1L UOP in past 24 hours. Monitor labs and urine output. Avoid NSAIDs, ACEI/ARBS, RCA and nephrotoxins. Dose medications as per eGFR.

## 2024-07-08 NOTE — PROGRESS NOTE ADULT - PROBLEM SELECTOR PLAN 4
ECG 6/21 with AF with RVR, spontaneously converted to NSR 6/21  - TTE wnl    - heparin gtt held for drop in Hgb 9 to 7.7 on 7/3, hgb stable around 8, discussed with cards to start on low dose Eliquis 2.5 mg bid 7/5, hgb has been stable  - once BP improves, can consider lopressor 12.5mg BID - ECG 6/21 with AF with RVR, spontaneously converted to NSR 6/21   - TTE wnl   - heparin gtt held for drop in Hgb 9 to 7.7 on 7/3, hgb stable around 8   -  cardiology following   -  start Eliquis 2.5 mg bid 7/5, hgb has been stable   - start Metoprolol 12.5mg BID with holding parameters.

## 2024-07-08 NOTE — PROGRESS NOTE ADULT - ATTENDING COMMENTS
Patient seen and examined with Dr. Mc. Dr. Mc is fluent in [place] and was able to provide translation services. Case also discussed with patient's family members (nephews) at bedside.    Patient is an 89M with extensive history including bladder cancer s/p TURBT and chemoradiation who presented with acute hypoxemic respiraotry failure on O2 and bilateral pleural effusions. He had bilateral thoracentesis earlier this hospitalization and then subsequent chest tube placement by thoracic surgery and IR. He continues to have pleural fluid drainage and is being considered for palliative PleurX placement.  - One of his pleural fluid samples had atypical cells.  - Imaging reviewed and is concerning for malignancy    The patient has had increased cells in pleural fluid with concern for infection. He is being managed on antibiotics as per ID. He does not appear toxic from an infectious standpoint but does have a leukocytosis.     The patient is DNR/DNI and this AM has been telling multiple providers that he is expecting to pass away. It appears his goal is to go home though it is unclear if he could be cared for at home. He does appear to have AIR HUNGER and DYSPNEA. He may benefit from low dose opioid therapy.     #Acute Hypoxemic Respiratory Failure  - Continue supplemental o2 as needed with goal O2 sat > 88%  - Palliative care follow-up for symptomatic management of dyspnea and air hunger    #Exudative Pleural Effusion  - Bilateral effusions currently being drained by chest tubes  - From a palliative standpoint patient would likely benefit with indwelling pleural catheters and this is being considered by thoracic surgery if the patient is agreeable. Patient and family are considering.  - Continue to monitor chest tube output    #DVT proph - currently on AC with Eliquis    #GOC   - Palliative Care follow-up  - Patient would benefit from consideration of hospice - unclear if patient would quality for inpatient vs home hospice  - Patient's family was going to have his  come in for visit today    Long term prognosis poor. Discussed with patient and family at bedside and all questions answered.

## 2024-07-08 NOTE — PROGRESS NOTE ADULT - SUBJECTIVE AND OBJECTIVE BOX
PATIENT:  ILIA MONROE  [id number]    CHIEF COMPLAINT:  Patient is a 89y old  Male who presents with a chief complaint of SOB (08 Jul 2024 13:12)      INTERVAL HISTORY/OVERNIGHT EVENTS:  - no acute events    MEDICATIONS:  MEDICATIONS  (STANDING):  apixaban 2.5 milliGRAM(s) Oral every 12 hours  ascorbic acid 500 milliGRAM(s) Oral daily  benzocaine/menthol Lozenge 1 Lozenge Oral three times a day  chlorhexidine 2% Cloths 1 Application(s) Topical daily  lactated ringers. 1000 milliLiter(s) (60 mL/Hr) IV Continuous <Continuous>  lactobacillus acidophilus 1 Tablet(s) Oral daily  midodrine. 15 milliGRAM(s) Oral every 8 hours  multivitamin/minerals 1 Tablet(s) Oral daily  pantoprazole    Tablet 40 milliGRAM(s) Oral before breakfast  piperacillin/tazobactam IVPB.- 3.375 Gram(s) IV Intermittent once  piperacillin/tazobactam IVPB.. 3.375 Gram(s) IV Intermittent every 8 hours  simethicone 80 milliGRAM(s) Chew four times a day  tamsulosin 0.4 milliGRAM(s) Oral at bedtime    MEDICATIONS  (PRN):  acetaminophen     Tablet .. 650 milliGRAM(s) Oral every 6 hours PRN Temp greater or equal to 38C (100.4F), Mild Pain (1 - 3)  albuterol/ipratropium for Nebulization 3 milliLiter(s) Nebulizer every 6 hours PRN Shortness of Breath and/or Wheezing  aluminum hydroxide/magnesium hydroxide/simethicone Suspension 30 milliLiter(s) Oral every 4 hours PRN Dyspepsia  sodium chloride 0.65% Nasal 1 Spray(s) Both Nostrils four times a day PRN Nasal Congestion      ALLERGIES:  Allergies    No Known Allergies    Intolerances        OBJECTIVE:  ICU Vital Signs Last 24 Hrs  T(C): 36.6 (08 Jul 2024 10:50), Max: 37.1 (08 Jul 2024 04:29)  T(F): 97.8 (08 Jul 2024 10:50), Max: 98.7 (08 Jul 2024 04:29)  HR: 125 (08 Jul 2024 10:50) (92 - 125)  BP: 112/65 (08 Jul 2024 10:50) (104/68 - 122/66)  BP(mean): --  ABP: --  ABP(mean): --  RR: 20 (08 Jul 2024 10:50) (18 - 20)  SpO2: 95% (08 Jul 2024 10:50) (94% - 96%)    O2 Parameters below as of 08 Jul 2024 10:50  Patient On (Oxygen Delivery Method): nasal cannula  O2 Flow (L/min): 2          Adult Advanced Hemodynamics Last 24 Hrs  CVP(mm Hg): --  CVP(cm H2O): --  CO: --  CI: --  PA: --  PA(mean): --  PCWP: --  SVR: --  SVRI: --  PVR: --  PVRI: --  CAPILLARY BLOOD GLUCOSE        CAPILLARY BLOOD GLUCOSE        I&O's Summary    07 Jul 2024 07:01  -  08 Jul 2024 07:00  --------------------------------------------------------  IN: 320 mL / OUT: 1890 mL / NET: -1570 mL    08 Jul 2024 07:01  -  08 Jul 2024 13:15  --------------------------------------------------------  IN: 480 mL / OUT: 0 mL / NET: 480 mL      Daily     Daily     PHYSICAL EXAMINATION:  General: WN/WD NAD  HEENT: PERRLA, EOMI, moist mucous membranes  Neurology: A&Ox3, nonfocal, ACKERMAN x 4  Respiratory: CTA B/L, normal respiratory effort, no wheezes, crackles, rales  CV: RRR, S1S2, no murmurs, rubs or gallops  Abdominal: Soft, NT, ND +BS, Last BM  Extremities: No edema, + peripheral pulses  Incisions:   Tubes:    LABS:                          9.2    20.05 )-----------( 233      ( 08 Jul 2024 06:52 )             29.1     07-08    139  |  100  |  34<H>  ----------------------------<  97  4.0   |  25  |  0.77    Ca    13.1<HH>      08 Jul 2024 06:52  Phos  1.8     07-07  Mg     2.2     07-07    TPro  6.2  /  Alb  2.7<L>  /  TBili  0.4  /  DBili  x   /  AST  11  /  ALT  13  /  AlkPhos  97  07-08    LIVER FUNCTIONS - ( 08 Jul 2024 06:52 )  Alb: 2.7 g/dL / Pro: 6.2 g/dL / ALK PHOS: 97 U/L / ALT: 13 U/L / AST: 11 U/L / GGT: x                   Urinalysis Basic - ( 08 Jul 2024 06:52 )    Color: x / Appearance: x / SG: x / pH: x  Gluc: 97 mg/dL / Ketone: x  / Bili: x / Urobili: x   Blood: x / Protein: x / Nitrite: x   Leuk Esterase: x / RBC: x / WBC x   Sq Epi: x / Non Sq Epi: x / Bacteria: x        TELEMETRY:     EKG:     IMAGING:

## 2024-07-08 NOTE — PROGRESS NOTE ADULT - SUBJECTIVE AND OBJECTIVE BOX
PROGRESS NOTE:   Authored by Dr. Kenia Cardona  Patient is a 89y old  Male who presents with a chief complaint of SOB (08 Jul 2024 15:41)        OVERNIGHT EVENTS: No acute overnight events.    SUBJECTIVE: Patient was seen and examined at bedside this morning.   Denies any nausea/vomiting/diarrhea, headache, shortness of breath, abdominal pain or chest pain/palpitations.   Patient responding appropriately to questions and able to make needs known.   Vital signs/imaging/labs/telemetry events reviewed.   No acute complaints or concerns. Pt is feeling well. Tolerating PO.  Stating he feels fine.     All other ROS neg except as above       MEDICATIONS  (STANDING):  apixaban 2.5 milliGRAM(s) Oral every 12 hours  ascorbic acid 500 milliGRAM(s) Oral daily  benzocaine/menthol Lozenge 1 Lozenge Oral three times a day  chlorhexidine 2% Cloths 1 Application(s) Topical daily  lactated ringers. 1000 milliLiter(s) (60 mL/Hr) IV Continuous <Continuous>  lactobacillus acidophilus 1 Tablet(s) Oral daily  metoprolol tartrate 12.5 milliGRAM(s) Oral two times a day  midodrine. 15 milliGRAM(s) Oral every 8 hours  multivitamin/minerals 1 Tablet(s) Oral daily  pantoprazole    Tablet 40 milliGRAM(s) Oral before breakfast  piperacillin/tazobactam IVPB.. 3.375 Gram(s) IV Intermittent every 8 hours  simethicone 80 milliGRAM(s) Chew four times a day  tamsulosin 0.4 milliGRAM(s) Oral at bedtime    MEDICATIONS  (PRN):  acetaminophen     Tablet .. 650 milliGRAM(s) Oral every 6 hours PRN Temp greater or equal to 38C (100.4F), Mild Pain (1 - 3)  albuterol/ipratropium for Nebulization 3 milliLiter(s) Nebulizer every 6 hours PRN Shortness of Breath and/or Wheezing  aluminum hydroxide/magnesium hydroxide/simethicone Suspension 30 milliLiter(s) Oral every 4 hours PRN Dyspepsia  sodium chloride 0.65% Nasal 1 Spray(s) Both Nostrils four times a day PRN Nasal Congestion      CAPILLARY BLOOD GLUCOSE        I&O's Summary    07 Jul 2024 07:01  -  08 Jul 2024 07:00  --------------------------------------------------------  IN: 320 mL / OUT: 1890 mL / NET: -1570 mL    08 Jul 2024 07:01  -  08 Jul 2024 15:56  --------------------------------------------------------  IN: 480 mL / OUT: 300 mL / NET: 180 mL        PHYSICAL EXAM:  Vital Signs Last 24 Hrs  T(C): 36.6 (08 Jul 2024 10:50), Max: 37.1 (08 Jul 2024 04:29)  T(F): 97.8 (08 Jul 2024 10:50), Max: 98.7 (08 Jul 2024 04:29)  HR: 126 (08 Jul 2024 14:58) (92 - 141)  BP: 102/64 (08 Jul 2024 14:58) (102/64 - 122/66)  BP(mean): --  RR: 20 (08 Jul 2024 13:42) (18 - 20)  SpO2: 94% (08 Jul 2024 13:42) (94% - 96%)    Parameters below as of 08 Jul 2024 13:42  Patient On (Oxygen Delivery Method): nasal cannula  O2 Flow (L/min): 2      PHYSICAL EXAM:     GENERAL: NAD  HEAD:  Atraumatic, Normocephalic  EYES: EOMI, conjunctiva and sclera clear, no nystagmus noted  ENT: Moist mucous membranes  CHEST/LUNG: normal work of breathing, Clear to auscultation bilaterally; No rales, rhonchi, wheezing, or rubs. Unlabored respirations  HEART:  Regular rate and rhythm; No murmurs, rubs, or gallops, normal S1/S2  ABDOMEN: normal bowel sounds; Soft, nontender, nondistended, no organomegaly   EXTREMITIES:  no appreciable edema in b/l LE, 2+ Peripheral Pulses, brisk capillary refill. No clubbing, cyanosis  MSK: No gross deformities noted, ROM wnl   Neurological:  A&Ox3, no focal deficits   SKIN: warm and dry, no visible rash  PSYCH: Normal mood, affect         LABS:                        9.2    20.05 )-----------( 233      ( 08 Jul 2024 06:52 )             29.1     07-08    139  |  100  |  34<H>  ----------------------------<  97  4.0   |  25  |  0.77    Ca    13.1<HH>      08 Jul 2024 06:52  Phos  1.8     07-07  Mg     2.2     07-07    TPro  6.2  /  Alb  2.7<L>  /  TBili  0.4  /  DBili  x   /  AST  11  /  ALT  13  /  AlkPhos  97  07-08            Culture - Blood (collected 05 Jul 2024 16:02)  Source: .Blood Blood-Peripheral  Preliminary Report (07 Jul 2024 20:01):    No growth at 48 Hours    Culture - Blood (collected 05 Jul 2024 15:58)  Source: .Blood Blood-Peripheral  Preliminary Report (07 Jul 2024 20:01):    No growth at 48 Hours        Tele Reviewed:    RADIOLOGY & ADDITIONAL TESTS:  Results Reviewed: yes  Imaging Personally Reviewed: yes  Electrocardiogram Personally Reviewed: yes       PROGRESS NOTE:      Authored by Dr. Kenia Cardona     Patient is a 89y old  Male who presents with a chief complaint of SOB (08 Jul 2024 13:15)                       OVERNIGHT EVENTS: No acute overnight events.           SUBJECTIVE: Patient was seen and examined at bedside this morning.      Pt is Georgian speaking, 2 nephews who are HCP are at bedside translating.     Pt  denies any nausea/vomiting/diarrhea, headache, shortness of breath, abdominal pain or chest pain/palpitations.      Patient responding appropriately to questions and able to make needs known.      Vital signs/imaging/labs/telemetry events reviewed.      No acute complaints or concerns. Pt is feeling well. Tolerating pureed PO, no dentures.     Stating he feels fine.            All other ROS neg except as above                  MEDICATIONS  (STANDING):     apixaban 2.5 milliGRAM(s) Oral every 12 hours     ascorbic acid 500 milliGRAM(s) Oral daily     benzocaine/menthol Lozenge 1 Lozenge Oral three times a day     chlorhexidine 2% Cloths 1 Application(s) Topical daily     lactated ringers. 1000 milliLiter(s) (60 mL/Hr) IV Continuous <Continuous>     lactobacillus acidophilus 1 Tablet(s) Oral daily     metoprolol tartrate 12.5 milliGRAM(s) Oral two times a day     midodrine. 15 milliGRAM(s) Oral every 8 hours     multivitamin/minerals 1 Tablet(s) Oral daily     pantoprazole    Tablet 40 milliGRAM(s) Oral before breakfast     piperacillin/tazobactam IVPB.- 3.375 Gram(s) IV Intermittent once     piperacillin/tazobactam IVPB.. 3.375 Gram(s) IV Intermittent every 8 hours     simethicone 80 milliGRAM(s) Chew four times a day     tamsulosin 0.4 milliGRAM(s) Oral at bedtime           MEDICATIONS  (PRN):     acetaminophen     Tablet .. 650 milliGRAM(s) Oral every 6 hours PRN Temp greater or equal to 38C (100.4F), Mild Pain (1 - 3)     albuterol/ipratropium for Nebulization 3 milliLiter(s) Nebulizer every 6 hours PRN Shortness of Breath and/or Wheezing     aluminum hydroxide/magnesium hydroxide/simethicone Suspension 30 milliLiter(s) Oral every 4 hours PRN Dyspepsia     sodium chloride 0.65% Nasal 1 Spray(s) Both Nostrils four times a day PRN Nasal Congestion                 CAPILLARY BLOOD GLUCOSE                       I&O's Summary           07 Jul 2024 07:01  -  08 Jul 2024 07:00     --------------------------------------------------------     IN: 320 mL / OUT: 1890 mL / NET: -1570 mL           08 Jul 2024 07:01  -  08 Jul 2024 15:11     --------------------------------------------------------     IN: 480 mL / OUT: 300 mL / NET: 180 mL                       PHYSICAL EXAM:     Vital Signs Last 24 Hrs     T(C): 36.6 (08 Jul 2024 10:50), Max: 37.1 (08 Jul 2024 04:29)     T(F): 97.8 (08 Jul 2024 10:50), Max: 98.7 (08 Jul 2024 04:29)     HR: 126 (08 Jul 2024 14:58) (92 - 141)     BP: 102/64 (08 Jul 2024 14:58) (102/64 - 122/66)     BP(mean): --     RR: 20 (08 Jul 2024 13:42) (18 - 20)     SpO2: 94% (08 Jul 2024 13:42) (94% - 96%)           Parameters below as of 08 Jul 2024 13:42     Patient On (Oxygen Delivery Method): nasal cannula     O2 Flow (L/min): 2                 PHYSICAL EXAM:            GENERAL: NAD     HEAD:  Atraumatic, Normocephalic     EYES: EOMI, conjunctiva and sclera clear, no nystagmus noted     ENT: Moist mucous membranes, no dentures     CHEST/LUNG: inc work of breathing, tachypneic, crackles and rales bilaterally; No rhonchi, wheezing, or rubs. on 2L NC. R  , L  cc output     HEART:  tachycardia, No murmurs, rubs, or gallops, normal S1/S2, a-fib     ABDOMEN: normal bowel sounds; Soft, nontender, nondistended, no organomegaly      EXTREMITIES:  no appreciable edema in b/l LE, 2+ Peripheral Pulses, brisk capillary refill. No clubbing, cyanosis     MSK: No gross deformities noted, ROM wnl      Neurological:  A&Ox2 no focal deficits      SKIN: warm and dry, no visible rash     PSYCH: Normal mood, affect                        LABS:                              9.2       20.05 )-----------( 233      ( 08 Jul 2024 06:52 )                29.1            07-08           139  |  100  |  34<H>     ----------------------------<  97     4.0   |  25  |  0.77           Ca    13.1<HH>      08 Jul 2024 06:52     Phos  1.8     07-07     Mg     2.2     07-07           TPro  6.2  /  Alb  2.7<L>  /  TBili  0.4  /  DBili  x   /  AST  11  /  ALT  13  /  AlkPhos  97  07-08                                   Culture - Blood (collected 05 Jul 2024 16:02)     Source: .Blood Blood-Peripheral     Preliminary Report (07 Jul 2024 20:01):       No growth at 48 Hours           Culture - Blood (collected 05 Jul 2024 15:58)     Source: .Blood Blood-Peripheral     Preliminary Report (07 Jul 2024 20:01):       No growth at 48 Hours                       Tele Reviewed:           RADIOLOGY & ADDITIONAL TESTS:     Results Reviewed: yes     Imaging Personally Reviewed: yes     Electrocardiogram Personally Reviewed: yes

## 2024-07-08 NOTE — CHART NOTE - NSCHARTNOTEFT_GEN_A_CORE
Patient's chart reviewed. Patient's family wishes to bring him home for hospice care. Would continue to treat hypercalcemia aggressively so that he can make it home to be with his family. Received pamidronate yesterday. Consider steroids or calcitonin in addition to fluid management. Consult hospice.     Shakir Junior MD, PGY-6  Hematology/Medical Oncology Fellow  Pager: [phone]  Available on [employer] Teams  After 5pm or on weekends please contact  to page on-call fellow

## 2024-07-09 NOTE — PROVIDER CONTACT NOTE (CRITICAL VALUE NOTIFICATION) - BACKGROUND
Dx: Hypoxemia  Hx HLD, Bladder Ca, colitis, Afib.
Patient admitted for AHRF
Patient admitted for hypoxemia.
Pt was admitted for AHRF 2/2 large plural effusion.
89 year old male admitted for Hypoxemia. With PMH of Malignant neoplasm of bladder.
Dx: AHRF. PMHx: hypoxemia, colitis, cachezia, hypercalcemia, ODIN, HTN
Pt admitted with dx. AHRF 2/2 b/l pleural effusion - b/l chest tube placed, New onset AF with RVR - on Heparin gtt.
Pt admitted with dx. AHRF 2/2 b/l pleural effusion - b/l chest tube placed, New onset AF with RVR - on Heparin gtt.
dx; b/l pleural effusion and Suction is at the bedside/p b/l thoracentesis;
89 year old male, admitted for Hypoxemia, with history of AFIB RVR
89 year old male, admitted for Hypoxemia. PMH Malignant neoplasm of bladder.
Dx: AHRF 2/2 pleural effusion  Hx: malignant neoplasm of bladder, hypoxia, colitis, HTN, ODIN, bilateral PE
Pt was admitted for AHRF.
89 y/o male with PMHx stage 2 bladder cancer sp TURP. Patient p/w worsening RAMIREZ and orthopnea for the past 3 days.
Dx: AHRF. PMHx: hypoxemia, colitis, cachezia, hypercalcemia, ODIN, HTN
Pt was admitted for AHRF.
AHRF 2/2 large L Pleural Effusion: found to have bilat pleural effusions L > R, loculated appearing on R; c/w HFNC (50L/55%)> 6L; CTA neg PE, 6/22
DX: hypoxemia  PMH: bladder CA, hyponatremia, acute hypoxic respiratory failure
89 year old male, admitted for Hypoxemia, with history of AFIB RVR
Dx: AHRF. PMHx: hypoxemia, colitis, cachezia, hypercalcemia, ODIN, HTN
Dx: AHRF. PMHx: hypoxemia, colitis, cachezia, hypercalcemia, ODIN, HTN

## 2024-07-09 NOTE — PROVIDER CONTACT NOTE (CRITICAL VALUE NOTIFICATION) - PERSON GIVING RESULT:
Murtaza Goodwin, LAB
Zac Robles
Murtaza Goodwin, LAB
Anand Mccoy/lab
Edilma Oh - Lab
maine lorraine 
Lab: Zac Robles
Anand Mccoy
Octaviano Champion
Travis Frank
Zac Schilling - [place]
Emerson Sagastume/Lab
Lab- Lizz Wilson
Zac Robles/Laboratory
Zac Schilling/Lab
Anand Mccoy/Lab
Murtaza Goodwin/lab
Sancho Fleming and Zac Robles/[place]
Murtaza Goodwin/ Laboratory
Zac Robles/[place]
Anand Malave, LAB

## 2024-07-09 NOTE — PROGRESS NOTE ADULT - CONVERSATION DETAILS
Lengthy discussion held with pt's nephew Christopher who states pt is  with no children, Christohper was named as the HCP and has documentation at home he's willing to provide. Christopher understand that pt has an irreversible underlying condition of metastatic cancer, and that his current presentation are complications sequela of the cancer. He sees that pt is very uncomfortable from the chest tubes. He is worried that pt is having air hunger with suffering. I advised that the the complications pt is experiencing, he is likely at end of life, with a prognosis of days. We discussed continued medical efforts to focus on symptom relief, with de-escalation of non-essential medications including abx to reduce pill burden and adverse effects at end of life. I adv Lengthy discussion held with pt's nephew Christopher at bedside. Christopher was named as the HCP and has documentation at home he's willing to provide. Christopher understand that pt has an irreversible underlying condition of metastatic cancer, and that his current presentation are complications sequela of the cancer. He sees that pt is very uncomfortable from the chest tubes. He is worried that pt is suffering from air hunger and pain. He is asking if the chest tubes can be removed for comfort. He is very clear that the goal should be focused on comfort rather than any other aggressive medical interventions.     I advised that seeing pt's overall conditions and complications, he is likely at end of life with a prognosis of days. We discussed continued medical efforts to focus on symptom relief, with de-escalation of non-essential medications including abx to reduce pill burden and adverse effects at end of life. I advised to bring pt to PCU for continued care. Christopher asked about bring pt home, I discouraged this notion due to anticipation of increasing symptom burden in setting of worsening mental status (hypercalcemia on labs) that will be very difficult to manage at home.     Christopher discussed things with the rest of his family and decided to pursue PCU transfer as next step.

## 2024-07-09 NOTE — PROGRESS NOTE ADULT - PROBLEM SELECTOR PLAN 2
- uptrending leukocytosis at 22  - pt is not toxic appearing, afebrile, on empiric broad spectrum antibiotic  - it is more likely due to malignancy induced leukocytosis with increased production of G-CSF by the tumor vs infectious etiology (c/f empyema)  -treatment would include treating the underlying issue/cancer with chemotherapy and possible leukapheresis/exchange blood transfusion  as well as f/u C/S and fluid studies results  - however further management is on hold as pt's plan is for comfort care and he is being transitioned to PCU for symptom directed care.  - ID following- pt with IC status, c/f R empyema with worsening leukocytosis and organisms on gram stain, c/w zosyn and f/u C/S. If persistent leukocytosis, consider replacing chest tubes and repeat blood cultures.

## 2024-07-09 NOTE — PROVIDER CONTACT NOTE (CRITICAL VALUE NOTIFICATION) - NS PROVIDER READ BACK TO LAB
yes
Body fluid culture from July 2nd POSITIVE/yes
yes

## 2024-07-09 NOTE — PROGRESS NOTE ADULT - PROBLEM SELECTOR PLAN 1
Pt with resolving/resolved ODIN in the setting of hypotension, infectious etiology, hypercalcemia v other. SCr of 0.9 on admission, trended up to 1.4 on 7/1. SCr improved to 0.77 on 7/8 but elevated to 1.1 today. Initial UA reviewed, no proteinuria. Kidney US without hydronephrosis. Pt with 750cc UOP in past 24 hrs (1.1L the day before). Pt receiving IVFs. Recommend repeat UA and UPCR. Monitor labs and urine output. Avoid NSAIDs, ACEI/ARBS, RCA and nephrotoxins. Dose medications as per eGFR. Pt with resolving/resolved ODIN in the setting of hypotension, infectious etiology, hypercalcemia v other. SCr of 0.9 on admission, trended up to 1.4 on 7/1. SCr improved to 0.77 on 7/8 but elevated to 1.1 today. Initial UA reviewed, no proteinuria. Kidney US without hydronephrosis. Pt with 750cc UOP in past 24 hrs (1.1L the day before). Pt receiving IVFs. Recommend repeat UA and UPCR. Recommend bladder scan to ensure pt is not retaining urine. Monitor labs and urine output. Avoid NSAIDs, ACEI/ARBS, RCA and nephrotoxins. Dose medications as per eGFR.

## 2024-07-09 NOTE — PROGRESS NOTE ADULT - PROBLEM SELECTOR PLAN 5
- multiple episodes of AF with RVR with  spontaneous conversion to NSR   - TTE wnl , TSH wnl, [id number] 2 (age, gender),   - heparin gtt held for drop in Hgb 9 to 7.7 on 7/3, hgb stable around 9.2  -  cardiology following   -  c/w Eliquis 2.5 mg bid 7/5, hgb has been stable   - c/w  Metoprolol 12.5mg BID with holding parameters.

## 2024-07-09 NOTE — PROGRESS NOTE ADULT - CONVERSATION DETAILS
Discussed with nephew and pt - pt says he is uncomfortable, restless, tired, wants to control these symptoms; understands he is dying and is "ready to go"    Nephew wants focus on comfort; agrees does not make sense to subject him to the OR for PleurX catheters when the benefit is, at best, unclear. Asks for palliative follow up re: disposition to PCU (pending pall care eval and bed availability) vs inpatient hospice vs home hospice. I recommended symptom control with morphine or other opioids and he was in agreement, will discuss with rest of family

## 2024-07-09 NOTE — PROVIDER CONTACT NOTE (CRITICAL VALUE NOTIFICATION) - SITUATION
Lactate 5.4
lactate 4.5
lactate 5.9
ABG Lactate 6.3
APTT: 122.9
Ca, ionized 1.76
Lactate 5.2
Lactate 6.7, Ionized calcium 1.77, Ca 13.5
Lactate: 5.5
lactate 6.7
ABG Sodium 120
Lactate: 6.7
Latate 4.8
As per lab patients Sodium whole blood venous is 120
Lactate 4.8
Critical Lab Value  Lactate-4.4
lactate level is 6.5
Body fluid- few polymorphonuclear leukocytes per low poser, rare gram negative rods per oil power filed
Blood lactate 7.4
Ca 13.1
Lab called with a critical for patient. Body fluid cultures collected on July 2nd final result is positive. Gram negative gelacio organisms seen in gram stain. Non viable after prolonged incubation and repeated sub culture.

## 2024-07-09 NOTE — PROGRESS NOTE ADULT - ATTENDING COMMENTS
Patient seen and examined with Dr. Mc. Dr. Mc is fluent in Togolese and was able to provide translation services. Case also discussed with patient's family members (nephew) at bedside.    Patient appears to be in the dying process and appears to be suffering. He is having signs of dyspnea and air hunger and would benefit from symptom directed care. He is DNR/DNI. His family is at bedside.    Patient is an 89M with extensive history including bladder cancer s/p TURBT and chemoradiation who presented with acute hypoxemic respiraotry failure on O2 and bilateral pleural effusions. He had bilateral thoracentesis earlier this hospitalization and then subsequent chest tube placement by thoracic surgery and IR. He continues to have pleural fluid drainage and was being considered for palliative PleurX placement though this may not be in line with GOC based on today's discussion.  - One of his pleural fluid samples had atypical cells.  - Imaging reviewed and is concerning for malignancy      #Acute Hypoxemic Respiratory Failure  - Continue supplemental o2 as needed with goal O2 sat > 88%  - Palliative care follow-up for symptomatic management of dyspnea and air hunger    #Exudative Pleural Effusion  - Bilateral effusions currently being drained by chest tubes  - From a palliative standpoint patient would likely benefit with indwelling pleural catheters and this is being considered by thoracic surgery if the patient is agreeable. Patient and family are considering.  - Continue to monitor chest tube output    #Infectious Disease  - Antibiotics as per ID    #DVT proph - currently on AC with Eliquis    #GOC   - Palliative Care follow-up  - Patient would benefit from consideration of hospice - unclear if patient would quality for inpatient vs home hospice  - Depending on GOC and further discussions would consider stopping telemetry monitoring, lab draws, and non-essential medications    Long term prognosis poor. Discussed with patient and family at bedside and all questions answered.

## 2024-07-09 NOTE — PROGRESS NOTE ADULT - ASSESSMENT
ASSESSMENT: 89 year old male with hx of stage II bladder cancer s/p TURBT and chemoradiation with gemcitabine (completed 5/2/24). Pt presenting with worsening dyspnea on exertion and orthopnea. CTA chest performed without evidence of PE. Large pleural effusion noted on L with passive atelectasis. New 8mm pulm nodule in RUL also noted. Patient admitted to the medical service. During this admission, patient has required multiple thoracentesis, Left on 6/22 (2.1 L removed) and Right on 6/25 (1.8L removed). Since that time, patient remains on 6L NC, endorsing increased WOB. Cultures from prior thoracentesis are negative, cytopathology showing rare atypical cells. Patient is on Unasyn. Repeat CT on 6/28 showing large L sided loculated effusion and right moderate hydropneumothorax. Thoracic surgery consulted for surgical management of loculated effusion.      6/30   5 L NC   OOB to chair  DC hep gtt  7/1     VSS, WBC down to 21, awaiting IR to place RCT, L Pigtail to WS draining 50 cc serosanguinous drainage this am no airleak,   rt PTC placed by IR  7/2     on high flow o2   chest xray  improving   BL  PTC  drainaing  7/3     remains on HFNC. CXR daily while tubes are in. Monitor CT output, +draining  7/4 VSS, weaned off HFNC, currently on 2L NC SpO2 96%, Left pigtail output 330cc/24h, Right pigtail output 350cc/24h, Maintain B/L pigtails to LWS per Dr. Dobbins, Fluid cytology pending.   7/5 VSS   chest tubes on water seal placed to LWS primary team aware  cytology pending  7/6 VSS family at bedside  chest tubes suction cytology pending   7/7 VSS, Left pigtail out /24h, Right pigtail output /24h. Maintain b/l pigtails to LWS. CXR with persistent left pleural effusion.  7/8    BL   PTC  draining      7/9 VSS, SpO2 95% on 2L, pt is DNR/DNI and family opting for comfort measures and deferring pleurx catheter placement at this time, pending bed to PCU.

## 2024-07-09 NOTE — PROGRESS NOTE ADULT - PROBLEM SELECTOR PLAN 2
S/p bilat chest tube, to wall suction, family asking to remove the drain as they observe that pt is very uncomfortable from this. I discussed case with pulmonary who would be willing to remove it after pt transfers to the PCU  - IV dilaudid 0.3 mg q3 prn dyspnea  - IV glycopyrrolate 0.4 mg q6 prn secretions

## 2024-07-09 NOTE — PROGRESS NOTE ADULT - ASSESSMENT
89F with complex medical hx including bladder ca, and CT showing likely evidence metastatic disease, AFib, bilat pleural effusions c/f parapneumonic vs malignant, hypercalcemia, s/p left chest tube by thoracic, right chest tube by IR. CT c/a/p reveals diffuse metastatic disease. Geriatrics and Palliative Medicine Team is consulted for GOC

## 2024-07-09 NOTE — PROGRESS NOTE ADULT - SUBJECTIVE AND OBJECTIVE BOX
Patient is a 89y old  Male who presents with a chief complaint of SOB (09 Jul 2024 11:14)      Vital Signs Last 24 Hrs  T(C): 36.6 (07-09-24 @ 11:45), Max: 37.4 (07-08-24 @ 16:55)  T(F): 97.9 (07-09-24 @ 11:45), Max: 99.3 (07-08-24 @ 16:55)  HR: 92 (07-09-24 @ 13:29) (92 - 132)  BP: 119/64 (07-09-24 @ 13:29) (94/62 - 119/64)  RR: 18 (07-09-24 @ 11:45) (18 - 20)  SpO2: 95% (07-09-24 @ 11:45) (86% - 95%)                07-08-24 @ 07:01  -  07-09-24 @ 07:00  --------------------------------------------------------  IN: 680 mL / OUT: 1105 mL / NET: -425 mL                          9.2    22.05 )-----------( 283      ( 09 Jul 2024 07:34 )             30.8       141  |  99  |  51<H>  ----------------------------<  102<H>  3.9   |  23  |  1.10    AST  14  /  ALT  15  /  AlkPhos  99  07-09          PHYSICAL EXAM  Neurology: A&Ox3, NAD  CV : RRR+S1S2  Lungs: +dyspneic, B/L BS clear, diminished at bases  +Right and Left pigtail to LWS with serosanguinous drainage, no air leak  Abdomen: Soft, NT/ND, +BSx4Q  Extremities: B/L LE warm, no edema, +PP           MEDICATIONS  acetaminophen     Tablet .. 650 milliGRAM(s) Oral every 6 hours PRN  albuterol/ipratropium for Nebulization 3 milliLiter(s) Nebulizer every 6 hours PRN  aluminum hydroxide/magnesium hydroxide/simethicone Suspension 30 milliLiter(s) Oral every 4 hours PRN  apixaban 2.5 milliGRAM(s) Oral every 12 hours  ascorbic acid 500 milliGRAM(s) Oral daily  benzocaine/menthol Lozenge 1 Lozenge Oral three times a day  calcitonin Injectable 270 International Unit(s) IntraMuscular every 12 hours  chlorhexidine 2% Cloths 1 Application(s) Topical daily  glycopyrrolate Injectable 0.4 milliGRAM(s) IV Push every 6 hours PRN  guaiFENesin Oral Liquid (Sugar-Free) 200 milliGRAM(s) Oral every 6 hours PRN  HYDROmorphone  Injectable 0.3 milliGRAM(s) IV Push every 3 hours PRN  HYDROmorphone  Injectable 0.2 milliGRAM(s) IV Push every 3 hours PRN  lactated ringers. 1000 milliLiter(s) IV Continuous <Continuous>  lactobacillus acidophilus 1 Tablet(s) Oral daily  metoprolol tartrate 12.5 milliGRAM(s) Oral two times a day  midodrine. 15 milliGRAM(s) Oral every 8 hours  multivitamin/minerals 1 Tablet(s) Oral daily  pantoprazole    Tablet 40 milliGRAM(s) Oral before breakfast  piperacillin/tazobactam IVPB.. 3.375 Gram(s) IV Intermittent every 8 hours  simethicone 80 milliGRAM(s) Chew four times a day  sodium chloride 0.65% Nasal 1 Spray(s) Both Nostrils four times a day PRN  tamsulosin 0.4 milliGRAM(s) Oral at bedtime

## 2024-07-09 NOTE — PROGRESS NOTE ADULT - SUBJECTIVE AND OBJECTIVE BOX
DATE OF SERVICE: 07-09-24 @ 15:54    Patient is a 89y old  Male who presents with a chief complaint of SOB (09 Jul 2024 15:06)      INTERVAL HISTORY: no acute events, family at bedside.    REVIEW OF SYSTEMS:  CONSTITUTIONAL: No weakness  EYES/ENT: No visual changes;  No throat pain   NECK: No pain or stiffness  RESPIRATORY: No cough, wheezing; No shortness of breath  CARDIOVASCULAR: No chest pain or palpitations  GASTROINTESTINAL: No abdominal  pain. No nausea, vomiting, or hematemesis  GENITOURINARY: No dysuria, frequency or hematuria  NEUROLOGICAL: No stroke like symptoms  SKIN: No rashes    TELEMETRY Personally reviewed: SR/ST 90s-110  	  MEDICATIONS:  metoprolol tartrate 12.5 milliGRAM(s) Oral two times a day  midodrine. 15 milliGRAM(s) Oral every 8 hours        PHYSICAL EXAM:  T(C): 36.6 (07-09-24 @ 11:45), Max: 37.4 (07-08-24 @ 16:55)  HR: 92 (07-09-24 @ 13:29) (92 - 132)  BP: 119/64 (07-09-24 @ 13:29) (94/62 - 119/64)  RR: 18 (07-09-24 @ 11:45) (18 - 20)  SpO2: 95% (07-09-24 @ 11:45) (86% - 95%)  Wt(kg): --  I&O's Summary    08 Jul 2024 07:01  -  09 Jul 2024 07:00  --------------------------------------------------------  IN: 680 mL / OUT: 1105 mL / NET: -425 mL    09 Jul 2024 07:01  -  09 Jul 2024 15:54  --------------------------------------------------------  IN: 240 mL / OUT: 550 mL / NET: -310 mL          Appearance: In no distress	  HEENT:    PERRL, EOMI	  Cardiovascular:  S1 S2, No JVD  Respiratory: Lungs clear to auscultation	  Gastrointestinal:  Soft, Non-tender, + BS	  Vascularature:  No edema of LE  Psychiatric: Appropriate affect   Neuro: no acute focal deficits                               9.2    22.05 )-----------( 283      ( 09 Jul 2024 07:34 )             30.8     07-09    141  |  99  |  51<H>  ----------------------------<  102<H>  3.9   |  23  |  1.10    Ca    13.5<HH>      09 Jul 2024 07:34  Phos  2.5     07-09  Mg     2.4     07-09    TPro  6.6  /  Alb  2.6<L>  /  TBili  0.4  /  DBili  x   /  AST  14  /  ALT  15  /  AlkPhos  99  07-09        Labs personally reviewed      ASSESSMENT/PLAN: 	    89M with hx of Stage II bladder cancer s/p TURBT and chemoradiation, presenting with dyspnea and orthopnea. a/w Acute hypoxic respiratory failure also found to have large L pleural effusion. Found to have new onset AF with RVR.    1. New Onset Atrial Fibrillation with Rapid Ventricular Response  - ECG 6/21 with AF with RVR  - Spontaneously converted to SR 6/21  - TTE wnl  - TSH wnl  - [id number] of 2 (age and gender).  - TranEliquis 2.5mg BID (lower off label dose as recent drop in H/H)    2. Shortness of Breath  - CTA neg for PE but found to have large left pleural effusion now s/p thora with 2.1L removed on 6/22  -   - TTE wnl, normal filling pressures, preserved EF  - s/p L thoracentesis 6/25 2.1L off  - now on 6L NC  - 6/28 CT with Very large loculated left pleural effusion and small to moderate size loculated right hydropneumothorax.  - S/p L pigtail     3. Hyponatremia  - Slowly improving, now 131  - Lasix on hold    4. Hypotension  - systolic 70s- 100  - midodrine 15mg TID started  - lactate 7.4 now 4.8  - NS bolus x 1 given                  Iolani Behrbom, AG-NP   Ander Cardoso DO [facility]  Cardiovascular Medicine  [address] [facility], [address]  Available through call or text on [employer] TEAM[employer]  Office: [phone]   DATE OF SERVICE: 07-09-24 @ 15:54    Patient is a 89y old  Male who presents with a chief complaint of SOB (09 Jul 2024 15:06)      INTERVAL HISTORY: no acute events, family at bedside.    REVIEW OF SYSTEMS:  CONSTITUTIONAL: No weakness  EYES/ENT: No visual changes;  No throat pain   NECK: No pain or stiffness  RESPIRATORY: No cough, wheezing; No shortness of breath  CARDIOVASCULAR: No chest pain or palpitations  GASTROINTESTINAL: No abdominal  pain. No nausea, vomiting, or hematemesis  GENITOURINARY: No dysuria, frequency or hematuria  NEUROLOGICAL: No stroke like symptoms  SKIN: No rashes    TELEMETRY Personally reviewed: SR/ST 90s-110  	  MEDICATIONS:  metoprolol tartrate 12.5 milliGRAM(s) Oral two times a day  midodrine. 15 milliGRAM(s) Oral every 8 hours        PHYSICAL EXAM:  T(C): 36.6 (07-09-24 @ 11:45), Max: 37.4 (07-08-24 @ 16:55)  HR: 92 (07-09-24 @ 13:29) (92 - 132)  BP: 119/64 (07-09-24 @ 13:29) (94/62 - 119/64)  RR: 18 (07-09-24 @ 11:45) (18 - 20)  SpO2: 95% (07-09-24 @ 11:45) (86% - 95%)  Wt(kg): --  I&O's Summary    08 Jul 2024 07:01  -  09 Jul 2024 07:00  --------------------------------------------------------  IN: 680 mL / OUT: 1105 mL / NET: -425 mL    09 Jul 2024 07:01  -  09 Jul 2024 15:54  --------------------------------------------------------  IN: 240 mL / OUT: 550 mL / NET: -310 mL          Appearance: In no distress	  HEENT:    PERRL, EOMI	  Cardiovascular:  S1 S2, No JVD  Respiratory: Lungs clear to auscultation	  Gastrointestinal:  Soft, Non-tender, + BS	  Vascularature:  No edema of LE  Psychiatric: Appropriate affect   Neuro: no acute focal deficits                               9.2    22.05 )-----------( 283      ( 09 Jul 2024 07:34 )             30.8     07-09    141  |  99  |  51<H>  ----------------------------<  102<H>  3.9   |  23  |  1.10    Ca    13.5<HH>      09 Jul 2024 07:34  Phos  2.5     07-09  Mg     2.4     07-09    TPro  6.6  /  Alb  2.6<L>  /  TBili  0.4  /  DBili  x   /  AST  14  /  ALT  15  /  AlkPhos  99  07-09        Labs personally reviewed      ASSESSMENT/PLAN: 	    89M with hx of Stage II bladder cancer s/p TURBT and chemoradiation, presenting with dyspnea and orthopnea. a/w Acute hypoxic respiratory failure also found to have large L pleural effusion. Found to have new onset AF with RVR.    1. New Onset Atrial Fibrillation with Rapid Ventricular Response  - ECG 6/21 with AF with RVR  - Spontaneously converted to SR 6/21  - TTE wnl  - TSH wnl  - [id number] of 2 (age and gender).  - Transitioned to Eliquis 2.5mg BID (lower off label dose as recent drop in H/H)    2. Shortness of Breath  - CTA neg for PE but found to have large left pleural effusion now s/p thora with 2.1L removed on 6/22  -   - TTE wnl, normal filling pressures, preserved EF  - s/p L thoracentesis 6/25 2.1L off  - now on 6L NC  - 6/28 CT with Very large loculated left pleural effusion and small to moderate size loculated right hydropneumothorax.  - S/p L pigtail     3. Hyponatremia  - Slowly improving, now 131  - Lasix on hold    4. Hypotension  - systolic 70s- 100  - midodrine 15mg TID started  - lactate 7.4 now 4.8  - NS bolus x 1 given      comfort care now, will sign off          Iolani Behrbom, MIKE Cardoso DO [facility]  Cardiovascular Medicine  [address], [address]  Available through call or text on [employer] TEAM[employer]  Office: [phone]

## 2024-07-09 NOTE — PROGRESS NOTE ADULT - PROBLEM SELECTOR PLAN 2
Pt with persistent hypercalcemia in setting of malignancy v other. Serum Ca remains elevated at 12.5 and iCa of 1.59 (7/2). PTH 2 and phos 2.5. Vit D 1,25 elevated at 165. Vit D 25 and PTHrP pending. K/L 0.8. OMAR with no monoclonal bands. Pt received Pamidronate x1 on 7/7. Serum Ca 13.5 and iCa of 1.77. Pt received calcitonin and IVFs today. Pt may benefit from steroids but would rule out TB. Monitor serum and iCa.

## 2024-07-09 NOTE — PROGRESS NOTE ADULT - ATTENDING SUPERVISION STATEMENT
Fellow
Resident
Resident

## 2024-07-09 NOTE — PROGRESS NOTE ADULT - PROBLEM SELECTOR PLAN 1
s/p Left PTC 6/30 by surgical resident  s/p Right PTC 7/1 by IR  Keep left and right pigtail to water seal  Pt and family deferring Pleurx placement at this time  Thoracic surgery will sign off, reconsult as needed  Palliative care following, plan to transfer pt to PCU pending bed   Continue care as per primary team

## 2024-07-09 NOTE — PROGRESS NOTE ADULT - ATTENDING COMMENTS
89 yo M with a PMH of Bladder CA and BPH who presented to [facility] due to lethargy and weakness. SCr on admission of 0.9 (6/20) but trended up to 1.4 on 7/1. Nephrology consulted for ODIN.  Per Luis/MELLY review, pt has had ranging SCr from 0.9 to 1.4.   Pt seen and examined with attending, team present initially.. Pt currently on NC and is s/p B/L chest tube placement for recurrent effusions Pt also found to have a RUL nodule. Of note, prior to rise in SCr, pt had a RRT for hypotension with BP in the 80s and HR in the 150s. Pt with 800cc of UOP overnight. Hx is limited due to current medical condition. Pt denied n/v/f/c/difficulty with urination ,.BP improving>100, mental status better.  Chest tubes in place, some SOB.  Ca increasing  1.  ARF--likely volume, hemodynamic, resolved.    NO HD indication at present AND IMPROVING Cr c/w overall improved perfusion  2.  Hypotension--improved now in 100s and mentating.  Oral pressor to keep MAP >65 as required  3.  Hypercalcemia--not life threatening and likely mitigate 2.  W/U as outlined in progress revealing HIGH 1/25 VITAMIN D.  NOT encephalopathic and with 1 improvinga bisphosphonate candidate.  USUALLY REQUIRES SEVERAL DAYS TO SEE EFFECT.  No volume expansion, calcitonin absolutely needed.  unless Ca rises abruptly or mental status declines. Check TB status if steroids contemplated by deana  4.  Respiratory failure acute--improving and not intubation risk in near future.  Given disease process mechanical vent not desirable    discussed with med team  Anand Correia MD  contact me on TEAMs

## 2024-07-09 NOTE — PROVIDER CONTACT NOTE (CRITICAL VALUE NOTIFICATION) - NAME OF MD/NP/PA/DO NOTIFIED:
[place] [id number]
CHAIM Murphy
Becky Sanchez ACP
Night RUBI Gomez
RUBI Wallis
GRACY Gilbert
GRACY- Monisha Sanchez
GRACY- Monisha Sanchez
Maral Murphy, ACP
CHAIM Noriega
Maral Murphy, ACP
Zoila Melendez
GRACY Gilbert
GRACY- Abbey Weldon
CHAIM Noriega
Guille Rodriguez NP
CHAIM Dowling
GRACY Gilbert
Maral Murphy, ACP
ACP Monisha Sanchez
Katharine Parmar ACP

## 2024-07-09 NOTE — PROVIDER CONTACT NOTE (CRITICAL VALUE NOTIFICATION) - RECOMMENDATIONS
Providers made aware.
Night provider made aware.
Notify provider.
Notify ACP Azeb Gilbert
no further order
notify provider
Notify ACP Azeb Gilbert
Notify ACP Monisha Sanchez.
Notify provider.
Provider made aware. IVF ordered for patient
NP updated.
Notify ACP Azeb Gilbert.
Provider notified.
C/w monitoring lactate levels
NP made aware.
Provider notified.
Provider notified.

## 2024-07-09 NOTE — PROGRESS NOTE ADULT - SUBJECTIVE AND OBJECTIVE BOX
[facility] DIVISION OF KIDNEY DISEASES AND HYPERTENSION   FOLLOW UP NOTE  --------------------------------------------------------------------------------  Chief Complaint: Pt with ODIN and hypercalcemia    24 hour events/subjective: Pt. was seen and examined. Pt with chest tubes in place and remains on NC. Pt somnolent this am. Pt received IVFs and calcitonin this am for persistent hypercalcemia. Pt non oliguric with 750cc in past 24 hours.    PAST HISTORY  --------------------------------------------------------------------------------  No significant changes to PMH, PSH, FHx, SHx, unless otherwise noted    ALLERGIES & MEDICATIONS  --------------------------------------------------------------------------------  Allergies  No Known Allergies    Intolerances    Standing Inpatient Medications  apixaban 2.5 milliGRAM(s) Oral every 12 hours  ascorbic acid 500 milliGRAM(s) Oral daily  benzocaine/menthol Lozenge 1 Lozenge Oral three times a day  calcitonin Injectable 270 International Unit(s) IntraMuscular every 12 hours  chlorhexidine 2% Cloths 1 Application(s) Topical daily  lactated ringers. 1000 milliLiter(s) IV Continuous <Continuous>  lactobacillus acidophilus 1 Tablet(s) Oral daily  metoprolol tartrate 12.5 milliGRAM(s) Oral two times a day  midodrine. 15 milliGRAM(s) Oral every 8 hours  multivitamin/minerals 1 Tablet(s) Oral daily  pantoprazole    Tablet 40 milliGRAM(s) Oral before breakfast  piperacillin/tazobactam IVPB.. 3.375 Gram(s) IV Intermittent every 8 hours  simethicone 80 milliGRAM(s) Chew four times a day  tamsulosin 0.4 milliGRAM(s) Oral at bedtime    PRN Inpatient Medications  acetaminophen     Tablet .. 650 milliGRAM(s) Oral every 6 hours PRN  albuterol/ipratropium for Nebulization 3 milliLiter(s) Nebulizer every 6 hours PRN  aluminum hydroxide/magnesium hydroxide/simethicone Suspension 30 milliLiter(s) Oral every 4 hours PRN  guaiFENesin Oral Liquid (Sugar-Free) 200 milliGRAM(s) Oral every 6 hours PRN  sodium chloride 0.65% Nasal 1 Spray(s) Both Nostrils four times a day PRN    REVIEW OF SYSTEMS  --------------------------------------------------------------------------------  Limited ROS     VITALS/PHYSICAL EXAM  --------------------------------------------------------------------------------  T(C): 36.6 (07-09-24 @ 04:00), Max: 37.4 (07-08-24 @ 16:55)  HR: 99 (07-09-24 @ 04:00) (95 - 141)  BP: 116/66 (07-09-24 @ 04:00) (94/62 - 116/66)  RR: 18 (07-09-24 @ 08:43) (18 - 20)  SpO2: 94% (07-09-24 @ 08:43) (86% - 95%)  Wt(kg): --    07-08-24 @ 07:01  -  07-09-24 @ 07:00  --------------------------------------------------------  IN: 680 mL / OUT: 1105 mL / NET: -425 mL    Physical Exam:  	Gen: Chronically ill appearing but in NAD  	HEENT: Anicteric  	Pulm: Diminished B/L. +NC. +B/L Chest tubes  	CV: S1S2+  	Abd: Soft, +BS    	Ext: No LE edema B/L  	Neuro: Somnolent  	Skin: Warm and dry    LABS/STUDIES  --------------------------------------------------------------------------------              9.2    22.05 >-----------<  283      [07-09-24 @ 07:34]              30.8     141  |  99  |  51  ----------------------------<  102      [07-09-24 @ 07:34]  3.9   |  23  |  1.10        Ca     13.5     [07-09-24 @ 07:34]      iCa    1.77     [07-09 @ 07:34]      Mg     2.4     [07-09-24 @ 07:34]      Phos  2.5     [07-09-24 @ 07:34]    TPro  6.6  /  Alb  2.6  /  TBili  0.4  /  DBili  x   /  AST  14  /  ALT  15  /  AlkPhos  99  [07-09-24 @ 07:34]    Creatinine Trend:  SCr 1.10 [07-09 @ 07:34]  SCr 0.77 [07-08 @ 06:52]  SCr 0.71 [07-07 @ 06:48]  SCr 0.80 [07-06 @ 06:11]  SCr 0.88 [07-05 @ 07:20]    Urine Protein 27      [07-03-24 @ 19:24]    Free Light Chains: kappa 6.69, lambda 7.57, ratio = 0.88      [07-04 @ 06:34]  Immunofixation Urine: No Monoclonal Band Identified  Reference Range: None Detected      [07-03-24 @ 19:24]

## 2024-07-09 NOTE — PROGRESS NOTE ADULT - PROBLEM SELECTOR PLAN 5
- HCP reportedly nephew Christopher, he will bring in documentation from home  - Code status DNR/I  - GOC is to transition to full comfort measures and tx to PCU

## 2024-07-09 NOTE — PROGRESS NOTE ADULT - ATTENDING COMMENTS
no 89M with hx of Stage II bladder cancer s/p TURBT and chemoradiation, presenting with dyspnea and orthopnea. a/w Acute hypoxic respiratory failure also found to have large L pleural effusion status post L thoracentesis with pending cytology and ongoing oxygen requirements.     Seen and examined at bedside this morning. He appears unwell, tachypneic and uncomfortable.   HR improved with metoprolol.   Labs worse today - hypercalcemia, lactic acidosis  discussed in detail with Pulm, Palliative and family - pt himself has expressed his wishes to discontinue treatment and nephews want to honor his wishes and pursue comfort care.   Home hospice referral sent but now patient is a candidate for PCU, pending bed  all medications discontinued and comfort care and appropriate meds ordered by Palliative  Pulm to remove chest tubes in the PCU  rest as above  d/w resident in detail 89M with hx of Stage II bladder cancer s/p TURBT and chemoradiation, presenting with dyspnea and orthopnea. a/w Acute hypoxic respiratory failure also found to have large L pleural effusion status post L thoracentesis with pending cytology and ongoing oxygen requirements.     Seen and examined at bedside this morning. He appears unwell, tachypneic and uncomfortable.   HR improved with metoprolol.   Labs worse today - hypercalcemia, lactic acidosis  discussed in detail with Pulm, Palliative and family - pt himself has expressed his wishes to discontinue treatment and nephews want to honor his wishes and pursue comfort care.   Home hospice referral sent but now patient is a candidate for PCU, pending bed  all medications discontinued and comfort care and appropriate meds ordered by Palliative  Pulm to remove chest tubes in the PCU  rest as above  prognosis dismal  d/w resident in detail

## 2024-07-09 NOTE — PROGRESS NOTE ADULT - SUBJECTIVE AND OBJECTIVE BOX
PROGRESS NOTE:   Authored by Dr. Kenia Cardona  Patient is a 89y old  Male who presents with a chief complaint of SOB (09 Jul 2024 11:14)        OVERNIGHT EVENTS: 2.7 sec pause noted on telemetry. Pt denied CP, palpitations, SOB, pain anywhere else. Self converted to NSR.     SUBJECTIVE: Patient was seen and examined at bedside this morning.   Pt is very somnolent in am with bloody output from his chest tubes bl.  Vital signs/imaging/labs/telemetry events reviewed.     All other ROS neg except as above       MEDICATIONS  (STANDING):  apixaban 2.5 milliGRAM(s) Oral every 12 hours  ascorbic acid 500 milliGRAM(s) Oral daily  benzocaine/menthol Lozenge 1 Lozenge Oral three times a day  calcitonin Injectable 270 International Unit(s) IntraMuscular every 12 hours  chlorhexidine 2% Cloths 1 Application(s) Topical daily  lactated ringers. 1000 milliLiter(s) (60 mL/Hr) IV Continuous <Continuous>  lactobacillus acidophilus 1 Tablet(s) Oral daily  metoprolol tartrate 12.5 milliGRAM(s) Oral two times a day  midodrine. 15 milliGRAM(s) Oral every 8 hours  multivitamin/minerals 1 Tablet(s) Oral daily  pantoprazole    Tablet 40 milliGRAM(s) Oral before breakfast  piperacillin/tazobactam IVPB.. 3.375 Gram(s) IV Intermittent every 8 hours  simethicone 80 milliGRAM(s) Chew four times a day  tamsulosin 0.4 milliGRAM(s) Oral at bedtime    MEDICATIONS  (PRN):  acetaminophen     Tablet .. 650 milliGRAM(s) Oral every 6 hours PRN Temp greater or equal to 38C (100.4F), Mild Pain (1 - 3)  albuterol/ipratropium for Nebulization 3 milliLiter(s) Nebulizer every 6 hours PRN Shortness of Breath and/or Wheezing  aluminum hydroxide/magnesium hydroxide/simethicone Suspension 30 milliLiter(s) Oral every 4 hours PRN Dyspepsia  glycopyrrolate Injectable 0.4 milliGRAM(s) IV Push every 6 hours PRN secretions  guaiFENesin Oral Liquid (Sugar-Free) 200 milliGRAM(s) Oral every 6 hours PRN Cough  HYDROmorphone  Injectable 0.3 milliGRAM(s) IV Push every 3 hours PRN dyspnea  HYDROmorphone  Injectable 0.2 milliGRAM(s) IV Push every 3 hours PRN pain  sodium chloride 0.65% Nasal 1 Spray(s) Both Nostrils four times a day PRN Nasal Congestion      CAPILLARY BLOOD GLUCOSE        I&O's Summary    08 Jul 2024 07:01  -  09 Jul 2024 07:00  --------------------------------------------------------  IN: 680 mL / OUT: 1105 mL / NET: -425 mL    09 Jul 2024 07:01  -  09 Jul 2024 14:45  --------------------------------------------------------  IN: 240 mL / OUT: 550 mL / NET: -310 mL        PHYSICAL EXAM:  Vital Signs Last 24 Hrs  T(C): 36.6 (09 Jul 2024 11:45), Max: 37.4 (08 Jul 2024 16:55)  T(F): 97.9 (09 Jul 2024 11:45), Max: 99.3 (08 Jul 2024 16:55)  HR: 92 (09 Jul 2024 13:29) (92 - 132)  BP: 119/64 (09 Jul 2024 13:29) (94/62 - 119/64)  BP(mean): --  RR: 18 (09 Jul 2024 11:45) (18 - 20)  SpO2: 95% (09 Jul 2024 11:45) (86% - 95%)    Parameters below as of 09 Jul 2024 11:45  Patient On (Oxygen Delivery Method): nasal cannula  O2 Flow (L/min): 2      PHYSICAL EXAM:     GENERAL: NAD , somnolent  HEAD:  Atraumatic, Normocephalic   EYES: EOMI, conjunctiva and sclera clear, no nystagmus noted   ENT: Moist mucous membranes, no dentures   CHEST/LUNG: inc work of breathing, tachypneic, crackles and rales bilaterally; No rhonchi, wheezing, or rubs. on 2L NC. R  , L CT 70 cc output   HEART:  tachycardia, No murmurs, rubs, or gallops, normal S1/S2, a-fib   ABDOMEN: normal bowel sounds; Soft, nontender, nondistended, no organomegaly    EXTREMITIES:  no appreciable edema in b/l LE, 2+ Peripheral Pulses, brisk capillary refill. No clubbing, cyanosis   MSK: No gross deformities noted, ROM wnl    Neurological:  A&Ox1 no focal deficits    SKIN: warm and dry, no visible rash   PSYCH: Normal mood, affect        LABS:                        9.2    22.05 )-----------( 283      ( 09 Jul 2024 07:34 )             30.8     07-09    141  |  99  |  51<H>  ----------------------------<  102<H>  3.9   |  23  |  1.10    Ca    13.5<HH>      09 Jul 2024 07:34  Phos  2.5     07-09  Mg     2.4     07-09    TPro  6.6  /  Alb  2.6<L>  /  TBili  0.4  /  DBili  x   /  AST  14  /  ALT  15  /  AlkPhos  99  07-09            Culture - Blood (collected 08 Jul 2024 06:55)  Source: .Blood Blood-Peripheral  Preliminary Report (09 Jul 2024 12:02):    No growth at 24 hours    Culture - Blood (collected 08 Jul 2024 06:55)  Source: .Blood Blood-Peripheral  Preliminary Report (09 Jul 2024 12:02):    No growth at 24 hours        Tele Reviewed:    RADIOLOGY & ADDITIONAL TESTS:  Results Reviewed: yes  Imaging Personally Reviewed: yes  Electrocardiogram Personally Reviewed: yes

## 2024-07-09 NOTE — PROGRESS NOTE ADULT - PROBLEM SELECTOR PLAN 1
-uptrending LA at 6.7  - more  likely multifactorial and due to : malignancy causing Warburg effect ( cancer cells undergo increased glycolysis and produce abundance of lactate to meet their glucose needs) vs impaired lactate metabolism by the liver (given new metastatic lesions noted on CT scan) vs decreased oxygen delivery (given significant pleural effusion) vs demand ischemia( given pt's persistent tachyarrhythmias)   -consistent with signs of lactic acidosis given pt fatigue, dec appetite, tachycardia, and confusion today  - less likely impaired lactate excretion by the kidneys( renal function currently improved) vs medications vs sepsis vs thiamine /riboflavin deficiency  - treatment would include resolving the underlying issue/cancer with chemotherapy, bicarbonate, repleting B1, and RRT if pH <7.1  - however further management is on hold as pt's plan is for comfort care and he is being transitioned to PCU for symptom directed care.

## 2024-07-09 NOTE — PROVIDER CONTACT NOTE (CRITICAL VALUE NOTIFICATION) - ACTION/TREATMENT ORDERED:
ACP Azeb Gilbert notified. No interventions ordered. Will continue to monitor.
ACP Monisha Sanchez notified. 500 ml IVF bolus ordered. Give calcitonin injection as per order. C/w zosyn.
ACP- Abbey Weldon made aware. Plan of care ongoing.
Result called during rapid response, team aware and continuing plan of care.
No new order at this time. Will continue to monitor.
Provider made aware. No interventions at this time. Will endorse to next shift. Monitoring remains ongoing.
provider notified, continuing to trend lactate levels, no further interventions, care ongoing
Provider made aware.
ACP Azeb Gilbert notified. No interventions ordered. Renal following.
ACP- Monisha Sanchez made aware with an order of blood culture and 0.9% NS at 75ml/hr x 8hours. Plan of care ongoing.
No intervention at this time. Will continue to monitor.
ACP Azeb Gilbert notified. No interventions ordered. Will continue to monitor.
No new order at this time. Will continue to monitor.
NP made aware. Further order(s) pending at this time.
Provider ordered LR @ 60ml x 24hr. Will continue to monitor.
ACP- Monisha Sanchez made aware, ok to follow nomogram protocol. Plan of care ongoing.
As per PA patient is on Zosyn, repeat cultures ordered.
NP updated.
Per provider, no intervention needed at this time. All safety measures maintained, plan of care ongoing, will continue to monitor.
Provider made aware. IVF ordered for patient

## 2024-07-09 NOTE — PROGRESS NOTE ADULT - ASSESSMENT
89M PMH A-fib on Eliquis, HTN, Stage II bladder cancer s/p TURBT and chemoradiation, presented with dyspnea and orthopnea. Pt was admitted for Acute hypoxic respiratory failure due to large L pleural effusion requiring L thoracentesis with pending cytology and ongoing oxygen requirements. Pt was evaluted by MICU for hypotension and elevated lactate. POCUS showed signs of hypovolemia and pt was given 500 cc fluid bolus. CTCAP with new nodules and likely malignant pleural effusion currently requiring chest tube with water seal / suction. Family completed GOC discussion and would like to pursue hospice care.

## 2024-07-09 NOTE — PROVIDER CONTACT NOTE (CRITICAL VALUE NOTIFICATION) - ASSESSMENT
Pt A&Ox3, able to make needs known. Pt asymptomatic. Pt afib on tele, HR 130s, other VSS. No s/s of bleeding. Pt denies cp, denies SOB, denies pain.
Pt A&Ox4, able to make needs known. Pt asymptomatic. VSS. No s/s of bleeding. Pt denies cp, denies SOB, denies pain.
Pt. AOX3. VSS. Not in any form of distress.
Pt. AOx4. VSS. No signs of bleeding noted. On heparin infusion, full AC nomogram. NPO since midnight
Rectal T99.1. Pt went into AF with RVR today requiring Digoxin 500mcg IVP x 1. Pt also started becoming hypotensive - most recent BP 76/41, IVF ordered now & pending further order(s) from provider at this time.
PT. AOx3. VSS. Not in any form of distress.
Pt A&Ox3, able to make needs known. Pt asymptomatic. Pt NSR on tele. VSS. No s/s of bleeding. Pt denies cp, denies SOB, denies pain.
Pt A&Ox3, able to make needs known. Pt asymptomatic. Pt afib on tele, HR 130s, other VSS. No s/s of bleeding. Pt denies cp, denies SOB, denies pain.
A&Ox4 and no distress noted.
A&Ox4, VSS, no distress noted.
A&Ox4, no distress noted.
Critical Lab Value  Lactate-4.4
No acute status changes at this time. ABG lactate drawn in response for AF RVR and Hypotension. Blood lactate 7.0 early today.
Patient remains aox4;vss. Tolerating HFNC. No s/s of SOB, CP. Sodium chloride fluids running at 100 cc/hr for 5 hours.
Pt aox3-4, responsive, no s/s of distress observed/reported. VSS, asymptomatic.
Pt. AOx4. VSS. No signs of bleeding noted. On heparin infusion, full AC nomogram.
vital signs stable  pt tolerating 6L NC with 95% 02 sat
Patient currently in bed, on high flow nasal cannula. AOx4, no complains of SOB or chest pain.
Rapid response called, lactate drawn and team aware of results.
Patient is AOx3, VSS. No c/o chest pain, sob or palpitations. Bilateral chest tube sites WDL, draining serosanguinous. Infrequent non productive dry cough overnight.
Pt A&Ox3, able to make needs known. Pt asymptomatic. Pt afib on tele, HR 130s, other VSS. No s/s of bleeding. Pt denies cp, denies SOB, denies pain.

## 2024-07-09 NOTE — PROGRESS NOTE ADULT - NS ATTEND AMEND GEN_ALL_CORE FT
I reviewed the overnight course of events on the unit, re-confirming the patient history. I discussed the care with the patient and their family. The plan of care was discussed with the ACP team and modifications were made to the notation where appropriate. Differential diagnosis and plan of care discussed with patient after the evaluation. Advanced care planning was discussed with patient and family.  Advanced care planning forms were reviewed and discussed.  Risks, benefits and alternatives of cardiac procedures were discussed in detail and all questions were answered. 35 minutes spent on total encounter of which more than fifty percent of the encounter was spent counseling and/or coordinating care by the attending physician.
Patient care and plan discussed and reviewed with Advanced Care Provider. Plan as outlined above edited by me to reflect our discussion.
I reviewed the overnight course of events on the unit, re-confirming the patient history. I discussed the care with the patient and their family. The plan of care was discussed with the ACP team and modifications were made to the notation where appropriate. Differential diagnosis and plan of care discussed with patient after the evaluation. Advanced care planning was discussed with patient and family.  Advanced care planning forms were reviewed and discussed.  Risks, benefits and alternatives of cardiac procedures were discussed in detail and all questions were answered. 35 minutes spent on total encounter of which more than fifty percent of the encounter was spent counseling and/or coordinating care by the attending physician.
Patient care and plan discussed and reviewed with Advanced Care Provider. Plan as outlined above edited by me to reflect our discussion.
I reviewed the overnight course of events on the unit, re-confirming the patient history. I discussed the care with the patient and their family. The plan of care was discussed with the ACP team and modifications were made to the notation where appropriate. Differential diagnosis and plan of care discussed with patient after the evaluation. Advanced care planning was discussed with patient and family.  Advanced care planning forms were reviewed and discussed.  Risks, benefits and alternatives of cardiac procedures were discussed in detail and all questions were answered. 35 minutes spent on total encounter of which more than fifty percent of the encounter was spent counseling and/or coordinating care by the attending physician.
I reviewed the overnight course of events on the unit, re-confirming the patient history. I discussed the care with the patient and their family. The plan of care was discussed with the ACP team and modifications were made to the notation where appropriate. Differential diagnosis and plan of care discussed with patient after the evaluation. Advanced care planning was discussed with patient and family.  Advanced care planning forms were reviewed and discussed.  Risks, benefits and alternatives of cardiac procedures were discussed in detail and all questions were answered. 35 minutes spent on total encounter of which more than fifty percent of the encounter was spent counseling and/or coordinating care by the attending physician.
Patient care and plan discussed and reviewed with Advanced Care Provider. Plan as outlined above edited by me to reflect our discussion.

## 2024-07-09 NOTE — PROGRESS NOTE ADULT - PROBLEM SELECTOR PLAN 4
-hyponatremia resolved , lasix on hold  -hypokalemia resolved   -ODIN resolved with overall improved perfusion, stable Cr and adequate UOP with 30-35 CC/hr  - nephro following- likely iso hypotension, Cr currently improved, UA without proteinuria, US renal without hydronephrosis, monitor UOP   -Hypercalcemia of malignancy- oncology and nephrology following, recommend c/w pamidronate ( need more time to see effect), calcitonin, steroids (r/o TB, f/u [facility], f/u plans for hospice care). Ca level not life threatening, no encephalopathy. Labs with elevated VIt D level  - iCa 1.77, Ca 13.5  - f/u PTHrP

## 2024-07-09 NOTE — PROGRESS NOTE ADULT - ASSESSMENT
88 yo M with a PMH of Bladder CA and BPH who presented to [facility] due to lethargy and weakness. SCr on admission of 0.9 (6/20) but trended up to 1.4 on 7/1. Nephrology consulted for ODIN.

## 2024-07-09 NOTE — PROGRESS NOTE ADULT - SUBJECTIVE AND OBJECTIVE BOX
PATIENT:  ILIA MONROE  [id number]    CHIEF COMPLAINT:  Patient is a 89y old  Male who presents with a chief complaint of SOB (09 Jul 2024 10:19)      INTERVAL HISTORY/OVERNIGHT EVENTS:  - no acute events; dyspneic, uncomfortable restless, tires; communicated with in Romanian    MEDICATIONS:  MEDICATIONS  (STANDING):  apixaban 2.5 milliGRAM(s) Oral every 12 hours  ascorbic acid 500 milliGRAM(s) Oral daily  benzocaine/menthol Lozenge 1 Lozenge Oral three times a day  calcitonin Injectable 270 International Unit(s) IntraMuscular every 12 hours  chlorhexidine 2% Cloths 1 Application(s) Topical daily  lactated ringers. 1000 milliLiter(s) (60 mL/Hr) IV Continuous <Continuous>  lactobacillus acidophilus 1 Tablet(s) Oral daily  metoprolol tartrate 12.5 milliGRAM(s) Oral two times a day  midodrine. 15 milliGRAM(s) Oral every 8 hours  multivitamin/minerals 1 Tablet(s) Oral daily  pantoprazole    Tablet 40 milliGRAM(s) Oral before breakfast  piperacillin/tazobactam IVPB.. 3.375 Gram(s) IV Intermittent every 8 hours  simethicone 80 milliGRAM(s) Chew four times a day  tamsulosin 0.4 milliGRAM(s) Oral at bedtime    MEDICATIONS  (PRN):  acetaminophen     Tablet .. 650 milliGRAM(s) Oral every 6 hours PRN Temp greater or equal to 38C (100.4F), Mild Pain (1 - 3)  albuterol/ipratropium for Nebulization 3 milliLiter(s) Nebulizer every 6 hours PRN Shortness of Breath and/or Wheezing  aluminum hydroxide/magnesium hydroxide/simethicone Suspension 30 milliLiter(s) Oral every 4 hours PRN Dyspepsia  glycopyrrolate Injectable 0.4 milliGRAM(s) IV Push every 6 hours PRN secretions  guaiFENesin Oral Liquid (Sugar-Free) 200 milliGRAM(s) Oral every 6 hours PRN Cough  HYDROmorphone  Injectable 0.2 milliGRAM(s) IV Push every 3 hours PRN pain  HYDROmorphone  Injectable 0.3 milliGRAM(s) IV Push every 3 hours PRN dyspnea  sodium chloride 0.65% Nasal 1 Spray(s) Both Nostrils four times a day PRN Nasal Congestion      ALLERGIES:  Allergies    No Known Allergies    Intolerances        OBJECTIVE:  ICU Vital Signs Last 24 Hrs  T(C): 36.6 (09 Jul 2024 04:00), Max: 37.4 (08 Jul 2024 16:55)  T(F): 97.8 (09 Jul 2024 04:00), Max: 99.3 (08 Jul 2024 16:55)  HR: 99 (09 Jul 2024 04:00) (95 - 141)  BP: 116/66 (09 Jul 2024 04:00) (94/62 - 116/66)  BP(mean): --  ABP: --  ABP(mean): --  RR: 18 (09 Jul 2024 08:43) (18 - 20)  SpO2: 94% (09 Jul 2024 08:43) (86% - 94%)    O2 Parameters below as of 09 Jul 2024 08:43  Patient On (Oxygen Delivery Method): nasal cannula  O2 Flow (L/min): 2          Adult Advanced Hemodynamics Last 24 Hrs  CVP(mm Hg): --  CVP(cm H2O): --  CO: --  CI: --  PA: --  PA(mean): --  PCWP: --  SVR: --  SVRI: --  PVR: --  PVRI: --  CAPILLARY BLOOD GLUCOSE        CAPILLARY BLOOD GLUCOSE        I&O's Summary    08 Jul 2024 07:01  -  09 Jul 2024 07:00  --------------------------------------------------------  IN: 680 mL / OUT: 1105 mL / NET: -425 mL      Daily     Daily     PHYSICAL EXAMINATION:  General: WN/WD NAD  HEENT: PERRL, EOMI, moist mucous membranes  Neurology: A&Ox2, nonfocal, ACKERMAN x 4  Respiratory: basilar diminhsment;300 ml x 2 output from CTs R & L respectively  CV: RRR, S1S2, no murmurs, rubs or gallops  Abdominal: Soft, NT, ND +BS  Extremities: No edema, + peripheral pulses    LABS:                          9.2    22.05 )-----------( 283      ( 09 Jul 2024 07:34 )             30.8     07-09    141  |  99  |  51<H>  ----------------------------<  102<H>  3.9   |  23  |  1.10    Ca    13.5<HH>      09 Jul 2024 07:34  Phos  2.5     07-09  Mg     2.4     07-09    TPro  6.6  /  Alb  2.6<L>  /  TBili  0.4  /  DBili  x   /  AST  14  /  ALT  15  /  AlkPhos  99  07-09    LIVER FUNCTIONS - ( 09 Jul 2024 07:34 )  Alb: 2.6 g/dL / Pro: 6.6 g/dL / ALK PHOS: 99 U/L / ALT: 15 U/L / AST: 14 U/L / GGT: x           Urinalysis Basic - ( 09 Jul 2024 07:34 )    Color: x / Appearance: x / SG: x / pH: x  Gluc: 102 mg/dL / Ketone: x  / Bili: x / Urobili: x   Blood: x / Protein: x / Nitrite: x   Leuk Esterase: x / RBC: x / WBC x   Sq Epi: x / Non Sq Epi: x / Bacteria: x

## 2024-07-09 NOTE — PROVIDER CONTACT NOTE (CRITICAL VALUE NOTIFICATION) - TEST AND RESULT REPORTED:
Lactate 5.4
Lactate: 5.5
Latate 4.8
APTT: 122.9
ABG Sodium 120
Blood lactate 7.4
Lactate 4.8
Lactate 6.7, Ionized calcium 1.77, Ca 13.5
Lactate-4.4
Lactate 5.2
Lactate: 6.7
ABG Lactate 6.3
Body fluid culture from July 2nd POSITIVE
lactate 6.7
Ca 13.1
Sodium whole blood venous is 120
lactate level is 6.5
Ca, ionized 1.76
lactate 4.5
Body fluid- few polymorphonuclear leukocytes per low poser, rare gram negative rods per oil power filed
lactate 5.9

## 2024-07-09 NOTE — PROGRESS NOTE ADULT - PROBLEM SELECTOR PLAN 3
Acute hypoxic resp failure 2/2 bilateral pleural effusion   - resolved, no intubation risk, mechanical ventilation is not desirable  - CT  CAP with IV contrast : Pulmonary nodules, nodular pleural thickening, hepatic lesions,    retroperitoneal LAD, c/f metastatic disease. bilateral loculated pleural effusions and hydropneumothorax  - TTE - normal LV and RV fxn, no diastolic dysfunction   - Bilateral exudative pleural effusion- s/p thoracentesis drained by bl chest tube placement  to LWS  - on Zosyn pending end date , s/p  7 days Tx-  lower suspicion for parapneumonic process and suspect malignant driving picture here with LAD on CT A/P  - pt was transitioned  HiFlow --> NC 2L   - CXr: 7/7 stable pleural effusion with atelectasis   - f/u peripheral flow cytometry and fluid studies from pleural fluid from 7/2  - now DNR/DNI with goal for hospice   - consider low dose opioids for dyspnea   - f/u pulmonology-need indwelling pleural catheters, poor overall prognosis  -CTS following- recommend pleurex placement for comfort care vs medical management only

## 2024-07-09 NOTE — PROVIDER CONTACT NOTE (CRITICAL VALUE NOTIFICATION) - NS PROVIDER READ BACK
Body fluid culture from July 2nd POSITIVE/yes
yes
teams message/no

## 2024-07-09 NOTE — PROGRESS NOTE ADULT - ASSESSMENT
89 yo M w/ PMHx stage II bladder cancer s/p TURBT and chemoradiation, presenting with dyspnea and acute hypoxemic respiratory failure with new onset Afib on Hep gtt. Found to have b/l pleural effusions L > R s/p L diagnostic/therapeutic thoracentesis yielding 2.1L of serosanguinous fluid. Now s/p R thoracentesis removed 1.8L on 6/25 c/b PTX ex vacuo. CTSx placed left chest tube on 6/30.  IR consulted to place right chest tube today.  Currently 97% on NRB.  Increasing WBC noted in pleural fluid on left, now ~1400, neutrophil predominant.  Atypical cells noted on cytology from right pleural fluid. S/p right chest tube placement by IR 7/1/24.    Decreased output from both R and L chest tubes, ~300ml each.  Now on 1L NC.    CT C/A/P reviewed - pulmonary nodules, B/L pleural effusions much improved, RP JOSE, hepatic lesions all concerning for mets malignancy.    #Pleural effusion, b/l exudative/bloody, increasing WBC count on repeat pleural studies  #PTX ex vacuo  #Acute hypoxemic respiratory failure    Now directed toward symptom management, end of life care    - pall care eval - patient tentatively accepted to PCU for symptom directed care pending bed and pall care follow up  - pulmonary is available for removal of chest tubes if in line with goals of care  - can leave chest tubes on water seal for now  - symptom control as per palliative/PCU  - will follow

## 2024-07-09 NOTE — PROGRESS NOTE ADULT - PROBLEM SELECTOR PLAN 1
With evidence of metastatic disease on CT c/a/p this admission. Pt was followed at [facility].  - As family decides on transition to comfort, no DMT to be discussed

## 2024-07-09 NOTE — PROGRESS NOTE ADULT - PROBLEM SELECTOR PLAN 6
- Stage II; s/p TURBT and chemoradiation   - per outpatient records, plan for repeat cystoscopy after treatment; around August 2024     - CT CAP with IV contrast: Pulmonary nodules, nodular pleural thickening, hepatic lesions, and    retroperitoneal LAD, compatible with metastatic disease. Small bilateral pleural effusions, decreased status post bilateral chest tube placement. Small bilateral pneumothoraces. Wall thickening of the sigmoid colon.    - Cytology pleural fluid done x 2 "findings provide no diagnostic evidence of a malignant lymphoproliferative disorder" and " rare atypical cells"   - CM following, referral sent for hospice care network   - Continue tamsulosin   - Monitor I/Os   - palliative following- pt doesn't meet criteria for inpatient hospice- he is awake, AO, eating and talking. He needs to be on IV meds that can't be given PO to qualify.

## 2024-07-09 NOTE — PROGRESS NOTE ADULT - PROBLEM SELECTOR PLAN 6
- D/c lab checks, tele monitoring, non-essential medications including abx today   - Add IV dilaudid 0.2-0.3 mg q3 prn mod-severe pain  - Chaplaincy referral for support  - Patient will tx to PCU when a bed opens up    Latesha Aguilar MD  [facility] Team Consults  Please call if we can be of assistance, [phone]

## 2024-07-10 NOTE — PROVIDER CONTACT NOTE (OTHER) - DATE AND TIME:
02-Jul-2024 13:05
21-Jun-2024 20:00
30-Jun-2024 15:15
08-Jul-2024 06:52
08-Jul-2024 10:15
10-Jul-2024 21:58
25-Jun-2024 14:00
03-Jul-2024 08:10
03-Jul-2024 18:45
01-Jul-2024 21:24
10-Jul-2024 05:50
21-Jun-2024 06:57
22-Jun-2024 04:50
23-Jun-2024 08:30

## 2024-07-10 NOTE — PROGRESS NOTE ADULT - SUBJECTIVE AND OBJECTIVE BOX
[facility] Division of Hospital Medicine  Dulce Blake MD  MS Teams PREFERRED        SUBJECTIVE / OVERNIGHT EVENTS: NAD    MEDICATIONS  (STANDING):  chlorhexidine 2% Cloths 1 Application(s) Topical daily  tamsulosin 0.4 milliGRAM(s) Oral at bedtime    MEDICATIONS  (PRN):  acetaminophen  Suppository .. 650 milliGRAM(s) Rectal every 6 hours PRN Temp greater or equal to 38C (100.4F), Mild Pain (1 - 3)  bisacodyl Suppository 10 milliGRAM(s) Rectal daily PRN Constipation  glycopyrrolate Injectable 0.4 milliGRAM(s) IV Push every 6 hours PRN secretions  guaiFENesin Oral Liquid (Sugar-Free) 200 milliGRAM(s) Oral every 6 hours PRN Cough  HYDROmorphone  Injectable 0.5 milliGRAM(s) IV Push every 1 hour PRN dyspnea  HYDROmorphone  Injectable 0.2 milliGRAM(s) IV Push every 1 hour PRN Moderate Pain (4 - 6)  HYDROmorphone  Injectable 0.5 milliGRAM(s) IV Push every 1 hour PRN Severe Pain (7 - 10)  LORazepam   Injectable 0.5 milliGRAM(s) IV Push every 1 hour PRN Agitation      I&O's Summary    09 Jul 2024 07:01  -  10 Jul 2024 07:00  --------------------------------------------------------  IN: 240 mL / OUT: 995 mL / NET: -755 mL    10 Jul 2024 07:01  -  10 Jul 2024 14:50  --------------------------------------------------------  IN: 0 mL / OUT: 0 mL / NET: 0 mL        PHYSICAL EXAM:  Vital Signs Last 24 Hrs  T(C): 36.4 (10 Jul 2024 13:52), Max: 36.8 (09 Jul 2024 16:25)  T(F): 97.5 (10 Jul 2024 13:52), Max: 98.3 (09 Jul 2024 16:25)  HR: 119 (10 Jul 2024 13:52) (96 - 119)  BP: 123/71 (10 Jul 2024 13:52) (112/65 - 136/68)  BP(mean): 91 (09 Jul 2024 19:44) (91 - 91)  RR: 22 (10 Jul 2024 13:52) (18 - 22)  SpO2: 92% (10 Jul 2024 13:52) (91% - 95%)    Parameters below as of 10 Jul 2024 13:52    O2 Flow (L/min): 2    PHYSICAL EXAM:     GENERAL: NAD , somnolent  HEAD:  Atraumatic, Normocephalic   EYES: EOMI, conjunctiva and sclera clear, no nystagmus noted   ENT: Moist mucous membranes, no dentures   CHEST/LUNG: inc work of breathing, tachypneic, crackles and rales bilaterally; No rhonchi, wheezing, or rubs. on 2L NC. R  , L CT 70 cc output   HEART:  tachycardia, No murmurs, rubs, or gallops, normal S1/S2, a-fib   ABDOMEN: normal bowel sounds; Soft, nontender, nondistended, no organomegaly    EXTREMITIES:  no appreciable edema in b/l LE, 2+ Peripheral Pulses, brisk capillary refill. No clubbing, cyanosis   MSK: No gross deformities noted, ROM wnl    Neurological:  A&Ox1 no focal deficits    SKIN: warm and dry, no visible rash   PSYCH: Normal mood, affect        LABS:                        9.7    29.09 )-----------( 297      ( 10 Jul 2024 08:55 )             31.5     07-10    146<H>  |  104  |  56<H>  ----------------------------<  115<H>  3.9   |  24  |  1.03    Ca    12.8<H>      10 Jul 2024 08:55  Phos  2.5     07-09  Mg     2.4     07-09    TPro  6.6  /  Alb  2.6<L>  /  TBili  0.4  /  DBili  x   /  AST  14  /  ALT  15  /  AlkPhos  99  07-09          Urinalysis Basic - ( 10 Jul 2024 08:55 )    Color: x / Appearance: x / SG: x / pH: x  Gluc: 115 mg/dL / Ketone: x  / Bili: x / Urobili: x   Blood: x / Protein: x / Nitrite: x   Leuk Esterase: x / RBC: x / WBC x   Sq Epi: x / Non Sq Epi: x / Bacteria: x        Culture - Blood (collected 08 Jul 2024 06:55)  Source: .Blood Blood-Peripheral  Preliminary Report (10 Jul 2024 12:01):    No growth at 48 Hours    Culture - Blood (collected 08 Jul 2024 06:55)  Source: .Blood Blood-Peripheral  Preliminary Report (10 Jul 2024 12:01):    No growth at 48 Hours

## 2024-07-10 NOTE — PROVIDER CONTACT NOTE (OTHER) - BACKGROUND
Admitted for hypoxemia. On HFNC.
Patient admitted with AHRF, on heparin gtt running at 15 cc/hr therapeutic x 2.
Pt admitted with dx. AHRF 2/2 pleural effusion - b/l chest tube placed, New onset AF RVR 6/21 - s/p Lopressor, on Heparin gtt.
PMHx of stage II bladder cancer s/p TURBT and chemoradiation, Afib  Dx: hypoxemia 2/2 pleural effusion s/p b/l chest tubes
Pt admitted with dx. AHRF 2/2 pleural effusion - b/l chest tube placed, New onset AF RVR 6/21 - s/p Lopressor, on Heparin gtt.
patient admitted for AHRF
Dx: AHRF. PMHx: hypoxemia, colitis, cachezia, hypercalcemia, ODIN, HTN.
89 year old male admitted for hypoxemia. PMH HLD, malignant neoplasm of bladder.
Patient admitted to [facility].
89 year old female admitted for Hypoxemia, with history of Malignant neoplasm of bladder.
Dx: AHRF  Hx: hypoxemia, colitis, cachezia, hypercalcemia, ODIN, HTN
Patient has DNR order
Pt admitted with dx. AHRF 2/2 pleural effusion - b/l chest tube placed, New onset AF RVR 6/21 - s/p Lopressor, heparin d/c.

## 2024-07-10 NOTE — CHART NOTE - NSCHARTNOTEFT_GEN_A_CORE
Patient arrived to PCU this afternoon. Patient with increased work of breathing. He required PRN Dilaudid 0.2mg IV X1 for pain  and PRN Dilaudid 0.3mg IV X3 for dyspnea within a 24hr period 7am-7am. He required PRN Dilaudid 0.3mg IV X1 for dyspnea with no relief. Dilaudid 0.5mg IV X1 was than given with minimal relief. Medications adjusted based on usage and need. Dilaudid 0.5mg-1mg IV ordered for moderate to severe pain. Dilaudid 1mg IV q1hr PRN ordered for dyspnea. Will continue to closely monitor. Patient arrived to PCU this afternoon. Patient with increased work of breathing. He required PRN Dilaudid 0.2mg IV X1 for pain  and PRN Dilaudid 0.3mg IV X3 for dyspnea within a 24hr period 7am-7am. He required PRN Dilaudid 0.3mg IV X1 for dyspnea with no relief. Dilaudid 0.5mg IV X1 was than given with minimal relief. Medications adjusted based on usage and need. Dilaudid 0.5mg-1mg IV ordered for moderate to severe pain. Dilaudid 1mg IV q1hr PRN ordered for dyspnea. Will continue to closely monitor.    Agree with plan as stated above.    MD Gagan Toth FACP  Available on Teams during regular business hours  Pager after hours [phone]  Division of Geriatrics and Palliative Medicine

## 2024-07-10 NOTE — PROGRESS NOTE ADULT - SUBJECTIVE AND OBJECTIVE BOX
SUBJECTIVE AND OBJECTIVE  Indication for Geriatrics and Palliative Care Services/INTERVAL HPI: GOC, PCU eval    OVERNIGHT EVENTS:  lethargic but arousable by voice, he appears uncomfortable with mild dyspnea. received 3 doses of dilaudid overnight for SOB    DNR on chart: DNI: Trial NIV  DNI: Trial NIV      Allergies    No Known Allergies    Intolerances    MEDICATIONS  (STANDING):  apixaban 2.5 milliGRAM(s) Oral every 12 hours  ascorbic acid 500 milliGRAM(s) Oral daily  benzocaine/menthol Lozenge 1 Lozenge Oral three times a day  calcitonin Injectable 270 International Unit(s) IntraMuscular every 12 hours  chlorhexidine 2% Cloths 1 Application(s) Topical daily  lactated ringers. 1000 milliLiter(s) (60 mL/Hr) IV Continuous <Continuous>  lactobacillus acidophilus 1 Tablet(s) Oral daily  metoprolol tartrate 12.5 milliGRAM(s) Oral two times a day  midodrine. 15 milliGRAM(s) Oral every 8 hours  multivitamin/minerals 1 Tablet(s) Oral daily  pantoprazole    Tablet 40 milliGRAM(s) Oral before breakfast  piperacillin/tazobactam IVPB.. 3.375 Gram(s) IV Intermittent every 8 hours  simethicone 80 milliGRAM(s) Chew four times a day  tamsulosin 0.4 milliGRAM(s) Oral at bedtime    MEDICATIONS  (PRN):  acetaminophen     Tablet .. 650 milliGRAM(s) Oral every 6 hours PRN Temp greater or equal to 38C (100.4F), Mild Pain (1 - 3)  albuterol/ipratropium for Nebulization 3 milliLiter(s) Nebulizer every 6 hours PRN Shortness of Breath and/or Wheezing  aluminum hydroxide/magnesium hydroxide/simethicone Suspension 30 milliLiter(s) Oral every 4 hours PRN Dyspepsia  glycopyrrolate Injectable 0.4 milliGRAM(s) IV Push every 6 hours PRN secretions  guaiFENesin Oral Liquid (Sugar-Free) 200 milliGRAM(s) Oral every 6 hours PRN Cough  HYDROmorphone  Injectable 0.3 milliGRAM(s) IV Push every 3 hours PRN dyspnea  HYDROmorphone  Injectable 0.2 milliGRAM(s) IV Push every 3 hours PRN pain  sodium chloride 0.65% Nasal 1 Spray(s) Both Nostrils four times a day PRN Nasal Congestion      ITEMS UNCHECKED ARE NOT PRESENT    PRESENT SYMPTOMS: [ x]Unable to self-report - see [ ] CPOT [x ] PAINADS [x ] RDOS  Source if other than patient:  [ ]Family   [ ]Team     Pain:  [ ]yes [x ]no  QOL impact -   Location -                    Aggravating factors -  Quality -  Radiation -  Timing-  Severity (0-10 scale):  Minimal acceptable level (0-10 scale):     Dyspnea:                           [ ]Mild [ x]Moderate [ ]Severe  Anxiety:                             [ ]Mild [ ]Moderate [ ]Severe  Fatigue:                             [ ]Mild [ ]Moderate [ x]Severe  Nausea:                             [ ]Mild [ ]Moderate [ ]Severe  Loss of appetite:              [ ]Mild [ x]Moderate [ ]Severe  Constipation:                    [ ]Mild [ ]Moderate [ ]Severe    PCSSQ[Palliative Care Spiritual Screening Question]   Severity (0-10):  Score of 4 or > indicate consideration of Chaplaincy referral.  Chaplaincy Referral: [x ] yes [ ] refused [ ] following [ ] Deferred     Caregiver [place]? : [ ] yes [x ] no [ ] Deferred [ ] Declined             Social work referral [ ] Patient & Family Centered Care Referral [ ]     Anticipatory Grief present?:  [ x] yes [ ] no  [ ] Deferred                  Social work referral [ ] Chaplaincy Referral[ ]    Other Symptoms:  [ x]All other review of systems negative     Palliative Performance Status Version 2:    20-30     %      http://npcrc.org/files/news/palliative_performance_scale_ppsv2.pdf    PHYSICAL EXAM:  Vital Signs Last 24 Hrs  T(C): 36.4 (10 Jul 2024 03:58), Max: 36.8 (09 Jul 2024 16:25)  T(F): 97.5 (10 Jul 2024 03:58), Max: 98.3 (09 Jul 2024 16:25)  HR: 114 (10 Jul 2024 03:58) (92 - 114)  BP: 112/65 (10 Jul 2024 03:58) (107/61 - 136/68)  BP(mean): 91 (09 Jul 2024 19:44) (91 - 91)  RR: 18 (10 Jul 2024 03:58) (18 - 18)  SpO2: 91% (10 Jul 2024 03:58) (91% - 95%)    Parameters below as of 10 Jul 2024 03:58  Patient On (Oxygen Delivery Method): room air           GENERAL: x[ ]Cachexia    [ ]Alert  [ ]Oriented x   [ x]Lethargic  [ ]Unarousable  [x ]min Verbal  [ ]Non-Verbal  Behavioral:   [ ]Anxiety  [ ]Delirium [ ]Agitation [ ]Other  HEENT:  [ x]Normal   [ ]Dry mouth   [ ]ET Tube/Trach  [ ]Oral lesions  PULMONARY: B/l CT  in place draining serosang draiage   [ ]Clear [ ]Tachypnea  [ ]Audible excessive secretions   [ ]Rhonchi        [ ]Right [ ]Left [ ]Bilateral  [ ]Crackles        [ ]Right [ ]Left [ ]Bilateral  [ ]Wheezing     [ ]Right [ ]Left [ ]Bilateral  [ x]Diminished BS [ ] Right [ ]Left [ x]Bilateral  CARDIOVASCULAR:    [x ]Regular [ ]Irregular [ ]Tachy  [ ]Rich [ ]Murmur [ ]Other  GASTROINTESTINAL:  [ x]Soft  [ ]Distended   [x ]+BS  [x ]Non tender [ ]Tender  [ ]Other [ ]PEG [ ]OGT/ NGT   Last BM:   GENITOURINARY:  [ ]Normal [ x]Incontinent   [ ]Oliguria/Anuria   [ ]Michael  MUSCULOSKELETAL:   [ ]Normal   [ x]Weakness  [ x]Bed/Wheelchair bound [ ]Edema  NEUROLOGIC:   [ ]No focal deficits  [ x] Cognitive impairment  [ ] Dysphagia [ ]Dysarthria [ ] Paresis [ ]Other   SKIN: see rn flow sheet  [ ]Normal  [ ]Rash  [ ]Other  [ ]Pressure ulcer(s) [ ]y [ ]n present on admission    CRITICAL CARE:  [ ]Shock Present  [ ]Septic [ ]Cardiogenic [ ]Neurologic [ ]Hypovolemic  [ ]Vasopressors [ ]Inotropes  [ ]Respiratory failure present [ ]Mechanical Ventilation [ ]Non-invasive ventilatory support [ ]High-Flow   [ ]Acute  [ ]Chronic [ ]Hypoxic  [ ]Hypercarbic [ ]Other  [ ]Other organ failure     LABS:                        9.2    22.05 )-----------( 283      ( 09 Jul 2024 07:34 )             30.8   07-09    141  |  99  |  51<H>  ----------------------------<  102<H>  3.9   |  23  |  1.10    Ca    13.5<HH>      09 Jul 2024 07:34  Phos  2.5     07-09  Mg     2.4     07-09    TPro  6.6  /  Alb  2.6<L>  /  TBili  0.4  /  DBili  x   /  AST  14  /  ALT  15  /  AlkPhos  99  07-09      Urinalysis Basic - ( 09 Jul 2024 07:34 )    Color: x / Appearance: x / SG: x / pH: x  Gluc: 102 mg/dL / Ketone: x  / Bili: x / Urobili: x   Blood: x / Protein: x / Nitrite: x   Leuk Esterase: x / RBC: x / WBC x   Sq Epi: x / Non Sq Epi: x / Bacteria: x      RADIOLOGY & ADDITIONAL STUDIES:    < from: Xray Chest 1 View- PORTABLE-Routine (Xray Chest 1 View- PORTABLE-Routine .) (07.07.24 @ 10:29) >  IMPRESSION:  Stable, bilateral pleural-parenchymal pattern.    --- End of Report ---    < end of copied text >  < from: CT Chest w/ IV Cont (07.03.24 @ 15:26) >  IMPRESSION:    Pulmonary nodules, nodular pleural thickening, hepatic lesions, and   retroperitoneal lymphadenopathy, compatible with metastatic disease.    Small bilateral pleural effusions, decreased status post bilateral chest   tube placement. Small bilateral pneumothoraces.    Wall thickening of the sigmoid colon. Correlate for colitis.      --- End of Report ---    < end of copied text >      Protein Calorie Malnutrition Present: [ ]mild [ ]moderate [ ]severe [ ]underweight [ ]morbid obesity  https://www.andeal.org/vault/2440/web/files/ONC/Table_Clinical%20Characteristics%20to%20Document%20Malnutrition-White%20JV%20et%20al%202012.pdf    Height (cm): 170.2 (06-20-24 @ 14:44), 165.1 (04-23-24 @ 16:00), 165.1 (02-27-24 @ 08:06)  Weight (kg): 68 (06-20-24 @ 14:44), 74 (05-16-24 @ 11:00), 77.1 (02-27-24 @ 08:06)  BMI (kg/m2): 23.5 (06-20-24 @ 14:44), 27.1 (05-16-24 @ 11:00), 28.3 (04-23-24 @ 16:00)    [ ]PPSV2 < or = 30%  [ ]significant weight loss [ ]poor nutritional intake [ ]anasarca[ ]Artificial Nutrition    Other REFERRALS:  [ ]Hospice  [ ]Child Life  [ ]Social Work  [ ]Case management [ ]Holistic Therapy

## 2024-07-10 NOTE — PROGRESS NOTE ADULT - PROBLEM SELECTOR PLAN 3
S/p bilat chest tube, to wall suction, family asking to remove the drain as they observe that pt is very uncomfortable from this. I discussed case with pulmonary who would be willing to remove it after pt transfers to the PCU  - IV dilaudid 0.3 mg q3 prn dyspnea pt required 3 doses in 24 hrs  - IV glycopyrrolate 0.4 mg q6 prn secretions

## 2024-07-10 NOTE — PROGRESS NOTE ADULT - TIME BILLING
Aliyah
Diogenes
Medical management as above, reviewing chart and coordinating care with primary team/staff, as well as reviewing vitals, radiology, medication list, recent labs, and prior records.    Does not include teaching time.
- Ordering, reviewing, and interpreting labs, testing, and imaging.  - Independently obtaining a review of systems and performing a physical exam  - Reviewing prior hospitalization and where necessary, outpatient records.  - Counselling and educating patient and family regarding interpretation of aforementioned items and plan of care.
Reviewing previous chart including notes, imaging, labs  Obtain history and physical exam from patient  Coordination with consultants  Discussing plan of care with patient and family
- Ordering, reviewing, and interpreting labs, testing, and imaging.  - Independently obtaining a review of systems and performing a physical exam  - Reviewing consultant documentation/recommendations in addition to discussing plan of care with consultants.  - Counselling and educating  family regarding interpretation of aforementioned items and plan of care.
- preparing to see the patient (eg, review of tests, documents, and imaging)  - performing a medically appropriate evaluation and examination  - speaking with patient and/or family as appropriate  - referring and communicating with other health care professionals  - documenting clinical information in the electronic or other health record  - care coordination.    Does not include teaching time.
Medical management as above, reviewing chart and coordinating care with primary team/staff, as well as reviewing vitals, radiology, medication list, recent labs, and prior records.    Does not include teaching time.
Diogenes
Time-based billing (NON-critical care).     The necessity of the time spent during the encounter on this date of service was due to:     - Ordering, reviewing, and interpreting labs, testing, and imaging.  - Independently obtaining a review of systems and performing a physical exam  - Reviewing prior hospitalization and where necessary, outpatient records.  - Counselling and educating patient and family regarding interpretation of aforementioned items and plan of care.    Health Care Decision Maker:    I had a face-to-face discussion with the patient/legal surrogate/HCP for 17 minutes on 7/10/2024 regarding advanced care planning. I provided a detailed explanation of advanced directives including CPR resuscitation, intubation, and life support measures. A MOLST form was completed and placed in the chart. The patient/legal surrogate/HCP has determined that the harm of resuscitative measures outweighs preceived benefit and has designated code status to be DNR/DNI with additional directives to  no use pressors/no use electrocardioversion/no use feeding tube and pursue comfort care measures.    The patient/legal surrogate/HCP request pallaitive care consultation and hospice service evaluation.      Billing Code: 95924
- Ordering, reviewing, and interpreting labs, testing, and imaging.  - Independently obtaining a review of systems and performing a physical exam  - Reviewing consultant documentation/recommendations in addition to discussing plan of care with consultants.  - Counselling and educating family regarding interpretation of aforementioned items and plan of care.
- preparing to see the patient (eg, review of tests, documents, and imaging)  - performing a medically appropriate evaluation and examination  - speaking with patient and/or family as appropriate  - referring and communicating with other health care professionals  - documenting clinical information in the electronic or other health record  - care coordination.    Does not include teaching time.
- Ordering, reviewing, and interpreting labs, testing, and imaging.  - Independently obtaining a review of systems and performing a physical exam  - Reviewing prior hospitalization and where necessary, outpatient records.  - Counselling and educating patient and family regarding interpretation of aforementioned items and plan of care.
- Ordering, reviewing, and interpreting labs, testing, and imaging.  - Independently obtaining a review of systems and performing a physical exam  - Reviewing consultant documentation/recommendations in addition to discussing plan of care with consultants.  - Counselling and educating family regarding interpretation of aforementioned items and plan of care.
patient encounter, reviewing tests and imaging, independently obtaining a history, performing a physical examination, discussing the plan with the patient, ordering medications/tests, documenting clinical information, and coordinating care. This excludes any time spent on teaching.
patient encounter, reviewing tests and imaging, independently obtaining a history, performing a physical examination, discussing the plan with the patient, ordering medications/tests, documenting clinical information, and coordinating care. This excludes any time spent on teaching.
Reviewing previous chart including notes, imaging, labs  Obtain history and physical exam from patient  Coordination with consultants  Discussing plan of care with patient and family
patient encounter, reviewing tests and imaging, independently obtaining a history, performing a physical examination, discussing the plan with the patient, ordering medications/tests, documenting clinical information, and coordinating care. This excludes any time spent on teaching.
Symptom assessment and management, supportive counseling, coordination of care    Mariah Vizcarra, ANP-BC  Please contact me via Teams  between 6am-2pm. If not answering, please call the palliative care pager [phone]    After 2pm and on weekends, please see the contact information below:    In the event of newly developing, evolving, or worsening symptoms between 2pm and 5pm please contact the Palliative Medicine via extension [phone] for assistance.  After 5pm please contact team via pager (if the patient is at [facility] #[phone] or if the patient is at [facility] #[phone]) The Geriatric and Palliative Medicine service has coverage 24 hours a day/ 7 days a week to provide medical recommendations regarding symptom management needs via telephone
patient encounter, reviewing tests and imaging, independently obtaining a history, performing a physical examination, discussing the plan with the patient, ordering medications/tests, documenting clinical information, and coordinating care. This excludes any time spent on teaching.
Time-based billing (NON-critical care).     The necessity of the time spent during the encounter on this date of service was due to:     - Ordering, reviewing, and interpreting labs, testing, and imaging.  - Independently obtaining a review of systems and performing a physical exam  - Reviewing prior hospitalization and where necessary, outpatient records.  - Counselling and educating patient and family regarding interpretation of aforementioned items and plan of care.
Time-based billing (NON-critical care).   54 minutes excluding time spent on teaching  The necessity of the time spent during the encounter on this date of service was due to:     - Ordering, reviewing, and interpreting labs, testing, and imaging.  - Independently obtaining a review of systems and performing a physical exam  - Reviewing prior hospitalization and where necessary, outpatient records.  - Counselling and educating patient and family regarding interpretation of aforementioned items and plan of care.
patient encounter, reviewing tests and imaging, independently obtaining a history, performing a physical examination, discussing the plan with the patient, ordering medications/tests, documenting clinical information, and coordinating care. This excludes any time spent on teaching.
Time-based billing (NON-critical care).   54 minutes excluding time spent on teaching  The necessity of the time spent during the encounter on this date of service was due to:     - Ordering, reviewing, and interpreting labs, testing, and imaging.  - Independently obtaining a review of systems and performing a physical exam  - Reviewing prior hospitalization and where necessary, outpatient records.  - Counselling and educating patient and family regarding interpretation of aforementioned items and plan of care.
Symptom assessment and management, supportive counseling, coordination of care

## 2024-07-10 NOTE — PROVIDER CONTACT NOTE (OTHER) - ACTION/TREATMENT ORDERED:
No intervention ordered at this time, care ongoing.
provider made aware. Wound care consult placed, turning and positioning every 2 hours endorsed. Monitoring remains ongoing,
Provider made aware. midodrine admin at this time
RUBI Aguilar notified. EKG done.
RUBI Wood came to bedside to assess patient and drain. Monitoring in progress.
See patient expiration note
ACP- Monisha Sanchez made aware with an order of EKG. Plan of care ongoing.
Will continue to monitor the patient.
dressing to be reinforced. continuous monitoring in progress. call bell within reach and safety maintained.
Provider made aware. RN contacted RT to increase oxygen liters and percentage. RN administered patient nebulizer. Pulmonary to consult patient during day to evaluate for possible thoracentesis.
Digoxin 500mcg IVP x 1.
ACP Azeb Gilbert notified. No interventions ordered. Will continue to monitor on tele.
Provider made aware. Will endorse to next shift. Monitoring remains ongoing.
Provider made aware. lactobacillus ordered

## 2024-07-10 NOTE — PROGRESS NOTE ADULT - REASON FOR ADMISSION
SOB
SOB   BL Pl effusions
SOB
SOB
SOB   BL Pl eff
sp   BL  Pl effusions with tubes placed
SOB

## 2024-07-10 NOTE — PROVIDER CONTACT NOTE (OTHER) - RECOMMENDATIONS
Notify RUBI Hardy
Notify ACP Azeb Gilbert.
Provider made aware. midodrine due at this time
Will continue to monitor the patient
provider made aware.
Notify provider.
RUBI Wood notified
Provider made aware. lactobacillus ordered
NP made aware.
Notify provider.
Patient pronounced at 2045  Neph, Ady Ivey at bedside
Notify provider.
Night provider made aware.

## 2024-07-10 NOTE — PROGRESS NOTE ADULT - CONVERSATION DETAILS
Discussed with nephews at bedside in detail, they wish to honor their uncle's wishes and pursue comfort care. Discussed with Palliative in detail, discussed with Pulm, plan to remove chest tubes after PCU transfer.

## 2024-07-10 NOTE — PROGRESS NOTE ADULT - PROVIDER SPECIALTY LIST ADULT
Cardiology
Heme/Onc
Hospitalist
Pulmonology
Thoracic Surgery
Cardiology
Infectious Disease
Internal Medicine
Pulmonology
Pulmonology
Thoracic Surgery
Thoracic Surgery
Cardiology
Hospitalist
Intervent Radiology
Pulmonology
Pulmonology
Thoracic Surgery
Thoracic Surgery
Heme/Onc
Heme/Onc
Hospitalist
Pulmonology
Thoracic Surgery
Cardiology
Hospitalist
Nephrology
Pulmonology
Thoracic Surgery
Nephrology
Internal Medicine
Nephrology
Pulmonology
Hospitalist
Hospitalist
Nephrology
Thoracic Surgery
Hospitalist
Nephrology
Palliative Care
Hospitalist
Hospitalist
Internal Medicine
Palliative Care
Internal Medicine

## 2024-07-10 NOTE — PROGRESS NOTE ADULT - PROBLEM SELECTOR PROBLEM 1
Bilateral pleural effusion
ODIN (acute kidney injury)
Bilateral pleural effusion
Acute hypoxic respiratory failure
Bilateral pleural effusion
ODIN (acute kidney injury)
Bilateral pleural effusion
Acute hypoxic respiratory failure
Bilateral pleural effusion
Bilateral pleural effusion
ODIN (acute kidney injury)
Acute hypoxic respiratory failure
Bilateral pleural effusion
Acute hypoxic respiratory failure
Elevated lactic acid level
Acute hypoxic respiratory failure
Acute hypoxic respiratory failure
Bladder cancer
Acute hypoxic respiratory failure
Bladder cancer
Acute hypoxic respiratory failure
Acute hypoxic respiratory failure
Comfort measures only status

## 2024-07-10 NOTE — CHART NOTE - NSCHARTNOTESELECT_GEN_ALL_CORE
Event Note
POCUS
Pulmonary attending/Event Note
Chest Tube Removal
Event Note
LEFT CHEST TUBE/Event Note
MICU/Event Note
Nephrology Fellow/Event Note
Oncology/Event Note
POCUS/Event Note
Pulm

## 2024-07-10 NOTE — PROGRESS NOTE ADULT - ASSESSMENT
89M PMH A-fib on Eliquis, HTN, Stage II bladder cancer s/p TURBT and chemoradiation, presented with dyspnea and orthopnea. Pt was admitted for Acute hypoxic respiratory failure due to large L pleural effusion requiring L thoracentesis with pending cytology and ongoing oxygen requirements. Pt was evaluted by MICU for hypotension and elevated lactate. POCUS showed signs of hypovolemia and pt was given 500 cc fluid bolus. CTCAP with new nodules and likely malignant pleural effusion currently requiring chest tube with water seal / suction. Family completed GOC discussion and would like to pursue hospice care.      06-Aug-2023 17:48

## 2024-07-10 NOTE — PROGRESS NOTE ADULT - NS ATTEST RISK PROBLEM GEN_ALL_CORE FT
Hypoxemia, pleural effusion
Hypoxemia, pleural effusion
1. Number and complexity of problems addressed for this patient:    1.1 Moderate (At least 1)  [x ] 1 or more chronic illnesses with exacerbation, progression, or side effects of treatment  [ ] 2 or more stable chronic illnesses  [ ] 1 undiagnosed new problem with uncertain prognosis  [ ] 1 acute illness with systemic symptoms  [ ] 1 acute complicated injury  1.2 High (At least 1)   [ x] 1 or more chronic illnesses with severe exacerbation, progression, or side effects of treatment  [ ] 1 acute or chronic illnesses or injuries that may pose a threat to life or bodily function    2. Amount and/or Complexity of Data that was Reviewed and Analyzed for this case:       Moderate (1 out of 3)       High (2 out of 3)  2.1. (Any combination of 3 of the following)   [ ] Prior External notes were reviewed  [x ] Each test result was reviewed (see "LABS" and "RADIOLOGY & ADDITIONAL STUDIES" above)  [ x] The following tests were ordered and/or reviewed (Only count 1 point for ordering or reviewing a unique test):  	[x ]CBC  	[ x] Chemistry   	[ x] Imaging   	[ ] Other:   [ ] Assessment requiring an independent historian   		Name of historian and relationship:   2.2  [x ] Personally review and interpretation of  image or testing   2.3  [ ] Discussion of management or test interpretation with external physician/other qualified health care professional\appropriate source (not separately reported)    3. Risk of Complications and/or Morbidity or Mortality of for this Patient’s Management:  3.1 Moderate risk of morbidity from additional diagnostic testing or treatment (At least 1):   [x ] Prescription drug management   [ ] Decision regarding minor surgery, treatment, or procedure with identified patient or procedure risk factors  [ ] Decision regarding elective major surgery, treatment, or procedure without identified patient or procedure risk factors   [ ] Diagnosis or treatment significantly limited by social determinants of health   [ ] Other:   3.2 High risk of morbidity from additional diagnostic testing or treatment (At least 1):   [ ] Drug therapy requiring intensive monitoring for toxicity   [ ] Decision regarding elective major surgery, treatment, or procedure with identified patient or procedure risk factors   [ ] Decision regarding emergency major surgery, treatment, or procedure   [x ] Decision regarding hospitalization or escalation of hospital-level of care  [ ] Decision not to resuscitate, not to intubate, or to de-escalate care because of poor prognosis   [ ] Decision to proceed or not with artificial nutrition   x[ ] Parenteral controlled substance  [ ] Other:

## 2024-07-10 NOTE — PROGRESS NOTE ADULT - SUBJECTIVE AND OBJECTIVE BOX
Interventional Radiology Follow-Up Note    This is a 89y Male s/p Right chest tube placement on 7/1in Interventional Radiology.     S: Patient seen and examined @ bedside. Patient appears to be sleeping comfortably.     Medication:  apixaban: (07-09)  metoprolol tartrate: (07-09)  metoprolol tartrate Injectable: (07-08)  midodrine.: (07-09)  piperacillin/tazobactam IVPB.: (07-08)  piperacillin/tazobactam IVPB.-: (07-08)  piperacillin/tazobactam IVPB..: (07-09)    Vitals:   T(F): 97.5, Max: 98.3 (16:25)  HR: 114  BP: 112/65  RR: 18  SpO2: 91%    Physical Exam:  General: Nontoxic, in NAD, sleeps through interview.   Abdomen: soft, NTND, no peritoneal signs.  Right chest tube c/d/i, to -20 LWS, draining serosanguineous fluid, no air leak.     24hr Drain output: 190ml    LABS:  WBC 29.09 / Hgb 9.7 / Hct 31.5 / Plt 297  Na 146 / K 3.9 / CO2 24 / Cl 104 / BUN 56 / Cr 1.03 / Glucose 115  ALT -- / AST -- / Alk Phos -- / Tbili --  Ptt -- / Pt -- / INR --    Assessment/Plan:      89 yo M w/ PMHx stage II bladder cancer s/p TURBT and chemoradiation, presenting with dyspnea and acute hypoxemic respiratory failure with new onset Afib on Hep gtt. Found to have b/l pleural effusions L > R s/p L diagnostic/therapeutic thoracentesis yielding 2.1L of serosanguinous fluid. Now s/p R thoracentesis removed 1.8L on 6/25 c/b PTX ex vacuo. CTSx placed left chest tube on 6/30.  IR consulted to place right chest tube today.  Currently 97% on NRB.  Increasing WBC noted in pleural fluid on left, now ~1400, neutrophil predominant.  Atypical cells noted on cytology from right pleural fluid. S/p right chest tube placement by IR 7/1/24. Patient now made DNR, pending transfer to palliative care unit. Thoracic surgery consulted for pleurx catheter placement however family refusing as placement is not in line with patient's goals of care.       - continue global management per primary team  - trend vs/labs- currently on 2L NC  - exudative pleural effusion  - culture negative  - cytology pending- follow up results  - maintain Right CT to Suction  - continue daily CXR  - document drainage q12 hours  - Rest of care per primary team      Please call IR at extension [phone] with any questions, concerns, or issues regarding above.       Interventional Radiology Follow-Up Note    This is a 89y Male s/p Right chest tube placement on 7/1in Interventional Radiology.     S: Patient seen and examined @ bedside. Patient appears to be sleeping comfortably.     Medication:  apixaban: (07-09)  metoprolol tartrate: (07-09)  metoprolol tartrate Injectable: (07-08)  midodrine.: (07-09)  piperacillin/tazobactam IVPB.: (07-08)  piperacillin/tazobactam IVPB.-: (07-08)  piperacillin/tazobactam IVPB..: (07-09)    Vitals:   T(F): 97.5, Max: 98.3 (16:25)  HR: 114  BP: 112/65  RR: 18  SpO2: 91%    Physical Exam:  General: Nontoxic, in NAD, sleeps through interview.   Abdomen: soft, NTND, no peritoneal signs.  Right chest tube c/d/i, to -20 LWS, draining serosanguineous fluid, no air leak.     24hr Drain output: 190ml    LABS:  WBC 29.09 / Hgb 9.7 / Hct 31.5 / Plt 297  Na 146 / K 3.9 / CO2 24 / Cl 104 / BUN 56 / Cr 1.03 / Glucose 115  ALT -- / AST -- / Alk Phos -- / Tbili --  Ptt -- / Pt -- / INR --    Assessment/Plan:      89 yo M w/ PMHx stage II bladder cancer s/p TURBT and chemoradiation, presenting with dyspnea and acute hypoxemic respiratory failure with new onset Afib on Hep gtt. Found to have b/l pleural effusions L > R s/p L diagnostic/therapeutic thoracentesis yielding 2.1L of serosanguinous fluid. Now s/p R thoracentesis removed 1.8L on 6/25 c/b PTX ex vacuo. CTSx placed left chest tube on 6/30.  IR consulted to place right chest tube today.  Currently 97% on NRB.  Increasing WBC noted in pleural fluid on left, now ~1400, neutrophil predominant.  Atypical cells noted on cytology from right pleural fluid. S/p right chest tube placement by IR 7/1/24. Patient now made DNR, pending transfer to palliative care unit. Thoracic surgery consulted for pleurx catheter placement however family refusing as placement is not in line with patient's goals of care.       - continue global management per primary team  - trend vs/labs- currently on 2L NC  - exudative pleural effusion  - culture negative  - cytology pending- follow up results  - may transition right chest tube to H2O seal   - continue daily CXR  - document drainage q12 hours  - Rest of care per primary team      Please call IR at extension [phone] with any questions, concerns, or issues regarding above.

## 2024-07-10 NOTE — PROGRESS NOTE ADULT - CONVERSATION/DISCUSSION
Diagnosis/Prognosis/MOLST Discussed/Treatment Options
Diagnosis/Prognosis/Hospice Referral/Palliative Care Referral

## 2024-07-10 NOTE — PROVIDER CONTACT NOTE (OTHER) - NAME OF MD/NP/PA/DO NOTIFIED:
GRACY Gilbert
GRACY- Monisha Sanchez
Abbey Weldon ACP
Vane Snyder, PA
Katharine Parmar ACP
Katharine Parmar ACP
PCU Fellow
Katharine Parmar ACP
Night RUBI Gomez
RUBI Aguilar
Abbey Weldon ACP
CHAIM Noriega
CHAIM Peguero
CHAIM_ Guille Rodriguez

## 2024-07-10 NOTE — PROGRESS NOTE ADULT - PROBLEM SELECTOR PROBLEM 7
Prophylactic measure
Encounter for palliative care
Prophylactic measure
Prophylactic measure
HTN (hypertension)
Bladder cancer
Prophylactic measure
Prophylactic measure

## 2024-07-10 NOTE — PROGRESS NOTE ADULT - PROBLEM SELECTOR PLAN 6
- HCP reportedly nephew Christopher, he will bring in documentation from home  - Code status DNR/I with trial of NIV  - GOC is to transition to full comfort measures and tx to PCU

## 2024-07-10 NOTE — CHART NOTE - NSCHARTNOTEFT_GEN_A_CORE
Discussed with thoracic surgery NP - they are ok with removal of L pigtail by pulmonary as GOC have aligned with full comfort care. Per PCU team, family requesting removal of the bilateral pigtails. L pigtail removed by pulmonary today 16:00, covered with occlusive dressing; right pigtail to be removed by IR tomorrow. Discussed with PCU team. CXR, post-pigtail usual care i.e. cxr, clamping trials etc. deferred at this time as per PCU team given full comfort measures.    Pulmonary will sign off at this time, please call back with questions; we are here to help if needed    Malvin Mc MD  Pulmonary/Critical Care  [phone] [facility]  [address] [facility]

## 2024-07-10 NOTE — PROVIDER CONTACT NOTE (CHANGE IN STATUS NOTIFICATION) - SITUATION
Pt had a pause for 2.7 secs when converting back to NSR. Was notified by tele tech of the conversion back to NSR, but was not notified of the pause, hence why provider was notified later of time of event
pt is NOW a&o1, arousable to name and repeated stimuli

## 2024-07-10 NOTE — CHART NOTE - NSCHARTNOTEFT_GEN_A_CORE
Notified by RN; patient with brownish secretion around chest tube insertion site; patient seen and evaluated at bed side. Bilateral chest tube dressing in place; advised RN that dressing needs to be changed for further assessment to the site; patient currently afebrile. Chest tube dressing change pending     Nuvia Peguero NP  Department of Medicine   Spectra [phone] Notified by RN; patient with brownish secretion around chest tube insertion site; patient seen and evaluated at bed side. Bilateral chest tube dressing in place; advised RN that dressing needs to be changed for further assessment of the site; patient currently afebrile. Chest tube dressing change pending     Nuvia Peguero NP  Department of Medicine   Spectra [phone]

## 2024-07-10 NOTE — PROVIDER CONTACT NOTE (CHANGE IN STATUS NOTIFICATION) - ACTION/TREATMENT ORDERED:
continuous monitoring in progress. call bell within reach and safety maintained.
stat 12 lead ekg ordered. continuous monitoring in progress. call bell within reach and safety maintained.

## 2024-07-10 NOTE — DISCHARGE NOTE FOR THE EXPIRED PATIENT - HOSPITAL COURSE
89F with complex medical hx including bladder ca, and CT showing likely evidence metastatic disease, AFib, bilat pleural effusions c/f parapneumonic vs malignant, hypercalcemia, s/p left chest tube by thoracic, right chest tube by IR. CT c/a/p reveals diffuse metastatic disease. Geriatrics and Palliative Medicine Team is consulted for GOC. The patient was transferred to PCU for symptom management and EOL care. The patient  on 7/10/24 at 21:58.

## 2024-07-10 NOTE — PROVIDER CONTACT NOTE (CHANGE IN STATUS NOTIFICATION) - ASSESSMENT
pt a&o1, arouses to name and repeated stimuli. no nonverbal indicators of chest pain, palpitations, sob at this time
Pt A&O2. VSS on 2L NC. Denies chest pain, palpitations, SOB throughout the shift

## 2024-07-10 NOTE — PROGRESS NOTE ADULT - PROBLEM SELECTOR PROBLEM 4
Functional quadriplegia
Paroxysmal atrial fibrillation
Bladder cancer
Electrolyte imbalance
HTN (hypertension)
HTN (hypertension)
Bladder cancer
Paroxysmal atrial fibrillation
Bladder cancer
Bladder cancer
Paroxysmal atrial fibrillation
HTN (hypertension)
Paroxysmal atrial fibrillation
Cachexia
Bladder cancer
Paroxysmal atrial fibrillation
Bladder cancer
Acute hypoxic respiratory failure

## 2024-07-10 NOTE — PROGRESS NOTE ADULT - PROBLEM SELECTOR PLAN 8
DVT ppx - Eliquis 2.5mg bid    Diet - pureed    Dispo - Pending inpatient hospice    Tentative acceptance to PCU with dilaudid for comfort measures  c/w chest tubes on water seal
-normotensive now , mentating well  -will monitor  - c/w midodrine 15 mg TID

## 2024-07-10 NOTE — PROGRESS NOTE ADULT - PROBLEM SELECTOR PLAN 6
- multiple episodes of AF with RVR with  spontaneous conversion to NSR   - TTE wnl , TSH wnl, Sryar7Tplr 2 (age, gender),   - heparin gtt held for drop in Hgb 9 to 7.7 on 7/3, hgb stable around 9.2  -  cardiology following   -  c/w Eliquis 2.5 mg bid 7/5, hgb has been stable   - c/w  Metoprolol 12.5mg BID with holding parameters.

## 2024-07-10 NOTE — PROVIDER CONTACT NOTE (OTHER) - ASSESSMENT
Patient asymptomatic. BP 91/48, HR 92, SpO2 99% on HFNC, temp98.1
Patient is AOx3, VSS. No c/o chest pain, sob or palpitations.
BP 82/49, HR in 130-140s, Rectal Temp 99.1, SPO2 97% with HFNC. Denies palpitation/CP/SOB.
Patient remains aox4;vss. Tolerating HFNC well. Patient denies any CP, SOB, heart palpitations. Heparin gtt remains running at 15 cc/hr therapeutic x 2.
Pt A&Ox4, able to make needs known. Pt asymptomatic. VSS. No s/s of bleeding. Pt denies cp, denies SOB, denies pain.
Pt. AOX4. VSS. Converted to NSR 's on tele.
BP 82/49, HR in 130-140s, Rectal Temp 99.1, SPO2 97% with HFNC. Denies palpitation/CP/SOB.
patient remains aox4;vss, tolerating HFNC.
VSS. Patient is Sinus rhythm with HR- 80s now. No c/o any chest pain, Palpitations.
Vitals as per flowsheet. A&Ox4. patient not in distress. No visible s/s of bleeding.
Patient remains aox4;vss. Patient does c/o having difficulty taking a deep breath in. RN used Japanese  number [id number]. Patient denies any CP, heart palpitations. Patient currently 91% HR 90 NSR.
Pt A&O1 arousable to name and repeated stimuli. denies chest pain, palpitations, sob at this time
Pt A&Ox3, able to make needs known. Pt asymptomatic. S/p  2.5 ml IVP Lopressor. Pt afib on tele, HR 130s, other VSS. No s/s of bleeding. Pt denies cp, denies SOB, denies pain.
No response to external stimuli  No spontaneous respirations  No apical heart rate  Negative papillary response to light

## 2024-07-10 NOTE — PROGRESS NOTE ADULT - PROBLEM SELECTOR PLAN 1
With evidence of metastatic disease on CT c/a/p this admission. Pt was followed at [facility].  - goal is comfort and transfer to PCU

## 2024-07-10 NOTE — PROVIDER CONTACT NOTE (CHANGE IN STATUS NOTIFICATION) - BACKGROUND
PMHx of stage II bladder cancer s/p TURBT and chemoradiation, h/o going back and forth from Afib NSR  Dx: hypoxemia
PMHx of stage II bladder cancer s/p TURBT and chemoradiation, Afib  Dx: hypoxemia 2/2 pleural effusion s/p b/l chest tubes

## 2024-07-10 NOTE — PROGRESS NOTE ADULT - PROBLEM SELECTOR PLAN 2
Pt required 2 doses of dialudid 0.3 mg ivp and 1 dose of dilaudid 0.2mg ivp in 24 hrs.  Pt appears to have end dose failure upon exam.  Pt unable to quantify but staff able to report improvement initially with administration of 0.3 and increase work of breathing noted symptoms returned at about 2 hrs after administration.   Plan to increase dilaudid to 0.3mg ivp q2 hrs prn.

## 2024-07-10 NOTE — PROVIDER CONTACT NOTE (OTHER) - SITUATION
As per tele tech patient converted from afib to normal sinus rhythm
patient SBP <95. Made provider aware as per parameters
Patient without spontaneous respirations or pulse
While observing patient, patient tachypneic. Currently on HFNC 50/50, oxygen 89%-91%.
Pt's afib on tele, continues to sustain HR 130s, pt asymptomatic
Pt in AF RVR with HR in 130-140s.
Converted to NSR 's as per tele tech Jozef Dobbins.
b/l chest tube dressing moderately saturated with brownish fluid
Patient had PAT- 1.8 second with HR- 179on tele monitor.
Tele alerted RN patient converted back into AFIB sustaining -130s.
Pt converted to afib on tele
patient has stool sample ordered for loose BM; has not had BM overnight or in AM; on zosyn. patient also has multiple order for chest tubes. Requires clarification
Patient's right chest tube drained 450cc (from 900ml to 1350ml) over a 2 hour period (19:30-21:30)
Suspected dti on sacrum observed

## 2024-07-10 NOTE — PROVIDER CONTACT NOTE (OTHER) - REASON
Patient without spontaneous respirations or pulse
b/l chest tube dressing moderately saturated with brownish fluid
Patient had PAT- 1.8 second with HR- 179on tele monitor.
Tele alerted RN patient converted back into AFIB sustaining -130s.
patient has stool sample ordered for loose BM; has not had BM overnight or in AM; on zosyn
Patient converted from afib to NSR
Pt converted to afib on tele
patient SBP <95. Made provider aware as per parameters
AF  RVR
Patient admitted with a suspected DTI located on sacrum
Converted to NSR 's on tele as per tele tech Jozef Dobbins.
Patient's right chest tube drained 450cc (from 900ml to 1350ml) over a 2 hour period (19:30-21:30)
Pt's afib on tele, continues to sustain HR 130s, pt asymptomatic
Patient tachypneic

## 2024-07-11 LAB
COMMENT - FLUIDS: SIGNIFICANT CHANGE UP
GAMMA INTERFERON BACKGROUND BLD IA-ACNC: 0.02 IU/ML — SIGNIFICANT CHANGE UP
M TB IFN-G BLD-IMP: ABNORMAL
M TB IFN-G CD4+ BCKGRND COR BLD-ACNC: 0 IU/ML — SIGNIFICANT CHANGE UP
M TB IFN-G CD4+CD8+ BCKGRND COR BLD-ACNC: 0 IU/ML — SIGNIFICANT CHANGE UP
QUANT TB PLUS MITOGEN MINUS NIL: 0.31 IU/ML — SIGNIFICANT CHANGE UP

## 2024-07-13 LAB
CULTURE RESULTS: SIGNIFICANT CHANGE UP
CULTURE RESULTS: SIGNIFICANT CHANGE UP
SPECIMEN SOURCE: SIGNIFICANT CHANGE UP
SPECIMEN SOURCE: SIGNIFICANT CHANGE UP

## 2024-07-17 LAB — NON-GYNECOLOGICAL CYTOLOGY STUDY: SIGNIFICANT CHANGE UP

## 2024-07-31 ENCOUNTER — APPOINTMENT (OUTPATIENT)
Dept: UROLOGY | Facility: CLINIC | Age: 89
End: 2024-07-31

## 2024-08-06 ENCOUNTER — APPOINTMENT (OUTPATIENT)
Dept: UROLOGY | Facility: CLINIC | Age: 89
End: 2024-08-06

## 2024-08-09 ENCOUNTER — APPOINTMENT (OUTPATIENT)
Dept: RADIATION ONCOLOGY | Facility: CLINIC | Age: 89
End: 2024-08-09

## 2024-09-19 NOTE — PROGRESS NOTE ADULT - SUBJECTIVE AND OBJECTIVE BOX
Department of Cardiology                                                                  [facility]/[place] [address] [id number]                                                            Telephone: [phone]. 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 He reports a decline in activity tolerance with golf. He had a recent echo which suggests moderate to severe MR.      MEDICATIONS:  Home Medications:  cetirizine 10 mg oral tablet: 1 tab(s) orally once a day (19 Sep 2024 14:00)  Claritin 24 Hour Allergy 10 mg oral tablet: 1 tab(s) orally once a day (19 Sep 2024 14:00)  CoQ10 100 mg oral capsule: 1 cap(s) orally once a day (19 Sep 2024 14:00)  Eliquis 5 mg oral tablet: 1 tab(s) orally 2 times a day (19 Sep 2024 14:00)  finasteride 5 mg oral tablet: 1 tab(s) orally once a day (19 Sep 2024 14:00)  furosemide 20 mg oral tablet: 1 tab(s) orally once a day (19 Sep 2024 14:00)  metoprolol succinate 50 mg oral tablet, extended release: 1 tab(s) orally once a day (19 Sep 2024 14:00)  montelukast 10 mg oral tablet: 1 tab(s) orally once a day (19 Sep 2024 14:00)  omega-3 polyunsaturated fatty acids: 1,200 milligram(s) once a day (19 Sep 2024 14:00)  rosuvastatin 5 mg oral capsule: 1 cap(s) orally once a day (at bedtime) (19 Sep 2024 14:00)  Trelegy Ellipta 100 mcg-62.5 mcg-25 mcg/inh inhalation powder: 1 puff(s) inhaled once a day (19 Sep 2024 14:00)  Vitamin C 500 mg oral tablet, chewable: 1 tab(s) chewed once a day (19 Sep 2024 14:00)  zinc (as gluconate) 30 mg oral tablet: 1 tab(s) orally once a day (19 Sep 2024 14:00)              chlorhexidine 4% Liquid 1 Application(s) Topical once          T(C): 36.9 (09-19-24 @ 12:00), Max: 36.9 (09-19-24 @ 12:00)  HR: --  BP: 126/91 (09-19-24 @ 12:00) (126/91 - 126/91)  RR: 15 (09-19-24 @ 12:00) (15 - 15)  SpO2: 98% (09-19-24 @ 12:00) (98% - 98%)  Wt(kg): --    I&O's Summary      Daily Height in cm: 182.88 (19 Sep 2024 12:00)    Daily       TELEMETRY: Paced	        DIAGNOSTIC TESTING:    ASSESSMENT: s/p DAMON which revealed EF < 20% and severe MR    -Post DAMON orders per protocol  -Post DAMON montioring per protocol  -Formal DAMON report pending  -DC home and follow-up with Dr. Calle   Jeff Bertrand MD  Internal Medicine  Available on TEAMS      Patient is a 89y old  Male who presents with a chief complaint of SOB (23 Jun 2024 11:48)      SUBJECTIVE / OVERNIGHT EVENTS:    No acute issues. some difficulty sleeping and with appetite.     No chest pain or palpitation. no dyspnea.     ADDITIONAL REVIEW OF SYSTEMS:    As above.     MEDICATIONS  (STANDING):  ascorbic acid 500 milliGRAM(s) Oral daily  chlorhexidine 2% Cloths 1 Application(s) Topical daily  heparin  Infusion. 1500 Unit(s)/Hr (15 mL/Hr) IV Continuous <Continuous>  multivitamin/minerals 1 Tablet(s) Oral daily  pantoprazole    Tablet 40 milliGRAM(s) Oral before breakfast  sodium chloride 0.9%. 500 milliLiter(s) (100 mL/Hr) IV Continuous <Continuous>  tamsulosin 0.4 milliGRAM(s) Oral at bedtime    MEDICATIONS  (PRN):  acetaminophen     Tablet .. 650 milliGRAM(s) Oral every 6 hours PRN Temp greater or equal to 38C (100.4F), Mild Pain (1 - 3)  albuterol/ipratropium for Nebulization 3 milliLiter(s) Nebulizer every 6 hours PRN Shortness of Breath and/or Wheezing  aluminum hydroxide/magnesium hydroxide/simethicone Suspension 30 milliLiter(s) Oral every 4 hours PRN Dyspepsia  heparin   Injectable 5500 Unit(s) IV Push every 6 hours PRN For aPTT less than 40  heparin   Injectable 2500 Unit(s) IV Push every 6 hours PRN For aPTT between 40 - 57      CAPILLARY BLOOD GLUCOSE        I&O's Summary    22 Jun 2024 07:01  -  23 Jun 2024 07:00  --------------------------------------------------------  IN: 590 mL / OUT: 1000 mL / NET: -410 mL    23 Jun 2024 07:01  -  23 Jun 2024 14:20  --------------------------------------------------------  IN: 400 mL / OUT: 150 mL / NET: 250 mL        PHYSICAL EXAM:  Vital Signs Last 24 Hrs  T(C): 36.4 (23 Jun 2024 11:19), Max: 36.5 (23 Jun 2024 04:00)  T(F): 97.6 (23 Jun 2024 11:19), Max: 97.7 (23 Jun 2024 04:00)  HR: 92 (23 Jun 2024 11:32) (92 - 96)  BP: 103/62 (23 Jun 2024 11:32) (100/63 - 103/62)  BP(mean): --  RR: 20 (23 Jun 2024 11:19) (18 - 20)  SpO2: 96% (23 Jun 2024 11:19) (95% - 99%)    Parameters below as of 23 Jun 2024 11:19  Patient On (Oxygen Delivery Method): nasal cannula, high flow    CONSTITUTIONAL: NAD, well-developed, well-groomed  RESPIRATORY: Normal respiratory effort; decreased breath sounds on the Left, Rales on the right, thoracentesis site is c/d/i  CARDIOVASCULAR: Regular rate and rhythm, normal S1 and S2, no murmur/rub/gallop; No lower extremity edema; Peripheral pulses are 2+ bilaterally  ABDOMEN: Nontender to palpation, normoactive bowel sounds, no rebound/guarding; No hepatosplenomegaly  PSYCH: A+O to person, place, and time; affect appropriate  SKIN: No rashes; no palpable lesions          LABS:                        10.9   15.26 )-----------( 352      ( 23 Jun 2024 06:35 )             32.7     06-23    127<L>  |  89<L>  |  24<H>  ----------------------------<  98  3.9   |  20<L>  |  0.92    Ca    9.3      23 Jun 2024 06:36    TPro  7.4  /  Alb  3.0<L>  /  TBili  0.4  /  DBili  x   /  AST  28  /  ALT  48<H>  /  AlkPhos  89  06-22    PT/INR - ( 22 Jun 2024 17:19 )   PT: 12.5 sec;   INR: 1.20 ratio         PTT - ( 23 Jun 2024 13:40 )  PTT:74.3 sec      Urinalysis Basic - ( 23 Jun 2024 06:36 )    Color: x / Appearance: x / SG: x / pH: x  Gluc: 98 mg/dL / Ketone: x  / Bili: x / Urobili: x   Blood: x / Protein: x / Nitrite: x   Leuk Esterase: x / RBC: x / WBC x   Sq Epi: x / Non Sq Epi: x / Bacteria: x        Culture - Body Fluid with Gram Stain (collected 22 Jun 2024 18:10)  Source: Pleural Fl Pleural Fluid  Gram Stain (23 Jun 2024 03:03):    polymorphonuclear leukocytes seen    No organisms seen    by cytocentrifuge    Culture - Fungal, Body Fluid (collected 22 Jun 2024 18:09)  Source: Pleural Fl Pleural Fluid  Preliminary Report (23 Jun 2024 08:05):    Testing in progress        RADIOLOGY & ADDITIONAL TESTS:  Results Reviewed:   Imaging Personally Reviewed:  Electrocardiogram Personally Reviewed:    COORDINATION OF CARE:  Care Discussed with Consultants/Other Providers [Y/N]:   Prior or Outpatient Records Reviewed [Y/N]:

## 2024-10-28 NOTE — PROVIDER CONTACT NOTE (CHANGE IN STATUS NOTIFICATION) - RECOMMENDATIONS
Medicine Refill Request    Last Office Visit: 5/20/2024   Last Consult Visit: Visit date not found  Last Telemedicine Visit: 1/25/2021 Emily Sullivan MD    Next Appointment: 11/29/2024      Current Outpatient Medications:     escitalopram (LEXAPRO) 20 mg tablet, Take 1 tablet (20 mg total) by mouth daily., Disp: 90 tablet, Rfl: 3    hydrochlorothiazide (HYDRODIURIL) 25 mg tablet, TAKE 1 TABLET (25 MG TOTAL) BY MOUTH DAILY., Disp: 90 tablet, Rfl: 3    ALPRAZolam (XANAX) 0.25 mg tablet, Take 1 tablet (0.25 mg total) by mouth 2 (two) times a day as needed for anxiety., Disp: 60 tablet, Rfl: 0    blood-glucose meter (PRECISION XTRA MONITOR) misc, 1 strip 2 (two) times a day., Disp: 1 each, Rfl: 0    fentaNYL (DURAGESIC) 25 mcg/hr, Place 1 patch on the skin See admin instr. Apply 1 patch topically every 72 hours. Use with 12mcg, for 37 mcg total., Disp: 10 patch, Rfl: 0    FREESTYLE THOM 2 SENSOR kit, PLACE 1 DEVICE ON THE SKIN EVERY 2 WEEKS., Disp: , Rfl:     gabapentin (NEURONTIN) 300 mg capsule, Take 300 mg by mouth as needed., Disp: , Rfl:     lancets (freestyle) 28 gauge misc, 1 strip 2 (two) times a day., Disp: 100 each, Rfl: 3    lansoprazole (PREVACID) 30 mg capsule, TAKE 1 CAPSULE BY MOUTH DAILY BEFORE BREAKFAST., Disp: 90 capsule, Rfl: 1    levothyroxine (SYNTHROID) 50 mcg tablet, Take 1 tablet (50 mcg total) by mouth daily., Disp: 90 tablet, Rfl: 3    metoprolol succinate XL (TOPROL-XL) 100 mg 24 hr tablet, Take 1 tablet (100 mg total) by mouth daily., Disp: 90 tablet, Rfl: 3    olmesartan (BENICAR) 40 mg tablet, Take 1 tablet (40 mg total) by mouth daily., Disp: 90 tablet, Rfl: 3    oxyCODONE (OXY-IR) 5 mg capsule, TAKE 1-2 CAPSULES BY MOUTH EVERY 8 HOURS AS NEEDED PAIN, Disp: , Rfl:     rosuvastatin (CRESTOR) 5 mg tablet, Take 1 tablet (5 mg total) by mouth daily., Disp: 90 tablet, Rfl: 1    semaglutide (OZEMPIC) 0.25 mg or 0.5 mg(2 mg/1.5 mL) pen injector, Inject 0.25 mg under skin once a week for 4 
weeks, then increase to 0.5 mg once a week., Disp: , Rfl:       BP Readings from Last 3 Encounters:   05/20/24 124/70   02/05/24 130/72   11/06/23 140/82       Recent Lab results:  Lab Results   Component Value Date    CHOL 220 (H) 05/18/2024   ,   Lab Results   Component Value Date    HDL 38 (L) 05/18/2024   ,   Lab Results   Component Value Date    LDLCALC 133 (H) 05/18/2024   ,   Lab Results   Component Value Date    TRIG 276 (H) 05/18/2024        Lab Results   Component Value Date    GLUCOSE 119 (H) 05/18/2024   ,   Lab Results   Component Value Date    HGBA1C 7.7 (H) 05/18/2024         Lab Results   Component Value Date    CREATININE 0.62 05/18/2024       Lab Results   Component Value Date    TSH 7.560 (H) 05/18/2024           Lab Results   Component Value Date    HGBA1C 7.7 (H) 05/18/2024     
provider made aware.
provider made aware

## 2024-12-23 NOTE — CONSULT NOTE ADULT - ASSESSMENT
Mrs. Whitaker has not read my message sent via [employer]. Therefore, forwarded this to her email on Friday, 12/20. Mrs. Whitaker expressed appreciation, explaining she's not been able to access her [employer] account for some reason. Provided her with the [facility] [employer] help desk contact numbers for further assistance if needed. [facility] support remains available.   89 y/o man, complex medical hx including bladder ca, and CT showing likely evidence met dz, AF, bl pleural effusions being tx as parapneumonic ?malig as well, hypercalcemia, post left ct by thoracic and now post right ct by IR. exudative and bloody. low glucose, high pH.  Clinically he is improving on the zosyn and with drainage now on nasal cannula.  CT reveals likely metastatic disease.  Cultures pending.  Palliative care called for GOC.

## 2025-04-25 NOTE — PROGRESS NOTE ADULT - PROBLEM SELECTOR PLAN 3
Significant hyponatremia to 120 on admission. Pt has previously been hyponatremic down to 130 however last month, Na was 136.  - Urine studies sent - low Eleanor and high Uosm suggesting low EABV;   - hold further lasix  - Na improving  - Goal 6-8 meq/24hrs No

## (undated) DEVICE — PREP BETADINE KIT

## (undated) DEVICE — SOL IRR BAG H2O 3000ML

## (undated) DEVICE — TUBING RANGER FLUID IRRIGATION SET DISP

## (undated) DEVICE — BAG URINE W METER 2L

## (undated) DEVICE — GLV 6 PROTEXIS (WHITE)

## (undated) DEVICE — SOL IRR POUR H2O 1500ML

## (undated) DEVICE — DRAPE LINGEMAN TUR

## (undated) DEVICE — ELCTR CUTTING LOOP 24FR 12 DEG 0.35 WIRE

## (undated) DEVICE — FOLEY CATH 2-WAY 20F 5CC LATEX LUBRICATH

## (undated) DEVICE — GOWN TRIMAX LG

## (undated) DEVICE — ACMI SELF-SEALING SEAL UP TO 7FR

## (undated) DEVICE — PACK CYSTO

## (undated) DEVICE — POSITIONER FOAM HEADREST (PINK)

## (undated) DEVICE — SYR IRRIGATION PISTON 60CC

## (undated) DEVICE — ELCTR PLASMA BUTTON OVAL 24FR 12-30 DEG

## (undated) DEVICE — WARMING BLANKET UPPER ADULT

## (undated) DEVICE — SOL IRR BAG NS 0.9% 3000ML

## (undated) DEVICE — ELCTR GROUNDING PAD ADULT COVIDIEN

## (undated) DEVICE — VENODYNE/SCD SLEEVE CALF LARGE

## (undated) DEVICE — SYR ASEPTO

## (undated) DEVICE — ELCTR PLASMA LOOP MEDIUM ANGLED 24FR 12-30 DEG

## (undated) DEVICE — POSITIONER FOAM EGG CRATE ULNAR 2PCS (PINK)

## (undated) DEVICE — ELCTR PLASMA ROLLER 24FR 12-30 DEG